# Patient Record
Sex: MALE | Race: WHITE | Employment: UNEMPLOYED | URBAN - NONMETROPOLITAN AREA
[De-identification: names, ages, dates, MRNs, and addresses within clinical notes are randomized per-mention and may not be internally consistent; named-entity substitution may affect disease eponyms.]

---

## 2017-08-27 ENCOUNTER — HOSPITAL ENCOUNTER (EMERGENCY)
Age: 27
Discharge: HOME | End: 2017-08-27

## 2017-08-27 VITALS
WEIGHT: 165 LBS | RESPIRATION RATE: 16 BRPM | TEMPERATURE: 99 F | DIASTOLIC BLOOD PRESSURE: 68 MMHG | OXYGEN SATURATION: 97 % | SYSTOLIC BLOOD PRESSURE: 136 MMHG | HEART RATE: 68 BPM | BODY MASS INDEX: 23.1 KG/M2 | HEIGHT: 71 IN

## 2017-08-27 DIAGNOSIS — K52.9 ACUTE GASTROENTERITIS: Primary | ICD-10-CM

## 2017-08-27 PROCEDURE — 99212 OFFICE O/P EST SF 10 MIN: CPT | Performed by: NURSE PRACTITIONER

## 2017-08-27 PROCEDURE — 99212 OFFICE O/P EST SF 10 MIN: CPT

## 2017-08-27 ASSESSMENT — PAIN SCALES - GENERAL: PAINLEVEL_OUTOF10: 2

## 2017-08-27 ASSESSMENT — ENCOUNTER SYMPTOMS
NAUSEA: 1
COUGH: 0
RHINORRHEA: 0
WHEEZING: 0
EYE DISCHARGE: 0
CONSTIPATION: 0
ABDOMINAL PAIN: 0
BACK PAIN: 0
COLOR CHANGE: 0
EYE PAIN: 0
DIARRHEA: 1
VOMITING: 1
SHORTNESS OF BREATH: 0
STRIDOR: 0
SORE THROAT: 0

## 2017-08-27 ASSESSMENT — PAIN DESCRIPTION - LOCATION: LOCATION: ABDOMEN

## 2017-12-11 ENCOUNTER — HOSPITAL ENCOUNTER (EMERGENCY)
Age: 27
Discharge: HOME | End: 2017-12-11

## 2017-12-11 VITALS
HEIGHT: 71 IN | WEIGHT: 180 LBS | HEART RATE: 76 BPM | SYSTOLIC BLOOD PRESSURE: 122 MMHG | BODY MASS INDEX: 25.2 KG/M2 | OXYGEN SATURATION: 98 % | RESPIRATION RATE: 16 BRPM | DIASTOLIC BLOOD PRESSURE: 72 MMHG | TEMPERATURE: 98.1 F

## 2017-12-11 DIAGNOSIS — K52.9 GASTROENTERITIS: Primary | ICD-10-CM

## 2017-12-11 DIAGNOSIS — H65.01 RIGHT ACUTE SEROUS OTITIS MEDIA, RECURRENCE NOT SPECIFIED: ICD-10-CM

## 2017-12-11 PROCEDURE — 99213 OFFICE O/P EST LOW 20 MIN: CPT | Performed by: NURSE PRACTITIONER

## 2017-12-11 PROCEDURE — 99213 OFFICE O/P EST LOW 20 MIN: CPT

## 2017-12-11 PROCEDURE — 6360000002 HC RX W HCPCS: Performed by: NURSE PRACTITIONER

## 2017-12-11 RX ORDER — ONDANSETRON 4 MG/1
4 TABLET, ORALLY DISINTEGRATING ORAL EVERY 8 HOURS PRN
Qty: 15 TABLET | Refills: 0 | Status: SHIPPED | OUTPATIENT
Start: 2017-12-11 | End: 2017-12-16

## 2017-12-11 RX ORDER — ONDANSETRON 4 MG/1
4 TABLET, ORALLY DISINTEGRATING ORAL ONCE
Status: COMPLETED | OUTPATIENT
Start: 2017-12-11 | End: 2017-12-11

## 2017-12-11 RX ORDER — ZINC OXIDE 13 %
1 CREAM (GRAM) TOPICAL 2 TIMES DAILY
Qty: 30 CAPSULE | Refills: 0 | Status: SHIPPED | OUTPATIENT
Start: 2017-12-11 | End: 2017-12-26

## 2017-12-11 RX ORDER — AMOXICILLIN 500 MG/1
500 CAPSULE ORAL 3 TIMES DAILY
Qty: 30 CAPSULE | Refills: 0 | Status: SHIPPED | OUTPATIENT
Start: 2017-12-14 | End: 2017-12-24

## 2017-12-11 RX ADMIN — ONDANSETRON 4 MG: 4 TABLET, ORALLY DISINTEGRATING ORAL at 10:05

## 2017-12-11 ASSESSMENT — ENCOUNTER SYMPTOMS
COUGH: 1
SHORTNESS OF BREATH: 0
RHINORRHEA: 1
APNEA: 0
DIARRHEA: 0
STRIDOR: 0
WHEEZING: 0
CHOKING: 0
NAUSEA: 1
CHEST TIGHTNESS: 0
ABDOMINAL PAIN: 0
SORE THROAT: 0

## 2017-12-11 ASSESSMENT — PAIN DESCRIPTION - LOCATION: LOCATION: EAR

## 2017-12-11 ASSESSMENT — PAIN DESCRIPTION - ORIENTATION: ORIENTATION: RIGHT

## 2017-12-11 ASSESSMENT — PAIN SCALES - GENERAL: PAINLEVEL_OUTOF10: 5

## 2017-12-11 ASSESSMENT — PAIN DESCRIPTION - PAIN TYPE: TYPE: ACUTE PAIN

## 2017-12-11 NOTE — ED PROVIDER NOTES
ALBUTEROL SULFATE  (90 BASE) MCG/ACT INHALER    Inhale 2 puffs into the lungs every 6 hours as needed for Wheezing       ALLERGIES     Patient is has No Known Allergies. FAMILY HISTORY     Patient's family history includes Heart Disease in his father. SOCIAL HISTORY     Patient  reports that he has been smoking Cigarettes. He has a 5.00 pack-year smoking history. His smokeless tobacco use includes Chew. He reports that he does not drink alcohol or use drugs. PHYSICAL EXAM     ED TRIAGE VITALS  BP: 122/72, Temp: 98.1 °F (36.7 °C), Pulse: 76, Resp: 16, SpO2: 98 %  Physical Exam   Constitutional: He is oriented to person, place, and time. He appears well-developed and well-nourished. No distress. HENT:   Head: Normocephalic and atraumatic. Right Ear: Hearing, external ear and ear canal normal. There is hemotympanum. Left Ear: Hearing, external ear and ear canal normal. Tympanic membrane is retracted. Nose: Mucosal edema and rhinorrhea present. Mouth/Throat: Uvula is midline and mucous membranes are normal. Posterior oropharyngeal edema and posterior oropharyngeal erythema present. Eyes: Conjunctivae and EOM are normal.   Neck: Normal range of motion. Cardiovascular: Normal rate, regular rhythm and normal heart sounds. Exam reveals no gallop and no friction rub. No murmur heard. Pulmonary/Chest: Effort normal and breath sounds normal. No respiratory distress. He has no wheezes. He has no rales. He exhibits no tenderness. Abdominal: Soft. Normal appearance and bowel sounds are normal. He exhibits no distension and no mass. There is tenderness in the left lower quadrant. There is no rigidity, no rebound and no guarding. Musculoskeletal: Normal range of motion. Neurological: He is alert and oriented to person, place, and time. Skin: Skin is warm and dry. He is not diaphoretic. Psychiatric: He has a normal mood and affect.  His behavior is normal. Judgment and thought content normal.   Nursing note and vitals reviewed. DIAGNOSTIC RESULTS   Labs:No results found for this visit on 12/11/17. IMAGING:  No orders to display     URGENT CARE COURSE:     Vitals:    12/11/17 0951   BP: 122/72   Pulse: 76   Resp: 16   Temp: 98.1 °F (36.7 °C)   TempSrc: Oral   SpO2: 98%   Weight: 180 lb (81.6 kg)   Height: 5' 11\" (1.803 m)       Medications   ondansetron (ZOFRAN-ODT) disintegrating tablet 4 mg (4 mg Oral Given 12/11/17 1005)     PROCEDURES:  None  FINAL IMPRESSION      1. Gastroenteritis    2. Right acute serous otitis media, recurrence not specified        DISPOSITION/PLAN      These symptoms are consistent with viral gastroenteritis. I recommend Clear Liquids only next 12-24 hours. If tolerated then BRAT Diet . Advise the patient that if worsening symptoms such as more than 6 stools per day, not voiding regularly, persistent vomiting not relieved with medication,unable to take oral fluids, high fever, severe weakness, lightheadedness or fainting, dry mucous membranes or other signs of dehydration, persisting or increasing abdominal pain, blood in stool or vomit, or failure to improve in 1-2 days. The patient needs to be reevaluated at that time by their primary care provider for a recheck, OR go the Emergency Department for reevaluation. The patient or patient's representative are agreeable to the treatment plan and left ambulatory without any complaints at this time. The parent or patient representative was advised that at this point the patient can be treated safely at home, the parent or Patient representative should be aware of following interventions and  advised to the watch for the following:  #1. Any increasing pain not controlled with Motrin or Tylenol. #2. Any development of drainage from the ears redness of the auricle or posterior ear.   #3.  Her development of the any fever chills, headache or stiffness of the neck the patient needs to be reevaluated by the primary care provider, return here or go to the emergency department for reevaluation. The patient or patient's representative are agreeable to the outpatient management at this time. They are advised to follow-up with her primary care provider in 2-3 days for reevaluation. The patient left ambulatory without any problems. PATIENT REFERRED TO:  Kristi Martínez CNP  [address] [facility]  [address]  [place] [address]  [phone]    Schedule an appointment as soon as possible for a visit   If symptoms worsen    DISCHARGE MEDICATIONS:  New Prescriptions    AMOXICILLIN (AMOXIL) 500 MG CAPSULE    Take 1 capsule by mouth 3 times daily for 10 days    ONDANSETRON (ZOFRAN ODT) 4 MG DISINTEGRATING TABLET    Take 1 tablet by mouth every 8 hours as needed for Nausea    PROBIOTIC PRODUCT (PROBIOTIC DAILY) CAPS    Take 1 capsule by mouth 2 times daily for 15 days    SODIUM CHLORIDE (AYR SALINE NASAL DROPS) 0.65 % (SOLN) SOLN NASAL DROPS    2 drops by Each Nare route as needed (congestion)     Current Discharge Medication List          MEENAKSHI Massey NP  12/11/17 Donald Deputado [facility] 136 Kristi Martínez NP  12/11/17 1012

## 2018-01-11 ENCOUNTER — TELEPHONE (OUTPATIENT)
Dept: INTERNAL MEDICINE CLINIC | Age: 28
End: 2018-01-11

## 2018-03-03 ENCOUNTER — HOSPITAL ENCOUNTER (EMERGENCY)
Age: 28
Discharge: HOME | End: 2018-03-03

## 2018-03-03 VITALS
BODY MASS INDEX: 26.05 KG/M2 | SYSTOLIC BLOOD PRESSURE: 132 MMHG | HEART RATE: 61 BPM | RESPIRATION RATE: 16 BRPM | OXYGEN SATURATION: 98 % | TEMPERATURE: 98.9 F | HEIGHT: 70 IN | DIASTOLIC BLOOD PRESSURE: 84 MMHG | WEIGHT: 182 LBS

## 2018-03-03 DIAGNOSIS — H66.001 ACUTE SUPPURATIVE OTITIS MEDIA OF RIGHT EAR WITHOUT SPONTANEOUS RUPTURE OF TYMPANIC MEMBRANE, RECURRENCE NOT SPECIFIED: Primary | ICD-10-CM

## 2018-03-03 PROCEDURE — 99214 OFFICE O/P EST MOD 30 MIN: CPT | Performed by: NURSE PRACTITIONER

## 2018-03-03 PROCEDURE — 99212 OFFICE O/P EST SF 10 MIN: CPT

## 2018-03-03 RX ORDER — AMOXICILLIN 500 MG/1
500 CAPSULE ORAL 2 TIMES DAILY
Qty: 20 CAPSULE | Refills: 0 | Status: SHIPPED | OUTPATIENT
Start: 2018-03-03 | End: 2018-03-13

## 2018-03-03 ASSESSMENT — PAIN DESCRIPTION - LOCATION: LOCATION: EAR

## 2018-03-03 ASSESSMENT — PAIN SCALES - GENERAL: PAINLEVEL_OUTOF10: 7

## 2018-03-03 ASSESSMENT — PAIN DESCRIPTION - ORIENTATION: ORIENTATION: RIGHT

## 2018-03-06 ASSESSMENT — ENCOUNTER SYMPTOMS
SHORTNESS OF BREATH: 0
VOMITING: 0
SORE THROAT: 0
RHINORRHEA: 0
VOICE CHANGE: 0
WHEEZING: 0
DIARRHEA: 0
COUGH: 1
SINUS PRESSURE: 0
TROUBLE SWALLOWING: 0
CHEST TIGHTNESS: 0
STRIDOR: 0

## 2018-03-06 NOTE — ED PROVIDER NOTES
(SUDAFED 24 HOUR NON-DROWSY) 240 MG TB24 extended release tablet Take 1 tablet by mouth daily as needed (congestion) Daily for nasal congestion, Disp-20 tablet, R-0Normal           Discharge Medication List as of 3/3/2018  1:55 PM          Patient given educational materials - see patient instructions. Discussed use, benefit, and side effects of prescribed medications. All patient questions answered. Pt voiced understanding. Reviewed health maintenance. Patient agreed with treatment plan. Follow up as directed.      Radha Akins, [address] [place] [address], [facility]  03/06/18 0177

## 2018-05-31 ENCOUNTER — HOSPITAL ENCOUNTER (EMERGENCY)
Age: 28
Discharge: HOME | End: 2018-05-31

## 2018-05-31 VITALS
HEART RATE: 84 BPM | OXYGEN SATURATION: 96 % | DIASTOLIC BLOOD PRESSURE: 75 MMHG | WEIGHT: 179 LBS | HEIGHT: 70 IN | RESPIRATION RATE: 16 BRPM | SYSTOLIC BLOOD PRESSURE: 137 MMHG | TEMPERATURE: 98.4 F | BODY MASS INDEX: 25.62 KG/M2

## 2018-05-31 DIAGNOSIS — S46.912A STRAIN OF LEFT SHOULDER, INITIAL ENCOUNTER: Primary | ICD-10-CM

## 2018-05-31 PROCEDURE — 99212 OFFICE O/P EST SF 10 MIN: CPT

## 2018-05-31 PROCEDURE — 96372 THER/PROPH/DIAG INJ SC/IM: CPT

## 2018-05-31 PROCEDURE — A4565 SLINGS: HCPCS

## 2018-05-31 PROCEDURE — 99212 OFFICE O/P EST SF 10 MIN: CPT | Performed by: NURSE PRACTITIONER

## 2018-05-31 RX ORDER — NAPROXEN 500 MG/1
500 TABLET ORAL 2 TIMES DAILY WITH MEALS
Qty: 30 TABLET | Refills: 0 | Status: SHIPPED | OUTPATIENT
Start: 2018-05-31 | End: 2018-11-05 | Stop reason: ALTCHOICE

## 2018-05-31 RX ORDER — CYCLOBENZAPRINE HCL 10 MG
10 TABLET ORAL 3 TIMES DAILY PRN
Qty: 15 TABLET | Refills: 0 | Status: SHIPPED | OUTPATIENT
Start: 2018-05-31 | End: 2018-08-27 | Stop reason: ALTCHOICE

## 2018-05-31 ASSESSMENT — PAIN DESCRIPTION - LOCATION: LOCATION: SHOULDER

## 2018-05-31 ASSESSMENT — PAIN SCALES - GENERAL: PAINLEVEL_OUTOF10: 2

## 2018-05-31 ASSESSMENT — ENCOUNTER SYMPTOMS
SHORTNESS OF BREATH: 0
CHEST TIGHTNESS: 0
COLOR CHANGE: 0

## 2018-08-27 ENCOUNTER — HOSPITAL ENCOUNTER (EMERGENCY)
Dept: GENERAL RADIOLOGY | Age: 28
Discharge: HOME | End: 2018-08-27

## 2018-08-27 ENCOUNTER — HOSPITAL ENCOUNTER (EMERGENCY)
Age: 28
Discharge: HOME | End: 2018-08-27

## 2018-08-27 VITALS
HEIGHT: 70 IN | BODY MASS INDEX: 25.05 KG/M2 | HEART RATE: 72 BPM | TEMPERATURE: 98.6 F | RESPIRATION RATE: 16 BRPM | WEIGHT: 175 LBS | OXYGEN SATURATION: 97 % | SYSTOLIC BLOOD PRESSURE: 143 MMHG | DIASTOLIC BLOOD PRESSURE: 85 MMHG

## 2018-08-27 DIAGNOSIS — S61.215A LACERATION OF LEFT RING FINGER WITHOUT FOREIGN BODY WITHOUT DAMAGE TO NAIL, INITIAL ENCOUNTER: Primary | ICD-10-CM

## 2018-08-27 PROCEDURE — 12001 RPR S/N/AX/GEN/TRNK 2.5CM/<: CPT

## 2018-08-27 PROCEDURE — 2500000003 HC RX 250 WO HCPCS

## 2018-08-27 PROCEDURE — 2709999900 HC NON-CHARGEABLE SUPPLY

## 2018-08-27 PROCEDURE — L3933 FO W/O JOINTS CF: HCPCS

## 2018-08-27 PROCEDURE — 99214 OFFICE O/P EST MOD 30 MIN: CPT | Performed by: NURSE PRACTITIONER

## 2018-08-27 PROCEDURE — 99213 OFFICE O/P EST LOW 20 MIN: CPT

## 2018-08-27 PROCEDURE — 12001 RPR S/N/AX/GEN/TRNK 2.5CM/<: CPT | Performed by: NURSE PRACTITIONER

## 2018-08-27 PROCEDURE — 64450 NJX AA&/STRD OTHER PN/BRANCH: CPT

## 2018-08-27 PROCEDURE — 73140 X-RAY EXAM OF FINGER(S): CPT

## 2018-08-27 RX ORDER — LIDOCAINE HYDROCHLORIDE 20 MG/ML
INJECTION, SOLUTION INFILTRATION; PERINEURAL
Status: COMPLETED
Start: 2018-08-27 | End: 2018-08-27

## 2018-08-27 RX ORDER — GINSENG 100 MG
CAPSULE ORAL
Qty: 1 TUBE | Refills: 0 | Status: SHIPPED | OUTPATIENT
Start: 2018-08-27 | End: 2018-11-05 | Stop reason: ALTCHOICE

## 2018-08-27 RX ORDER — DIAPER,BRIEF,INFANT-TODD,DISP
EACH MISCELLANEOUS ONCE
Status: COMPLETED | OUTPATIENT
Start: 2018-08-27 | End: 2018-08-27

## 2018-08-27 RX ORDER — DIAPER,BRIEF,INFANT-TODD,DISP
EACH MISCELLANEOUS
Status: DISCONTINUED
Start: 2018-08-27 | End: 2018-08-27 | Stop reason: HOSPADM

## 2018-08-27 RX ADMIN — Medication: at 11:54

## 2018-08-27 RX ADMIN — LIDOCAINE HYDROCHLORIDE: 20 INJECTION, SOLUTION INFILTRATION; PERINEURAL at 11:57

## 2018-08-27 ASSESSMENT — ENCOUNTER SYMPTOMS
DIARRHEA: 0
VOMITING: 0
COLOR CHANGE: 0
NAUSEA: 0
SHORTNESS OF BREATH: 0

## 2018-08-27 ASSESSMENT — PAIN SCALES - GENERAL: PAINLEVEL_OUTOF10: 3

## 2018-08-27 ASSESSMENT — PAIN DESCRIPTION - PAIN TYPE: TYPE: ACUTE PAIN

## 2018-08-27 ASSESSMENT — PAIN DESCRIPTION - ORIENTATION: ORIENTATION: LEFT

## 2018-08-27 ASSESSMENT — PAIN DESCRIPTION - DESCRIPTORS: DESCRIPTORS: ACHING

## 2018-08-27 ASSESSMENT — PAIN DESCRIPTION - LOCATION: LOCATION: FINGER (COMMENT WHICH ONE)

## 2018-08-27 NOTE — ED PROVIDER NOTES
Ganes[facility] L[facility]  Urgent Care Encounter       CHIEF COMPLAINT       Chief Complaint   Patient presents with    Finger Injury     Left, 4th digit       Nurses Notes reviewed and I agree except as noted in the HPI. HISTORY OF PRESENT ILLNESS   Lilly Zamudio is a 33 y.o. male who presents For complaints of a left ring finger laceration. The patient dropped an item on it at work. He denies decrease in range of motion. He states that this is not a contaminated wound. He denies numbness or tingling, decreased sensation, decreased range of motion. His last tetanus shot was 4 months ago. HPI    REVIEW OF SYSTEMS     Review of Systems   Constitutional: Negative for activity change, appetite change, chills and fever. Respiratory: Negative for shortness of breath. Cardiovascular: Negative for chest pain. Gastrointestinal: Negative for diarrhea, nausea and vomiting. Skin: Positive for wound (Left ring finger). Negative for color change, pallor and rash. PAST MEDICAL HISTORY         Diagnosis Date    Asthma     Depression     Heart murmur     Multiple allergies        SURGICAL HISTORY     Patient  has a past surgical history that includes joint replacement (Left, 2011) and joint replacement. CURRENT MEDICATIONS       Discharge Medication List as of 8/27/2018 11:43 AM      CONTINUE these medications which have NOT CHANGED    Details   naproxen (NAPROSYN) 500 MG tablet Take 1 tablet by mouth 2 times daily (with meals), Disp-30 tablet, R-0Normal             ALLERGIES     Patient is has No Known Allergies. Patients   There is no immunization history on file for this patient. FAMILY HISTORY     Patient's family history includes Heart Disease in his father. SOCIAL HISTORY     Patient  reports that he has been smoking Cigarettes. He has a 5.00 pack-year smoking history. His smokeless tobacco use includes Chew.  He reports that he does not drink alcohol or use Other 8/27/18 1157)   bacitracin zinc ointment ( Topical Given 8/27/18 1150)            PROCEDURES:  Lac Repair  Date/Time: 8/27/2018 12:02 PM  Performed by: Ethel Koehler  Authorized by: Ethel Koehler     Consent:     Consent obtained:  Verbal    Consent given by:  Patient    Risks discussed:  Infection, need for additional repair and poor cosmetic result  Universal protocol:     Procedure explained and questions answered to patient or proxy's satisfaction: yes      Relevant documents present and verified: yes      Test results available and properly labeled: yes      Imaging studies available: yes      Required blood products, implants, devices, and special equipment available: yes      Site/side marked: yes      Immediately prior to procedure, a time out was called: yes      Patient identity confirmed:  Verbally with patient and arm band  Anesthesia (see MAR for exact dosages):      Anesthesia method:  Nerve block    Block location:  Left ring finger    Block needle gauge:  25 G    Block anesthetic:  Lidocaine 2% w/o epi    Block technique:  Transthecal finger block    Block injection procedure:  Anatomic landmarks identified, introduced needle, incremental injection, negative aspiration for blood and anatomic landmarks palpated    Block outcome:  Anesthesia achieved  Laceration details:     Location:  Finger    Finger location:  L ring finger    Length (cm):  2.5    Depth (mm):  3  Repair type:     Repair type:  Simple  Pre-procedure details:     Preparation:  Patient was prepped and draped in usual sterile fashion  Exploration:     Hemostasis achieved with:  Direct pressure    Wound exploration: wound explored through full range of motion and entire depth of wound probed and visualized      Wound extent: no areolar tissue violation noted, no foreign bodies/material noted, no muscle damage noted, no nerve damage noted and no tendon damage noted      Contaminated: no    Treatment:     Area cleansed with:  Betadine 8/27/2018 11:43 AM          Discharge Medication List as of 8/27/2018 11:43 AM          LEYDI Gay CNP    (Please note that portions of this note were completed with a voice recognition program.  Efforts were made to edit the dictations but occasionally words are mis-transcribed.)           LEYDI Gay CNP  08/27/18 8035

## 2018-11-05 ENCOUNTER — HOSPITAL ENCOUNTER (EMERGENCY)
Age: 28
Discharge: HOME | End: 2018-11-05

## 2018-11-05 VITALS
BODY MASS INDEX: 25.68 KG/M2 | RESPIRATION RATE: 16 BRPM | WEIGHT: 179 LBS | OXYGEN SATURATION: 98 % | TEMPERATURE: 98.4 F | SYSTOLIC BLOOD PRESSURE: 132 MMHG | HEART RATE: 72 BPM | DIASTOLIC BLOOD PRESSURE: 79 MMHG

## 2018-11-05 DIAGNOSIS — Z20.2 EXPOSURE TO CHLAMYDIA: Primary | ICD-10-CM

## 2018-11-05 DIAGNOSIS — Z72.0 TOBACCO USE: ICD-10-CM

## 2018-11-05 DIAGNOSIS — Z11.3 SCREENING EXAMINATION FOR STD (SEXUALLY TRANSMITTED DISEASE): ICD-10-CM

## 2018-11-05 LAB
BILIRUBIN URINE: NEGATIVE
BLOOD, URINE: NEGATIVE
CHARACTER, URINE: CLEAR
CHLAMYDIA TRACHOMATIS BY RT-PCR: NOT DETECTED
COLOR: YELLOW
CT/NG SOURCE: NORMAL
GLUCOSE, URINE: NEGATIVE MG/DL
KETONES, URINE: NEGATIVE
LEUKOCYTES, UA: NEGATIVE
NEISSERIA GONORRHOEAE BY RT-PCR: NOT DETECTED
NITRATE, UA: NEGATIVE
PH UA: 5.5 (ref 5–9)
PROTEIN UA: NEGATIVE MG/DL
REFLEX TO URINE C & S: NORMAL
SPECIFIC GRAVITY UA: >= 1.03 (ref 1–1.03)
UROBILINOGEN, URINE: 0.2 EU/DL (ref 0–1)

## 2018-11-05 PROCEDURE — 2500000003 HC RX 250 WO HCPCS: Performed by: NURSE PRACTITIONER

## 2018-11-05 PROCEDURE — 99213 OFFICE O/P EST LOW 20 MIN: CPT | Performed by: NURSE PRACTITIONER

## 2018-11-05 PROCEDURE — 6360000002 HC RX W HCPCS: Performed by: NURSE PRACTITIONER

## 2018-11-05 PROCEDURE — 81003 URINALYSIS AUTO W/O SCOPE: CPT

## 2018-11-05 PROCEDURE — 96372 THER/PROPH/DIAG INJ SC/IM: CPT

## 2018-11-05 PROCEDURE — 87591 N.GONORRHOEAE DNA AMP PROB: CPT

## 2018-11-05 PROCEDURE — 6370000000 HC RX 637 (ALT 250 FOR IP): Performed by: NURSE PRACTITIONER

## 2018-11-05 PROCEDURE — 99213 OFFICE O/P EST LOW 20 MIN: CPT

## 2018-11-05 PROCEDURE — 87491 CHLMYD TRACH DNA AMP PROBE: CPT

## 2018-11-05 RX ORDER — AZITHROMYCIN 250 MG/1
1000 TABLET, FILM COATED ORAL ONCE
Status: COMPLETED | OUTPATIENT
Start: 2018-11-05 | End: 2018-11-05

## 2018-11-05 RX ORDER — METRONIDAZOLE 500 MG/1
2000 TABLET ORAL ONCE
Status: COMPLETED | OUTPATIENT
Start: 2018-11-05 | End: 2018-11-05

## 2018-11-05 RX ADMIN — AZITHROMYCIN 1000 MG: 250 TABLET, FILM COATED ORAL at 10:06

## 2018-11-05 RX ADMIN — LIDOCAINE HYDROCHLORIDE 250 MG: 10 INJECTION, SOLUTION EPIDURAL; INFILTRATION; INTRACAUDAL; PERINEURAL at 10:06

## 2018-11-05 RX ADMIN — METRONIDAZOLE 2000 MG: 500 TABLET ORAL at 10:06

## 2018-11-05 ASSESSMENT — ENCOUNTER SYMPTOMS
NAUSEA: 0
SHORTNESS OF BREATH: 0
ABDOMINAL PAIN: 0
DIARRHEA: 0
VOMITING: 0
CHEST TIGHTNESS: 0

## 2018-11-05 NOTE — ED PROVIDER NOTES
Screening examination for STD (sexually transmitted disease)    3. Tobacco use        DISPOSITION/PLAN   DISPOSITION Decision To Discharge 11/05/2018 09:55:16 AM    Chlamydia and gonorrhea cultures pending. Treated as above here in the urgent care. Refrain from all sexual activity for the next 7 days. Notify your sexual partner(s) regarding positive results so that they can be tested and treated as well. Patient has been encouraged to use condoms with each sexual encounter. [facility] Department  Sexually Transmitted Disease Clinic                Address: [address], [facility], [address]   [phone]  Regarding syphilis and HIV testing as per [facility] [place] guidelines  [address] [place] [address]                                                               [phone]    Smoking cessation     PATIENT REFERRED TO:  [facility] Department  [address] [facility] [email or web address] [id number] [phone]    Schedule an appointment as soon as possible for a visit in 3 days  for further evalation, If symptoms worsen, GO DIRECTLY TO THE EMERGENCY DEPARTMENT    DISCHARGE MEDICATIONS:  New Prescriptions    No medications on file     There are no discharge medications for this patient.       LEYDI Holden CNP, APRN - CNP  11/05/18 1010

## 2018-11-26 ENCOUNTER — HOSPITAL ENCOUNTER (OUTPATIENT)
Dept: GENERAL RADIOLOGY | Age: 28
Discharge: HOME | End: 2018-11-26

## 2018-11-26 ENCOUNTER — HOSPITAL ENCOUNTER (OUTPATIENT)
Age: 28
Discharge: HOME | End: 2018-11-26

## 2018-11-26 DIAGNOSIS — R52 PAIN: ICD-10-CM

## 2018-12-03 ENCOUNTER — HOSPITAL ENCOUNTER (EMERGENCY)
Age: 28
Discharge: HOME | End: 2018-12-03

## 2018-12-03 VITALS
HEART RATE: 66 BPM | OXYGEN SATURATION: 98 % | BODY MASS INDEX: 25.83 KG/M2 | RESPIRATION RATE: 12 BRPM | WEIGHT: 180 LBS | SYSTOLIC BLOOD PRESSURE: 124 MMHG | TEMPERATURE: 98.2 F | DIASTOLIC BLOOD PRESSURE: 59 MMHG

## 2018-12-03 DIAGNOSIS — K52.9 GASTROENTERITIS: Primary | ICD-10-CM

## 2018-12-03 PROCEDURE — 99213 OFFICE O/P EST LOW 20 MIN: CPT

## 2018-12-03 PROCEDURE — 99212 OFFICE O/P EST SF 10 MIN: CPT | Performed by: NURSE PRACTITIONER

## 2018-12-03 PROCEDURE — 6360000002 HC RX W HCPCS: Performed by: NURSE PRACTITIONER

## 2018-12-03 RX ORDER — ONDANSETRON 4 MG/1
4 TABLET, ORALLY DISINTEGRATING ORAL ONCE
Status: COMPLETED | OUTPATIENT
Start: 2018-12-03 | End: 2018-12-03

## 2018-12-03 RX ORDER — ONDANSETRON 4 MG/1
4 TABLET, ORALLY DISINTEGRATING ORAL EVERY 8 HOURS PRN
Qty: 6 TABLET | Refills: 0 | Status: SHIPPED | OUTPATIENT
Start: 2018-12-03 | End: 2019-01-09 | Stop reason: ALTCHOICE

## 2018-12-03 RX ADMIN — ONDANSETRON 4 MG: 4 TABLET, ORALLY DISINTEGRATING ORAL at 10:29

## 2018-12-03 ASSESSMENT — ENCOUNTER SYMPTOMS
ABDOMINAL DISTENTION: 0
ANAL BLEEDING: 0
NAUSEA: 1
VOMITING: 0
SHORTNESS OF BREATH: 0
ABDOMINAL PAIN: 0
CONSTIPATION: 0
RECTAL PAIN: 0
DIARRHEA: 1
BLOOD IN STOOL: 0

## 2018-12-03 NOTE — ED PROVIDER NOTES
concerns at time of discharge. I did discuss the patient that the symptoms typically last 3-5 days. The patient was ambulatory out of the department in stable condition. PATIENTREFERRED TO:  No primary care provider on file. No primary physician on file.       DISCHARGEMEDICATIONS:  Discharge Medication List as of 12/3/2018 10:45 AM      START taking these medications    Details   ondansetron (ZOFRAN ODT) 4 MG disintegrating tablet Take 1 tablet by mouth every 8 hours as needed for Nausea or Vomiting, Disp-6 tablet, R-0Normal             Discharge Medication List as of 12/3/2018 10:45 AM          Discharge Medication List as of 12/3/2018 10:45 AM          LEYDI Niño CNP    (Please note that portions of this note were completed with a voice recognition program. Efforts were made to edit the dictations but occasionally words are mis-transcribed.)           LEYDI Niño CNP  12/03/18 9748

## 2019-01-09 ENCOUNTER — HOSPITAL ENCOUNTER (EMERGENCY)
Age: 29
Discharge: HOME | End: 2019-01-09

## 2019-01-09 VITALS
DIASTOLIC BLOOD PRESSURE: 69 MMHG | TEMPERATURE: 98.2 F | SYSTOLIC BLOOD PRESSURE: 149 MMHG | OXYGEN SATURATION: 99 % | HEART RATE: 68 BPM | WEIGHT: 172 LBS | BODY MASS INDEX: 24.68 KG/M2 | RESPIRATION RATE: 18 BRPM

## 2019-01-09 DIAGNOSIS — A08.4 VIRAL GASTROENTERITIS: Primary | ICD-10-CM

## 2019-01-09 PROCEDURE — 99213 OFFICE O/P EST LOW 20 MIN: CPT

## 2019-01-09 PROCEDURE — 99214 OFFICE O/P EST MOD 30 MIN: CPT | Performed by: NURSE PRACTITIONER

## 2019-01-09 RX ORDER — ONDANSETRON 4 MG/1
4 TABLET, FILM COATED ORAL EVERY 8 HOURS PRN
Qty: 12 TABLET | Refills: 0 | Status: SHIPPED | OUTPATIENT
Start: 2019-01-09 | End: 2019-02-25

## 2019-01-09 RX ORDER — LOPERAMIDE HYDROCHLORIDE 2 MG/1
2 CAPSULE ORAL 4 TIMES DAILY PRN
Refills: 0 | COMMUNITY
Start: 2019-01-09 | End: 2019-02-25

## 2019-01-09 ASSESSMENT — ENCOUNTER SYMPTOMS
VOMITING: 0
ABDOMINAL PAIN: 1
BACK PAIN: 0
NAUSEA: 1
VOICE CHANGE: 0
RHINORRHEA: 1
COUGH: 0
CONSTIPATION: 0
TROUBLE SWALLOWING: 0
BLOATING: 0
DIARRHEA: 1
SORE THROAT: 0

## 2019-01-09 ASSESSMENT — PAIN DESCRIPTION - PAIN TYPE: TYPE: ACUTE PAIN

## 2019-01-09 ASSESSMENT — PAIN DESCRIPTION - ORIENTATION: ORIENTATION: MID

## 2019-01-09 ASSESSMENT — PAIN SCALES - GENERAL: PAINLEVEL_OUTOF10: 3

## 2019-01-09 ASSESSMENT — PAIN - FUNCTIONAL ASSESSMENT: PAIN_FUNCTIONAL_ASSESSMENT: PREVENTS OR INTERFERES SOME ACTIVE ACTIVITIES AND ADLS

## 2019-01-09 ASSESSMENT — PAIN DESCRIPTION - FREQUENCY: FREQUENCY: CONTINUOUS

## 2019-01-09 ASSESSMENT — PAIN DESCRIPTION - ONSET: ONSET: PROGRESSIVE

## 2019-01-09 ASSESSMENT — PAIN DESCRIPTION - PROGRESSION: CLINICAL_PROGRESSION: GRADUALLY WORSENING

## 2019-01-09 ASSESSMENT — PAIN DESCRIPTION - LOCATION: LOCATION: ABDOMEN

## 2019-01-09 ASSESSMENT — PAIN DESCRIPTION - DESCRIPTORS: DESCRIPTORS: CRAMPING

## 2019-02-25 ENCOUNTER — HOSPITAL ENCOUNTER (EMERGENCY)
Age: 29
Discharge: HOME | End: 2019-02-25

## 2019-02-25 VITALS
BODY MASS INDEX: 24.39 KG/M2 | TEMPERATURE: 99.2 F | HEART RATE: 79 BPM | RESPIRATION RATE: 16 BRPM | WEIGHT: 170 LBS | DIASTOLIC BLOOD PRESSURE: 80 MMHG | SYSTOLIC BLOOD PRESSURE: 133 MMHG | OXYGEN SATURATION: 96 %

## 2019-02-25 DIAGNOSIS — R51.9 NONINTRACTABLE HEADACHE, UNSPECIFIED CHRONICITY PATTERN, UNSPECIFIED HEADACHE TYPE: Primary | ICD-10-CM

## 2019-02-25 DIAGNOSIS — J01.00 ACUTE MAXILLARY SINUSITIS, RECURRENCE NOT SPECIFIED: ICD-10-CM

## 2019-02-25 PROCEDURE — 99213 OFFICE O/P EST LOW 20 MIN: CPT | Performed by: NURSE PRACTITIONER

## 2019-02-25 PROCEDURE — 6360000002 HC RX W HCPCS: Performed by: NURSE PRACTITIONER

## 2019-02-25 PROCEDURE — 99214 OFFICE O/P EST MOD 30 MIN: CPT

## 2019-02-25 PROCEDURE — 96372 THER/PROPH/DIAG INJ SC/IM: CPT

## 2019-02-25 RX ORDER — GUAIFENESIN 600 MG/1
1200 TABLET, EXTENDED RELEASE ORAL 2 TIMES DAILY
Qty: 40 TABLET | Refills: 0 | Status: SHIPPED | OUTPATIENT
Start: 2019-02-25 | End: 2019-03-07

## 2019-02-25 RX ORDER — KETOROLAC TROMETHAMINE 30 MG/ML
30 INJECTION, SOLUTION INTRAMUSCULAR; INTRAVENOUS ONCE
Status: COMPLETED | OUTPATIENT
Start: 2019-02-25 | End: 2019-02-25

## 2019-02-25 RX ORDER — FLUTICASONE PROPIONATE 50 MCG
1 SPRAY, SUSPENSION (ML) NASAL DAILY
Qty: 1 BOTTLE | Refills: 0 | Status: SHIPPED | OUTPATIENT
Start: 2019-02-25 | End: 2020-06-01 | Stop reason: SDUPTHER

## 2019-02-25 RX ADMIN — KETOROLAC TROMETHAMINE 30 MG: 30 INJECTION, SOLUTION INTRAMUSCULAR at 19:48

## 2019-02-25 ASSESSMENT — PAIN - FUNCTIONAL ASSESSMENT: PAIN_FUNCTIONAL_ASSESSMENT: PREVENTS OR INTERFERES SOME ACTIVE ACTIVITIES AND ADLS

## 2019-02-25 ASSESSMENT — PAIN DESCRIPTION - DESCRIPTORS: DESCRIPTORS: ACHING;DULL

## 2019-02-25 ASSESSMENT — PAIN DESCRIPTION - ONSET: ONSET: GRADUAL

## 2019-02-25 ASSESSMENT — PAIN DESCRIPTION - PAIN TYPE: TYPE: ACUTE PAIN

## 2019-02-25 ASSESSMENT — ENCOUNTER SYMPTOMS
EYE REDNESS: 1
EYE DISCHARGE: 0
EYE ITCHING: 0
RESPIRATORY NEGATIVE: 1
PHOTOPHOBIA: 1
EYE PAIN: 0
GASTROINTESTINAL NEGATIVE: 1

## 2019-02-25 ASSESSMENT — PAIN SCALES - GENERAL
PAINLEVEL_OUTOF10: 5
PAINLEVEL_OUTOF10: 8

## 2019-02-25 ASSESSMENT — PAIN DESCRIPTION - LOCATION: LOCATION: HEAD

## 2019-09-23 ENCOUNTER — HOSPITAL ENCOUNTER (EMERGENCY)
Age: 29
Discharge: HOME | End: 2019-09-23

## 2019-09-23 VITALS
BODY MASS INDEX: 24.62 KG/M2 | SYSTOLIC BLOOD PRESSURE: 115 MMHG | DIASTOLIC BLOOD PRESSURE: 77 MMHG | WEIGHT: 172 LBS | TEMPERATURE: 97.9 F | HEIGHT: 70 IN | RESPIRATION RATE: 16 BRPM | OXYGEN SATURATION: 99 % | HEART RATE: 74 BPM

## 2019-09-23 DIAGNOSIS — H65.91 MIDDLE EAR EFFUSION, RIGHT: Primary | ICD-10-CM

## 2019-09-23 PROCEDURE — 99214 OFFICE O/P EST MOD 30 MIN: CPT | Performed by: NURSE PRACTITIONER

## 2019-09-23 PROCEDURE — 99212 OFFICE O/P EST SF 10 MIN: CPT

## 2019-09-23 RX ORDER — AMOXICILLIN 500 MG/1
500 CAPSULE ORAL 3 TIMES DAILY
COMMUNITY
End: 2020-06-01 | Stop reason: ALTCHOICE

## 2019-09-23 RX ORDER — PREDNISONE 20 MG/1
40 TABLET ORAL DAILY
Qty: 14 TABLET | Refills: 0 | Status: SHIPPED | OUTPATIENT
Start: 2019-09-23 | End: 2019-09-30

## 2019-09-23 ASSESSMENT — ENCOUNTER SYMPTOMS
EYE REDNESS: 0
SORE THROAT: 0
NAUSEA: 0
VOMITING: 0
SHORTNESS OF BREATH: 0
COUGH: 0
EYE ITCHING: 0

## 2019-09-23 ASSESSMENT — PAIN DESCRIPTION - ORIENTATION: ORIENTATION: RIGHT

## 2019-09-23 ASSESSMENT — PAIN SCALES - GENERAL: PAINLEVEL_OUTOF10: 3

## 2019-09-23 ASSESSMENT — PAIN - FUNCTIONAL ASSESSMENT: PAIN_FUNCTIONAL_ASSESSMENT: ACTIVITIES ARE NOT PREVENTED

## 2019-09-23 ASSESSMENT — PAIN DESCRIPTION - LOCATION: LOCATION: EAR

## 2019-09-23 NOTE — ED PROVIDER NOTES
1 spray by Nasal route daily for 7 days       ALLERGIES     Patient is has No Known Allergies. Patients   There is no immunization history on file for this patient. FAMILY HISTORY     Patient's family history includes Depression in his mother; Heart Disease in his father. SOCIAL HISTORY     Patient  reports that he has been smoking cigarettes. He has a 5.00 pack-year smoking history. He has quit using smokeless tobacco.  His smokeless tobacco use included chew. He reports that he does not drink alcohol or use drugs. PHYSICAL EXAM     ED TRIAGE VITALS  BP: 115/77, Temp: 97.9 °F (36.6 °C), Pulse: 74, Resp: 16, SpO2: 99 %,Estimated body mass index is 24.68 kg/m² as calculated from the following:    Height as of this encounter: 5' 10\" (1.778 m). Weight as of this encounter: 172 lb (78 kg). ,No LMP for male patient. Physical Exam   Constitutional: He is oriented to person, place, and time. He appears well-developed and well-nourished. No distress. HENT:   Right Ear: A middle ear effusion is present. Left Ear: Tympanic membrane normal.   Mouth/Throat: Oropharynx is clear and moist. No oropharyngeal exudate or posterior oropharyngeal erythema. Tonsils are 0 on the right. Tonsils are 0 on the left. No tonsillar exudate. Eyes: Right conjunctiva is not injected. Left conjunctiva is not injected. Pupils are equal.   Neck: Normal range of motion. Cardiovascular: Normal rate and regular rhythm. No murmur heard. Pulmonary/Chest: Effort normal and breath sounds normal. No respiratory distress. Musculoskeletal:        Right knee: He exhibits normal range of motion. Left knee: He exhibits normal range of motion. Lymphadenopathy:        Head (right side): No tonsillar adenopathy present. Head (left side): No tonsillar adenopathy present. He has no cervical adenopathy. Neurological: He is alert and oriented to person, place, and time. Skin: Skin is warm. No rash noted.  He is not

## 2020-06-01 ENCOUNTER — HOSPITAL ENCOUNTER (EMERGENCY)
Age: 30
Discharge: HOME | End: 2020-06-01
Payer: MEDICAID

## 2020-06-01 VITALS
HEIGHT: 71 IN | TEMPERATURE: 97.9 F | BODY MASS INDEX: 28.56 KG/M2 | DIASTOLIC BLOOD PRESSURE: 86 MMHG | RESPIRATION RATE: 16 BRPM | WEIGHT: 204 LBS | HEART RATE: 73 BPM | OXYGEN SATURATION: 99 % | SYSTOLIC BLOOD PRESSURE: 124 MMHG

## 2020-06-01 PROCEDURE — 99214 OFFICE O/P EST MOD 30 MIN: CPT | Performed by: NURSE PRACTITIONER

## 2020-06-01 PROCEDURE — U0003 INFECTIOUS AGENT DETECTION BY NUCLEIC ACID (DNA OR RNA); SEVERE ACUTE RESPIRATORY SYNDROME CORONAVIRUS 2 (SARS-COV-2) (CORONAVIRUS DISEASE [COVID-19]), AMPLIFIED PROBE TECHNIQUE, MAKING USE OF HIGH THROUGHPUT TECHNOLOGIES AS DESCRIBED BY CMS-2020-01-R: HCPCS

## 2020-06-01 PROCEDURE — 99213 OFFICE O/P EST LOW 20 MIN: CPT

## 2020-06-01 PROCEDURE — U0002 COVID-19 LAB TEST NON-CDC: HCPCS

## 2020-06-01 RX ORDER — DEXTROMETHORPHAN POLISTIREX 30 MG/5ML
60 SUSPENSION ORAL 2 TIMES DAILY PRN
Refills: 0 | COMMUNITY
Start: 2020-06-01 | End: 2020-06-11

## 2020-06-01 RX ORDER — IBUPROFEN 400 MG/1
400 TABLET ORAL EVERY 6 HOURS PRN
COMMUNITY
End: 2020-06-01 | Stop reason: HOSPADM

## 2020-06-01 RX ORDER — FLUTICASONE PROPIONATE 50 MCG
1 SPRAY, SUSPENSION (ML) NASAL DAILY
Qty: 1 BOTTLE | Refills: 0 | Status: SHIPPED | OUTPATIENT
Start: 2020-06-01 | End: 2020-08-13 | Stop reason: CLARIF

## 2020-06-01 RX ORDER — CETIRIZINE HYDROCHLORIDE 10 MG/1
10 TABLET ORAL DAILY
Qty: 30 TABLET | Refills: 0 | COMMUNITY
Start: 2020-06-01 | End: 2020-08-13 | Stop reason: CLARIF

## 2020-06-01 ASSESSMENT — ENCOUNTER SYMPTOMS
FACIAL SWELLING: 0
TROUBLE SWALLOWING: 0
CHEST TIGHTNESS: 0
SINUS PRESSURE: 0
SINUS PAIN: 0
STRIDOR: 0
EYE REDNESS: 0
VOMITING: 1
NAUSEA: 0
VOICE CHANGE: 0
WHEEZING: 0
SHORTNESS OF BREATH: 0
COUGH: 1
EYE ITCHING: 0
ABDOMINAL PAIN: 0
BACK PAIN: 0
SORE THROAT: 0
DIARRHEA: 0
RHINORRHEA: 1

## 2020-06-01 NOTE — ED PROVIDER NOTES
nausea. Genitourinary: Negative for decreased urine volume and difficulty urinating. Musculoskeletal: Negative for back pain and myalgias. Skin: Negative for pallor and rash. Allergic/Immunologic: Positive for environmental allergies. Negative for food allergies and immunocompromised state. Neurological: Negative for dizziness and headaches. Hematological: Negative for adenopathy. Psychiatric/Behavioral: The patient is not nervous/anxious. PAST MEDICAL HISTORY         Diagnosis Date    Asthma     Depression     Heart murmur     Hxyf-Rvxot-Xmoihdx disease, left     Multiple allergies        SURGICAL HISTORY     Patient  has a past surgical history that includes joint replacement (Left, 2011) and Tympanostomy tube placement. CURRENT MEDICATIONS       Discharge Medication List as of 6/1/2020  8:44 AM          ALLERGIES     Patient is has No Known Allergies. FAMILY HISTORY     Patient'sfamily history includes Depression in his mother; Heart Disease in his father. SOCIAL HISTORY     Patient  reports that he has been smoking cigarettes. He has a 7.50 pack-year smoking history. He has quit using smokeless tobacco.  His smokeless tobacco use included chew. He reports that he does not drink alcohol or use drugs. PHYSICAL EXAM     ED TRIAGE VITALS  BP: 124/86, Temp: 97.9 °F (36.6 °C), Pulse: 73, Resp: 16, SpO2: 99 %  Physical Exam  Vitals signs and nursing note reviewed. Constitutional:       General: He is not in acute distress. Appearance: Normal appearance. He is well-developed and well-groomed. He is not ill-appearing, toxic-appearing or diaphoretic. HENT:      Head: Normocephalic and atraumatic. Right Ear: Hearing, tympanic membrane, ear canal and external ear normal.      Left Ear: Hearing, tympanic membrane, ear canal and external ear normal.      Nose: Nose normal. No congestion or rhinorrhea. Right Turbinates: Swollen. Left Turbinates: Swollen.       Right

## 2020-06-01 NOTE — ED NOTES
Discharge instructions reviewed with patient. Patient informed to go to ER for worsening symptoms, SOB, chest pain or abdominal pain. Patient ambulatory out in stable condition.       Mariel Lance RN  06/01/20 7684

## 2020-06-01 NOTE — ED TRIAGE NOTES
Pt ambulatory into esuc with c/o productive cough for the past four days. pt states he called off work the weekend and work states he needed checked. Pt states he had a fever last night but none today. Pt denies pain.

## 2020-06-02 ENCOUNTER — CARE COORDINATION (OUTPATIENT)
Dept: CARE COORDINATION | Age: 30
End: 2020-06-02

## 2020-06-03 NOTE — CARE COORDINATION
contacted by nurse care manager for worsening symptoms. Pt will be further monitored by COVID Loop Team based on severity of symptoms and risk factors.

## 2020-06-04 ENCOUNTER — TELEPHONE (OUTPATIENT)
Dept: FAMILY MEDICINE CLINIC | Age: 30
End: 2020-06-04

## 2020-06-04 LAB — SARS-COV-2: NOT DETECTED

## 2020-06-05 ENCOUNTER — TELEPHONE (OUTPATIENT)
Dept: FAMILY MEDICINE CLINIC | Age: 30
End: 2020-06-05

## 2020-06-11 ENCOUNTER — TELEMEDICINE (OUTPATIENT)
Dept: FAMILY MEDICINE CLINIC | Age: 30
End: 2020-06-11
Payer: MEDICAID

## 2020-06-11 PROBLEM — G25.81 RESTLESS LEG: Status: ACTIVE | Noted: 2020-06-11

## 2020-06-11 PROBLEM — K21.9 GASTROESOPHAGEAL REFLUX DISEASE: Status: ACTIVE | Noted: 2020-06-11

## 2020-06-11 PROCEDURE — 99203 OFFICE O/P NEW LOW 30 MIN: CPT | Performed by: NURSE PRACTITIONER

## 2020-06-11 RX ORDER — FERROUS SULFATE 325(65) MG
325 TABLET ORAL NIGHTLY
Qty: 90 TABLET | Refills: 3 | Status: SHIPPED | OUTPATIENT
Start: 2020-06-11 | End: 2020-08-13 | Stop reason: CLARIF

## 2020-06-11 RX ORDER — CHOLECALCIFEROL (VITAMIN D3) 50 MCG
2000 TABLET ORAL DAILY
Qty: 90 TABLET | Refills: 0 | Status: SHIPPED | OUTPATIENT
Start: 2020-06-11 | End: 2020-08-13 | Stop reason: CLARIF

## 2020-06-11 RX ORDER — MULTIVITAMIN WITH IRON
250 TABLET ORAL
Qty: 90 TABLET | Refills: 5 | Status: SHIPPED | OUTPATIENT
Start: 2020-06-11 | End: 2020-08-13 | Stop reason: CLARIF

## 2020-06-11 RX ORDER — PANTOPRAZOLE SODIUM 20 MG/1
20 TABLET, DELAYED RELEASE ORAL DAILY
Qty: 30 TABLET | Refills: 3 | Status: SHIPPED | OUTPATIENT
Start: 2020-06-11 | End: 2020-10-28 | Stop reason: SDUPTHER

## 2020-06-11 ASSESSMENT — ENCOUNTER SYMPTOMS
EYE PAIN: 0
VOMITING: 0
NAUSEA: 0
ABDOMINAL PAIN: 0
EYE DISCHARGE: 0
COUGH: 0
ABDOMINAL DISTENTION: 0
TROUBLE SWALLOWING: 0
SHORTNESS OF BREATH: 0
PHOTOPHOBIA: 0
SORE THROAT: 0
DIARRHEA: 0
CONSTIPATION: 0
BLOOD IN STOOL: 0

## 2020-06-11 NOTE — PROGRESS NOTES
Cholecalciferol (VITAMIN D) 50 MCG (2000 UT) TABS tablet Take 1 tablet by mouth daily Yes LEYDI Woodson CNP   magnesium (MAGNESIUM-OXIDE) 250 MG TABS tablet Take 1 tablet by mouth 3 times daily (before meals) Yes LEYDI Woodson CNP   pantoprazole (PROTONIX) 20 MG tablet Take 1 tablet by mouth daily Yes LEYDI Woodson CNP   fluticasone (FLONASE) 50 MCG/ACT nasal spray 1 spray by Nasal route daily  LEYDI Foster CNP   dextromethorphan (DELSYM) 30 MG/5ML extended release liquid Take 10 mLs by mouth 2 times daily as needed for Cough  LEYDI Foster CNP   cetirizine (ZYRTEC ALLERGY) 10 MG tablet Take 1 tablet by mouth daily  LEYDI Foster CNP       Social History     Tobacco Use    Smoking status: Current Every Day Smoker     Packs/day: 0.75     Years: 10.00     Pack years: 7.50     Types: Cigarettes    Smokeless tobacco: Former User     Types: Chew    Tobacco comment: Vapor   Substance Use Topics    Alcohol use: No    Drug use: No        PHYSICAL EXAMINATION:  [ INSTRUCTIONS:  \"[x]\" Indicates a positive item  \"[]\" Indicates a negative item  -- DELETE ALL ITEMS NOT EXAMINED]  Vital Signs: (As obtained by patient/caregiver or practitioner observation)  -     Constitutional: [x] Appears well-developed and well-nourished [x] No apparent distress      [] Abnormal-   Mental status  [x] Alert and awake  [x] Oriented to person/place/time [x]Able to follow commands      Eyes:  EOM    [x]  Normal  [] Abnormal-  Sclera  [x]  Normal  [] Abnormal -         Discharge [x]  None visible  [] Abnormal -    HENT:   [x] Normocephalic, atraumatic.   [] Abnormal   [x] Mouth/Throat: Mucous membranes are moist.     External Ears [x] Normal  [] Abnormal-     Neck: [x] No visualized mass     Pulmonary/Chest: [x] Respiratory effort normal.  [x] No visualized signs of difficulty breathing or respiratory distress        [] Abnormal-      Musculoskeletal:   [] Normal gait with no signs of ataxia

## 2020-06-30 ENCOUNTER — TELEMEDICINE (OUTPATIENT)
Dept: FAMILY MEDICINE CLINIC | Age: 30
End: 2020-06-30
Payer: MEDICAID

## 2020-06-30 ENCOUNTER — PATIENT MESSAGE (OUTPATIENT)
Dept: FAMILY MEDICINE CLINIC | Age: 30
End: 2020-06-30

## 2020-06-30 PROCEDURE — 99214 OFFICE O/P EST MOD 30 MIN: CPT | Performed by: NURSE PRACTITIONER

## 2020-06-30 RX ORDER — GABAPENTIN 600 MG/1
600 TABLET ORAL NIGHTLY
Qty: 90 TABLET | Refills: 1 | Status: SHIPPED | OUTPATIENT
Start: 2020-06-30 | End: 2020-11-16 | Stop reason: SDUPTHER

## 2020-06-30 RX ORDER — TRAZODONE HYDROCHLORIDE 50 MG/1
50 TABLET ORAL NIGHTLY
Qty: 90 TABLET | Refills: 1 | Status: SHIPPED | OUTPATIENT
Start: 2020-06-30 | End: 2020-08-11

## 2020-06-30 ASSESSMENT — ENCOUNTER SYMPTOMS
VOMITING: 0
ABDOMINAL PAIN: 0
SORE THROAT: 0
ABDOMINAL DISTENTION: 0
EYE PAIN: 0
PHOTOPHOBIA: 0
DIARRHEA: 0
TROUBLE SWALLOWING: 0
BLOOD IN STOOL: 0
COUGH: 0
CONSTIPATION: 0
EYE DISCHARGE: 0
NAUSEA: 0
SHORTNESS OF BREATH: 0

## 2020-06-30 NOTE — PROGRESS NOTES
Services were provided through a video synchronous discussion virtually to substitute for in-person clinic visit. Patient and provider were located at their individual homes. --Edilberto Amador, LEYDI Staley CNP on 6/30/2020 at 9:07 AM    An electronic signature was used to authenticate this note.

## 2020-07-03 ENCOUNTER — PATIENT MESSAGE (OUTPATIENT)
Dept: FAMILY MEDICINE CLINIC | Age: 30
End: 2020-07-03

## 2020-07-03 NOTE — LETTER
[address] [place] [address],[address] [facility] [address]  Phone: [phone]  Fax: [phone]    LEYDI Conde CNP        July 7, 2020     Patient: Manuela Arrieta   YOB: 1990   Date of Visit: 7/3/2020       To Whom it May Concern:    Mecca Morris is a patient of mine that has borderline personality disorder with recent loss of family member. It is my medical opinion that he will benefit from an emotional support dog. Please allow him to have an emotional support dog. If you have any questions or concerns, please don't hesitate to call.     Sincerely,             LEYDI Conde CNP

## 2020-07-06 NOTE — TELEPHONE ENCOUNTER
From: Chapis Rachel  To: LEYDI Hartman - CNP  Sent: 7/3/2020 6:50 PM EDT  Subject: Non-Urgent Medical Question    Hey, sorry to bug you again I was inquiring about getting a letter to have a emotional support dog, with my borderline personality disorder I feel a canine  would help me in a lot of ways my wife's dog helped me through a lot of tough spots but she recently passed so I was wanting to get another one.

## 2020-07-21 ENCOUNTER — PATIENT MESSAGE (OUTPATIENT)
Dept: FAMILY MEDICINE CLINIC | Age: 30
End: 2020-07-21

## 2020-07-21 ENCOUNTER — TELEPHONE (OUTPATIENT)
Dept: FAMILY MEDICINE CLINIC | Age: 30
End: 2020-07-21

## 2020-07-21 NOTE — TELEPHONE ENCOUNTER
Person called, lm that his son was exposed to covid and work won;t let him back without a test?  Looked into chart, not sure if he is a pt here. Called back and asked for a call back.

## 2020-07-21 NOTE — TELEPHONE ENCOUNTER
From: Palma Matos  Sent: 7/21/2020 4:47 PM EDT  To: [facility]  Subject: RE: Non-Urgent Medical Question    My son was exposed to someone with symptoms that got tested and the same day he was tested he was sent to my house without my knowledge of him being tested so my work is wanting me to get tested before I am able to return.    ----- Message -----  [facility] [facility]: 7/21/20, 4:46 PM  To: Palma Matos  Subject: RE: Non-Urgent [facility],     Are you having symptoms? Were you exposed to someone with COVID-19? Thank you,  Shanelle Connor      ----- Message -----   From:Barrie Sage   Sent:7/21/2020 3:38 PM EDT   To:Liana Wilcox APRN - CNP   Subject:Non-Urgent Medical Question    I need to get an appointment set up to come in and get a covid test I've tried calling to set an appointment up but can't seem to get ahold of anyone.

## 2020-07-24 ENCOUNTER — PATIENT MESSAGE (OUTPATIENT)
Dept: FAMILY MEDICINE CLINIC | Age: 30
End: 2020-07-24

## 2020-07-24 ENCOUNTER — HOSPITAL ENCOUNTER (OUTPATIENT)
Age: 30
Discharge: HOME | End: 2020-07-24
Payer: MEDICAID

## 2020-07-24 DIAGNOSIS — Z20.822 CLOSE EXPOSURE TO COVID-19 VIRUS: ICD-10-CM

## 2020-07-24 PROCEDURE — U0003 INFECTIOUS AGENT DETECTION BY NUCLEIC ACID (DNA OR RNA); SEVERE ACUTE RESPIRATORY SYNDROME CORONAVIRUS 2 (SARS-COV-2) (CORONAVIRUS DISEASE [COVID-19]), AMPLIFIED PROBE TECHNIQUE, MAKING USE OF HIGH THROUGHPUT TECHNOLOGIES AS DESCRIBED BY CMS-2020-01-R: HCPCS

## 2020-07-24 PROCEDURE — 99211 OFF/OP EST MAY X REQ PHY/QHP: CPT

## 2020-07-24 NOTE — PROGRESS NOTES
oropharyngeal Specimen collected using droplet plus precautions. Pt tolerated well. Specimen to lab and pt ambulatory  Out in stable condition.

## 2020-07-27 LAB — SARS-COV-2: NOT DETECTED

## 2020-07-28 ENCOUNTER — PATIENT MESSAGE (OUTPATIENT)
Dept: FAMILY MEDICINE CLINIC | Age: 30
End: 2020-07-28

## 2020-07-28 NOTE — TELEPHONE ENCOUNTER
From: Franchesca Ceballos  To: LEYDI Alicea - CNP  Sent: 7/28/2020 9:59 AM EDT  Subject: Non-Urgent Medical Question    I was wanting to see if now that I have medical card if it would be possible for you to treat me for my borderline personality disorder or if you would be able to get me a referral to someone who would be able to treat me for it.

## 2020-08-04 ENCOUNTER — HOSPITAL ENCOUNTER (OUTPATIENT)
Age: 30
Discharge: HOME | End: 2020-08-04
Payer: MEDICAID

## 2020-08-04 DIAGNOSIS — K21.9 GASTROESOPHAGEAL REFLUX DISEASE, ESOPHAGITIS PRESENCE NOT SPECIFIED: ICD-10-CM

## 2020-08-04 DIAGNOSIS — J45.909 ASTHMA, UNSPECIFIED ASTHMA SEVERITY, UNSPECIFIED WHETHER COMPLICATED, UNSPECIFIED WHETHER PERSISTENT: ICD-10-CM

## 2020-08-04 DIAGNOSIS — F60.3 BORDERLINE PERSONALITY DISORDER (HCC): ICD-10-CM

## 2020-08-04 DIAGNOSIS — G47.00 INSOMNIA, UNSPECIFIED TYPE: ICD-10-CM

## 2020-08-04 DIAGNOSIS — G25.81 RESTLESS LEG: ICD-10-CM

## 2020-08-04 LAB
ALBUMIN SERPL-MCNC: 4.3 G/DL (ref 3.5–5.1)
ALP BLD-CCNC: 45 U/L (ref 38–126)
ALT SERPL-CCNC: 17 U/L (ref 11–66)
ANION GAP SERPL CALCULATED.3IONS-SCNC: 10 MEQ/L (ref 8–16)
AST SERPL-CCNC: 18 U/L (ref 5–40)
BASOPHILS # BLD: 0.3 %
BASOPHILS ABSOLUTE: 0 THOU/MM3 (ref 0–0.1)
BILIRUB SERPL-MCNC: 0.4 MG/DL (ref 0.3–1.2)
BILIRUBIN DIRECT: < 0.2 MG/DL (ref 0–0.3)
BUN BLDV-MCNC: 10 MG/DL (ref 7–22)
CALCIUM SERPL-MCNC: 9.2 MG/DL (ref 8.5–10.5)
CHLORIDE BLD-SCNC: 105 MEQ/L (ref 98–111)
CHOLESTEROL, TOTAL: 211 MG/DL (ref 100–199)
CO2: 24 MEQ/L (ref 23–33)
CREAT SERPL-MCNC: 0.7 MG/DL (ref 0.4–1.2)
EOSINOPHIL # BLD: 2.1 %
EOSINOPHILS ABSOLUTE: 0.2 THOU/MM3 (ref 0–0.4)
ERYTHROCYTE [DISTWIDTH] IN BLOOD BY AUTOMATED COUNT: 13 % (ref 11.5–14.5)
ERYTHROCYTE [DISTWIDTH] IN BLOOD BY AUTOMATED COUNT: 43.4 FL (ref 35–45)
FERRITIN: 144 NG/ML (ref 22–322)
GFR SERPL CREATININE-BSD FRML MDRD: > 90 ML/MIN/1.73M2
GLUCOSE BLD-MCNC: 98 MG/DL (ref 70–108)
HCT VFR BLD CALC: 50 % (ref 42–52)
HDLC SERPL-MCNC: 29 MG/DL
HEMOGLOBIN: 16.9 GM/DL (ref 14–18)
IMMATURE GRANS (ABS): 0.05 THOU/MM3 (ref 0–0.07)
IMMATURE GRANULOCYTES: 0.4 %
IRON: 92 UG/DL (ref 65–195)
LDL CHOLESTEROL CALCULATED: 125 MG/DL
LYMPHOCYTES # BLD: 29 %
LYMPHOCYTES ABSOLUTE: 3.3 THOU/MM3 (ref 1–4.8)
MAGNESIUM: 2.1 MG/DL (ref 1.6–2.4)
MCH RBC QN AUTO: 31 PG (ref 26–33)
MCHC RBC AUTO-ENTMCNC: 33.8 GM/DL (ref 32.2–35.5)
MCV RBC AUTO: 91.7 FL (ref 80–94)
MONOCYTES # BLD: 8 %
MONOCYTES ABSOLUTE: 0.9 THOU/MM3 (ref 0.4–1.3)
NUCLEATED RED BLOOD CELLS: 0 /100 WBC
PLATELET # BLD: 221 THOU/MM3 (ref 130–400)
PMV BLD AUTO: 11.3 FL (ref 9.4–12.4)
POTASSIUM SERPL-SCNC: 4.4 MEQ/L (ref 3.5–5.2)
RBC # BLD: 5.45 MILL/MM3 (ref 4.7–6.1)
SEG NEUTROPHILS: 60.2 %
SEGMENTED NEUTROPHILS ABSOLUTE COUNT: 6.9 THOU/MM3 (ref 1.8–7.7)
SODIUM BLD-SCNC: 139 MEQ/L (ref 135–145)
TOTAL IRON BINDING CAPACITY: 269 UG/DL (ref 171–450)
TOTAL PROTEIN: 6.6 G/DL (ref 6.1–8)
TRIGL SERPL-MCNC: 283 MG/DL (ref 0–199)
TSH SERPL DL<=0.05 MIU/L-ACNC: 1.88 UIU/ML (ref 0.4–4.2)
VITAMIN D 25-HYDROXY: 19 NG/ML (ref 30–100)
WBC # BLD: 11.5 THOU/MM3 (ref 4.8–10.8)

## 2020-08-04 PROCEDURE — 84443 ASSAY THYROID STIM HORMONE: CPT

## 2020-08-04 PROCEDURE — 80053 COMPREHEN METABOLIC PANEL: CPT

## 2020-08-04 PROCEDURE — 83735 ASSAY OF MAGNESIUM: CPT

## 2020-08-04 PROCEDURE — 84436 ASSAY OF TOTAL THYROXINE: CPT

## 2020-08-04 PROCEDURE — 82306 VITAMIN D 25 HYDROXY: CPT

## 2020-08-04 PROCEDURE — 80061 LIPID PANEL: CPT

## 2020-08-04 PROCEDURE — 36415 COLL VENOUS BLD VENIPUNCTURE: CPT

## 2020-08-04 PROCEDURE — 83090 ASSAY OF HOMOCYSTEINE: CPT

## 2020-08-04 PROCEDURE — 82728 ASSAY OF FERRITIN: CPT

## 2020-08-04 PROCEDURE — 85025 COMPLETE CBC W/AUTO DIFF WBC: CPT

## 2020-08-04 PROCEDURE — 83550 IRON BINDING TEST: CPT

## 2020-08-04 PROCEDURE — 83540 ASSAY OF IRON: CPT

## 2020-08-04 PROCEDURE — 82248 BILIRUBIN DIRECT: CPT

## 2020-08-05 LAB
HOMOCYSTEINE: 8.1 UMOL/L
THYROXINE (T4): 7 UG/DL (ref 4.5–10.9)

## 2020-08-08 ENCOUNTER — PATIENT MESSAGE (OUTPATIENT)
Dept: FAMILY MEDICINE CLINIC | Age: 30
End: 2020-08-08

## 2020-08-10 ENCOUNTER — TELEPHONE (OUTPATIENT)
Dept: FAMILY MEDICINE CLINIC | Age: 30
End: 2020-08-10

## 2020-08-10 NOTE — TELEPHONE ENCOUNTER
From: Rukhsana Hawthorne  To: Nehal Reddy APRN - CNP  Sent: 8/8/2020 11:54 AM EDT  Subject: Prescription Question    I was wondering if you could prescribe me the 100 mg of trazadone. With the 50 that you prescribed me I have to take 2 which only lasts half as long as they should I now use the [facility] on [address]. Also I never recieved the letter for my emotional support animal so was going to see if you could send another copy for me.

## 2020-08-10 NOTE — TELEPHONE ENCOUNTER
Patient calling in regards to his upcoming appointment 8/13/2020 with orthopedic Dr. Macie Perez in [facility]. Patient is needing to be seen in regards to continued issues with his left hip after having a hip replacement. Patient needs referral prior to being seen Thursday. Please place referral they are a [facility] provider.

## 2020-08-11 ENCOUNTER — TELEMEDICINE (OUTPATIENT)
Dept: FAMILY MEDICINE CLINIC | Age: 30
End: 2020-08-11
Payer: MEDICAID

## 2020-08-11 PROBLEM — E55.9 VITAMIN D DEFICIENCY: Status: ACTIVE | Noted: 2020-08-11

## 2020-08-11 PROBLEM — G47.09 OTHER INSOMNIA: Status: ACTIVE | Noted: 2020-08-11

## 2020-08-11 PROCEDURE — G8428 CUR MEDS NOT DOCUMENT: HCPCS | Performed by: STUDENT IN AN ORGANIZED HEALTH CARE EDUCATION/TRAINING PROGRAM

## 2020-08-11 PROCEDURE — 99214 OFFICE O/P EST MOD 30 MIN: CPT | Performed by: STUDENT IN AN ORGANIZED HEALTH CARE EDUCATION/TRAINING PROGRAM

## 2020-08-11 RX ORDER — TRAZODONE HYDROCHLORIDE 100 MG/1
100 TABLET ORAL NIGHTLY
Qty: 90 TABLET | Refills: 1 | Status: SHIPPED | OUTPATIENT
Start: 2020-08-11 | End: 2020-12-31 | Stop reason: SDUPTHER

## 2020-08-11 RX ORDER — TRAZODONE HYDROCHLORIDE 100 MG/1
100 TABLET ORAL NIGHTLY
Qty: 30 TABLET | Refills: 5 | Status: SHIPPED | OUTPATIENT
Start: 2020-08-11 | End: 2020-08-11

## 2020-08-11 RX ORDER — VARENICLINE TARTRATE
KIT
Qty: 1 BOX | Refills: 0 | Status: SHIPPED | OUTPATIENT
Start: 2020-08-11 | End: 2020-11-10 | Stop reason: ALTCHOICE

## 2020-08-11 RX ORDER — MONTELUKAST SODIUM 10 MG/1
10 TABLET ORAL DAILY
Qty: 30 TABLET | Refills: 3 | Status: SHIPPED | OUTPATIENT
Start: 2020-08-11 | End: 2021-01-20 | Stop reason: SDUPTHER

## 2020-08-11 ASSESSMENT — ENCOUNTER SYMPTOMS
COUGH: 0
DIARRHEA: 1
BACK PAIN: 0
EYE REDNESS: 0
ABDOMINAL PAIN: 1
SHORTNESS OF BREATH: 0
RHINORRHEA: 0
EYE ITCHING: 1
CONSTIPATION: 0

## 2020-08-11 NOTE — TELEPHONE ENCOUNTER
Is there any way we can work him into a video visit in the next day or so? We need to document why we are making the referral and the last time I see we saw him for the hip pain was an er visit from 2018. That's why the insurance company wants us to see him first to refer, in case we can do any xrays, etc so he can see the specialist fewer times and get it all taken care of hopefully in the first visit. We did see him for restless leg - but not hip pain.   WE just need to document need for the specialist and or order some xrays if that would be indicated for him to get also

## 2020-08-11 NOTE — TELEPHONE ENCOUNTER
Trazodone 100 mg already prescribed  to [employer]. Also we will need to discuss emotional support animal next visit since we did not discuss it in the 2 previous appointments.

## 2020-08-11 NOTE — PROGRESS NOTES
TELEHEALTH EVALUATION -- Audio/Visual (During ILSVW-96 public health emergency)    HPI:    Franchesca Ceballos (:  1990) has requested an audio/video evaluation for the following concern(s):    Patient has total hip replacement left in . Patient's pain is getting worse. Patient has been trying stretches and ibuprofen which helps but still gives a lot of issues. Has been going to chiropractor 3 times a week. Appointment with Dr. Priyank Romero in [facility]. Osgood schlatter on knee with pain. Patient needs recommendation to get certified as service animal. . Review of Systems   Constitutional: Negative for chills and fever. HENT: Positive for congestion. Negative for rhinorrhea. Eyes: Positive for itching. Negative for redness and visual disturbance. Respiratory: Negative for cough and shortness of breath. Cardiovascular: Negative for chest pain and leg swelling. Gastrointestinal: Positive for abdominal pain and diarrhea. Negative for constipation. Genitourinary: Negative for dysuria and hematuria. Musculoskeletal: Negative for back pain and myalgias. Hip pain, knee pain   Skin: Negative for rash and wound. Neurological: Negative for dizziness and headaches. Hematological: Does not bruise/bleed easily. Psychiatric/Behavioral: Positive for sleep disturbance. The patient is not nervous/anxious. Prior to Visit Medications    Medication Sig Taking? Authorizing Provider   montelukast (SINGULAIR) 10 MG tablet Take 1 tablet by mouth daily Yes Pepito Novoa, DO   varenicline (CHANTIX STARTING MONTH ) 0.5 MG X 11 & 1 MG X 42 tablet Take by mouth. Yes Pepito Novoa, DO   traZODone (DESYREL) 100 MG tablet Take 1 tablet by mouth nightly Yes Tomás Pinedo, DO   gabapentin (NEURONTIN) 600 MG tablet Take 1 tablet by mouth nightly for 90 days.   Julieth Sidhu APRN - CNP   ferrous sulfate (IRON 325) 325 (65 Fe) MG tablet Take 1 tablet by mouth nightly  Liana LEYDI Corona CNP   Cholecalciferol (VITAMIN D) 50 MCG (2000 UT) TABS tablet Take 1 tablet by mouth daily  LEYDI Lozano CNP   magnesium (MAGNESIUM-OXIDE) 250 MG TABS tablet Take 1 tablet by mouth 3 times daily (before meals)  LEYDI Lozano CNP   pantoprazole (PROTONIX) 20 MG tablet Take 1 tablet by mouth daily  LEYDI Lozano CNP   fluticasone (FLONASE) 50 MCG/ACT nasal spray 1 spray by Nasal route daily  Roark Lesches, APRN - CNP   cetirizine (ZYRTEC ALLERGY) 10 MG tablet Take 1 tablet by mouth daily  Roark Lesches, APRN - CNP       Social History     Tobacco Use    Smoking status: Current Every Day Smoker     Packs/day: 0.75     Years: 10.00     Pack years: 7.50     Types: Cigarettes    Smokeless tobacco: Former User     Types: Chew    Tobacco comment: Vapor   Substance Use Topics    Alcohol use: No    Drug use: No            PHYSICAL EXAMINATION:    Constitutional: [x] Appears well-developed and well-nourished [] No apparent distress      [] Abnormal-   Mental status  [] Alert and awake  [x] Oriented to person/place/time [x]Able to follow commands      Eyes:  EOM    [x]  Normal  [] Abnormal-  Sclera  [x]  Normal  [] Abnormal -         Discharge [x]  None visible  [] Abnormal -    HENT:   [x] Normocephalic, atraumatic.   [] Abnormal   [x] Mouth/Throat: Mucous membranes are moist.     External Ears [x] Normal  [] Abnormal-     Neck: [x] No visualized mass     Pulmonary/Chest: [x] Respiratory effort normal.  [] No visualized signs of difficulty breathing or respiratory distress        [] Abnormal-      Musculoskeletal:   [x] Normal gait with no signs of ataxia         [x] Normal range of motion of neck        [] Abnormal-       Neurological:        [x] No Facial Asymmetry (Cranial nerve 7 motor function) (limited exam to video visit)          [x] No gaze palsy        [] Abnormal-         Skin:        [x] No significant exanthematous lesions or discoloration noted on facial skin         [] Abnormal-            Psychiatric:       [x] Normal Affect [] No Hallucinations        [] Abnormal-     ASSESSMENT/PLAN:  1. Left hip pain  External referral to ortho    2. Vitamin D deficiency  2000 international units (20 to 25 micrograms) daily  6 months recheck    3. Lactose intolerance  Avoiding dairy products    4. Seasonal allergies  Flonase is not helping   Tried OTC zyrtec in the past  Starting Singulair     5. Encounter for Smoking cessation  Starting Chantix    6. Insomnia, other  Was told to start on trazadone 50 but increase to 100 mg if not working. Has been taking 100 mg  Filled prescription for 100 mg dose per patient request so that he can take one pill      Return in about 6 months (around 2/11/2021), or if symptoms worsen or fail to improve. An  electronic signature was used to authenticate this note. --Pepito Novoa DO on 8/11/2020 at 5:01 PM        Pursuant to the emergency declaration under the [facility], 1135 waiver authority and the [employer] and [employer] Act, this Virtual  Visit was conducted, with patient's consent, to reduce the patient's risk of exposure to COVID-19 and provide continuity of care for an established patient. Services were provided through a video synchronous discussion virtually to substitute for in-person clinic visit.

## 2020-08-12 ENCOUNTER — TELEPHONE (OUTPATIENT)
Dept: FAMILY MEDICINE CLINIC | Age: 30
End: 2020-08-12

## 2020-08-12 ENCOUNTER — TELEMEDICINE (OUTPATIENT)
Dept: FAMILY MEDICINE CLINIC | Age: 30
End: 2020-08-12
Payer: MEDICAID

## 2020-08-12 PROBLEM — F33.40 RECURRENT MAJOR DEPRESSIVE DISORDER, IN REMISSION (HCC): Status: ACTIVE | Noted: 2020-08-12

## 2020-08-12 PROBLEM — F41.9 ANXIETY: Status: ACTIVE | Noted: 2020-08-12

## 2020-08-12 PROBLEM — F60.3 BORDERLINE PERSONALITY DISORDER (HCC): Status: ACTIVE | Noted: 2020-08-12

## 2020-08-12 PROCEDURE — 4004F PT TOBACCO SCREEN RCVD TLK: CPT | Performed by: STUDENT IN AN ORGANIZED HEALTH CARE EDUCATION/TRAINING PROGRAM

## 2020-08-12 PROCEDURE — G8419 CALC BMI OUT NRM PARAM NOF/U: HCPCS | Performed by: STUDENT IN AN ORGANIZED HEALTH CARE EDUCATION/TRAINING PROGRAM

## 2020-08-12 PROCEDURE — G8428 CUR MEDS NOT DOCUMENT: HCPCS | Performed by: STUDENT IN AN ORGANIZED HEALTH CARE EDUCATION/TRAINING PROGRAM

## 2020-08-12 PROCEDURE — 99212 OFFICE O/P EST SF 10 MIN: CPT | Performed by: STUDENT IN AN ORGANIZED HEALTH CARE EDUCATION/TRAINING PROGRAM

## 2020-08-12 NOTE — TELEPHONE ENCOUNTER
When I staffed with Dr. Gerard Bernardo he said to put in an external referral because we did not find Dr. Yazan Leiva when we were placing the order

## 2020-08-12 NOTE — Clinical Note
Patient needs access to this letter for him to be able to train his current dog as a Psychiatric Service Dog.

## 2020-08-12 NOTE — LETTER
[address] [place] [address],[address] [facility] [address]  [facility] [address]  Phone: [phone]  Fax: [address], DO        August 12, 2020     Patient: Shabana Darby   YOB: 1990   Date of Visit: 8/12/2020       To Whom It May Concern: It is my medical opinion that Madelaine Woodruff would benefit from a Psychiatric Service Dog. Patient has a history of Borderline Personality Disorder, Depression and Anxiety. Please allow him to have a Psychiatric Service Dog or for his current dog to be trained as a Psychiatric Service Dog. If you have any questions or concerns, please don't hesitate to call.     Sincerely,        Madelyn Elise, DO

## 2020-08-12 NOTE — PROGRESS NOTES
TELEHEALTH EVALUATION -- Audio/Visual (During CDHIR-39 public health emergency)    HPI:    Roxy Chavarria (:  1990) has requested an audio/video evaluation for the following concern(s): Wants letter for Service Animal Recommendation    Robert Montejo sent patient a paper for her to be an \"QUINTON\". Patient wants to get her trained to be a \"psychiatric service animal\"--needs recommendation as a service animal. He will take this to a  and they will train. Patient is unable to take her to places like [employer] as an QUINTON. Patient wants this for \"borderline personality disorder, chronic depression and acute anxiety. \"    Patient is able to take her into stores. Review of Systems  Constitutional: Negative for chills and fever. HENT: Positive for congestion. Negative for rhinorrhea. Eyes: Positive for itching. Negative for redness and visual disturbance. Respiratory: Negative for cough and shortness of breath. Cardiovascular: Negative for chest pain and leg swelling. Gastrointestinal: Positive for abdominal pain and diarrhea. Negative for constipation. Genitourinary: Negative for dysuria and hematuria. Musculoskeletal: Negative for back pain and myalgias. Hip pain, knee pain   Skin: Negative for rash and wound. Neurological: Negative for dizziness and headaches. Hematological: Does not bruise/bleed easily. Psychiatric/Behavioral: Positive for sleep disturbance. The patient is not nervous/anxious. Prior to Visit Medications    Medication Sig Taking? Authorizing Provider   montelukast (SINGULAIR) 10 MG tablet Take 1 tablet by mouth daily  Jacquie Ragsdale,    varenicline (CHANTIX STARTING MONTH ) 0.5 MG X 11 & 1 MG X 42 tablet Take by mouth. Jacquie Ragsdale,    traZODone (DESYREL) 100 MG tablet Take 1 tablet by mouth nightly  Ofelia Pinedo, DO   gabapentin (NEURONTIN) 600 MG tablet Take 1 tablet by mouth nightly for 90 days.   LEYDI Perez - CNP ferrous sulfate (IRON 325) 325 (65 Fe) MG tablet Take 1 tablet by mouth nightly  LEYDI Frias CNP   Cholecalciferol (VITAMIN D) 50 MCG (2000 UT) TABS tablet Take 1 tablet by mouth daily  LEYDI Frias CNP   magnesium (MAGNESIUM-OXIDE) 250 MG TABS tablet Take 1 tablet by mouth 3 times daily (before meals)  LEYDI Frias CNP   pantoprazole (PROTONIX) 20 MG tablet Take 1 tablet by mouth daily  LEYDI Frias CNP   fluticasone (FLONASE) 50 MCG/ACT nasal spray 1 spray by Nasal route daily  LEYDI Larios CNP   cetirizine (ZYRTEC ALLERGY) 10 MG tablet Take 1 tablet by mouth daily  LEYDI Larios CNP       Social History     Tobacco Use    Smoking status: Current Every Day Smoker     Packs/day: 0.75     Years: 10.00     Pack years: 7.50     Types: Cigarettes    Smokeless tobacco: Former User     Types: Chew    Tobacco comment: Vapor   Substance Use Topics    Alcohol use: No    Drug use: No      PHYSICAL EXAMINATION:  Constitutional: [x] Appears well-developed and well-nourished [x] No apparent distress      [] Abnormal-   Mental status  [x] Alert and awake  [x] Oriented to person/place/time []Able to follow commands      Eyes:  EOM    [x]  Normal  [] Abnormal-  Sclera  [x]  Normal  [] Abnormal -         Discharge [x]  None visible  [] Abnormal -    HENT:   [x] Normocephalic, atraumatic.   [] Abnormal   [x] Mouth/Throat: Mucous membranes are moist.     External Ears [x] Normal  [] Abnormal-     Neck: [x] No visualized mass     Pulmonary/Chest: [] Respiratory effort normal.  [x] No visualized signs of difficulty breathing or respiratory distress        [] Abnormal-      Musculoskeletal:   [x] Normal gait with no signs of ataxia         [x] Normal range of motion of neck        [] Abnormal-       Neurological:        [x] No Facial Asymmetry (Cranial nerve 7 motor function) (limited exam to video visit)          [x] No gaze palsy        [] Abnormal-         Skin:        [x] No significant exanthematous lesions or discoloration noted on facial skin         [] Abnormal-            Psychiatric:       [x] Normal Affect [x] No Hallucinations        [] Abnormal-     Other pertinent observable physical exam findings-     Due to this being a TeleHealth encounter, evaluation of the following organ systems is limited: Vitals/Constitutional/EENT/Resp/CV/GI//MS/Neuro/Skin/Heme-Lymph-Imm. ASSESSMENT/PLAN:  1. Anxiety  Patient is requesting letter be written for \"Psychiatric Service Dog\"- letter has been written. I do believe that it would be beneficial for the patient to have his dog trained further as a Psychiatric Service Dog. He has benefited from his dog being an Emotional Support Animal, but there are still settings that he is not able to bring her along. 2. Recurrent major depressive disorder, in remission ([facility] [address]  See above    3. Borderline personality disorder ([facility] [address]  See above      Return if symptoms worsen or fail to improve. An  electronic signature was used to authenticate this note. --Pepito Novoa DO on 8/12/2020 at 4:35 PM        Pursuant to the emergency declaration under the [facility], 1135 waiver authority and the [employer] and [employer] Act, this Virtual  Visit was conducted, with patient's consent, to reduce the patient's risk of exposure to COVID-19 and provide continuity of care for an established patient. Services were provided through a video synchronous discussion virtually to substitute for in-person clinic visit.

## 2020-08-12 NOTE — TELEPHONE ENCOUNTER
Thanks for catching that [facility].  There is an order now for External  Referral to orthopedic surgery

## 2020-08-12 NOTE — TELEPHONE ENCOUNTER
Patient is scheduled for a VV at 4:00pm 08/12/20   I saw the patient yesterday via VV. If he only needs a letter from us than we can just send this to him and no need for second appointment today. If he needs to discuss more and needs more than the letter than we can continue with the VV today.

## 2020-08-12 NOTE — TELEPHONE ENCOUNTER
Patient was needing an internal referral to orthopedics with Dr. Nichole Hooper. Looks like you ordered a referral for orthotics. Was this what you were wanting to order? Pended referral for orthopedics if it needs to be changed.

## 2020-08-12 NOTE — PROGRESS NOTES
S: 31 y.o. male with No chief complaint on file. HPI: Patient wants to make his therapy dog a service dog. He is a spur chronic depression anxiety and bipolar disorder    BP Readings from Last 3 Encounters:   06/01/20 124/86   09/23/19 115/77   02/25/19 133/80     Wt Readings from Last 3 Encounters:   06/01/20 204 lb (92.5 kg)   09/23/19 172 lb (78 kg)   02/25/19 170 lb (77.1 kg)           O: VS: AAO/NAD, appropriate affect for mood      Diagnosis depression anxiety and bipolar disorder. Plan:  Letter provided for recommendation that the animal be classified as a service animal.      Health Maintenance Due   Topic Date Due    Varicella vaccine (1 of 2 - 2-dose childhood series) 05/05/1990    Pneumococcal 0-64 years Vaccine (1 of 1 - PPSV23) 05/05/1995    HIV screen  05/05/2004    DTaP/Tdap/Td vaccine (1 - Tdap) 05/05/2008         Attending Physician Statement  I have discussed the case, including pertinent history and exam findings with the resident. I also have seen the patient and performed key portions of the examination. I agree with the documented assessment and plan as documented by the resident.   GC modifier added to this encounter      Fabi Guajardo DO 8/12/2020 4:11 PM

## 2020-08-13 ENCOUNTER — OFFICE VISIT (OUTPATIENT)
Dept: ORTHOPEDIC SURGERY | Age: 30
End: 2020-08-13
Payer: MEDICAID

## 2020-08-13 VITALS — BODY MASS INDEX: 29.2 KG/M2 | WEIGHT: 204 LBS | HEIGHT: 70 IN

## 2020-08-13 PROCEDURE — 99203 OFFICE O/P NEW LOW 30 MIN: CPT | Performed by: PHYSICIAN ASSISTANT

## 2020-08-13 PROCEDURE — G8419 CALC BMI OUT NRM PARAM NOF/U: HCPCS | Performed by: PHYSICIAN ASSISTANT

## 2020-08-13 PROCEDURE — G8427 DOCREV CUR MEDS BY ELIG CLIN: HCPCS | Performed by: PHYSICIAN ASSISTANT

## 2020-08-13 PROCEDURE — 4004F PT TOBACCO SCREEN RCVD TLK: CPT | Performed by: PHYSICIAN ASSISTANT

## 2020-08-13 PROCEDURE — 20610 DRAIN/INJ JOINT/BURSA W/O US: CPT | Performed by: PHYSICIAN ASSISTANT

## 2020-08-13 RX ORDER — LIDOCAINE HYDROCHLORIDE 10 MG/ML
4 INJECTION, SOLUTION INFILTRATION; PERINEURAL ONCE
Status: COMPLETED | OUTPATIENT
Start: 2020-08-13 | End: 2020-08-13

## 2020-08-13 RX ORDER — BETAMETHASONE SODIUM PHOSPHATE AND BETAMETHASONE ACETATE 3; 3 MG/ML; MG/ML
12 INJECTION, SUSPENSION INTRA-ARTICULAR; INTRALESIONAL; INTRAMUSCULAR; SOFT TISSUE ONCE
Status: COMPLETED | OUTPATIENT
Start: 2020-08-13 | End: 2020-08-13

## 2020-08-13 RX ORDER — MELOXICAM 15 MG/1
15 TABLET ORAL DAILY
Qty: 30 TABLET | Refills: 3 | Status: SHIPPED | OUTPATIENT
Start: 2020-08-13 | End: 2020-12-31

## 2020-08-13 RX ADMIN — BETAMETHASONE SODIUM PHOSPHATE AND BETAMETHASONE ACETATE 12 MG: 3; 3 INJECTION, SUSPENSION INTRA-ARTICULAR; INTRALESIONAL; INTRAMUSCULAR; SOFT TISSUE at 14:02

## 2020-08-13 RX ADMIN — LIDOCAINE HYDROCHLORIDE 4 ML: 10 INJECTION, SOLUTION INFILTRATION; PERINEURAL at 14:03

## 2020-08-13 ASSESSMENT — ENCOUNTER SYMPTOMS
VOMITING: 0
NAUSEA: 0
COLOR CHANGE: 0

## 2020-08-13 NOTE — PROGRESS NOTES
Patient ID: Olya Benavidez is a 31 y.o. male. Chief Complaint   Patient presents with    New Patient     Lt hip pain        HPI  Mr. Soumya Jaime is a 40-year-old male with history of Legg calf Perthes disease and left total hip arthroplasty approximately 8 years ago by surgeon in [place], [place]. Patient states he has had intermittent pain and discomfort with this left hip since the surgery however the past 3 to 4 months his pain has been quite a bit worse without any preceding injury or trauma. Pain is most severe to the lateral aspect of the left hip and radiates down to the lateral thigh. Patient states he has had bursitis in the past and underwent injections which usually do provide moderate relief of his pain. Patient denies any injury, trauma or fall prior to the onset of his worsening left hip pain. He also notes chronic left-sided low back pain which he is currently involved in chiropractic care for her. Patient denies any left hip swelling, warmth, redness, fever, chills, numbness or tingling in this left side. Right Knee Pain:   Patient also notes chronic right knee pain for many years. He states he has a history of Osgood slaughters disease and states overall it does not bother him unless he bumps this right knee on something otherwise he states it is not limiting to daily or exercise based activities.     Past Medical History:   Diagnosis Date    Asthma     Depression     Heart murmur     Xawr-Fmqob-Uwmcfsu disease, left     Multiple allergies      Past Surgical History:   Procedure Laterality Date    JOINT REPLACEMENT Left 2011    Left hip replacement    TYMPANOSTOMY TUBE PLACEMENT       Family History   Problem Relation Age of Onset    Depression Mother     Heart Disease Father      Social History     Occupational History    Not on file   Tobacco Use    Smoking status: Current Every Day Smoker     Packs/day: 0.75     Years: 10.00     Pack years: 7.50     Types: Cigarettes    Smokeless tobacco: Former User     Types: Chew    Tobacco comment: Vapor   Substance and Sexual Activity    Alcohol use: No    Drug use: No    Sexual activity: Not on file        Review of Systems   Constitutional: Negative for chills and fever. Cardiovascular: Negative for chest pain. Gastrointestinal: Negative for nausea and vomiting. Musculoskeletal: Positive for arthralgias (Left hip, right knee). Negative for gait problem. Skin: Negative for color change and rash. Neurological: Negative for weakness and numbness. Physical Exam  Constitutional:       General: He is not in acute distress. Appearance: Normal appearance. He is well-developed. Neck:      Musculoskeletal: Normal range of motion and neck supple. Musculoskeletal:      Lumbar back: He exhibits tenderness (Patient does have focal tenderness over the left SI joint. No paralumbar or parathoracic tenderness. ). He exhibits normal range of motion, no bony tenderness, no swelling, no edema and no deformity. Comments: left Hip   Tenderness: Greater trochanter     Range of Motion:     Extension: 10    Flexion: 110    Internal Rotation: 20    External Rotation: 30    Abduction: 30    Adduction:  30     Muscle Strength     Abduction: 4/5    Adduction: 5/5    Flexion: 5/5       Gait: Antalgic  Anny Test: Negative  Irina Test: Positive    right Knee   Swelling: Mild focal swelling over the tibial tubercle with prominent tibial tubercle present. Effusion: Negative  Tenderness: None     Range of Motion:     Extension: 0 mild pain with resisted extension. No pain with passive    Flexion: 130     McMurrays: Negative  Anterior Lachmann: Negative  Posterior Lachmann: Negative  Anterior Drawer: Negative  Posterior Drawer: Negative  Varus Stress Test: Negative  Valgus Stress Test: Negative  Pivot Shift: Negative  Patellar Apprehension: No       Skin:     General: Skin is warm and dry.    Neurological:      Mental Status: He is alert and oriented to person, place, and time. Assessment:     Encounter Diagnoses   Name Primary?  Greater trochanteric bursitis of left hip Yes    Osgood-Schlatter's disease of right lower extremity          X-Rays taken in clinic today and preliminarily reviewed by me  AP pelvis demonstrates patient is status post left total hip arthroplasty which appears in appropriate position without evidence of component or periprosthetic complications. Right hip is within normal limits. Plan:     Sofia Bowman is a 31 y.o. old male with left hip pain that is consistent with trochanteric bursitis. Patient is educated he does have a component of some SI joint pain however I believe the majority of his symptoms are coming from trochanteric bursitis. We discussed treatment options available to him, including nonoperative and operative intervention. At this time I believe he will benefit from conservative management. Consequently, an extensive home exercise program was provided. We also discussed use of a prescription NSAID versus a cortisone injection and at this time he has elected to proceed with     a cortisone injection which was administered as outlined below. I'll see him back my clinic in 2 months but he was encouraged to return or call earlier with questions and/or concerns. Procedure: left trochanteric bursal injection  An informed verbal consent for the procedure was obtained and risks including, but not limited to: allergy to medications, injection, bleeding, stiffness of joint, recurrence of symptoms, loss of function, swelling, drainage, irrigation, need for surgery and pseudo-septic inflammation, were explained to the patient. Also, discussed was the potential for further injections, irrigation and debridement and surgery. Alternate means of treatment have also been discussed with the patient.     Administrations This Visit     betamethasone acetate-betamethasone sodium phosphate (CELESTONE) injection 12 mg     Admin Date  08/13/2020  14:02 Action  Given Dose  12 mg Route  Intra-articular Site  Hip Left Administered By  Wilbur Johnston MA    Ordering Provider:  Nichole Hooper, [facility]:  [id number]    Lot#:  [id number]    :  [address]    Patient Supplied?:  No          lidocaine 1 % injection 4 mL     Admin Date  08/13/2020  14:03 Action  Given Dose  4 mL Route  Intra-articular Site  Hip Left Administered By  Wilbur Johnston MA    Ordering Provider:  TRACY Fields    NDC:  6467-6271-15    Lot#:  [id number]    :  [address]    Patient Supplied?:  No                  Under sterile conditions the left greater trochanter is prepped with Betadine and alcohol. Using a 25-gauge needle 4 cc of 1% lidocaine without epinephrine is injected superficially. Subsequently using a 21-gauge needle 2 cc of 6mg/ml Celestone is injected into the left trochanteric bursa. A bandage is applied to the injection site. Patient tolerated procedure well. Orders Placed This Encounter   Procedures    External Referral To Physical Therapy     Referral Priority:   Routine     Referral Type:   Eval and Treat     Referral Reason:   Specialty Services Required     Requested Specialty:   Physical Therapy     Number of Visits Requested:   1    AK ARTHROCENTESIS ASPIR&/INJ MAJOR JT/BURSA W/O         TRACY Fields      Please note that this chart was generated using voicerecognition Dragon dictation software. Although every effort was made to ensurethe accuracy of this automated transcription, some errors in transcription may haveoccurred.

## 2020-08-20 ENCOUNTER — HOSPITAL ENCOUNTER (OUTPATIENT)
Dept: PHYSICAL THERAPY | Age: 30
Setting detail: THERAPIES SERIES
Discharge: HOME | End: 2020-08-20
Payer: MEDICAID

## 2020-08-20 PROCEDURE — 97110 THERAPEUTIC EXERCISES: CPT

## 2020-08-20 PROCEDURE — 97161 PT EVAL LOW COMPLEX 20 MIN: CPT

## 2020-08-20 NOTE — FLOWSHEET NOTE
** PLEASE SIGN, DATE AND TIME CERTIFICATION BELOW AND RETURN TO [facility] OUTPATIENT REHABILITATION (FAX #: [phone]). ATTEST/CO-SIGN IF ACCESSING VIA [employer]. THANK YOU.**    I certify that I have examined the patient below and determined that Physical Medicine and Rehabilitation service is necessary and that I approve the established plan of care for up to 90 days or as specifically noted. Attestation, signature or co-signature of physician indicates approval of certification requirements.    ________________________ ____________ __________  Physician Signature   Date   Time  [address] [facility]  PHYSICAL THERAPY  [x] EVALUATION  [] DAILY NOTE (LAND) [] DAILY NOTE (AQUATIC ) [] PROGRESS NOTE [] DISCHARGE NOTE    [x] OUTPATIENT REHABILITATION [facility]   [] [place] [address]    [] [facility]   [] [facility]    Date: 2020  Patient Name:  Celestino Almendarez  : 1990  MRN: [id number]    Referring Practitioner TRACY Young   Diagnosis Trochanteric bursitis, left hip [M70.62]    Treatment Diagnosis Left hip pain    Date of Evaluation 20    Additional Pertinent History Hip replacement on the left at age 20      Functional Outcome Measure Used LEFS   Functional Outcome Score LEFS Total Score: 18  LEFS Disability Index: 60-79%   (20)       Insurance: Primary: Payor: [employer] /  /  / ,   Secondary:    Authorization Information: precert after 31OZ visit  No ionto , heat or ice    Visit # 1, 1/10 for progress note   Visits Allowed: 30   Recertification Date: 8 weeks, Oct 15th    Physician Follow-Up: Unknown    Physician Orders: Left hip ROM, strengthening and IT band stretching   History of Present Illness: Hip replacement at 20years old on the left, due to leg pertheses disease. Pain started at 8years old. (Took 2 years to diagnosis due to left knee pain, not hip pain when he was a child).        SUBJECTIVE: Wife with pt today. She is pregnant and due on Aug 30th. He reports his hip  pain is getting worse over time   Pain increases with standing more than 20 min,    More hip and low back pain B,  Worse on the left, but also to the right low back with pressure with lifting. Social/Functional History and Current Status:  Medications and Allergies have been reviewed and are listed on Medical History Questionnaire. Minerva Tabares lives with spouse in a multiple floor home with stairs and no handrail to enter. rebuilding porch,, will get a new handrail installed. Task Previous Current   ADLs  Independent needs help getting out of tub. Ambulation Independent not walking very far,  can not walk too fast or too slow   [employer] Not Applicable   Community Integration Not Applicable Not Applicable   Driving Active  Active    Work [employer]. 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 OBJECTIVE:    Pain: Back pain 5/10, left hip 3/10    Palpation Tenderness in the left greater trochanter, and lumbar region,    Observation Left PSIS greater troch higher by 1 inch in stance, no weight shift to the left,    Posture Decreased lordosis in sitting, rounded shoulders, slouches frequently. Range of Motion Flexion wfl, and ext 90% of normal -- end range pain in left hip   SKC R 90, L95, SLR R 25, L 25, hip abd R 42, L 23 IR R 30, L 20 with increased pain at End range, ER    Strength Left hip ER, flex, abd  3- pain with resist ,left IR , ext 3+,  right hip flex abd, ext  4 with LBP with resist ext and abd,    Ambulation Decreased weight shift to the left leg, short stance on the left. Stairs Pain with walking up steps with left leg. Special Tests Hip compression, SI joint compression increased pain into both hips and the back.            TREATMENT   Precautions:    Pain: No new Education completed  []  Reviewed Prior HEP      []  Patient verbalized and/or demonstrated understanding of education provided. []  Patient unable to verbalize and/or demonstrate understanding of education provided. Will continue education. []  Barriers to learning: none    PLAN:  Treatment Recommendations: Strengthening, Range of Motion, Gait Training, Pain Management, Home Exercise Program, Patient Education, Positioning and Modalities    [x]  Plan of care initiated. Plan to see patient 2 times per week for 8 weeks to address the treatment planned outlined above.   []  Continue with current plan of care  []  Modify plan of care as follows:    []  Hold pending physician visit  []  Discharge    Time In 0932   Time Out 1030       Timed Code Minutes: Min Units   ADL (74750)     Aquatics (33470)     Gait (92451)     Manual Therapy (99938)     Massage (10081)     Neuro (28751)     Th. Activities (30249)     Th. Exercise (95660) 9 1   Ultrasound (16178)     Ionto (03055)     Manual E-Stim (00555)          TOTAL SESSION TIME: 58 min       Electronically Signed by: Sridevi Vail

## 2020-08-25 ENCOUNTER — HOSPITAL ENCOUNTER (OUTPATIENT)
Dept: PHYSICAL THERAPY | Age: 30
Setting detail: THERAPIES SERIES
Discharge: HOME | End: 2020-08-25
Payer: MEDICAID

## 2020-08-25 PROCEDURE — 97110 THERAPEUTIC EXERCISES: CPT

## 2020-08-25 NOTE — PROGRESS NOTES
[address] [facility]  PHYSICAL THERAPY  [] EVALUATION  [x] DAILY NOTE (LAND) [] DAILY NOTE (AQUATIC ) [] PROGRESS NOTE [] DISCHARGE NOTE    [x] OUTPATIENT REHABILITATION [facility]   [] [place] [address]    [] [facility]   [] Lili Cruz    Date: 2020  Patient Name:  Unknown Yovani  : 1990  MRN: [id number]    Referring Practitioner TRACY Barboza   Diagnosis Trochanteric bursitis, left hip [M70.62]    Treatment Diagnosis Left hip pain    Date of Evaluation 20    Additional Pertinent History Hip replacement on the left at age 20      Functional Outcome Measure Used LEFS   Functional Outcome Score     (20)       Insurance: Primary: Payor: Mary Aj /  /  / ,   Secondary:    Authorization Information: precert after 69ZG visit  No ionto , heat or ice    Visit # 2, 2/10 for progress note   Visits Allowed: 30   Recertification Date: 8 weeks, Oct 15th    Physician Follow-Up: Unknown    Physician Orders: Left hip ROM, strengthening and IT band stretching   History of Present Illness: Hip replacement at 20years old on the left, due to leg pertheses disease. Pain started at 8years old. (Took 2 years to diagnosis due to left knee pain, not hip pain when he was a child). SUBJECTIVE: Patient reporting hip and low back pain 5-6/10 today. Wife with patient and they brought a service dog in training with them.     TREATMENT   Precautions:    Pain: 5-6/10 in hip/low back    X in shaded column indicates activity completed today   Modalities Parameters/  Location  Notes                     Manual Therapy Time/Technique  Notes                     Exercise/Intervention Sets/Sec  Notes   Abd sets, glut sets, add sets with ball 10 5 sec x    Hip abduction (orange) bilat and unilat 10 5 sec     SAQ 10 5 sec     Quad set and hamstring set 10 5 sec x    SLR  10 5 sec x  had to break left side up into 2 sets of 5    Attempted side lying clam and had to hold due to pain       Passive HS stretch  3 15 seconds x Unable to perform SKTC/piriformis  On L due to pain   LTR 3 10-15 sec x    Right side lying for Left IT band stretch  3 20-30 seconds x    LAQ 10 5 seconds x           Specific Interventions Next Treatment: strengthening and stabs for back, hip strength and stretch, gait , modalities at needed, instructed to ice at home next visit. Activity/Treatment Tolerance:  [x]  Patient tolerated treatment well  []  Patient limited by fatigue  [x]  Patient limited by pain   []  Patient limited by medical complications  []  Other:     Assessment: Progressed with strengthening exercises as noted with patient having good to fair tolerance. Did have to hold a few exercises due to patient having pain but patient denies having any lasting increase in pain. GOALS:  Patient Goal: less pain and strength so he can take care of his future child. Short Term Goals:  Time Frame: 4 weeks   Increase strength of the core to 4/5 and the left hip to 4   Increase B hip flex to 110 , SLR to 45 to increase ease of dressing  Decrease left hip pain to 2, back pain to 4/10   Long Tirm Goals:  Time Frame: 8 weeks  Increase core and B LE strength to 5/5 so patient is able to walk on all surface including up and down steps without UE assist to carry objects into his home  Increase hip flex to 130, SLR to 50 , hip abd to 40 to allow for dressing without increased LBP   I HEP   Increased LEFS score from a 18 to 40/80       Patient Education:   [x]  HEP/Education Completed: hand out given for bracing, SAQ, quad set and hip adduction   Medbridge Access Code:  []  No new Education completed  []  Reviewed Prior HEP      [x]  Patient verbalized and/or demonstrated understanding of education provided. []  Patient unable to verbalize and/or demonstrate understanding of education provided. Will continue education.   []  Barriers to learning: none    PLAN:  Treatment Recommendations: Strengthening, Range of Motion, Gait Training, Pain Management, Home Exercise Program, Patient Education, Positioning and Modalities    []  Plan of care initiated. Plan to see patient 2 times per week for 8 weeks to address the treatment planned outlined above.   [x]  Continue with current plan of care  []  Modify plan of care as follows:    []  Hold pending physician visit  []  Discharge    Time In 1437   Time Out 1511       Timed Code Minutes: Min Units   ADL (10687)     Aquatics (70157)     Gait (93177)     Manual Therapy (41081)     Massage (21082)     Neuro (36303)     Th. Activities (25563)     Th. Exercise (89812) 34 2   Ultrasound (20590)     Ionto (10095)     Manual E-Stim (26807)          TOTAL SESSION TIME: 34 min       Electronically Signed by: Karan Alcantar

## 2020-08-27 ENCOUNTER — HOSPITAL ENCOUNTER (OUTPATIENT)
Dept: PHYSICAL THERAPY | Age: 30
Setting detail: THERAPIES SERIES
Discharge: HOME | End: 2020-08-27
Payer: MEDICAID

## 2020-08-31 ENCOUNTER — APPOINTMENT (OUTPATIENT)
Dept: PHYSICAL THERAPY | Age: 30
End: 2020-08-31
Payer: MEDICAID

## 2020-09-08 ENCOUNTER — HOSPITAL ENCOUNTER (OUTPATIENT)
Dept: PHYSICAL THERAPY | Age: 30
Setting detail: THERAPIES SERIES
Discharge: HOME | End: 2020-09-08
Payer: MEDICAID

## 2020-09-08 PROCEDURE — 97110 THERAPEUTIC EXERCISES: CPT

## 2020-09-08 NOTE — PROGRESS NOTES
[address] [facility]  PHYSICAL THERAPY  [] EVALUATION  [x] DAILY NOTE (LAND) [] DAILY NOTE (AQUATIC ) [] PROGRESS NOTE [] DISCHARGE NOTE    [x] OUTPATIENT REHABILITATION CENTER [facility]   [] [place] [address]    [] [facility]   [] Chris Sanches    Date: 2020  Patient Name:  Manuela Arrieta  : 1990  MRN: [id number]    Referring Practitioner TRACY Kwon   Diagnosis Trochanteric bursitis, left hip [M70.62]    Treatment Diagnosis Left hip pain    Date of Evaluation 20    Additional Pertinent History Hip replacement on the left at age 20      Functional Outcome Measure Used LEFS   Functional Outcome Score    18(20)       Insurance: Primary: Payor: Sukhdeep Felipe /  /  / ,   Secondary:    Authorization Information: precert after [facility] visit  No ionto , heat or ice    Visit # 3, 3/10 for progress note   Visits Allowed: 30   Recertification Date: 8 weeks, Oct 15th    Physician Follow-Up: Unknown    Physician Orders: Left hip ROM, strengthening and IT band stretching   History of Present Illness: Hip replacement at 20years old on the left, due to leg pertheses disease. Pain started at 8years old. (Took 2 years to diagnosis due to left knee pain, not hip pain when he was a child). SUBJECTIVE: missed several appt due to new baby -- 10days old.       TREATMENT   Precautions:    Pain: 5-6/10 left hip -- low back region     X in shaded column indicates activity completed today   Modalities Parameters/  Location  Notes                     Manual Therapy Time/Technique  Notes                     Exercise/Intervention Sets/Sec  Notes   Abd sets, glut sets, add sets with ball, bridge -- supine 10 5 sec x    Hip abduction (orange) bilat and unilat 10 5 sec     SAQ 10 5 sec     Quad set and hamstring set 10 5 sec x    SLR  10 5 sec x  had to break left side up into 2 sets of 5    Attempted side lying clam , hip abd  10  x    Passive HS stretch 3 15 seconds x Unable to perform SKTC/piriformis  On L due to pain   LTR 3 10-15 sec x    Right side lying for Left IT band stretch  3 20-30 seconds x    NK table  7.5#  2 x 10   x                  Sittingnuetral spine hipabd sets, and hip flex 10   x    Hamstring stretch on step, hip flex stretch on step   3 15 sec. x    Nu step  Legs only  Level 4   Done first to warm up. 5 min  x    Specific Interventions Next Treatment: strengthening and stabs for back, hip strength and stretch, gait , modalities at needed, instructed to ice when he gets home. Activity/Treatment Tolerance:  [x]  Patient tolerated treatment well  []  Patient limited by fatigue  [x]  Patient limited by pain   []  Patient limited by medical complications  []  Other:     Assessment: Progressed with strengthening exercises as noted with patient having good to fair tolerance. Did have to hold a few exercises due to patient having pain but patient denies having any lasting increase in pain. GOALS:  Patient Goal: less pain and strength so he can take care of his future child. Short Term Goals:  Time Frame: 4 weeks   Increase strength of the core to 4/5 and the left hip to 4   Increase B hip flex to 110 , SLR to 45 to increase ease of dressing  Decrease left hip pain to 2, back pain to 4/10   Long Tirm Goals:  Time Frame: 8 weeks  Increase core and B LE strength to 5/5 so patient is able to walk on all surface including up and down steps without UE assist to carry objects into his home  Increase hip flex to 130, SLR to 50 , hip abd to 40 to allow for dressing without increased LBP   I HEP   Increased LEFS score from a 18 to 40/80       Patient Education:   [x]  HEP/Education Completed: hand out given for bracing, SAQ, quad set and hip adduction   Medbridge Access Code:  []  No new Education completed  []  Reviewed Prior HEP      [x]  Patient verbalized and/or demonstrated understanding of education provided.   []  Patient unable to verbalize and/or demonstrate understanding of education provided. Will continue education. []  Barriers to learning: none    PLAN:  Treatment Recommendations: Strengthening, Range of Motion, Gait Training, Pain Management, Home Exercise Program, Patient Education, Positioning and Modalities    []  Plan of care initiated. Plan to see patient 2 times per week for 8 weeks to address the treatment planned outlined above.   [x]  Continue with current plan of care  []  Modify plan of care as follows:    []  Hold pending physician visit  []  Discharge    Time In [id number]   Time out 0915       Timed Code Minutes: Min Units   ADL (86404)     Aquatics (33513)     Gait (46763)     Manual Therapy (47375)     Massage (73510)     Neuro (99793)     Th. Activities (92616)     Th. Exercise (21479) 41 3   Ultrasound (56441)     Ionto (81276)     Manual E-Stim (80633)          TOTAL SESSION TIME: 41 min       Electronically Signed by: Hany Wei

## 2020-09-10 ENCOUNTER — HOSPITAL ENCOUNTER (OUTPATIENT)
Dept: PHYSICAL THERAPY | Age: 30
Setting detail: THERAPIES SERIES
Discharge: HOME | End: 2020-09-10
Payer: MEDICAID

## 2020-09-10 ENCOUNTER — PATIENT MESSAGE (OUTPATIENT)
Dept: ORTHOPEDIC SURGERY | Age: 30
End: 2020-09-10

## 2020-09-10 PROCEDURE — 97110 THERAPEUTIC EXERCISES: CPT

## 2020-09-10 NOTE — PROGRESS NOTES
[address] [facility]  PHYSICAL THERAPY  [] EVALUATION  [x] DAILY NOTE (LAND) [] DAILY NOTE (AQUATIC ) [] PROGRESS NOTE [] DISCHARGE NOTE    [x] OUTPATIENT REHABILITATION CENTER [facility]   [] [place] [address]    [] [facility]   [] Reyes Lemme    Date: 9/10/2020  Patient Name:  Efra Rodriguez  : 1990  MRN: [id number]    Referring Practitioner TRACY Arenas   Diagnosis Trochanteric bursitis, left hip [M70.62]    Treatment Diagnosis Left hip pain    Date of Evaluation 20    Additional Pertinent History Hip replacement on the left at age 20      Functional Outcome Measure Used LEFS   Functional Outcome Score    18(20)       Insurance: Primary: Payor: Maria G Pressley /  /  / ,   Secondary:    Authorization Information: precert after 97MI visit  No ionto , heat or ice    Visit # 4, 4/10 for progress note   Visits Allowed: 30   Recertification Date: 8 weeks, Oct 15th    Physician Follow-Up: Unknown    Physician Orders: Left hip ROM, strengthening and IT band stretching   History of Present Illness: Hip replacement at 20years old on the left, due to leg pertheses disease. Pain started at 8years old. (Took 2 years to diagnosis due to left knee pain, not hip pain when he was a child). SUBJECTIVE: low back pain today. Not sleeping well.         TREATMENT   Precautions:    Pain: 6/10back, 4/10  left hip -    X in shaded column indicates activity completed today   Modalities Parameters/  Location  Notes                     Manual Therapy Time/Technique  Notes                     Exercise/Intervention Sets/Sec  Notes   Abd sets, glut sets,sitting,  add sets with ball, bridge -- supine 10 5 sec x    Hip abduction (orange) bilat and unilat 10 5 sec     SAQ 10 5 sec     Quad set and hamstring set 10 5 sec x    SLR  10 5 sec x     side lying clam , hip abd  10  x    Passive HS stretch  3 15 seconds x Unable to perform SKTC/piriformis  On L due to pain   LTR 3 10-15 sec x    Right side lying for Left IT band stretch,   hip flexor stretch with left leg hanging off side of bed    3 15 seconds x    NK table  7.5#  2 x 10   x    Standing hip abd, SLR, Marching with UE assist  10  x           Sitting nuetral spine hip abd sets, and hip flex 10   x    Hamstring stretch on step, hip flex stretch on step   3 15 sec. x    Nu step  Legs only  Level 4   Done first to warm up. 5 min  x    Specific Interventions Next Treatment: strengthening and stabs for back, hip strength and stretch, gait , modalities at needed, instructed to ice when he gets home. Activity/Treatment Tolerance:  [x]  Patient tolerated treatment well  []  Patient limited by fatigue  [x]  Patient limited by pain   []  Patient limited by medical complications  []  Other:     Assessment: more sore this am due to not sleeping well. Especially low back. GOALS:  Patient Goal: less pain and strength so he can take care of his child. Short Term Goals:  Time Frame: 4 weeks   Increase strength of the core to 4/5 and the left hip to 4   Increase B hip flex to 110 , SLR to 45 to increase ease of dressing  Decrease left hip pain to 2, back pain to 4/10   Long Tirm Goals:  Time Frame: 8 weeks  Increase core and B LE strength to 5/5 so patient is able to walk on all surface including up and down steps without UE assist to carry objects into his home  Increase hip flex to 130, SLR to 50 , hip abd to 40 to allow for dressing without increased LBP   I HEP   Increased LEFS score from a 18 to 40/80       Patient Education:   [x]  HEP/Education Completed: hand out given for bracing, SAQ, quad set and hip adduction   Medbridge Access Code:  []  No new Education completed  []  Reviewed Prior HEP      [x]  Patient verbalized and/or demonstrated understanding of education provided. []  Patient unable to verbalize and/or demonstrate understanding of education provided. Will continue education.   []  Barriers to learning: none    PLAN:  Treatment Recommendations: Strengthening, Range of Motion, Gait Training, Pain Management, Home Exercise Program, Patient Education, Positioning and Modalities    []  Plan of care initiated. Plan to see patient 2 times per week for 8 weeks to address the treatment planned outlined above.   [x]  Continue with current plan of care  []  Modify plan of care as follows:    []  Hold pending physician visit  []  Discharge    Time In 0830   Time out 0912       Timed Code Minutes: Min Units   ADL (00597)     Aquatics (28052)     Gait (35874)     Manual Therapy (54050)     Massage (97628)     Neuro (10133)     Th. Activities (73390)     Th. Exercise (45222) 42 3   Ultrasound (13335)     Ionto (87447)     Manual E-Stim (66848)          TOTAL SESSION TIME: 42min       Electronically Signed by: Guadalupe Alva

## 2020-09-10 NOTE — TELEPHONE ENCOUNTER
Sent patient my chart message informing him that Karime Nash PA-C would like him to continue physical therapy and a referral to pain management.

## 2020-09-10 NOTE — TELEPHONE ENCOUNTER
From: Slade Clinton  To: TRACY Hernandez  Sent: 9/10/2020 11:23 AM EDT  Subject: Visit Follow-Up Question    My physical therapist said that I should get in contact with you to let you know I'm still having lower back and hip pain. Usually a constant 6 on a pain scale and it is affected whether I'm sitting or standing. Only relief I get is when laying down and the cortisone shot and mobic is not helping.

## 2020-09-14 ENCOUNTER — HOSPITAL ENCOUNTER (OUTPATIENT)
Dept: PHYSICAL THERAPY | Age: 30
Setting detail: THERAPIES SERIES
Discharge: HOME | End: 2020-09-14
Payer: MEDICAID

## 2020-09-14 PROCEDURE — 97110 THERAPEUTIC EXERCISES: CPT

## 2020-09-14 NOTE — PROGRESS NOTES
[facility]  PHYSICAL THERAPY  [x] DAILY NOTE (LAND) [] DAILY NOTE (AQUATIC ) [] PROGRESS NOTE [] DISCHARGE NOTE    [x] OUTPATIENT REHABILITATION CENTER [facility]   [] [place] [address]    [] [facility]   [] [facility] [place]    Date: 2020  Patient Name:  Celestino Almendarez  : 1990  MRN: [id number]    Referring Practitioner TRACY Young   Diagnosis Trochanteric bursitis, left hip [M70.62]    Treatment Diagnosis Left hip pain    Date of Evaluation 20    Additional Pertinent History Hip replacement on the left at age 20      Functional Outcome Measure Used LEFS   Functional Outcome Score    18(20)       Insurance: Primary: Payor: [employer] /  /  / ,   Secondary:    Authorization Information: precert after 74JZ visit  No ionto , heat or ice    Visit # 5, 5/10 for progress note   Visits Allowed: 30   Recertification Date: 8 weeks, Oct 15th    Physician Follow-Up: Unknown    Physician Orders: Left hip ROM, strengthening and IT band stretching   History of Present Illness: Hip replacement at 20years old on the left, due to leg pertheses disease. Pain started at 8years old. (Took 2 years to diagnosis due to left knee pain, not hip pain when he was a child). SUBJECTIVE: Pt reporting pain level 7/10 today and that his hip gave out on him getting out of the car. Patient denies falling but reporting that he almost fell. Patient also reporting that he is going to be going to pain management.      TREATMENT   Precautions:    Pain: 7/10  left hip     X in shaded column indicates activity completed today   Modalities Parameters/  Location  Notes               Manual Therapy Time/Technique  Notes               Exercise/Intervention Sets/Sec  Notes   Abd sets, add sets with ball, bridge -- supine 15 5 sec x    Hip abduction (orange) bilat and unilat 10 5 sec     SAQ 10 5 sec     Quad set and hamstring set 10 5 sec     SLR  10 5 sec x side lying clam , hip abd  10  x    Passive HS stretch  3 15 seconds  Unable to perform SKTC/piriformis  On L due to pain   LTR 3 10-15 sec     Right side lying for Left IT band stretch,   hip flexor stretch with left leg hanging off side of bed    3 15 seconds     NK table  7.5#  2 x 10   x    Heel/toe raises, 3 way hip, HS curls, marching and mini squats 10  x    Hydro stick with bracing 4 directions 10  x    Sitting neutral spine hip abd sets, and hip flex 10   x    Hamstring stretch on step, hip flex stretch on step , adductor stretch  3 15 sec. x    NuStep  Legs only  Level 4   5 min  x    Specific Interventions Next Treatment: strengthening and stabs for back, hip strength and stretch, gait , modalities at needed, instructed to ice when he gets home. Activity/Treatment Tolerance:  [x]  Patient tolerated treatment well  []  Patient limited by fatigue  [x]  Patient limited by pain   []  Patient limited by medical complications  []  Other:     Assessment:Progressed with reps and added Hydro stick with good tolerance. Less range of motion noted on left hip abduction/clam shells. Patient reporting pain a little worse at end of session but having no other complains. GOALS:  Patient Goal: less pain and strength so he can take care of his child.     Short Term Goals:  Time Frame: 4 weeks   Increase strength of the core to 4/5 and the left hip to 4   Increase B hip flex to 110 , SLR to 45 to increase ease of dressing  Decrease left hip pain to 2, back pain to 4/10   Long Tirm Goals:  Time Frame: 8 weeks  Increase core and B LE strength to 5/5 so patient is able to walk on all surface including up and down steps without UE assist to carry objects into his home  Increase hip flex to 130, SLR to 50 , hip abd to 40 to allow for dressing without increased LBP   I HEP   Increased LEFS score from a 18 to 40/80       Patient Education:   [x]  HEP/Education Completed:   [address]   []  No new Education completed  []  Reviewed Prior HEP      [x]  Patient verbalized and/or demonstrated understanding of education provided. []  Patient unable to verbalize and/or demonstrate understanding of education provided. Will continue education. []  Barriers to learning: none    PLAN: 8 weeks to address the treatment planned outlined above.   [x]  Continue with current plan of care  []  Modify plan of care as follows:    []  Hold pending physician visit  []  Discharge    Time In 0831   Time out 0908       Timed Code Minutes: [address] Units   ADL (54800)     Aquatics (37211)     Gait (46789)     Manual Therapy (27041)     Massage (47279)     Neuro (84129)     Th. Activities (49212)     Th. Exercise (63498) 37 2   Ultrasound (50197)     Ionto (88870)     Manual E-Stim (66366)          TOTAL SESSION TIME: 37min       Electronically Signed by: Renato Ruiz

## 2020-09-17 ENCOUNTER — APPOINTMENT (OUTPATIENT)
Dept: PHYSICAL THERAPY | Age: 30
End: 2020-09-17
Payer: MEDICAID

## 2020-09-21 ENCOUNTER — HOSPITAL ENCOUNTER (OUTPATIENT)
Dept: PHYSICAL THERAPY | Age: 30
Setting detail: THERAPIES SERIES
Discharge: HOME | End: 2020-09-21
Payer: MEDICAID

## 2020-09-21 ENCOUNTER — PATIENT MESSAGE (OUTPATIENT)
Dept: FAMILY MEDICINE CLINIC | Age: 30
End: 2020-09-21

## 2020-09-21 PROCEDURE — 97110 THERAPEUTIC EXERCISES: CPT

## 2020-09-21 NOTE — TELEPHONE ENCOUNTER
From: Pedro Weller  To: Mukul Manrique DO  Sent: 9/21/2020 8:16 AM EDT  Subject: Prescription Question    I've recently started having issues with my asthma and couldn't find my advair discus or my emergency inhaler I was wondering if there was anyway to get a prescription for them again.

## 2020-09-21 NOTE — PROGRESS NOTES
[address] [facility]  PHYSICAL THERAPY  [] DAILY NOTE (LAND) [] DAILY NOTE (AQUATIC ) [x] PROGRESS NOTE [] DISCHARGE NOTE    [x] OUTPATIENT REHABILITATION CENTER - [place]   [] [place] [address]    [] [facility]   [] Stacey Syed    Date: 2020  Patient Name:  Albino Herrera  : 1990  MRN: [id number]    Referring Practitioner TRACY Gonzalez   Diagnosis Trochanteric bursitis, left hip [M70.62]    Treatment Diagnosis Left hip pain    Date of Evaluation 20    Additional Pertinent History Hip replacement on the left at age 20      Functional Outcome Measure Used LEFS   Functional Outcome Score    18(20)       Insurance: Primary: Payor: [employer] /  /  / ,   Secondary:    Authorization Information: precert after 39CO visit  No ionto , heat or ice    Visit # 6,0/10 for progress note   Visits Allowed: 30   Recertification Date: 8 weeks, Oct 15th    Physician Follow-Up: Unknown    Physician Orders: Left hip ROM, strengthening and IT band stretching   History of Present Illness: Hip replacement at 20years old on the left, due to leg pertheses disease. Pain started at 8years old. (Took 2 years to diagnosis due to left knee pain, not hip pain when he was a child). SUBJECTIVE: Pt reporting pain level 4/10 prior to exercise, increases to 7/10 after. Has appt for pain management on Oct 1st.  He feels confortable cont with HEP until he see pain management physician. O: Resistive ER, Flex and abd painful and 3+ strength,  IR 4 with less pain with resistance   Hip compression  Increased pain , distraction -- no change  ROM  Flex 105, abd 20 , with end range pain   TREATMENT   Precautions:    Pain: 4-7 /10  left hip and lateral sacrum  Increases after exercise.      X in shaded column indicates activity completed today   Modalities Parameters/  Location  Notes               Manual Therapy Time/Technique  Notes hip flex to 110 , SLR to 45 to increase ease of dressing    NOT MET   hip flex   Decrease left hip pain to 2, back pain to 4/10  NOT MET  Hip Least pain is 2/10  Worst pain is 7/10  Back pain usually 5/10   Long Tirm Goals:   Time Frame: 8 weeks-- none met    Increase core and B LE strength to 5/5 so patient is able to walk on all surface including up and down steps without UE assist to carry objects into his home  Increase hip flex to 130, SLR to 50 , hip abd to 40 to allow for dressing without increased LBP   I HEP   Increased LEFS score from a 18 to 40/80       Patient Education:   [x]  HEP/Education Completed: reviewed [employer]. [employer] Code:[id number]     []  No new Education completed  []  Reviewed Prior HEP      [x]  Patient verbalized and/or demonstrated understanding of education provided. []  Patient unable to verbalize and/or demonstrate understanding of education provided. Will continue education. []  Barriers to learning: none    PLAN: 8 weeks to address the treatment planned outlined above.   []  Continue with current plan of care  []  Modify plan of care as follows:    [x]  Hold pending physician visit  Will see on Oct 5th after pain management consult  Then cont 2 times per week if pain gets under control   []  Discharge    Time In 0831   Time out 0908       Timed Code Minutes: [facility] Units   ADL ([phone])     Aquatics (39616)     Gait (51415)     Manual Therapy (94310)     Massage (05223)     Neuro (19729)     Th. Activities (28637)     Th. Exercise (57318) 37 2   Ultrasound (40929)     Ionto (35508)     Manual E-Stim (10797)          TOTAL SESSION TIME: 37min       Electronically Signed by: Hany Wei

## 2020-09-21 NOTE — TELEPHONE ENCOUNTER
Dr. Moura Sorrel-    Please see message below. Please advise. LV-08/12/20  Next visit - none    Thanks!   Pratik

## 2020-09-22 NOTE — TELEPHONE ENCOUNTER
Please tell patient it would be best if we could see him in the clinic to discuss his \"issues with asthma\" and we can fill the inhalers at that time. Please inform patient if experiences shortness of breath that does not resolve after sitting and resting to go to the Emergency Department. If patient has new or worsening cough/cold symptoms I will put in order COVID testing ambulatory.

## 2020-09-28 ENCOUNTER — TELEMEDICINE (OUTPATIENT)
Dept: PSYCHOLOGY | Age: 30
End: 2020-09-28
Payer: MEDICAID

## 2020-09-28 PROCEDURE — 90791 PSYCH DIAGNOSTIC EVALUATION: CPT | Performed by: PSYCHOLOGIST

## 2020-09-28 ASSESSMENT — PATIENT HEALTH QUESTIONNAIRE - PHQ9
7. TROUBLE CONCENTRATING ON THINGS, SUCH AS READING THE NEWSPAPER OR WATCHING TELEVISION: 2
9. THOUGHTS THAT YOU WOULD BE BETTER OFF DEAD, OR OF HURTING YOURSELF: 0
4. FEELING TIRED OR HAVING LITTLE ENERGY: 3
8. MOVING OR SPEAKING SO SLOWLY THAT OTHER PEOPLE COULD HAVE NOTICED. OR THE OPPOSITE, BEING SO FIGETY OR RESTLESS THAT YOU HAVE BEEN MOVING AROUND A LOT MORE THAN USUAL: 3
SUM OF ALL RESPONSES TO PHQ QUESTIONS 1-9: 17
2. FEELING DOWN, DEPRESSED OR HOPELESS: 1
1. LITTLE INTEREST OR PLEASURE IN DOING THINGS: 3
10. IF YOU CHECKED OFF ANY PROBLEMS, HOW DIFFICULT HAVE THESE PROBLEMS MADE IT FOR YOU TO DO YOUR WORK, TAKE CARE OF THINGS AT HOME, OR GET ALONG WITH OTHER PEOPLE: 2
SUM OF ALL RESPONSES TO PHQ QUESTIONS 1-9: 17
3. TROUBLE FALLING OR STAYING ASLEEP: 3
5. POOR APPETITE OR OVEREATING: 1
6. FEELING BAD ABOUT YOURSELF - OR THAT YOU ARE A FAILURE OR HAVE LET YOURSELF OR YOUR FAMILY DOWN: 1
SUM OF ALL RESPONSES TO PHQ9 QUESTIONS 1 & 2: 4
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
## 2020-09-28 NOTE — LETTER
[address] [facility]    Deedee Powell PSYD          September 28, 2020       Patient: Susy Hummel   MR Number: [id number]   YOB: 1990   Date of Visit: 9/28/2020       [facility]:    I am referring my patient, Cathy Pena, to you for evaluation of Bipolar Disorder, and medication management. He has a history of Bipolar D/O, Borderline Personality Traits, and PTSD. He  has a past medical history of Asthma, Depression, Heart murmur, Kuwd-Dpkzf-Kzyjhka disease, left, and Multiple allergies. His  has a past surgical history that includes joint replacement (Left, 2011) and Tympanostomy tube placement. He  reports that he has been smoking cigarettes. He has a 7.50 pack-year smoking history. He has quit using smokeless tobacco.  His smokeless tobacco use included chew. He reports that he does not drink alcohol or use drugs. He has a current medication list which includes the following prescription(s): chantix continuing month wilbur, meloxicam, montelukast, chantix starting month wilbur, trazodone, gabapentin, and pantoprazole. He has No Known Allergies. I appreciate your assistance in his care and look forward to your findings and recommendations.     Sincerely,                             Deedee Powell PSYD

## 2020-09-30 ASSESSMENT — ENCOUNTER SYMPTOMS
BLURRED VISION: 0
SHORTNESS OF BREATH: 0
ABDOMINAL PAIN: 0
BACK PAIN: 1
BOWEL INCONTINENCE: 0
PERSISTENT INFECTIONS: 0

## 2020-09-30 NOTE — PROGRESS NOTES
HPI:     Hip Pain    The incident occurred more than 1 week ago. The pain is present in the left hip, left thigh, left leg, left knee, left ankle and left heel. The quality of the pain is described as shooting. The pain is at a severity of 7/10. The pain is moderate. The pain has been constant since onset. Associated symptoms include an inability to bear weight, muscle weakness, numbness and tingling. The symptoms are aggravated by weight bearing, movement and palpation. He has tried non-weight bearing, NSAIDs and rest for the symptoms. The treatment provided mild relief. Back Pain   This is a chronic problem. The current episode started more than 1 year ago. The problem occurs constantly. The problem has been gradually worsening since onset. The pain is present in the lumbar spine and sacro-iliac. The quality of the pain is described as shooting. The pain is at a severity of 7/10. The pain is moderate. The pain is the same all the time. The symptoms are aggravated by bending, standing and twisting. Stiffness is present all day. Associated symptoms include headaches, leg pain, numbness, pelvic pain, tingling and weakness. Pertinent negatives include no abdominal pain, bladder incontinence, bowel incontinence, chest pain, dysuria, fever, paresis, paresthesias, perianal numbness or weight loss. He has tried home exercises and NSAIDs for the symptoms. The treatment provided mild relief. Chronic left hip and low back pain. Had hip replacement with redo Txmz-Ptmmé-Jfoijzu disease. He has had a recent bursa injection with minimal benefit. He has been doing physical therapy and continues to have some lingering pain. Pain in the low back seemingly over the left SI joint. Does occasionally go down the anterior thigh. Takes gabapentin for restless leg with some benefit. X-ray of the hip and pelvis show stable hip prosthesis. Patient denies any new neurological symptoms.  No bowel or bladder incontinence, no weakness, and no falling. Review of OARRS does not show any aberrant prescription behavior. Past Medical History:   Diagnosis Date    Asthma     Depression     Heart murmur     Qlyh-Huawu-Yfcwcjo disease, left     Multiple allergies        Past Surgical History:   Procedure Laterality Date    JOINT REPLACEMENT Left 2011    Left hip replacement    TYMPANOSTOMY TUBE PLACEMENT         No Known Allergies      Current Outpatient Medications:     varenicline (CHANTIX CONTINUING MONTH PAK) 1 MG tablet, Take 1 tablet by mouth 2 times daily, Disp: 60 tablet, Rfl: 3    meloxicam (MOBIC) 15 MG tablet, Take 1 tablet by mouth daily, Disp: 30 tablet, Rfl: 3    montelukast (SINGULAIR) 10 MG tablet, Take 1 tablet by mouth daily, Disp: 30 tablet, Rfl: 3    traZODone (DESYREL) 100 MG tablet, Take 1 tablet by mouth nightly, Disp: 90 tablet, Rfl: 1    pantoprazole (PROTONIX) 20 MG tablet, Take 1 tablet by mouth daily, Disp: 30 tablet, Rfl: 3    varenicline (CHANTIX STARTING MONTH PAK) 0.5 MG X 11 & 1 MG X 42 tablet, Take by mouth., Disp: 1 box, Rfl: 0    gabapentin (NEURONTIN) 600 MG tablet, Take 1 tablet by mouth nightly for 90 days. , Disp: 90 tablet, Rfl: 1    Family History   Problem Relation Age of Onset    Depression Mother     Heart Disease Father        Social History     Socioeconomic History    Marital status:      Spouse name: Not on file    Number of children: Not on file    Years of education: Not on file    Highest education level: Not on file   Occupational History    Not on file   Social Needs    Financial resource strain: Not on file    Food insecurity     Worry: Not on file     Inability: Not on file    Transportation needs     Medical: Not on file     Non-medical: Not on file   Tobacco Use    Smoking status: Current Every Day Smoker     Packs/day: 0.75     Years: 10.00     Pack years: 7.50     Types: Cigarettes    Smokeless tobacco: Former User     Types: R [address]i [address] comment: Vapor   Substance and Sexual Activity    Alcohol use: No    Drug use: No    Sexual activity: Not on file   Lifestyle    Physical activity     Days per week: Not on file     Minutes per session: Not on file    Stress: Not on file   Relationships    Social connections     Talks on phone: Not on file     Gets together: Not on file     Attends [employer] service: Not on file     Active member of club or organization: Not on file     Attends meetings of clubs or organizations: Not on file     Relationship status: Not on file    Intimate partner violence     Fear of current or ex partner: Not on file     Emotionally abused: Not on file     Physically abused: Not on file     Forced sexual activity: Not on file   Other Topics Concern    Not on file   Social History Narrative    Not on file       Review of Systems:  Review of Systems   Constitution: Negative for fever and weight loss. HENT: Negative for congestion and tinnitus. Eyes: Negative for blurred vision. Cardiovascular: Negative for chest pain. Respiratory: Negative for shortness of breath. Endocrine: Negative for heat intolerance. Hematologic/Lymphatic: Negative for bleeding problem. Skin: Negative for rash. Musculoskeletal: Positive for back pain, joint pain and joint swelling. Gastrointestinal: Negative for abdominal pain and bowel incontinence. Genitourinary: Positive for pelvic pain. Negative for bladder incontinence and dysuria. Neurological: Positive for headaches, numbness, tingling and weakness. Negative for paresthesias. Psychiatric/Behavioral: Positive for depression. Allergic/Immunologic: Negative for persistent infections. Physical Exam:  BP (!) 140/89   Pulse 73   Temp 98 °F (36.7 °C) (Infrared)   Ht 5' 10\" (1.778 m)   Wt 195 lb (88.5 kg)   BMI 27.98 kg/m²     Physical Exam  Vitals signs reviewed. Constitutional:       General: He is awake. Appearance: He is not ill-appearing or diaphoretic. HENT:      Right Ear: External ear normal.      Left Ear: External ear normal.   Eyes:      General:         Right eye: No discharge. Left eye: No discharge. Conjunctiva/sclera: Conjunctivae normal.   Neck:      Thyroid: No thyromegaly. Trachea: No tracheal deviation. Pulmonary:      Effort: Pulmonary effort is normal. No respiratory distress. Breath sounds: No stridor. Musculoskeletal:      Lumbar back: He exhibits tenderness. He exhibits no edema and no deformity. Skin:     General: Skin is warm and dry. Neurological:      Mental Status: He is alert and oriented to person, place, and time. Gait: Gait normal.      Deep Tendon Reflexes: Reflexes are normal and symmetric. Psychiatric:         Attention and Perception: Attention and perception normal.         Behavior: Behavior normal.         Record/Diagnostics Review:    As above, I did review the imaging    Orders:  Orders Placed This Encounter   Procedures    XR LUMBAR SPINE (2-3 VIEWS)    DE INJECT SI JOINT ARTHRGRPHY&/ANES/STEROID W/IMAGE     Assessment:  1. Sacroiliitis ([address]    2. Chronic bilateral low back pain, unspecified whether sciatica present    3. Left hip pain    4. Lumbar radiculopathy    5. Greater trochanteric bursitis of left hip        Treatment Plan:  DISCUSSION: Treatment options discussed with patient and all questions answered to patient's satisfaction. OARRS Review: Reviewed and acceptable for medications prescribed. TREATMENT OPTIONS:     Discussed different treatment options including continued conservative care such as physical therapy, chiropractic care, acupuncture. Discussed different interventional options such as epidural steroids or medial branch blocks. Also discussed neuromodulation in the form of spinal cord stimulation. Also discussed surgical evaluation. X-ray lumbar spine.   Axial low back pain the worst of complaints, has failed conservative measures and the pain continues,

## 2020-10-01 ENCOUNTER — OFFICE VISIT (OUTPATIENT)
Dept: PAIN MANAGEMENT | Age: 30
End: 2020-10-01
Payer: MEDICAID

## 2020-10-01 VITALS
HEIGHT: 70 IN | DIASTOLIC BLOOD PRESSURE: 89 MMHG | BODY MASS INDEX: 27.92 KG/M2 | SYSTOLIC BLOOD PRESSURE: 140 MMHG | HEART RATE: 73 BPM | WEIGHT: 195 LBS | TEMPERATURE: 98 F

## 2020-10-01 PROCEDURE — 99244 OFF/OP CNSLTJ NEW/EST MOD 40: CPT | Performed by: PAIN MEDICINE

## 2020-10-05 ENCOUNTER — HOSPITAL ENCOUNTER (EMERGENCY)
Dept: GENERAL RADIOLOGY | Age: 30
Discharge: HOME | End: 2020-10-05
Payer: MEDICAID

## 2020-10-05 ENCOUNTER — HOSPITAL ENCOUNTER (EMERGENCY)
Age: 30
Discharge: HOME | End: 2020-10-05
Payer: MEDICAID

## 2020-10-05 ENCOUNTER — PATIENT MESSAGE (OUTPATIENT)
Dept: FAMILY MEDICINE CLINIC | Age: 30
End: 2020-10-05

## 2020-10-05 ENCOUNTER — HOSPITAL ENCOUNTER (OUTPATIENT)
Age: 30
Discharge: HOME | End: 2020-10-05
Payer: MEDICAID

## 2020-10-05 VITALS
OXYGEN SATURATION: 98 % | HEIGHT: 70 IN | HEART RATE: 77 BPM | DIASTOLIC BLOOD PRESSURE: 93 MMHG | WEIGHT: 211 LBS | BODY MASS INDEX: 30.21 KG/M2 | TEMPERATURE: 98.4 F | RESPIRATION RATE: 16 BRPM | SYSTOLIC BLOOD PRESSURE: 137 MMHG

## 2020-10-05 LAB
GROUP A STREP CULTURE, REFLEX: NEGATIVE
REFLEX THROAT C + S: NORMAL

## 2020-10-05 PROCEDURE — 87880 STREP A ASSAY W/OPTIC: CPT

## 2020-10-05 PROCEDURE — 99213 OFFICE O/P EST LOW 20 MIN: CPT | Performed by: NURSE PRACTITIONER

## 2020-10-05 PROCEDURE — 99214 OFFICE O/P EST MOD 30 MIN: CPT

## 2020-10-05 PROCEDURE — 87070 CULTURE OTHR SPECIMN AEROBIC: CPT

## 2020-10-05 PROCEDURE — 72100 X-RAY EXAM L-S SPINE 2/3 VWS: CPT

## 2020-10-05 RX ORDER — AMOXICILLIN 500 MG/1
500 CAPSULE ORAL 3 TIMES DAILY
Qty: 15 CAPSULE | Refills: 0 | Status: SHIPPED | OUTPATIENT
Start: 2020-10-05 | End: 2020-10-10

## 2020-10-05 ASSESSMENT — ENCOUNTER SYMPTOMS
NAUSEA: 0
SORE THROAT: 1
VOMITING: 0
ABDOMINAL PAIN: 0
SHORTNESS OF BREATH: 0
RHINORRHEA: 0
COUGH: 0
DIARRHEA: 0

## 2020-10-05 ASSESSMENT — PAIN DESCRIPTION - FREQUENCY: FREQUENCY: CONTINUOUS

## 2020-10-05 ASSESSMENT — PAIN SCALES - GENERAL: PAINLEVEL_OUTOF10: 4

## 2020-10-05 ASSESSMENT — PAIN DESCRIPTION - ORIENTATION: ORIENTATION: RIGHT

## 2020-10-05 ASSESSMENT — PAIN DESCRIPTION - PAIN TYPE: TYPE: ACUTE PAIN

## 2020-10-05 ASSESSMENT — PAIN DESCRIPTION - LOCATION: LOCATION: EAR

## 2020-10-05 ASSESSMENT — PAIN DESCRIPTION - DESCRIPTORS: DESCRIPTORS: ACHING

## 2020-10-05 NOTE — ED PROVIDER NOTES
[facility]  Urgent Care Encounter       CHIEF COMPLAINT       Chief Complaint   Patient presents with   Alexandru Pink     right ear       Nurses Notes reviewed and I agree except as noted in the HPI. HISTORY OF PRESENT ILLNESS   Jose Stewart is a 31 y.o. male who presents with complaints of right ear pain that worsens when he swallows and radiates into his lower jaw/throat. The history is provided by the patient. Ear Problem   Location:  Right  Behind ear:  No abnormality  Quality:  Aching, shooting and sharp  Severity:  Moderate  Onset quality:  Sudden  Duration:  12 hours  Timing:  Constant  Progression:  Worsening  Chronicity:  New  Context: recent URI    Context: not direct blow and not water in ear    Relieved by:  Nothing  Worsened by:  Swallowing  Ineffective treatments:  Ice and OTC medications  Associated symptoms: sore throat    Associated symptoms: no abdominal pain, no congestion, no cough, no diarrhea, no ear discharge, no fever, no headaches, no rash, no rhinorrhea and no vomiting        REVIEW OF SYSTEMS     Review of Systems   Constitutional: Negative for chills and fever. HENT: Positive for ear pain (right) and sore throat. Negative for congestion, ear discharge and rhinorrhea. Respiratory: Negative for cough and shortness of breath. Cardiovascular: Negative for chest pain. Gastrointestinal: Negative for abdominal pain, diarrhea, nausea and vomiting. Musculoskeletal: Negative for myalgias. Skin: Negative for rash. Allergic/Immunologic: Negative for environmental allergies. Neurological: Negative for weakness and headaches.        PAST MEDICAL HISTORY         Diagnosis Date    Asthma     Bipolar 1 disorder ([facility] [address]     Depression     Heart murmur     Iuir-Vrrpo-Omcqzzq disease, left     Multiple allergies     PTSD (post-traumatic stress disorder)        SURGICALHISTORY     Patient  has a past surgical history that includes joint replacement (Left, 2011) and Tympanostomy tube placement. CURRENT MEDICATIONS       Previous Medications    GABAPENTIN (NEURONTIN) 600 MG TABLET    Take 1 tablet by mouth nightly for 90 days. MELOXICAM (MOBIC) 15 MG TABLET    Take 1 tablet by mouth daily    MONTELUKAST (SINGULAIR) 10 MG TABLET    Take 1 tablet by mouth daily    PANTOPRAZOLE (PROTONIX) 20 MG TABLET    Take 1 tablet by mouth daily    TRAZODONE (DESYREL) 100 MG TABLET    Take 1 tablet by mouth nightly    VARENICLINE (CHANTIX CONTINUING MONTH AASHISH) 1 MG TABLET    Take 1 tablet by mouth 2 times daily    VARENICLINE (CHANTIX STARTING MONTH PAK) 0.5 MG X 11 & 1 MG X 42 TABLET    Take by mouth. ALLERGIES     Patient is has No Known Allergies. Patients   There is no immunization history on file for this patient. FAMILY HISTORY     Patient's family history includes Depression in his mother; Heart Disease in his father. SOCIAL HISTORY     Patient  reports that he has been smoking cigarettes. He has a 2.50 pack-year smoking history. He has quit using smokeless tobacco.  His smokeless tobacco use included chew. He reports current drug use. Drug: Marijuana. He reports that he does not drink alcohol. PHYSICAL EXAM     ED TRIAGE VITALS  BP: (!) 137/93, Temp: 98.4 °F (36.9 °C), Pulse: 77, Resp: 16, SpO2: 98 %,Estimated body mass index is 30.28 kg/m² as calculated from the following:    Height as of this encounter: 5' 10\" (1.778 m). Weight as of this encounter: 211 lb (95.7 kg). ,No LMP for male patient. Physical Exam  Vitals signs and nursing note reviewed. Constitutional:       General: He is not in acute distress. Appearance: Normal appearance. HENT:      Head: Normocephalic. Right Ear: Ear canal and external ear normal. Tenderness present. Tympanic membrane is erythematous.       Left Ear: Tympanic membrane, ear canal and external ear normal.      Nose: Nose normal.      Mouth/Throat:      Mouth: Mucous membranes are moist. Pharynx: Pharyngeal swelling, oropharyngeal exudate and posterior oropharyngeal erythema present. Neck:      Musculoskeletal: Normal range of motion. Cardiovascular:      Rate and Rhythm: Normal rate and regular rhythm. Heart sounds: Normal heart sounds. Pulmonary:      Effort: Pulmonary effort is normal.      Breath sounds: Normal breath sounds. Skin:     General: Skin is warm and dry. Neurological:      Mental Status: He is alert and oriented to person, place, and time. Psychiatric:         Behavior: Behavior normal.       DIAGNOSTIC RESULTS     Labs:  Results for orders placed or performed during the hospital encounter of 10/05/20   Strep A culture, throat    Specimen: Throat   Result Value Ref Range    REFLEX THROAT C + S INDICATED    STREP A ANTIGEN   Result Value Ref Range    GROUP A STREP CULTURE, REFLEX Negative        IMAGING:  none    EKG:  none    URGENT CARE COURSE:     Vitals:    10/05/20 1728   BP: (!) 137/93   Pulse: 77   Resp: 16   Temp: 98.4 °F (36.9 °C)   TempSrc: Temporal   SpO2: 98%   Weight: 211 lb (95.7 kg)   Height: 5' 10\" (1.778 m)       Medications - No data to display         PROCEDURES:  None    FINAL IMPRESSION      1. Otalgia of right ear    2. Recurrent acute serous otitis media of right ear      DISPOSITION/ PLAN   DISPOSITION Decision To Discharge 10/05/2020 05:41:29 PM     Exam consistent with right ear otitis media. Concerns for strep to coccal pharyngitis. Rapid strep a swab negative and sent to lab for culture. Will start patient on amoxicillin for 7 days. He may continue to use over-the-counter acetaminophen as needed for pain control. He was instructed to follow-up with PCP if symptoms worsen or fail to improve. Patient voiced understanding and was discharged in stable condition.     PATIENT REFERRED TO:  Eunice Vidales, APRN - CNP  None      DISCHARGE MEDICATIONS:  New Prescriptions    AMOXICILLIN (AMOXIL) 500 MG CAPSULE    Take 1 capsule by mouth 3 times daily for 5 days       Discontinued Medications    No medications on file       Current Discharge Medication List          LEYDI Arana CNP    (Please note that portions of this note were completed with a voice recognition program. Efforts were made to edit the dictations but occasionally words are mis-transcribed.)            LEYDI Arana CNP  10/05/20 7849

## 2020-10-05 NOTE — TELEPHONE ENCOUNTER
From: Micheline Pac  Sent: 10/5/2020 5:00 PM EDT  To: [facility]  Subject: RE: Non-Urgent Medical Question    Unfortunately no because I work from 7a.m to 5p.m and just started my job this past Monday. But thank you I'll try to get to urgent care tomorrow after work  ----- Message -----  [facility] [address] [facility] [address] [facility] 10/5/20, 4:58 PM  To: Micheline Pac  Subject: RE: Non-Urgent [facility],     We would need to see you for an appointment. Would tomorrow 10/6/2020 at 8am, 11:40am, 1:40pm, or 3:20pm work for you? Thank you,  Gaurang Perry      ----- Message -----   From:Barrie Hung   Sent:10/5/2020 4:30 PM EDT   To:Leslee Koroma,    Subject:Non-Urgent Medical Question    Hello, I have a history of bad ear infections and woke up with a bad one this morning I was hoping you might be able to send something over to the pharmacy to take and make this better.

## 2020-10-05 NOTE — ED TRIAGE NOTES
Pt ambulatory into [facility] with c/o right ear pain that started this am. Pt states he gets frequent ear infections and works outside. Pt states pain 4.

## 2020-10-06 ENCOUNTER — TELEPHONE (OUTPATIENT)
Dept: FAMILY MEDICINE CLINIC | Age: 30
End: 2020-10-06

## 2020-10-06 NOTE — TELEPHONE ENCOUNTER
[facility]  [address], [address]  Phone: [phone]  Fax: [phone]    October 6, 2020    [address] [address]      Dear Eugene Galindo,    This letter is regarding your Emergency Department (ED) visit at [address] [address] on 10/5/20. Dr. Yazmin Carmona wanted to make sure that you understand your discharge instructions and that you were able to fill any prescriptions that may have been ordered for you. Please contact the office at the above phone number if the ED advised you to follow up with Dr. Yazmin Carmona, or if you have any further questions or needs. Also did you know -   *Visiting the ED for a non-emergency could result in higher co-pays than you would normally be subject to paying? *You can call your doctor even after hours so they can direct you to the most appropriate care. [facility]) practices can often offer you an appointment on the same day that you call. *We have some [facility] offices that offer Walk-in appointments; check our website for availability in your community, [email or web address].      *Evisits are now available for patients for $36 through [employer] for certain conditions:  * Sinus, cold and or cough       * Diarrhea            * Headache  * Heartburn                                * Poison Cathryn          * Back pain     * Urinary problems                         If you do not have [employer] and are interested, please contact the office and a staff member may assist you or go to [email or web address].     Sincerely,     LEYDI Faulkner CNP and your [facility]

## 2020-10-08 LAB — THROAT/NOSE CULTURE: NORMAL

## 2020-10-19 ENCOUNTER — HOSPITAL ENCOUNTER (OUTPATIENT)
Dept: PREADMISSION TESTING | Age: 30
Setting detail: SPECIMEN
Discharge: HOME | End: 2020-10-23
Payer: MEDICAID

## 2020-10-19 DIAGNOSIS — Z01.818 PREOP TESTING: Primary | ICD-10-CM

## 2020-10-19 PROCEDURE — U0003 INFECTIOUS AGENT DETECTION BY NUCLEIC ACID (DNA OR RNA); SEVERE ACUTE RESPIRATORY SYNDROME CORONAVIRUS 2 (SARS-COV-2) (CORONAVIRUS DISEASE [COVID-19]), AMPLIFIED PROBE TECHNIQUE, MAKING USE OF HIGH THROUGHPUT TECHNOLOGIES AS DESCRIBED BY CMS-2020-01-R: HCPCS

## 2020-10-19 PROCEDURE — C9803 HOPD COVID-19 SPEC COLLECT: HCPCS

## 2020-10-20 ENCOUNTER — ANESTHESIA EVENT (OUTPATIENT)
Dept: OPERATING ROOM | Age: 30
End: 2020-10-20
Payer: MEDICAID

## 2020-10-20 LAB — SARS-COV-2, NAA: NOT DETECTED

## 2020-10-23 ENCOUNTER — ANESTHESIA (OUTPATIENT)
Dept: OPERATING ROOM | Age: 30
End: 2020-10-23
Payer: MEDICAID

## 2020-10-23 ENCOUNTER — APPOINTMENT (OUTPATIENT)
Dept: GENERAL RADIOLOGY | Age: 30
End: 2020-10-23
Attending: PAIN MEDICINE
Payer: MEDICAID

## 2020-10-23 ENCOUNTER — HOSPITAL ENCOUNTER (OUTPATIENT)
Age: 30
Setting detail: OUTPATIENT SURGERY
Discharge: HOME | End: 2020-10-23
Attending: PAIN MEDICINE | Admitting: PAIN MEDICINE
Payer: MEDICAID

## 2020-10-23 VITALS
SYSTOLIC BLOOD PRESSURE: 118 MMHG | OXYGEN SATURATION: 97 % | BODY MASS INDEX: 28 KG/M2 | HEART RATE: 86 BPM | TEMPERATURE: 98 F | RESPIRATION RATE: 26 BRPM | HEIGHT: 71 IN | DIASTOLIC BLOOD PRESSURE: 98 MMHG | WEIGHT: 200 LBS

## 2020-10-23 VITALS
SYSTOLIC BLOOD PRESSURE: 121 MMHG | OXYGEN SATURATION: 100 % | DIASTOLIC BLOOD PRESSURE: 70 MMHG | RESPIRATION RATE: 17 BRPM

## 2020-10-23 PROCEDURE — 3700000000 HC ANESTHESIA ATTENDED CARE: Performed by: PAIN MEDICINE

## 2020-10-23 PROCEDURE — 3209999900 FLUORO FOR SURGICAL PROCEDURES

## 2020-10-23 PROCEDURE — 6360000002 HC RX W HCPCS: Performed by: NURSE ANESTHETIST, CERTIFIED REGISTERED

## 2020-10-23 PROCEDURE — 2709999900 HC NON-CHARGEABLE SUPPLY: Performed by: PAIN MEDICINE

## 2020-10-23 PROCEDURE — 3600000002 HC SURGERY LEVEL 2 BASE: Performed by: PAIN MEDICINE

## 2020-10-23 PROCEDURE — 27096 INJECT SACROILIAC JOINT: CPT | Performed by: PAIN MEDICINE

## 2020-10-23 PROCEDURE — 7100000011 HC PHASE II RECOVERY - ADDTL 15 MIN: Performed by: PAIN MEDICINE

## 2020-10-23 PROCEDURE — 6360000002 HC RX W HCPCS: Performed by: PAIN MEDICINE

## 2020-10-23 PROCEDURE — 2500000003 HC RX 250 WO HCPCS: Performed by: PAIN MEDICINE

## 2020-10-23 PROCEDURE — 7100000010 HC PHASE II RECOVERY - FIRST 15 MIN: Performed by: PAIN MEDICINE

## 2020-10-23 RX ORDER — PROMETHAZINE HYDROCHLORIDE 25 MG/ML
6.25 INJECTION, SOLUTION INTRAMUSCULAR; INTRAVENOUS
Status: DISCONTINUED | OUTPATIENT
Start: 2020-10-23 | End: 2020-10-23 | Stop reason: HOSPADM

## 2020-10-23 RX ORDER — ONDANSETRON 2 MG/ML
4 INJECTION INTRAMUSCULAR; INTRAVENOUS
Status: DISCONTINUED | OUTPATIENT
Start: 2020-10-23 | End: 2020-10-23 | Stop reason: HOSPADM

## 2020-10-23 RX ORDER — FENTANYL CITRATE 50 UG/ML
25 INJECTION, SOLUTION INTRAMUSCULAR; INTRAVENOUS EVERY 5 MIN PRN
Status: DISCONTINUED | OUTPATIENT
Start: 2020-10-23 | End: 2020-10-23 | Stop reason: HOSPADM

## 2020-10-23 RX ORDER — HYDROCODONE BITARTRATE AND ACETAMINOPHEN 5; 325 MG/1; MG/1
2 TABLET ORAL PRN
Status: DISCONTINUED | OUTPATIENT
Start: 2020-10-23 | End: 2020-10-23 | Stop reason: HOSPADM

## 2020-10-23 RX ORDER — HYDRALAZINE HYDROCHLORIDE 20 MG/ML
5 INJECTION INTRAMUSCULAR; INTRAVENOUS EVERY 10 MIN PRN
Status: DISCONTINUED | OUTPATIENT
Start: 2020-10-23 | End: 2020-10-23 | Stop reason: HOSPADM

## 2020-10-23 RX ORDER — MORPHINE SULFATE 1 MG/ML
1 INJECTION, SOLUTION EPIDURAL; INTRATHECAL; INTRAVENOUS EVERY 5 MIN PRN
Status: DISCONTINUED | OUTPATIENT
Start: 2020-10-23 | End: 2020-10-23 | Stop reason: HOSPADM

## 2020-10-23 RX ORDER — DEXAMETHASONE SODIUM PHOSPHATE 10 MG/ML
INJECTION INTRAMUSCULAR; INTRAVENOUS PRN
Status: DISCONTINUED | OUTPATIENT
Start: 2020-10-23 | End: 2020-10-23 | Stop reason: ALTCHOICE

## 2020-10-23 RX ORDER — HYDROCODONE BITARTRATE AND ACETAMINOPHEN 5; 325 MG/1; MG/1
1 TABLET ORAL PRN
Status: DISCONTINUED | OUTPATIENT
Start: 2020-10-23 | End: 2020-10-23 | Stop reason: HOSPADM

## 2020-10-23 RX ORDER — MIDAZOLAM HYDROCHLORIDE 1 MG/ML
INJECTION INTRAMUSCULAR; INTRAVENOUS PRN
Status: DISCONTINUED | OUTPATIENT
Start: 2020-10-23 | End: 2020-10-23 | Stop reason: SDUPTHER

## 2020-10-23 RX ORDER — FENTANYL CITRATE 50 UG/ML
INJECTION, SOLUTION INTRAMUSCULAR; INTRAVENOUS PRN
Status: DISCONTINUED | OUTPATIENT
Start: 2020-10-23 | End: 2020-10-23 | Stop reason: SDUPTHER

## 2020-10-23 RX ORDER — SODIUM CHLORIDE 0.9 % (FLUSH) 0.9 %
10 SYRINGE (ML) INJECTION EVERY 12 HOURS SCHEDULED
Status: DISCONTINUED | OUTPATIENT
Start: 2020-10-23 | End: 2020-10-23 | Stop reason: HOSPADM

## 2020-10-23 RX ORDER — DIPHENHYDRAMINE HYDROCHLORIDE 50 MG/ML
12.5 INJECTION INTRAMUSCULAR; INTRAVENOUS
Status: DISCONTINUED | OUTPATIENT
Start: 2020-10-23 | End: 2020-10-23 | Stop reason: HOSPADM

## 2020-10-23 RX ORDER — SODIUM CHLORIDE 0.9 % (FLUSH) 0.9 %
10 SYRINGE (ML) INJECTION PRN
Status: DISCONTINUED | OUTPATIENT
Start: 2020-10-23 | End: 2020-10-23 | Stop reason: HOSPADM

## 2020-10-23 RX ORDER — MEPERIDINE HYDROCHLORIDE 50 MG/ML
12.5 INJECTION INTRAMUSCULAR; INTRAVENOUS; SUBCUTANEOUS EVERY 5 MIN PRN
Status: DISCONTINUED | OUTPATIENT
Start: 2020-10-23 | End: 2020-10-23 | Stop reason: HOSPADM

## 2020-10-23 RX ORDER — BUPIVACAINE HYDROCHLORIDE 2.5 MG/ML
INJECTION, SOLUTION INFILTRATION; PERINEURAL PRN
Status: DISCONTINUED | OUTPATIENT
Start: 2020-10-23 | End: 2020-10-23 | Stop reason: ALTCHOICE

## 2020-10-23 RX ADMIN — FENTANYL CITRATE 100 MCG: 50 INJECTION INTRAMUSCULAR; INTRAVENOUS at 13:52

## 2020-10-23 RX ADMIN — MIDAZOLAM HYDROCHLORIDE 2 MG: 1 INJECTION, SOLUTION INTRAMUSCULAR; INTRAVENOUS at 13:52

## 2020-10-23 ASSESSMENT — PULMONARY FUNCTION TESTS
PIF_VALUE: 0

## 2020-10-23 ASSESSMENT — PAIN SCALES - GENERAL: PAINLEVEL_OUTOF10: 0

## 2020-10-23 ASSESSMENT — LIFESTYLE VARIABLES: SMOKING_STATUS: 1

## 2020-10-23 ASSESSMENT — PAIN - FUNCTIONAL ASSESSMENT: PAIN_FUNCTIONAL_ASSESSMENT: 0-10

## 2020-10-23 NOTE — H&P
Pain Pre-Op H&P Note    Bouchra Myers    HPI: Jose Stewart  presents with back pain.     Past Medical History:   Diagnosis Date    Asthma     Bipolar 1 disorder ([address]     Depression     Heart murmur     Rosq-Bgcjh-Goxbbob disease, left     Multiple allergies     PTSD (post-traumatic stress disorder)        Past Surgical History:   Procedure Laterality Date    JOINT REPLACEMENT Left 2011    Left hip replacement    TYMPANOSTOMY TUBE PLACEMENT         Family History   Problem Relation Age of Onset    Depression Mother     Heart Disease Father        No Known Allergies      Current Facility-Administered Medications:     morphine (PF) injection 1 mg, 1 mg, Intravenous, Q5 Min PRN, Marco Antonio Esquivel MD    HYDROmorphone (DILAUDID) injection 0.5 mg, 0.5 mg, Intravenous, Q5 Min PRN, Marco Antonio Esquivel MD    fentaNYL (SUBLIMAZE) injection 25 mcg, 25 mcg, Intravenous, Q5 Min PRN, Marco Antonio Esquivel MD    HYDROcodone-acetaminophen (NORCO) 5-325 MG per tablet 1 tablet, 1 tablet, Oral, PRN **OR** HYDROcodone-acetaminophen (NORCO) 5-325 MG per tablet 2 tablet, 2 tablet, Oral, PRN, Marco Antonio Esquivel MD    diphenhydrAMINE (BENADRYL) injection 12.5 mg, 12.5 mg, Intravenous, Once PRN, Marco Antonio Esquivel MD    ondansetron [facility]) injection 4 mg, 4 mg, Intravenous, Once PRN, Marco Antonio Esquivel MD    promethazine (PHENERGAN) injection 6.25 mg, 6.25 mg, Intramuscular, Once PRN, Marco Antonio Esquivel MD    hydrALAZINE (APRESOLINE) injection 5 mg, 5 mg, Intravenous, Q10 Min PRN, Marco Antonio Esquivel MD    meperidine (DEMEROL) injection 12.5 mg, 12.5 mg, Intravenous, Q5 Min PRN, Marco Antonio Esquivel MD    Social History     Tobacco Use    Smoking status: Current Every Day Smoker     Packs/day: 0.25     Years: 10.00     Pack years: 2.50     Types: Cigarettes    Smokeless tobacco: Former User     Types: Chew    Tobacco comment: Vapor   Substance Use Topics    Alcohol use: No       Review of Systems:   Focused review of systems was performed, and negative as pertinent to diagnosis, except as stated in HPI.    Physical Exam:     /79   Pulse 67   Temp 98.4 °F (36.9 °C) (Temporal)   Resp 16   Ht 5' 11\" (1.803 m)   Wt 200 lb (90.7 kg)   SpO2 96%   BMI 27.89 kg/m²     Physical Exam  Constitutional:       Appearance: Normal appearance. HENT:      Head: Normocephalic and atraumatic. Eyes:      General: Lids are normal.   Neck:      Musculoskeletal: Normal range of motion. Pulmonary:      Effort: Pulmonary effort is normal.   Skin:     Comments: Visualized skin with no rash   Neurological:      Mental Status: He is alert. Psychiatric:         Attention and Perception: Attention and perception normal.         Mood and Affect: Mood and affect normal.            Patient's current physical status, medications, medical history, and HPI have been reviewed and updated as appropriate on this date: 10/23/20    Risk/Benefit(s): The risks, benefits, alternatives, and potential complications have been discussed with the patient/family and informed consent has been obtained for the procedure/sedation.     Diagnosis:   M46.1 SACROILITIS      Plan: SI joint injection          Amy García

## 2020-10-23 NOTE — ANESTHESIA POSTPROCEDURE EVALUATION
POST- ANESTHESIA EVALUATION       Pt Name: Micheline Landeros  MRN: [id number]  [place]: 1990  Date of evaluation: 10/23/2020  Time:  2:20 PM      BP (!) 118/98   Pulse 86   Temp 98 °F (36.7 °C) (Temporal)   Resp 26   Ht 5' 11\" (1.803 m)   Wt 200 lb (90.7 kg)   SpO2 97%   BMI 27.89 kg/m²      Consciousness Level  Awake  Cardiopulmonary Status  Stable  Pain Adequately Treated YES  Nausea / Vomiting  NO  Adequate Hydration  YES  Anesthesia Related Complications NONE      Electronically signed by Kasey Lopez MD on 10/23/2020 at 2:20 PM       Department of Anesthesiology  Postprocedure Note    Patient: Micheline Landeros  MRN: [id number]  [place]: 1990  Date of evaluation: 10/23/2020  Time:  2:20 PM     Procedure Summary     Date:  10/23/20 Room / Location:  [address] [place] [address] [address]    Anesthesia Start:  2740 Anesthesia Stop:  6816    Procedure:  SACROILIAC JOINT INJECTION (Left ) Diagnosis:  (M46.1 SACROILITIS)    Surgeon:  Dillan Archibald MD Responsible Provider:  LEYDI Vazquez CRNA    Anesthesia Type:  MAC ASA Status:  2          Anesthesia Type: MAC    Apoorva Phase I: Apoorva Score: 10    Apoorva Phase II: Apoorva Score: 10    Last vitals: Reviewed and per EMR flowsheets.        Anesthesia Post Evaluation

## 2020-10-23 NOTE — ANESTHESIA PRE PROCEDURE
Department of Anesthesiology  Preprocedure Note       Name:  Gerson Lares   Age:  31 y.o.  :  1990                                          MRN:  [id number]         Date:  10/23/2020      Surgeon: Estela Holm):  Jazzmine Foster MD    Procedure: Procedure(s):  SACROILIAC JOINT INJECTION    Medications prior to admission:   Prior to Admission medications    Medication Sig Start Date End Date Taking? Authorizing Provider   meloxicam (MOBIC) 15 MG tablet Take 1 tablet by mouth daily 20  Yes TRACY Pitts   montelukast (SINGULAIR) 10 MG tablet Take 1 tablet by mouth daily 20  Yes Luis Armando Elsmore, DO   traZODone (DESYREL) 100 MG tablet Take 1 tablet by mouth nightly 20  Yes Qing Pinedo,    gabapentin (NEURONTIN) 600 MG tablet Take 1 tablet by mouth nightly for 90 days. 6/30/20 10/23/20 Yes LEYDI Terrell CNP   pantoprazole (PROTONIX) 20 MG tablet Take 1 tablet by mouth daily 20  Yes LEYDI Terrell - CNP   varenicline (CHANTIX CONTINUING MONTH ) 1 MG tablet Take 1 tablet by mouth 2 times daily 20   Luis Armando Elsmore, DO   varenicline (CHANTIX STARTING MONTH ) 0.5 MG X 11 & 1 MG X 42 tablet Take by mouth. 20   Luis Armando Elsmore, DO       Current medications:    No current facility-administered medications for this encounter.         Allergies:  No Known Allergies    Problem List:    Patient Active Problem List   Diagnosis Code    Left hip pain M25.552    Depression F32.9    Asthma J45.909    Restless leg G25.81    Gastroesophageal reflux disease K21.9    Vitamin D deficiency E55.9    Other insomnia G47.09    Anxiety F41.9    Recurrent major depressive disorder, in remission ([facility] F33.40    Borderline personality disorder ([facility] F60.3       Past Medical History:        Diagnosis Date    Asthma     Bipolar 1 disorder ([facility]     Depression     Heart murmur     Gryp-Uchob-Jgggcej disease, left     Multiple allergies     PTSD (post-traumatic stress disorder) Past Surgical History:        Procedure Laterality Date    JOINT REPLACEMENT Left 2011    Left hip replacement    TYMPANOSTOMY TUBE PLACEMENT         Social History:    Social History     Tobacco Use    Smoking status: Current Every Day Smoker     Packs/day: 0.25     Years: 10.00     Pack years: 2.50     Types: Cigarettes    Smokeless tobacco: Former User     Types: Chew    Tobacco comment: Vapor   Substance Use Topics    Alcohol use: No                                Ready to quit: Not Answered  Counseling given: Not Answered  Comment: Vapor      Vital Signs (Current):   Vitals:    10/23/20 1313   BP: 132/79   Pulse: 67   Resp: 16   Temp: 98.4 °F (36.9 °C)   TempSrc: Temporal   SpO2: 96%   Weight: 200 lb (90.7 kg)   Height: 5' 11\" (1.803 m)                                              BP Readings from Last 3 Encounters:   10/23/20 132/79   10/05/20 (!) 137/93   10/01/20 (!) 140/89       NPO Status: Time of last liquid consumption: 2300                        Time of last solid consumption: 2300                        Date of last liquid consumption: 10/22/20                        Date of last solid food consumption: 10/22/20    BMI:   Wt Readings from Last 3 Encounters:   10/23/20 200 lb (90.7 kg)   10/05/20 211 lb (95.7 kg)   10/01/20 195 lb (88.5 kg)     Body mass index is 27.89 kg/m².     CBC:   Lab Results   Component Value Date    WBC 11.5 08/04/2020    RBC 5.45 08/04/2020    HGB 16.9 08/04/2020    HCT 50.0 08/04/2020    MCV 91.7 08/04/2020    RDW 13.4 06/28/2014     08/04/2020       CMP:   Lab Results   Component Value Date     08/04/2020    K 4.4 08/04/2020     08/04/2020    CO2 24 08/04/2020    BUN 10 08/04/2020    CREATININE 0.7 08/04/2020    LABGLOM >90 08/04/2020    GLUCOSE 98 08/04/2020    PROT 6.6 08/04/2020    CALCIUM 9.2 08/04/2020    BILITOT 0.4 08/04/2020    ALKPHOS 45 08/04/2020    AST 18 08/04/2020    ALT 17 08/04/2020       POC Tests: No results for input(s): POCGLU, POCNA, POCK, POCCL, POCBUN, POCHEMO, POCHCT in the last 72 hours. Coags: No results found for: PROTIME, INR, APTT    HCG (If Applicable): No results found for: PREGTESTUR, PREGSERUM, HCG, HCGQUANT     ABGs: No results found for: PHART, PO2ART, WUP3OBE, IZP6NWV, BEART, W5VQZVHR     Type & Screen (If Applicable):  No results found for: LABABO, LABRH    Drug/Infectious Status (If Applicable):  No results found for: HIV, HEPCAB    COVID-19 Screening (If Applicable):   Lab Results   Component Value Date    COVID19 Not Detected 10/19/2020         Anesthesia Evaluation  Patient summary reviewed and Nursing notes reviewed no history of anesthetic complications:   Airway: Mallampati: II  TM distance: >3 FB   Neck ROM: full  Mouth opening: > = 3 FB Dental: normal exam         Pulmonary:normal exam  breath sounds clear to auscultation  (+) asthma: current smoker                           Cardiovascular:  Exercise tolerance: good (>4 METS),           Rhythm: regular  Rate: normal           Beta Blocker:  Not on Beta Blocker         Neuro/Psych:   (+) psychiatric history: stable with treatmentdepression/anxiety             GI/Hepatic/Renal:   (+) GERD: no interval change,           Endo/Other: Negative Endo/Other ROS                    Abdominal:           Vascular: negative vascular ROS. Anesthesia Plan      MAC     ASA 2             Anesthetic plan and risks discussed with patient. Plan discussed with CRNA.                   Manish Corbin MD   10/23/2020

## 2020-10-23 NOTE — OP NOTE
Sacro-Iliac Joint Injection:  SURGEON: Daphne Myers    PRE-OP DIAGNOSIS: Sacroiliitis (M46.1), Low back pain (M54.5)    POST-OP DIAGNOSIS: Same. Procedure performed: Left Sacroiliac Joint Injection. Physician confirmed and marked the surgical site. EBL: minimal    CONSENT: Patient has undergone the educational process with this procedure, is aware and fully understands the risks involved: potential damage to any and all body organs including possible bleeding, infection, and nerve injury, allergic reaction and headache. Patient also understands that the procedure will be undertaken in a safe, controlled and monitored setting. Patient recognizes that the benefits may include relief from pain and reduction in the oral use of medications. Patient agreed to proceed. The patient was counseled at length about the risks of conchis Covid-19 during their perioperative period and any recovery window from their procedure. The patient was made aware that conchis Covid-19  may worsen their prognosis for recovering from their procedure  and lend to a higher morbidity and/or mortality risk. All material risks, benefits, and reasonable alternatives including postponing the procedure were discussed. The patient does wish to proceed with the procedure at this time. PREP: The patient's back was prepped with chloroprep and draped appropriately. 5ml of 0.5% lidocaine was used to anesthetize the skin and subcutaneous tissue. PROCEDURE NOTE: A 22 gauge 3.5 inch spinal needle was advanced to the  Left SI joint under fluoroscopic guidance. Aspiration was negative for blood, CSF and producing pain. 2ml of  0.25% bupivacaine was mixed with 5mg Dexamethasone and slowly injected into the joint(s). The needle was withdrawn by the physician and the nurse applied a sterile dressing. The patient tolerated the procedure well. No complications occurred.  Patient transferred to the recovery room in satisfactory condition. Appropriate written discharge instructions given to the patient.       Sumi Bowles

## 2020-10-28 RX ORDER — PANTOPRAZOLE SODIUM 20 MG/1
20 TABLET, DELAYED RELEASE ORAL DAILY
Qty: 30 TABLET | Refills: 3 | Status: SHIPPED | OUTPATIENT
Start: 2020-10-28 | End: 2021-02-04 | Stop reason: SDUPTHER

## 2020-11-04 ASSESSMENT — ENCOUNTER SYMPTOMS
BACK PAIN: 1
RESPIRATORY NEGATIVE: 1
BOWEL INCONTINENCE: 0

## 2020-11-04 NOTE — PROGRESS NOTES
HPI:     Back Pain   This is a chronic problem. The current episode started more than 1 year ago. The problem occurs daily. The problem has been gradually worsening since onset. The pain is present in the gluteal. The quality of the pain is described as aching and stabbing. The pain radiates to the left knee and left thigh. The pain is at a severity of 3/10. The pain is mild. The pain is worse during the night. The symptoms are aggravated by bending, twisting, standing and position. Associated symptoms include leg pain, numbness, tingling and weakness. Pertinent negatives include no bladder incontinence or bowel incontinence. He has tried ice and heat for the symptoms. The treatment provided no relief. Chronic left hip and low back pain. 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 He had a bursa injection with minimal benefit. He had an SI joint injection with minimal benefit. X-ray of the hip show stable prosthesis. X-ray of the lumbar spine with degenerative changes. Does have left hip prosthesis. Has recently completed physical therapy and continues to have pain. Patient denies any new neurological symptoms. Nobowel or bladder incontinence, no weakness, and no falling. Review of OARRS does not show any aberrant prescription behavior.      Past Medical History:   Diagnosis Date    Asthma     Bipolar 1 disorder ([facility] [address]     Depression     Heart murmur     Nrqb-Opdxf-Qckevpz disease, left     Multiple allergies     PTSD (post-traumatic stress disorder)        Past Surgical History:   Procedure Laterality Date    [facility] INJECTION PROCEDURE FOR SACROILIAC JOINT Left 10/23/2020    SACROILIAC JOINT INJECTION performed by Alaina Kay MD at [address] [facility] Left 2011    Left hip replacement    PAIN MANAGEMENT PROCEDURE  10/23/2020    SACROILIAC JOINT INJECTION    TYMPANOSTOMY TUBE PLACEMENT         No Known Allergies      Current Outpatient Medications:    pantoprazole (PROTONIX) 20 MG tablet, Take 1 tablet by mouth daily, Disp: 30 tablet, Rfl: 3    meloxicam (MOBIC) 15 MG tablet, Take 1 tablet by mouth daily, Disp: 30 tablet, Rfl: 3    montelukast (SINGULAIR) 10 MG tablet, Take 1 tablet by mouth daily, Disp: 30 tablet, Rfl: 3    traZODone (DESYREL) 100 MG tablet, Take 1 tablet by mouth nightly, Disp: 90 tablet, Rfl: 1    varenicline (CHANTIX CONTINUING MONTH AASHISH) 1 MG tablet, Take 1 tablet by mouth 2 times daily, Disp: 60 tablet, Rfl: 3    varenicline (CHANTIX STARTING MONTH AASHISH) 0.5 MG X 11 & 1 MG X 42 tablet, Take by mouth., Disp: 1 box, Rfl: 0    gabapentin (NEURONTIN) 600 MG tablet, Take 1 tablet by mouth nightly for 90 days. , Disp: 90 tablet, Rfl: 1    Family History   Problem Relation Age of Onset    Depression Mother     Heart Disease Father        Social History     Socioeconomic History    Marital status:      Spouse name: Not on file    Number of children: Not on file    Years of education: Not on file    Highest education level: Not on file   Occupational History    Not on file   Social Needs    Financial resource strain: Not on file    Food insecurity     Worry: Not on file     Inability: Not on file    Transportation needs     Medical: Not on file     Non-medical: Not on file   Tobacco Use    Smoking status: Current Every Day Smoker     Packs/day: 0.25     Years: 10.00     Pack years: 2.50     Types: Cigarettes    Smokeless tobacco: Former User     Types: Chew    Tobacco comment: Vapor   Substance and Sexual Activity    Alcohol use: No    Drug use: Yes     Types: Marijuana     Comment: medical gummy used 10/04/2020    Sexual activity: Not on file   Lifestyle    Physical activity     Days per week: Not on file     Minutes per session: Not on file    Stress: Not on file   Relationships    Social connections     Talks on phone: Not on file     Gets together: Not on file     Attends Buddhist service: Not on file     Active member of club or organization: Not on file     Attends meetings of clubs or organizations: Not on file     Relationship status: Not on file    Intimate partner violence     Fear of current or ex partner: Not on file     Emotionally abused: Not on file     Physically abused: Not on file     Forced sexual activity: Not on file   Other Topics Concern    Not on file   Social History Narrative    Not on file       Review of Systems:  Review of Systems   Constitution: Negative. Cardiovascular: Negative. Respiratory: Negative. Musculoskeletal: Positive for back pain. Gastrointestinal: Negative for bowel incontinence. Genitourinary: Negative for bladder incontinence. Neurological: Positive for numbness, tingling and weakness. Physical Exam:  There were no vitals taken for this visit. Physical Exam  Constitutional:       Appearance: Normal appearance. HENT:      Head: Normocephalic and atraumatic. Eyes:      General: Lids are normal.   Neck:      Musculoskeletal: Normal range of motion. Pulmonary:      Effort: Pulmonary effort is normal.   Skin:     Comments: Visualized skin with no rash   Neurological:      Mental Status: He is alert. Psychiatric:         Attention and Perception: Attention and perception normal.         Mood and Affect: Mood and affect normal.         Record/Diagnostics Review:    As above, I did review the imaging    Orders:  Orders Placed This Encounter   Procedures    MRI LUMBAR SPINE WO CONTRAST       Assessment:  1. Lumbosacral neuritis    2. Lumbosacral spondylosis without myelopathy    3. Left hip pain        Treatment Plan:  DISCUSSION: Treatment options discussed with patient and all questions answered to patient's satisfaction. OARRS Review: Reviewed and acceptable for medications prescribed.   TREATMENT OPTIONS:     Has failed conservative measures, including physical therapy and the pain is worsening, I do believe an MRI of the Lumbar spine is indicated to further evaluate pathology and guide treatment plan. Consider injection therapies versus surgical evaluation based on imaging results. Salima Hill M.D. I have reviewed the chief complaint and history of present illness (including ROS and PFSH) and vital documentation by my staff and I agree with their documentation and have added where applicable. Nirali Pizano is a 31 y.o. male being evaluated by a Virtual Visit (video visit) encounter to address concerns as mentioned above. A caregiver was present when appropriate. Due to this being a TeleHealth encounter (During [facility] emergency), evaluation of the following organ systems was limited: Vitals/Constitutional/EENT/Resp/CV/GI//MS/Neuro/Skin/Heme-Lymph-Imm. Pursuant to the emergency declaration under the [address] [place] [address] authority and the [employer] Act, this Virtual Visit was conducted with patient's (and/or legal guardian's) consent, to reduce the patient's risk of exposure to COVID-19 and provide necessary medical care. The patient (and/or legal guardian) has also been advised to contact this office for worsening conditions or problems, and seek emergency medical treatment and/or call 911 if deemed necessary. Total time spent for this encounter: Not billed by time    Services were provided through a video synchronous discussion virtually to substitute for in-person clinic visit. Patient and provider were located at their individual homes. --Everett Crawford MD on 11/5/2020 at 9:27 AM    An electronic signature was used to authenticate this note.

## 2020-11-05 ENCOUNTER — TELEPHONE (OUTPATIENT)
Dept: PAIN MANAGEMENT | Age: 30
End: 2020-11-05

## 2020-11-05 ENCOUNTER — VIRTUAL VISIT (OUTPATIENT)
Dept: PAIN MANAGEMENT | Age: 30
End: 2020-11-05
Payer: MEDICAID

## 2020-11-05 PROCEDURE — 99213 OFFICE O/P EST LOW 20 MIN: CPT | Performed by: PAIN MEDICINE

## 2020-11-05 NOTE — TELEPHONE ENCOUNTER
Pt cld just had VV this morning and is in need of Dr note for work-Please contact him back at [phone]

## 2020-11-10 ENCOUNTER — HOSPITAL ENCOUNTER (EMERGENCY)
Age: 30
Discharge: HOME | End: 2020-11-10
Payer: MEDICAID

## 2020-11-10 VITALS
TEMPERATURE: 98.2 F | DIASTOLIC BLOOD PRESSURE: 77 MMHG | RESPIRATION RATE: 16 BRPM | HEART RATE: 78 BPM | OXYGEN SATURATION: 98 % | SYSTOLIC BLOOD PRESSURE: 122 MMHG

## 2020-11-10 PROCEDURE — 99213 OFFICE O/P EST LOW 20 MIN: CPT | Performed by: NURSE PRACTITIONER

## 2020-11-10 PROCEDURE — 99213 OFFICE O/P EST LOW 20 MIN: CPT

## 2020-11-10 PROCEDURE — U0003 INFECTIOUS AGENT DETECTION BY NUCLEIC ACID (DNA OR RNA); SEVERE ACUTE RESPIRATORY SYNDROME CORONAVIRUS 2 (SARS-COV-2) (CORONAVIRUS DISEASE [COVID-19]), AMPLIFIED PROBE TECHNIQUE, MAKING USE OF HIGH THROUGHPUT TECHNOLOGIES AS DESCRIBED BY CMS-2020-01-R: HCPCS

## 2020-11-10 RX ORDER — ACETAMINOPHEN 500 MG
500 TABLET ORAL EVERY 6 HOURS PRN
Qty: 60 TABLET | Refills: 0 | Status: SHIPPED | OUTPATIENT
Start: 2020-11-10 | End: 2021-02-04

## 2020-11-10 RX ORDER — AZELASTINE 1 MG/ML
1 SPRAY, METERED NASAL 2 TIMES DAILY
Qty: 1 BOTTLE | Refills: 0 | Status: SHIPPED | OUTPATIENT
Start: 2020-11-10 | End: 2021-02-04

## 2020-11-10 RX ORDER — ASCORBIC ACID/MULTIVIT-MIN 1000 MG
1 EFFERVESCENT POWDER IN PACKET ORAL DAILY
Qty: 30 EACH | Refills: 0 | Status: SHIPPED | OUTPATIENT
Start: 2020-11-10 | End: 2021-02-04

## 2020-11-10 RX ORDER — CALCIUM CARBONATE 260MG(650)
TABLET,CHEWABLE ORAL
Refills: 0 | COMMUNITY
Start: 2020-11-10 | End: 2021-02-04

## 2020-11-10 RX ORDER — NICOTINE 21 MG/24HR
1 PATCH, TRANSDERMAL 24 HOURS TRANSDERMAL DAILY
Qty: 42 PATCH | Refills: 0 | Status: SHIPPED | OUTPATIENT
Start: 2020-11-10 | End: 2021-02-04

## 2020-11-10 RX ORDER — PSEUDOEPHEDRINE HYDROCHLORIDE 30 MG/1
30 TABLET ORAL EVERY 6 HOURS PRN
Qty: 56 TABLET | Refills: 0 | COMMUNITY
Start: 2020-11-10 | End: 2020-11-15

## 2020-11-10 RX ORDER — ALBUTEROL SULFATE 90 UG/1
2 AEROSOL, METERED RESPIRATORY (INHALATION) EVERY 6 HOURS PRN
Qty: 1 INHALER | Refills: 0 | Status: SHIPPED | OUTPATIENT
Start: 2020-11-10 | End: 2021-04-21 | Stop reason: SDUPTHER

## 2020-11-10 ASSESSMENT — PAIN DESCRIPTION - PAIN TYPE: TYPE: ACUTE PAIN

## 2020-11-10 ASSESSMENT — ENCOUNTER SYMPTOMS
CHOKING: 0
STRIDOR: 0
WHEEZING: 0
SINUS PRESSURE: 1
VOMITING: 1
CHEST TIGHTNESS: 0
CONSTIPATION: 0
ABDOMINAL PAIN: 1
RECTAL PAIN: 0
APNEA: 0
SORE THROAT: 0
SHORTNESS OF BREATH: 1
COLOR CHANGE: 0
NAUSEA: 1
RHINORRHEA: 1
BLOOD IN STOOL: 0
DIARRHEA: 1
COUGH: 1
ANAL BLEEDING: 0

## 2020-11-10 ASSESSMENT — PAIN DESCRIPTION - LOCATION: LOCATION: HEAD

## 2020-11-10 ASSESSMENT — PAIN SCALES - GENERAL: PAINLEVEL_OUTOF10: 3

## 2020-11-10 NOTE — ED TRIAGE NOTES
Stephen Flaherty arrives to room with complaint of cough nausea loose stool fever at home yesterday symptoms started 2 days ago.

## 2020-11-10 NOTE — ED PROVIDER NOTES
[place] [address]  Urgent Care Encounter      CHIEF COMPLAINT       Chief Complaint   Patient presents with    Headache    Fever    Cough    Nausea    Diarrhea       Nurses Notes reviewed and I agree except as noted in the HPI. HISTORY OFPRESENT ILLNESS   Randi Skaggs is a 31 y.o. The history is provided by the patient. No  was used. Fever   Max temp prior to arrival:  101.7  Temp source:  Oral  Severity:  Moderate  Onset quality:  Sudden  Duration:  2 days  Timing:  Constant  Progression:  Worsening  Relieved by:  Nothing  Worsened by:  Laying down  Ineffective treatments:  Ibuprofen  Associated symptoms: chills, congestion, cough, diarrhea, headaches, myalgias, nausea, rhinorrhea and vomiting    Associated symptoms: no chest pain, no confusion, no dysuria, no ear pain, no rash, no somnolence and no sore throat    Risk factors: no contaminated food, no contaminated water, no hx of cancer, no immunosuppression, no occupational exposure, no recent sickness, no recent travel and no sick contacts        REVIEW OF SYSTEMS     Review of Systems   Constitutional: Positive for activity change, appetite change, chills, diaphoresis, fatigue and fever. HENT: Positive for congestion, postnasal drip, rhinorrhea and sinus pressure. Negative for ear pain and sore throat. Respiratory: Positive for cough and shortness of breath. Negative for apnea, choking, chest tightness, wheezing and stridor. Cardiovascular: Negative for chest pain, palpitations and leg swelling. Gastrointestinal: Positive for abdominal pain, diarrhea, nausea and vomiting. Negative for anal bleeding, blood in stool, constipation and rectal pain. Genitourinary: Negative for dysuria. Musculoskeletal: Positive for myalgias. Skin: Negative for color change, pallor, rash and wound. Neurological: Positive for headaches. Negative for dizziness and light-headedness.    Psychiatric/Behavioral: ill-appearing, toxic-appearing or diaphoretic. HENT:      Head: Normocephalic and atraumatic. Right Ear: Hearing, ear canal and external ear normal. No middle ear effusion. Tympanic membrane is bulging. Tympanic membrane is not erythematous. Left Ear: Hearing, ear canal and external ear normal.  No middle ear effusion. Tympanic membrane is bulging. Tympanic membrane is not erythematous. Nose: Congestion and rhinorrhea present. Mouth/Throat:      Pharynx: Posterior oropharyngeal erythema present. Eyes:      Extraocular Movements: Extraocular movements intact. Conjunctiva/sclera: Conjunctivae normal.   Neck:      Musculoskeletal: Normal range of motion. Cardiovascular:      Rate and Rhythm: Normal rate and regular rhythm. Pulmonary:      Effort: Pulmonary effort is normal. No respiratory distress. Breath sounds: Normal breath sounds. No stridor. No wheezing, rhonchi or rales. Chest:      Chest wall: No tenderness. Musculoskeletal: Normal range of motion. Skin:     General: Skin is warm. Neurological:      General: No focal deficit present. Mental Status: He is alert and oriented to person, place, and time. Psychiatric:         Mood and Affect: Mood normal.         Behavior: Behavior normal. Behavior is cooperative. Thought Content: Thought content normal.         Judgment: Judgment normal.         DIAGNOSTIC RESULTS   Labs:No results found for this visit on 11/10/20. IMAGING:  No orders to display     URGENT CARE COURSE:     Vitals:    11/10/20 0958   BP: 122/77   Pulse: 78   Resp: 16   Temp: 98.2 °F (36.8 °C)   TempSrc: Temporal   SpO2: 98%       Medications - No data to display  PROCEDURES:  None  FINAL IMPRESSION      1. Acute upper respiratory infection        DISPOSITION/PLAN   Decision To Discharge     I did discuss clinical findings with the patient as well as vital signs in assessment findings.   He was advised that the Patient has signs and symptoms of upper respiratory infection. Patient is afebrile and stable. Patient can use Tylenol and/or OTC cough syrup. Avoid tobacco use/exposure,Take medication as directed,Drink Lots of fluids and Use Inhalers as directed if prescribed. Advised to follow up with family doctor in the next 2-3 days for reevaluation. The patient may return to urgent care if does not get better or symptoms worsen. However the patient is advised to go to ER immediately if present symptoms worsen, high fever >102 , vomiting, breathing difficulty, chest pain, lethargy or new symptoms develop. Patient/ parents understands this approach of home management and agrees to the treatment plan.     PATIENT REFERRED TO:  [facility]  [address] [phone]  Go today  If symptoms worsen    [facility]  [address]  [facility] [address]  Call today  [phone]    DISCHARGE MEDICATIONS:  Discharge Medication List as of 11/10/2020 10:18 AM      START taking these medications    Details   azelastine (ASTELIN) 0.1 % nasal spray 1 spray by Nasal route 2 times daily Use in each nostril as directed, Disp-1 Bottle,R-0Normal      pseudoephedrine (SUDAFED) 30 MG tablet Take 1 tablet by mouth every 6 hours as needed for Congestion (headache), Disp-56 tablet,R-0OTC      albuterol sulfate HFA (VENTOLIN HFA) 108 (90 Base) MCG/ACT inhaler Inhale 2 puffs into the lungs every 6 hours as needed for Wheezing, Disp-1 Inhaler,R-0Normal      acetaminophen (APAP EXTRA STRENGTH) 500 MG tablet Take 1 tablet by mouth every 6 hours as needed for Pain, Disp-60 tablet,R-0Normal      Multiple Vitamins-Minerals (EMERGEN-C VITAMIN C) PACK Take 1 Package by mouth daily, Disp-30 each,R-0Normal      Zinc 10 MG LOZG Follow package directions, R-0OTC           Discharge Medication List as of 11/10/2020 10:18 AM          [facility], APRN - CNP          [facility], APRN - CNP  11/10/20 1033

## 2020-11-11 ENCOUNTER — CARE COORDINATION (OUTPATIENT)
Dept: CARE COORDINATION | Age: 30
End: 2020-11-11

## 2020-11-11 NOTE — CARE COORDINATION
questions related to their healthcare. Author provided contact information for future reference. Patient/family/caregiver given information for [facility] and agrees to enroll yes  Patient's preferred e-mail:  [email or web address]   Patient's preferred phone number: [phone]  Based on Loop alert triggers, patient will be contacted by nurse care manager for worsening symptoms. Patient was seen in the  on 11/10/20 for headache and fever. Patient is self quarantined until he gets his test results back. Patient educated on covid precautions and given covid hotline number. Patient agreed to sign up for get well loop.     Swathi López [facility]  [address] [facility]   Medication Assistance  [employer]    C)[phone]

## 2020-11-12 LAB — SARS-COV-2: NOT DETECTED

## 2020-11-15 ENCOUNTER — PATIENT MESSAGE (OUTPATIENT)
Dept: FAMILY MEDICINE CLINIC | Age: 30
End: 2020-11-15

## 2020-11-16 RX ORDER — GABAPENTIN 600 MG/1
600 TABLET ORAL NIGHTLY
Qty: 90 TABLET | Refills: 0 | Status: SHIPPED | OUTPATIENT
Start: 2020-11-16 | End: 2020-11-17 | Stop reason: SDUPTHER

## 2020-11-16 RX ORDER — GABAPENTIN 600 MG/1
600 TABLET ORAL NIGHTLY
Qty: 90 TABLET | Refills: 1 | Status: CANCELLED | OUTPATIENT
Start: 2020-11-16 | End: 2021-02-14

## 2020-11-16 NOTE — TELEPHONE ENCOUNTER
Patient's last appointment was : 8/12/2020  Patient's next appointment is : 2/11/2021  Last refilled:06/30/2020

## 2020-11-17 ENCOUNTER — TELEPHONE (OUTPATIENT)
Dept: PAIN MANAGEMENT | Age: 30
End: 2020-11-17

## 2020-11-17 RX ORDER — GABAPENTIN 600 MG/1
600 TABLET ORAL NIGHTLY
Qty: 90 TABLET | Refills: 0 | Status: ON HOLD | OUTPATIENT
Start: 2020-11-17 | End: 2021-02-12

## 2020-11-17 NOTE — TELEPHONE ENCOUNTER
Sweetie Elizondo is calling regarding a pre-cert for this patients testing for tomorrow. She is asking for a return call by 12:00 noon today to discuss.  - [phone]. Thank you.

## 2020-11-17 NOTE — TELEPHONE ENCOUNTER
Gabapentin was sent to [facility], Needs to be sent to [facility].

## 2020-11-18 ENCOUNTER — HOSPITAL ENCOUNTER (OUTPATIENT)
Dept: MRI IMAGING | Age: 30
Discharge: HOME | End: 2020-11-18
Payer: MEDICAID

## 2020-11-18 PROCEDURE — 72148 MRI LUMBAR SPINE W/O DYE: CPT

## 2020-12-03 ENCOUNTER — TELEMEDICINE (OUTPATIENT)
Dept: PSYCHOLOGY | Age: 30
End: 2020-12-03
Payer: MEDICAID

## 2020-12-03 PROCEDURE — 90834 PSYTX W PT 45 MINUTES: CPT | Performed by: PSYCHOLOGIST

## 2020-12-03 NOTE — PROGRESS NOTES
Behavioral Health Consultation/Psychotherapy Note  Dianelys Nuno Psy.D. Visit Date:  12/3/2020    Patient:  Joanne Carpenter  YOB: 1990  Chief Complaint:  Follow-up; Anxiety; Depression; and Stress    Duration of session:  40 minutes      S:     This session was conducted as a telepsychology visit due to [facility] restrictions placed on in-person visits d/t the COVID-19 outbreak. Patient Location: Home       Provider Location (City/State): [facility] [address] [facility]    Patient gave verbal consent for teleservices and will sign a consent form when feasible. This virtual visit was conducted via interactive/real-time audio/video, using [place]. Ct has been having issues with noises, as an example he can't be in the same room as his wife b/c she chews too loud. He also is afraid to leave the house, and hasn't left in 3 weeks. He said he doesn't like being around people, and would rather be alone. He doesn't even like it when his wife touches him. He's very tense when out and about. He expressed the intense hypervigilance he experiences each day. He said he's afraid anything could happen, someone coming up behind him, etc.      He lost his job when he got tested for COVID-19 and had to be off work to await results. 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 He attended [employer], and hopes to get back into a [employer] again someday. We talked about some CBT techniques that will help him reduce his anxiety through gradual exposure. We also talked about mindfulness meditation and deep breathing to help elicit the anxiety response.      O:    Appearance    Patient presents as alert, oriented, and cooperative  Appetite normal  Sleep disturbance Yes  Loss of pleasure Yes  Speech    normal rate, normal volume, well articulated and clear and understandable  Mood    Depressed  Affect    depressed affect  Thought Process    goal directed and linear and coherent  Insight    Fair  Judgment    Impaired  Memory    recent and remote memory intact  Suicide Assessment    no suicidal ideation    A:    1. PTSD (post-traumatic stress disorder)    2. Generalized anxiety disorder    3. Bipolar affective disorder, currently depressed, moderate ([address]        Multiple potential diagnoses exist, but at this time, DANTE, PTSD stand out. He possesses some traits of Borderline Personality Disorder, and it appears a Bipolar D/O of some type is present as well. Ct needs referred for psychiatric services. P:    [facility] in 2-4 weeks. All questions about treatment plan answered. Patient instructed to go immediately to the emergency room and/or call 911 if any suicidal or homicidal ideations. Patient stated understanding and is agreeable to treatment and crisis plan.           Provider Signature:  Electronically signed by Stephen Mera PSYD on 12/3/2020 at 9:37 AM

## 2020-12-10 ENCOUNTER — VIRTUAL VISIT (OUTPATIENT)
Dept: PAIN MANAGEMENT | Age: 30
End: 2020-12-10
Payer: MEDICAID

## 2020-12-10 PROCEDURE — 99213 OFFICE O/P EST LOW 20 MIN: CPT | Performed by: PAIN MEDICINE

## 2020-12-10 PROCEDURE — G8428 CUR MEDS NOT DOCUMENT: HCPCS | Performed by: PAIN MEDICINE

## 2020-12-10 ASSESSMENT — ENCOUNTER SYMPTOMS
COUGH: 0
BACK PAIN: 1
BOWEL INCONTINENCE: 0

## 2020-12-10 NOTE — PROGRESS NOTES
HPI:     Back Pain   This is a chronic problem. The current episode started more than 1 year ago. The problem occurs constantly. The problem has been gradually worsening since onset. The pain is present in the lumbar spine. The quality of the pain is described as aching. The pain is moderate. The pain is the same all the time. The symptoms are aggravated by position. Stiffness is present all day. Pertinent negatives include no bowel incontinence, chest pain or fever. Pain in the low back into the left hip. MRI with disc bulging and foraminal narrowing was at L3-4 and L4-5. He has had SI joint injection with no benefit. He has hip replacement in the past.  Physical therapy in the past with no benefit. Patient denies any new neurological symptoms. Nobowel or bladder incontinence, no weakness, and no falling. Review of OARRS does not show any aberrant prescription behavior. Past Medical History:   Diagnosis Date    Asthma     B[facility] disorder ([facility] [address] [facility]     Depression     Heart murmur     Lhud-Tdmca-Hzjnvyk disease, left     Multiple allergies     PTSD (post-traumatic stress disorder)        Past Surgical History:   Procedure Laterality Date    [facility] INJECTION PROCEDURE FOR SACROILIAC JOINT Left 10/23/2020    SACROILIAC JOINT INJECTION performed by Rosemary Ryder MD at [facility] Left 2011    Left hip replacement    PAIN MANAGEMENT PROCEDURE  10/23/2020    SACROILIAC JOINT INJECTION    TYMPANOSTOMY TUBE PLACEMENT         No Known Allergies      Current Outpatient Medications:     gabapentin (NEURONTIN) 600 MG tablet, Take 1 tablet by mouth nightly for 90 days. , Disp: 90 tablet, Rfl: 0    azelastine (ASTELIN) 0.1 % nasal spray, 1 spray by Nasal route 2 times daily Use in each nostril as directed, Disp: 1 Bottle, Rfl: 0    albuterol sulfate HFA (VENTOLIN HFA) 108 (90 Base) MCG/ACT inhaler, Inhale 2 puffs into the lungs every 6 hours as needed for Wheezing, Disp: 1 Inhaler, Rfl: 0    acetaminophen (APAP EXTRA STRENGTH) 500 MG tablet, Take 1 tablet by mouth every 6 hours as needed for Pain, Disp: 60 tablet, Rfl: 0    Multiple Vitamins-Minerals (EMERGEN-C VITAMIN C) PACK, Take 1 Package by mouth daily, Disp: 30 each, Rfl: 0    Zinc 10 MG LOZG, Follow package directions, Disp:  , Rfl: 0    nicotine (NICODERM CQ) 21 MG/24HR, Place 1 patch onto the skin daily, Disp: 42 patch, Rfl: 0    pantoprazole (PROTONIX) 20 MG tablet, Take 1 tablet by mouth daily, Disp: 30 tablet, Rfl: 3    meloxicam (MOBIC) 15 MG tablet, Take 1 tablet by mouth daily, Disp: 30 tablet, Rfl: 3    montelukast (SINGULAIR) 10 MG tablet, Take 1 tablet by mouth daily, Disp: 30 tablet, Rfl: 3    traZODone (DESYREL) 100 MG tablet, Take 1 tablet by mouth nightly, Disp: 90 tablet, Rfl: 1    Family History   Problem Relation Age of Onset    Depression Mother     Heart Disease Father        Social History     Socioeconomic History    Marital status:      Spouse name: Not on file    Number of children: Not on file    Years of education: Not on file    Highest education level: Not on file   Occupational History    Not on file   Social Needs    Financial resource strain: Not on file    Food insecurity     Worry: Not on file     Inability: Not on file    Transportation needs     Medical: Not on file     Non-medical: Not on file   Tobacco Use    Smoking status: Current Every Day Smoker     Packs/day: 0.25     Years: 10.00     Pack years: 2.50     Types: Cigarettes    Smokeless tobacco: Former User     Types: Chew    Tobacco comment: Vapor   Substance and Sexual Activity    Alcohol use: No    Drug use: Yes     Types: Marijuana     Comment: medical gummy used 10/04/2020    Sexual activity: Not on file   Lifestyle    Physical activity     Days per week: Not on file     Minutes per session: Not on file    Stress: Not on file   Relationships    Social connections     Talks on phone: Not on file Gets together: Not on file     Attends Bahai service: Not on file     Active member of club or organization: Not on file     Attends meetings of clubs or organizations: Not on file     Relationship status: Not on file    Intimate partner violence     Fear of current or ex partner: Not on file     Emotionally abused: Not on file     Physically abused: Not on file     Forced sexual activity: Not on file   Other Topics Concern    Not on file   Social History Narrative    Not on file       Review of Systems:  Review of Systems   Constitution: Negative for fever. Cardiovascular: Negative for chest pain. Respiratory: Negative for cough. Musculoskeletal: Positive for back pain. Gastrointestinal: Negative for bowel incontinence. Physical Exam:    Physical Exam  Constitutional:       Appearance: Normal appearance. HENT:      Head: Normocephalic and atraumatic. Eyes:      General: Lids are normal.   Neck:      Musculoskeletal: Normal range of motion. Pulmonary:      Effort: Pulmonary effort is normal.   Skin:     Comments: Visualized skin with no rash   Neurological:      Mental Status: He is alert. Psychiatric:         Attention and Perception: Attention and perception normal.         Mood and Affect: Mood and affect normal.       Record/Diagnostics Review:    As above, I did review the imaging    Orders:  Orders Placed This Encounter   Procedures    WA INJECT ANES/STEROID FORAMEN LUMBAR/SACRAL W IMG GUIDE ,1 LEVEL    [place] INJECT ANES/STEROID FORAMEN LUMBAR/SACRAL W IMG GUIDE ,EA ADD LEVEL       Assessment:  1. Lumbar radiculopathy    2. Lumbosacral spondylosis without myelopathy        Treatment Plan:  DISCUSSION: Treatment options discussed with patient and all questions answered to patient's satisfaction. OARRS Review: Reviewed and acceptable for medications prescribed.   TREATMENT OPTIONS:     Discussed different treatment options including continued conservative care such as physical therapy, chiropractic care, acupuncture. Discussed different interventional options such as epidural steroids or medial branch blocks. Also discussed neuromodulation in the form of spinal cord stimulation. Also discussed surgical evaluation. Wishes to try injections. Pain in the low back and legs continues despite conservative measures, may benefit from epidural steroid injections, we'll try the L3 and L4 transforaminal approach x 2. Radha Lopez M.D. I have reviewed the chief complaint and history of present illness (including ROS and PFSH) and vital documentation by my staff and I agree with their documentation and have added where applicable. Papito Keen is a 31 y.o. male being evaluated by a Virtual Visit (video visit) encounter to address concerns as mentioned above. A caregiver was present when appropriate. Due to this being a TeleHealth encounter (During CDNI-89 public health emergency), evaluation of the following organ systems was limited: Vitals/Constitutional/EENT/Resp/CV/GI//MS/Neuro/Skin/Heme-Lymph-Imm. Pursuant to the emergency declaration under the [address] [place] [address] authority and the [employer] Act, this Virtual Visit was conducted with patient's (and/or legal guardian's) consent, to reduce the patient's risk of exposure to COVID-19 and provide necessary medical care. The patient (and/or legal guardian) has also been advised to contact this office for worsening conditions or problems, and seek emergency medical treatment and/or call 911 if deemed necessary. Patient identification was verified at the start of the visit: Yes    Total time spent for this encounter: Not billed by time    Services were provided through a video synchronous discussion virtually to substitute for in-person clinic visit.  Patient and provider were located at their individual homes.    --Addison Blevins MD on 12/10/2020 at 10:40 AM    An electronic signature was used to authenticate this note.

## 2020-12-31 ENCOUNTER — VIRTUAL VISIT (OUTPATIENT)
Dept: PSYCHIATRY | Age: 30
End: 2020-12-31
Payer: MEDICAID

## 2020-12-31 DIAGNOSIS — F41.1 GAD (GENERALIZED ANXIETY DISORDER): ICD-10-CM

## 2020-12-31 DIAGNOSIS — F39 MOOD DISORDER (HCC): ICD-10-CM

## 2020-12-31 DIAGNOSIS — F43.10 PTSD (POST-TRAUMATIC STRESS DISORDER): Primary | ICD-10-CM

## 2020-12-31 PROCEDURE — 90792 PSYCH DIAG EVAL W/MED SRVCS: CPT | Performed by: PSYCHIATRY & NEUROLOGY

## 2020-12-31 RX ORDER — LAMOTRIGINE 25 MG/1
TABLET ORAL
Qty: 60 TABLET | Refills: 1 | Status: SHIPPED | OUTPATIENT
Start: 2020-12-31 | End: 2021-02-19 | Stop reason: SDUPTHER

## 2020-12-31 RX ORDER — TRAZODONE HYDROCHLORIDE 100 MG/1
100 TABLET ORAL NIGHTLY
Qty: 90 TABLET | Refills: 1 | Status: SHIPPED | OUTPATIENT
Start: 2020-12-31 | End: 2021-03-04 | Stop reason: SDUPTHER
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
## 2021-01-19 ENCOUNTER — TELEPHONE (OUTPATIENT)
Dept: PREADMISSION TESTING | Age: 31
End: 2021-01-19

## 2021-01-20 ENCOUNTER — PRE-PROCEDURE TELEPHONE (OUTPATIENT)
Dept: PREADMISSION TESTING | Age: 31
End: 2021-01-20

## 2021-01-20 DIAGNOSIS — J30.2 SEASONAL ALLERGIES: ICD-10-CM

## 2021-01-20 RX ORDER — MONTELUKAST SODIUM 10 MG/1
10 TABLET ORAL DAILY
Qty: 30 TABLET | Refills: 0 | Status: SHIPPED | OUTPATIENT
Start: 2021-01-20 | End: 2021-02-04 | Stop reason: SDUPTHER

## 2021-01-21 ENCOUNTER — ANESTHESIA EVENT (OUTPATIENT)
Dept: OPERATING ROOM | Age: 31
End: 2021-01-21
Payer: MEDICAID

## 2021-01-25 ENCOUNTER — HOSPITAL ENCOUNTER (OUTPATIENT)
Dept: PREADMISSION TESTING | Age: 31
Setting detail: SPECIMEN
Discharge: HOME | End: 2021-01-29
Payer: MEDICAID

## 2021-01-25 DIAGNOSIS — Z01.818 PREOPERATIVE TESTING: ICD-10-CM

## 2021-01-25 DIAGNOSIS — Z01.818 PREOPERATIVE TESTING: Primary | ICD-10-CM

## 2021-01-25 PROCEDURE — U0005 INFEC AGEN DETEC AMPLI PROBE: HCPCS

## 2021-01-25 PROCEDURE — C9803 HOPD COVID-19 SPEC COLLECT: HCPCS

## 2021-01-25 PROCEDURE — U0003 INFECTIOUS AGENT DETECTION BY NUCLEIC ACID (DNA OR RNA); SEVERE ACUTE RESPIRATORY SYNDROME CORONAVIRUS 2 (SARS-COV-2) (CORONAVIRUS DISEASE [COVID-19]), AMPLIFIED PROBE TECHNIQUE, MAKING USE OF HIGH THROUGHPUT TECHNOLOGIES AS DESCRIBED BY CMS-2020-01-R: HCPCS

## 2021-01-26 ENCOUNTER — TELEMEDICINE (OUTPATIENT)
Dept: PSYCHOLOGY | Age: 31
End: 2021-01-26
Payer: MEDICAID

## 2021-01-26 DIAGNOSIS — F43.10 PTSD (POST-TRAUMATIC STRESS DISORDER): Primary | ICD-10-CM

## 2021-01-26 DIAGNOSIS — F31.32 BIPOLAR AFFECTIVE DISORDER, CURRENTLY DEPRESSED, MODERATE (HCC): ICD-10-CM

## 2021-01-26 DIAGNOSIS — F41.1 GENERALIZED ANXIETY DISORDER: ICD-10-CM

## 2021-01-26 LAB
SARS-COV-2, RAPID: NORMAL
SARS-COV-2: NORMAL
SARS-COV-2: NOT DETECTED
SOURCE: NORMAL

## 2021-01-26 PROCEDURE — 90832 PSYTX W PT 30 MINUTES: CPT | Performed by: PSYCHOLOGIST

## 2021-01-26 NOTE — PROGRESS NOTES
Behavioral Health Consultation/Psychotherapy Note  Sue Stein. Magalys Cruz Psy.D. Visit Date:  2021    Patient:  Robin Ahumada  YOB: 1990  Chief Complaint:  Follow-up, Anxiety, and Stress    Duration of session:  35 minutes      S:     This session was conducted as a telepsychology visit due to [facility] restrictions placed on in-person visits d/t the COVID-19 outbreak. Patient Location: Home       Provider Location ([facility]    Patient gave verbal consent for teleservices and will sign a consent form when feasible. This virtual visit was conducted via interactive/real-time audio/video, using Triage. Ct said he's trying to cope with his dad's passing. He feels comfortable being at his house, and spending time with his mom. He said he and his wife will be moving into the house after they get done renovating it. 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 The job is overnight, which he said is better for his sleep schedule, and for appts, etc.      He's been taking the Lamictal now for nearly 4 weeks (2 wks at increased dose of 50mg). His dad  on Dec 21. Over the next week or so they were out of town visiting family. He said he has been having some vivid dreams, which he attributes to new medication (this writer is unsure about this). He still wakes up during the night, but overall sleeps better than before since he gets a few good nights per week. He gets very irritated at the smallest things, and we explored and processed that today. He seems to have sensitivity to sounds and lights. He also said his short-term memory has been worse.          O:    Appearance    Patient presents as alert, oriented, and cooperative  Appetite normal  Sleep disturbance Yes  Loss of pleasure Yes  Speech    normal rate, normal volume, well articulated and clear and understandable  Mood    Depressed  Affect    depressed affect  Thought Process    goal directed and linear and coherent  Insight    Fair  Judgment    Impaired  Memory    recent and remote memory intact  Suicide Assessment    no suicidal ideation    A:    1. PTSD (post-traumatic stress disorder)    2. Generalized anxiety disorder    3. Bipolar affective disorder, currently depressed, moderate ([address]        Multiple potential diagnoses exist, but at this time, DANTE, PTSD stand out. Felipe Guillen possesses some traits of Borderline Personality Disorder, and it appears a Bipolar D/O of some type is present as well.  Ct needs referred for psychiatric services.      P:    Get appt with PCP to discuss referrals for sound and light sensitivity which seem to be a root of irritability. Focus on daily mindfulness meditation practices for 15+ min per day. Control aspects of your environment you can and try not to make too many changes at once. MyChart VV in 2-3 weeks. All questions about treatment plan answered. Patient instructed to go immediately to the emergency room and/or call 911 if any suicidal or homicidal ideations. Patient stated understanding and is agreeable to treatment and crisis plan.           Provider Signature:  Electronically signed by Ritesh Decker PSYD on 1/26/2021 at 4:17 PM

## 2021-01-29 ENCOUNTER — HOSPITAL ENCOUNTER (OUTPATIENT)
Age: 31
Setting detail: OUTPATIENT SURGERY
Discharge: HOME | End: 2021-01-29
Attending: PAIN MEDICINE | Admitting: PAIN MEDICINE
Payer: MEDICAID

## 2021-01-29 ENCOUNTER — APPOINTMENT (OUTPATIENT)
Dept: GENERAL RADIOLOGY | Age: 31
End: 2021-01-29
Attending: PAIN MEDICINE
Payer: MEDICAID

## 2021-01-29 ENCOUNTER — ANESTHESIA (OUTPATIENT)
Dept: OPERATING ROOM | Age: 31
End: 2021-01-29
Payer: MEDICAID

## 2021-01-29 VITALS
TEMPERATURE: 98.3 F | WEIGHT: 195.5 LBS | HEART RATE: 69 BPM | HEIGHT: 70 IN | BODY MASS INDEX: 27.99 KG/M2 | OXYGEN SATURATION: 100 % | RESPIRATION RATE: 20 BRPM | DIASTOLIC BLOOD PRESSURE: 83 MMHG | SYSTOLIC BLOOD PRESSURE: 118 MMHG

## 2021-01-29 VITALS
SYSTOLIC BLOOD PRESSURE: 112 MMHG | RESPIRATION RATE: 10 BRPM | DIASTOLIC BLOOD PRESSURE: 68 MMHG | OXYGEN SATURATION: 99 %

## 2021-01-29 PROCEDURE — 3600000002 HC SURGERY LEVEL 2 BASE: Performed by: PAIN MEDICINE

## 2021-01-29 PROCEDURE — 64483 NJX AA&/STRD TFRM EPI L/S 1: CPT | Performed by: PAIN MEDICINE

## 2021-01-29 PROCEDURE — 3700000000 HC ANESTHESIA ATTENDED CARE: Performed by: PAIN MEDICINE

## 2021-01-29 PROCEDURE — 64484 NJX AA&/STRD TFRM EPI L/S EA: CPT | Performed by: PAIN MEDICINE

## 2021-01-29 PROCEDURE — 7100000011 HC PHASE II RECOVERY - ADDTL 15 MIN: Performed by: PAIN MEDICINE

## 2021-01-29 PROCEDURE — 6360000002 HC RX W HCPCS: Performed by: NURSE ANESTHETIST, CERTIFIED REGISTERED

## 2021-01-29 PROCEDURE — 6360000002 HC RX W HCPCS: Performed by: PAIN MEDICINE

## 2021-01-29 PROCEDURE — 6360000004 HC RX CONTRAST MEDICATION: Performed by: PAIN MEDICINE

## 2021-01-29 PROCEDURE — 7100000010 HC PHASE II RECOVERY - FIRST 15 MIN: Performed by: PAIN MEDICINE

## 2021-01-29 PROCEDURE — 2709999900 HC NON-CHARGEABLE SUPPLY: Performed by: PAIN MEDICINE

## 2021-01-29 PROCEDURE — 3209999900 FLUORO FOR SURGICAL PROCEDURES

## 2021-01-29 RX ORDER — MIDAZOLAM HYDROCHLORIDE 1 MG/ML
INJECTION INTRAMUSCULAR; INTRAVENOUS PRN
Status: DISCONTINUED | OUTPATIENT
Start: 2021-01-29 | End: 2021-01-29 | Stop reason: SDUPTHER

## 2021-01-29 RX ORDER — DEXAMETHASONE SODIUM PHOSPHATE 10 MG/ML
INJECTION INTRAMUSCULAR; INTRAVENOUS PRN
Status: DISCONTINUED | OUTPATIENT
Start: 2021-01-29 | End: 2021-01-29 | Stop reason: ALTCHOICE

## 2021-01-29 RX ORDER — ONDANSETRON 2 MG/ML
4 INJECTION INTRAMUSCULAR; INTRAVENOUS
Status: DISCONTINUED | OUTPATIENT
Start: 2021-01-29 | End: 2021-01-29 | Stop reason: HOSPADM

## 2021-01-29 RX ORDER — PROMETHAZINE HYDROCHLORIDE 25 MG/ML
6.25 INJECTION, SOLUTION INTRAMUSCULAR; INTRAVENOUS
Status: DISCONTINUED | OUTPATIENT
Start: 2021-01-29 | End: 2021-01-29 | Stop reason: HOSPADM

## 2021-01-29 RX ORDER — DIPHENHYDRAMINE HYDROCHLORIDE 50 MG/ML
12.5 INJECTION INTRAMUSCULAR; INTRAVENOUS
Status: DISCONTINUED | OUTPATIENT
Start: 2021-01-29 | End: 2021-01-29 | Stop reason: HOSPADM

## 2021-01-29 RX ORDER — HYDRALAZINE HYDROCHLORIDE 20 MG/ML
5 INJECTION INTRAMUSCULAR; INTRAVENOUS EVERY 10 MIN PRN
Status: DISCONTINUED | OUTPATIENT
Start: 2021-01-29 | End: 2021-01-29 | Stop reason: HOSPADM

## 2021-01-29 RX ORDER — MORPHINE SULFATE 1 MG/ML
1 INJECTION, SOLUTION EPIDURAL; INTRATHECAL; INTRAVENOUS EVERY 5 MIN PRN
Status: DISCONTINUED | OUTPATIENT
Start: 2021-01-29 | End: 2021-01-29 | Stop reason: HOSPADM

## 2021-01-29 RX ORDER — MEPERIDINE HYDROCHLORIDE 50 MG/ML
12.5 INJECTION INTRAMUSCULAR; INTRAVENOUS; SUBCUTANEOUS EVERY 5 MIN PRN
Status: DISCONTINUED | OUTPATIENT
Start: 2021-01-29 | End: 2021-01-29 | Stop reason: HOSPADM

## 2021-01-29 RX ORDER — FENTANYL CITRATE 50 UG/ML
25 INJECTION, SOLUTION INTRAMUSCULAR; INTRAVENOUS EVERY 5 MIN PRN
Status: DISCONTINUED | OUTPATIENT
Start: 2021-01-29 | End: 2021-01-29 | Stop reason: HOSPADM

## 2021-01-29 RX ORDER — HYDROCODONE BITARTRATE AND ACETAMINOPHEN 5; 325 MG/1; MG/1
1 TABLET ORAL PRN
Status: DISCONTINUED | OUTPATIENT
Start: 2021-01-29 | End: 2021-01-29 | Stop reason: HOSPADM

## 2021-01-29 RX ORDER — SODIUM CHLORIDE 0.9 % (FLUSH) 0.9 %
10 SYRINGE (ML) INJECTION EVERY 12 HOURS SCHEDULED
Status: DISCONTINUED | OUTPATIENT
Start: 2021-01-29 | End: 2021-01-29 | Stop reason: HOSPADM

## 2021-01-29 RX ORDER — SODIUM CHLORIDE 0.9 % (FLUSH) 0.9 %
10 SYRINGE (ML) INJECTION PRN
Status: DISCONTINUED | OUTPATIENT
Start: 2021-01-29 | End: 2021-01-29 | Stop reason: HOSPADM

## 2021-01-29 RX ORDER — FENTANYL CITRATE 50 UG/ML
INJECTION, SOLUTION INTRAMUSCULAR; INTRAVENOUS PRN
Status: DISCONTINUED | OUTPATIENT
Start: 2021-01-29 | End: 2021-01-29 | Stop reason: SDUPTHER

## 2021-01-29 RX ORDER — HYDROCODONE BITARTRATE AND ACETAMINOPHEN 5; 325 MG/1; MG/1
2 TABLET ORAL PRN
Status: DISCONTINUED | OUTPATIENT
Start: 2021-01-29 | End: 2021-01-29 | Stop reason: HOSPADM

## 2021-01-29 RX ADMIN — FENTANYL CITRATE 100 MCG: 50 INJECTION, SOLUTION INTRAMUSCULAR; INTRAVENOUS at 10:44

## 2021-01-29 RX ADMIN — MIDAZOLAM HYDROCHLORIDE 2 MG: 1 INJECTION, SOLUTION INTRAMUSCULAR; INTRAVENOUS at 10:46

## 2021-01-29 ASSESSMENT — PAIN SCALES - GENERAL
PAINLEVEL_OUTOF10: 0
PAINLEVEL_OUTOF10: 0

## 2021-01-29 ASSESSMENT — PAIN - FUNCTIONAL ASSESSMENT: PAIN_FUNCTIONAL_ASSESSMENT: 0-10

## 2021-01-29 ASSESSMENT — PULMONARY FUNCTION TESTS
PIF_VALUE: 0

## 2021-01-29 ASSESSMENT — LIFESTYLE VARIABLES: SMOKING_STATUS: 1

## 2021-01-29 NOTE — ANESTHESIA PRE PROCEDURE
Department of Anesthesiology  Preprocedure Note       Name:  Malone Bryce   Age:  31 y.o.  :  1990                                          MRN:  [id number]         Date:  2021      Surgeon: Milton Hammond):  César Wright MD    Procedure: Procedure(s):  LUMBAR TRANSFORAMINAL L3-4    Medications prior to admission:   Prior to Admission medications    Medication Sig Start Date End Date Taking? Authorizing Provider   montelukast (SINGULAIR) 10 MG tablet Take 1 tablet by mouth daily 21  Yes Toni Frederick DO   traZODone (DESYREL) 100 MG tablet Take 1 tablet by mouth nightly 20  Yes Chester Bang MD   lamoTRIgine (LAMICTAL) 25 MG tablet Take one tab once daily x 2 weeks. Then increase to two tabs once daily. 20  Yes Chester Bang MD   gabapentin (NEURONTIN) 600 MG tablet Take 1 tablet by mouth nightly for 90 days. 11/17/20 2/15/21 Yes Nicole Pinedo DO   albuterol sulfate HFA (VENTOLIN HFA) 108 (90 Base) MCG/ACT inhaler Inhale 2 puffs into the lungs every 6 hours as needed for Wheezing 11/10/20  Yes LEYDI Diaz CNP   pantoprazole (PROTONIX) 20 MG tablet Take 1 tablet by mouth daily 10/28/20 2/25/21 Yes Nicole Pinedo DO   azelastine (ASTELIN) 0.1 % nasal spray 1 spray by Nasal route 2 times daily Use in each nostril as directed 11/10/20   LEYDI Diaz CNP   acetaminophen (APAP EXTRA STRENGTH) 500 MG tablet Take 1 tablet by mouth every 6 hours as needed for Pain 11/10/20   LEYDI Diaz CNP   Multiple Vitamins-Minerals (EMERGEN-C VITAMIN C) PACK Take 1 Package by mouth daily 11/10/20   LEYDI Diaz CNP   Zinc 10 MG LOZG Follow package directions 11/10/20   LEYDI Diaz CNP   nicotine (Dee Tobias) 21 MG/24HR Place 1 patch onto the skin daily 11/10/20 12/22/20  LEYDI Diaz CNP       Current medications:    No current facility-administered medications for this encounter.         Allergies:  No Known Allergies    Problem List:    Patient Active Problem List   Diagnosis Code    Left hip pain M25.552    Depression F32.9    Asthma J45.909    Restless leg G25.81    Gastroesophageal reflux disease K21.9    Vitamin D deficiency E55.9    Other insomnia G47.09    Anxiety F41.9    Recurrent major depressive disorder, in remission ([facility] [address] F33.40    Borderline personality disorder ([facility] [address] F60.3       Past Medical History:        Diagnosis Date    Asthma     Bipolar 1 disorder ([facility] [address]     Depression     Heart murmur     Eywe-Kpktt-Xtshnks disease, left     Multiple allergies     PTSD (post-traumatic stress disorder)        Past Surgical History:        Procedure Laterality Date    [facility] INJECTION PROCEDURE FOR SACROILIAC JOINT Left 10/23/2020    SACROILIAC JOINT INJECTION performed by [address], MD at [facility] Left 2011    Left hip replacement    PAIN MANAGEMENT PROCEDURE  10/23/2020    SACROILIAC JOINT INJECTION    TYMPANOSTOMY TUBE PLACEMENT         Social History:    Social History     Tobacco Use    Smoking status: Current Every Day Smoker     Packs/day: 0.25     Years: 10.00     Pack years: 2.50     Types: Cigarettes    Smokeless tobacco: Former User     Types: Chew    Tobacco comment: Vapor   Substance Use Topics    Alcohol use: No                                Ready to quit: Not Answered  Counseling given: Not Answered  Comment: Vapor      Vital Signs (Current): There were no vitals filed for this visit.                                            BP Readings from Last 3 Encounters:   11/10/20 122/77   10/23/20 121/70   10/23/20 (!) 118/98       NPO Status:                                                                                 BMI:   Wt Readings from Last 3 Encounters:   10/23/20 200 lb (90.7 kg)   10/05/20 211 lb (95.7 kg)   10/01/20 195 lb (88.5 kg)     There is no height or weight on file to calculate BMI.    CBC:   Lab Results   Component Value Date WBC 11.5 08/04/2020    RBC 5.45 08/04/2020    HGB 16.9 08/04/2020    HCT 50.0 08/04/2020    MCV 91.7 08/04/2020    RDW 13.4 06/28/2014     08/04/2020       CMP:   Lab Results   Component Value Date     08/04/2020    K 4.4 08/04/2020     08/04/2020    CO2 24 08/04/2020    BUN 10 08/04/2020    CREATININE 0.7 08/04/2020    LABGLOM >90 08/04/2020    GLUCOSE 98 08/04/2020    PROT 6.6 08/04/2020    CALCIUM 9.2 08/04/2020    BILITOT 0.4 08/04/2020    ALKPHOS 45 08/04/2020    AST 18 08/04/2020    ALT 17 08/04/2020       POC Tests: No results for input(s): POCGLU, POCNA, POCK, POCCL, POCBUN, POCHEMO, POCHCT in the last 72 hours. Coags: No results found for: PROTIME, INR, APTT    HCG (If Applicable): No results found for: PREGTESTUR, PREGSERUM, HCG, HCGQUANT     ABGs: No results found for: PHART, PO2ART, KYE1IVX, NYE0DZW, BEART, M0APKXZO     Type & Screen (If Applicable):  No results found for: LABABO, LABRH    Drug/Infectious Status (If Applicable):  No results found for: HIV, HEPCAB    COVID-19 Screening (If Applicable):   Lab Results   Component Value Date    COVID19 Not Detected 01/25/2021    COVID19 Not Detected 10/19/2020         Anesthesia Evaluation  Patient summary reviewed and Nursing notes reviewed no history of anesthetic complications:   Airway: Mallampati: II  TM distance: >3 FB   Neck ROM: full  Mouth opening: > = 3 FB Dental: normal exam         Pulmonary:normal exam  breath sounds clear to auscultation  (+) asthma: current smoker                           Cardiovascular:Negative CV ROS            Rhythm: regular  Rate: normal                    Neuro/Psych:   (+) psychiatric history: stable with treatmentdepression/anxiety             GI/Hepatic/Renal:   (+) GERD:,           Endo/Other: Negative Endo/Other ROS                    Abdominal:           Vascular: negative vascular ROS.                                        Anesthesia Plan      MAC     ASA 2             Anesthetic plan and risks discussed with patient. Plan discussed with CRNA.                   Kajal Lewis MD   1/29/2021

## 2021-01-29 NOTE — ANESTHESIA POSTPROCEDURE EVALUATION
POST- ANESTHESIA EVALUATION       Pt Name: Carin Lezama  MRN: [id number]  [place]: 1990  Date of evaluation: 1/29/2021  Time:  11:26 AM      /83   Pulse 69   Temp 98.3 °F (36.8 °C) (Temporal)   Resp 20   Ht 5' 10\" (1.778 m)   Wt 195 lb 8 oz (88.7 kg)   SpO2 100%   BMI 28.05 kg/m²      Consciousness Level  Awake  Cardiopulmonary Status  Stable  Pain Adequately Treated YES  Nausea / Vomiting  NO  Adequate Hydration  YES  Anesthesia Related Complications NONE      Electronically signed by Yolis Looney MD on 1/29/2021 at 11:26 AM       Department of Anesthesiology  Postprocedure Note    Patient: Carin Lezama  MRN: [id number]  [place]: 1990  Date of evaluation: 1/29/2021  Time:  11:26 AM     Procedure Summary     Date: 01/29/21 Room / Location: [address] / [address] [place] [address]    Anesthesia Start: 8638 Anesthesia Stop: 0084    Procedure: LUMBAR TRANSFORAMINAL L3-4 (Left ) Diagnosis: (M54.16 RADICULOPATHY)    Surgeons: Kaden Hancock MD Responsible Provider: Yolis Looney MD    Anesthesia Type: MAC ASA Status: 2          Anesthesia Type: MAC    Apoorva Phase I: Apoorva Score: 10    Apoorva Phase II: Apoorva Score: 10    Last vitals: Reviewed and per EMR flowsheets.        Anesthesia Post Evaluation

## 2021-01-29 NOTE — H&P
Pain Pre-Op H&P Note    Twin Myers    HPI: José Miguel Hussein  presents with low back and leg pain.     Past Medical History:   Diagnosis Date    Asthma     Bipolar 1 disorder ([address]     Depression     Heart murmur     Jsub-Otmcn-Actznes disease, left     Multiple allergies     PTSD (post-traumatic stress disorder)        Past Surgical History:   Procedure Laterality Date    [facility] INJECTION PROCEDURE FOR SACROILIAC JOINT Left 10/23/2020    SACROILIAC JOINT INJECTION performed by Boogie Rizzo MD at [facility] Left 2011    Left hip replacement    PAIN MANAGEMENT PROCEDURE  10/23/2020    SACROILIAC JOINT INJECTION    TYMPANOSTOMY TUBE PLACEMENT         Family History   Problem Relation Age of Onset    Depression Mother     Heart Disease Father        No Known Allergies      Current Facility-Administered Medications:     sodium chloride flush 0.9 % injection 10 mL, 10 mL, Intravenous, 2 times per day, oBn Pitts MD    sodium chloride flush 0.9 % injection 10 mL, 10 mL, Intravenous, PRN, Bon Pitts MD    meperidine (DEMEROL) injection 12.5 mg, 12.5 mg, Intravenous, Q5 Min PRN, Bon Pitts MD    morphine (PF) injection 1 mg, 1 mg, Intravenous, Q5 Min PRN, Bon Pitts MD    HYDROmorphone (DILAUDID) injection 0.5 mg, 0.5 mg, Intravenous, Q5 Min PRN, Bon Pitts MD    fentaNYL (SUBLIMAZE) injection 25 mcg, 25 mcg, Intravenous, Q5 Min PRN, Bon Pitts MD    HYDROcodone-acetaminophen (NORCO) 5-325 MG per tablet 1 tablet, 1 tablet, Oral, PRN **OR** HYDROcodone-acetaminophen (NORCO) 5-325 MG per tablet 2 tablet, 2 tablet, Oral, PRN, Bon Pitts MD    [facility]ansetron [facility]) injection 4 mg, 4 mg, Intravenous, Once PRN, Bon Pitts MD    promethazine (PHENERGAN) injection 6.25 mg, 6.25 mg, Intramuscular, Once PRN, Bon Pitts MD    diphenhydrAMINE (BENADRYL) injection 12.5 mg, 12.5 mg, Intravenous, Once PRN, Bon Pitts MD    hydrALAZINE (APRESOLINE) injection 5 mg, 5 mg, Intravenous, Q10 Min PRN, Patricia Zari Min MD    Social History     Tobacco Use    Smoking status: Current Every Day Smoker     Packs/day: 0.25     Years: 10.00     Pack years: 2.50     Types: Cigarettes    Smokeless tobacco: Former User     Types: Chew    Tobacco comment: Vapor   Substance Use Topics    Alcohol use: No       Review of Systems:   Focused review of systems was performed, and negative as pertinent to diagnosis, except as stated in HPI. Physical Exam  Constitutional:       Appearance: Normal appearance. Pulmonary:      Effort: Pulmonary effort is normal.   Neurological:      Mental Status: He is alert. Psychiatric:         Attention and Perception: Attention and perception normal.         Mood and Affect: Mood and affect normal.         Patient's current physical status, medications, medical history, and HPI have been reviewed and updated as appropriate on this date: 01/29/21    Risk/Benefit(s): The risks, benefits, alternatives, and potential complications have been discussed with the patient/family and informed consent has been obtained for the procedure/sedation.     Diagnosis:   M54.16 RADICULOPATHY      Plan: lumbar transforaminal        Gonzales Roll

## 2021-01-29 NOTE — OP NOTE
Transforaminal Epidural Steroid Injection:  SURGEON: Daphne Myers    PRE-OP DIAGNOSIS: M54.17 (lumbosacral neuritis), M54.5 (low back pain)    POST-OP DIAGNOSIS: Same. PROCEDURE PERFORMED:  Left L3 and L4 Lumbar Transforaminal SID selective nerve root block. Physician confirmed and marked the surgical site. EBL: minimal    CONSENT: Patient has undergone the educational process with this procedure, is aware and fully understands the risks involved: potential damage to any and all body organs including possible bleeding, infection, and nerve injury, allergic reaction and headache. Patient also understands that the procedure will be undertaken in a safe, controlled and monitored setting. Patient recognizes that the benefits may include relief from pain and reduction in the oral use of medications. Patient agreed to proceed. The patient was counseled at length about the risks of conchis Covid-19 during their perioperative period and any recovery window from their procedure. The patient was made aware that conchis Covid-19  may worsen their prognosis for recovering from their procedure  and lend to a higher morbidity and/or mortality risk. All material risks, benefits, and reasonable alternatives including postponing the procedure were discussed. The patient does wish to proceed with the procedure at this time. PREP: The patient was placed in the prone position and padded appropriately. The injection site was prepped with chloroprep and draped appropriately. 5ml of 0.5% lidocaine was used to anesthetize the skin and subcutaneous tissue. PROCEDURE NOTE: A 22 gauge 3.5 inch Pencil-Point needle was then advanced to the Left L3 and L4 neuroforamen using a wide paramedian approach under fluoroscopic guidance. Aspiration was negative for blood, CSF and producing pain.  Contrast Medium: 1 ml of (omnipaque) contrast was then injected and the following was noted: appropriate spread of contrast along the nerve root sheath and adjacent epidural space 4mg Dexamethasone mixed with 1.5 ml of 0.5 % lidocaine was then injected into the nerve root sheath of each of the indicated levels. The needle was withdrawn by the physician and the nurse applied a sterile dressing. The patient tolerated the procedure well. No complications occured. Patient transferred to the recovery room in satisfatory condition. Appropriate written discharge instructions given to the patient.     Katheleen Councilman

## 2021-02-03 ENCOUNTER — HOSPITAL ENCOUNTER (EMERGENCY)
Age: 31
Discharge: HOME | End: 2021-02-03
Payer: MEDICAID

## 2021-02-03 VITALS
SYSTOLIC BLOOD PRESSURE: 142 MMHG | RESPIRATION RATE: 18 BRPM | DIASTOLIC BLOOD PRESSURE: 80 MMHG | OXYGEN SATURATION: 98 % | BODY MASS INDEX: 27.63 KG/M2 | TEMPERATURE: 99.1 F | WEIGHT: 193 LBS | HEART RATE: 68 BPM | HEIGHT: 70 IN

## 2021-02-03 DIAGNOSIS — J11.1 INFLUENZA-LIKE ILLNESS: Primary | ICD-10-CM

## 2021-02-03 LAB
FLU A ANTIGEN: NEGATIVE
FLU B ANTIGEN: NEGATIVE

## 2021-02-03 PROCEDURE — 99213 OFFICE O/P EST LOW 20 MIN: CPT

## 2021-02-03 PROCEDURE — 87804 INFLUENZA ASSAY W/OPTIC: CPT

## 2021-02-03 RX ORDER — BENZONATATE 200 MG/1
200 CAPSULE ORAL 3 TIMES DAILY PRN
Qty: 21 CAPSULE | Refills: 0 | Status: SHIPPED | OUTPATIENT
Start: 2021-02-03 | End: 2021-02-04

## 2021-02-03 RX ORDER — DEXTROMETHORPHAN HYDROBROMIDE AND PROMETHAZINE HYDROCHLORIDE 15; 6.25 MG/5ML; MG/5ML
5 SYRUP ORAL 4 TIMES DAILY PRN
Qty: 118 ML | Refills: 0 | Status: SHIPPED | OUTPATIENT
Start: 2021-02-03 | End: 2021-02-04

## 2021-02-03 ASSESSMENT — ENCOUNTER SYMPTOMS
SORE THROAT: 0
VOMITING: 1
COUGH: 1
DIARRHEA: 0
SHORTNESS OF BREATH: 0
NAUSEA: 1

## 2021-02-03 NOTE — ED PROVIDER NOTES
[place] [address]  Urgent Care Encounter       CHIEF COMPLAINT       Chief Complaint   Patient presents with    Headache     chills, body aches       Nurses Notes reviewed and I agree except as noted in the HPI. HISTORY OF PRESENT ILLNESS   Geneva Schaefer is a 31 y.o. male who presents for evaluation of cough, congestion, headaches, body aches, chills and low-grade fever that been ongoing for the past 4 days. Patient states that he had a rapid Covid test performed which was negative. He denies any loss of smell or taste or any known sick exposures. He denies any significant shortness of breath. States that he is also felt nauseous and had some vomiting at home. Reports that he does have a prescription for Zofran but has not taken any of these medications. The history is provided by the patient. REVIEW OF SYSTEMS     Review of Systems   Constitutional: Positive for chills and fever. HENT: Positive for congestion. Negative for sore throat. Respiratory: Positive for cough. Negative for shortness of breath. Cardiovascular: Negative for chest pain. Gastrointestinal: Positive for nausea and vomiting. Negative for diarrhea. Musculoskeletal: Positive for myalgias. Negative for arthralgias. Skin: Negative for rash. Allergic/Immunologic: Negative for immunocompromised state. Neurological: Negative for headaches. PAST MEDICAL HISTORY         Diagnosis Date    Asthma     B[facility] 1 disorder ([facility] [address]     Depression     Heart murmur     Dptu-Ervmb-Uclubbn disease, left     Multiple allergies     PTSD (post-traumatic stress disorder)        SURGICALHISTORY     Patient  has a past surgical history that includes joint replacement (Left, 2011); Tympanostomy tube placement; Pain management procedure (10/23/2020); Injection Procedure For Sacroiliac Joint (Left, 10/23/2020);  Pain management procedure (Left, 01/29/2021); and Pain management procedure (Left, 1/29/2021). CURRENT MEDICATIONS       Previous Medications    ACETAMINOPHEN (APAP EXTRA STRENGTH) 500 MG TABLET    Take 1 tablet by mouth every 6 hours as needed for Pain    ALBUTEROL SULFATE HFA (VENTOLIN HFA) 108 (90 BASE) MCG/ACT INHALER    Inhale 2 puffs into the lungs every 6 hours as needed for Wheezing    AZELASTINE (ASTELIN) 0.1 % NASAL SPRAY    1 spray by Nasal route 2 times daily Use in each nostril as directed    GABAPENTIN (NEURONTIN) 600 MG TABLET    Take 1 tablet by mouth nightly for 90 days. LAMOTRIGINE (LAMICTAL) 25 MG TABLET    Take one tab once daily x 2 weeks. Then increase to two tabs once daily. MONTELUKAST (SINGULAIR) 10 MG TABLET    Take 1 tablet by mouth daily    MULTIPLE VITAMINS-MINERALS (EMERGEN-C VITAMIN C) PACK    Take 1 Package by mouth daily    NICOTINE (NICODERM CQ) 21 MG/24HR    Place 1 patch onto the skin daily    PANTOPRAZOLE (PROTONIX) 20 MG TABLET    Take 1 tablet by mouth daily    TRAZODONE (DESYREL) 100 MG TABLET    Take 1 tablet by mouth nightly    ZINC 10 MG LOZG    Follow package directions       ALLERGIES     Patient is has No Known Allergies. Patients   There is no immunization history on file for this patient. FAMILY HISTORY     Patient's family history includes Depression in his mother; Heart Disease in his father. SOCIAL HISTORY     Patient  reports that he has been smoking cigarettes. He has a 2.50 pack-year smoking history. He has quit using smokeless tobacco.  His smokeless tobacco use included chew. He reports current drug use. Drug: Marijuana. He reports that he does not drink alcohol. PHYSICAL EXAM     ED TRIAGE VITALS  BP: (!) 142/80, Temp: 99.1 °F (37.3 °C), Pulse: 68, Resp: 18, SpO2: 98 %,Estimated body mass index is 27.69 kg/m² as calculated from the following:    Height as of this encounter: 5' 10\" (1.778 m). Weight as of this encounter: 193 lb (87.5 kg). ,No LMP for male patient.     Physical Exam  Vitals signs and nursing note reviewed. Constitutional:       General: He is not in acute distress. Appearance: He is well-developed. He is not diaphoretic. HENT:      Right Ear: Tympanic membrane and ear canal normal.      Left Ear: Tympanic membrane and ear canal normal.      Mouth/Throat:      Mouth: Mucous membranes are moist.      Pharynx: Oropharynx is clear. Eyes:      Conjunctiva/sclera:      Right eye: Right conjunctiva is not injected. Left eye: Left conjunctiva is not injected. Pupils: Pupils are equal.   Neck:      Musculoskeletal: Normal range of motion. Cardiovascular:      Rate and Rhythm: Normal rate and regular rhythm. Heart sounds: No murmur. Pulmonary:      Effort: Pulmonary effort is normal. No respiratory distress. Breath sounds: Normal breath sounds. Abdominal:      Palpations: Abdomen is soft. Tenderness: There is no abdominal tenderness. Musculoskeletal:      Right knee: He exhibits normal range of motion. Left knee: He exhibits normal range of motion. Skin:     General: Skin is warm. Findings: No rash. Neurological:      Mental Status: He is alert and oriented to person, place, and time. Psychiatric:         Behavior: Behavior normal.         DIAGNOSTIC RESULTS     Labs:  Results for orders placed or performed during the hospital encounter of 02/03/21   Rapid influenza A/B antigens   Result Value Ref Range    Flu A Antigen Negative NEGATIVE    Flu B Antigen Negative NEGATIVE       IMAGING:    No orders to display         EKG:  none    URGENT CARE COURSE:     Vitals:    02/03/21 1307 02/03/21 1308   BP:  (!) 142/80   Pulse: 68    Resp: 18    Temp: 99.1 °F (37.3 °C)    TempSrc: Temporal    SpO2: 98%    Weight: 193 lb (87.5 kg)    Height: 5' 10\" (1.778 m)        Medications - No data to display         PROCEDURES:  None    FINAL IMPRESSION      1.  Influenza-like illness          DISPOSITION/ PLAN       Influenza swab is negative at this time I discussed with the patient that based on his negative Covid test earlier in the week, I believe he likely has some other sort of a viral type illness. Discussed with the patient the plan to treat symptomatically with Tessalon Perles and Promethazine DM and he is advised to begin using his Zofran at home. He is instructed to rest and hydrate and is agreeable to plan as discussed. He will follow-up on outpatient basis as needed.     PATIENT REFERRED TO:  Ba Watkins, DO  [address] / [facility] [place] [address]      DISCHARGE MEDICATIONS:  New Prescriptions    BENZONATATE (TESSALON) 200 MG CAPSULE    Take 1 capsule by mouth 3 times daily as needed for Cough    PROMETHAZINE-DEXTROMETHORPHAN (PROMETHAZINE-DM) 6.25-15 MG/5ML SYRUP    Take 5 mLs by mouth 4 times daily as needed for Cough       Discontinued Medications    No medications on file       Current Discharge Medication List          LEYDI Larose - CNP    (Please note that portions of this note were completed with a voice recognition program. Efforts were made to edit the dictations but occasionally words are mis-transcribed.)          LEYDI Larose - WARREN  02/03/21 5599

## 2021-02-03 NOTE — ED TRIAGE NOTES
Patient to room with c/o headache, chills, and generalized body aches beginning three days ago. States temp of 101. 2 two days ago. Negative COVID testing prior to arrival. Flu swab obtained. Patient tolerated well.

## 2021-02-04 ENCOUNTER — OFFICE VISIT (OUTPATIENT)
Dept: FAMILY MEDICINE CLINIC | Age: 31
End: 2021-02-04
Payer: MEDICAID

## 2021-02-04 VITALS
BODY MASS INDEX: 28.64 KG/M2 | TEMPERATURE: 97.1 F | OXYGEN SATURATION: 97 % | WEIGHT: 199.6 LBS | HEART RATE: 76 BPM | RESPIRATION RATE: 14 BRPM | SYSTOLIC BLOOD PRESSURE: 122 MMHG | DIASTOLIC BLOOD PRESSURE: 78 MMHG

## 2021-02-04 DIAGNOSIS — M25.552 LEFT HIP PAIN: ICD-10-CM

## 2021-02-04 DIAGNOSIS — R29.818 SUSPECTED SLEEP APNEA: ICD-10-CM

## 2021-02-04 DIAGNOSIS — J30.2 SEASONAL ALLERGIES: ICD-10-CM

## 2021-02-04 DIAGNOSIS — E78.1 HYPERTRIGLYCERIDEMIA: ICD-10-CM

## 2021-02-04 DIAGNOSIS — G25.81 RESTLESS LEG: Primary | ICD-10-CM

## 2021-02-04 DIAGNOSIS — K21.9 GASTROESOPHAGEAL REFLUX DISEASE WITHOUT ESOPHAGITIS: ICD-10-CM

## 2021-02-04 DIAGNOSIS — J45.909 ASTHMA, UNSPECIFIED ASTHMA SEVERITY, UNSPECIFIED WHETHER COMPLICATED, UNSPECIFIED WHETHER PERSISTENT: ICD-10-CM

## 2021-02-04 PROCEDURE — 99214 OFFICE O/P EST MOD 30 MIN: CPT | Performed by: STUDENT IN AN ORGANIZED HEALTH CARE EDUCATION/TRAINING PROGRAM

## 2021-02-04 PROCEDURE — G8427 DOCREV CUR MEDS BY ELIG CLIN: HCPCS | Performed by: STUDENT IN AN ORGANIZED HEALTH CARE EDUCATION/TRAINING PROGRAM

## 2021-02-04 PROCEDURE — 4004F PT TOBACCO SCREEN RCVD TLK: CPT | Performed by: STUDENT IN AN ORGANIZED HEALTH CARE EDUCATION/TRAINING PROGRAM

## 2021-02-04 PROCEDURE — G8484 FLU IMMUNIZE NO ADMIN: HCPCS | Performed by: STUDENT IN AN ORGANIZED HEALTH CARE EDUCATION/TRAINING PROGRAM

## 2021-02-04 PROCEDURE — G8419 CALC BMI OUT NRM PARAM NOF/U: HCPCS | Performed by: STUDENT IN AN ORGANIZED HEALTH CARE EDUCATION/TRAINING PROGRAM

## 2021-02-04 RX ORDER — ROPINIROLE 0.5 MG/1
0.5 TABLET, FILM COATED ORAL 3 TIMES DAILY
Qty: 90 TABLET | Refills: 3 | Status: SHIPPED | OUTPATIENT
Start: 2021-02-04 | End: 2021-02-08

## 2021-02-04 RX ORDER — PANTOPRAZOLE SODIUM 20 MG/1
20 TABLET, DELAYED RELEASE ORAL DAILY
Qty: 30 TABLET | Refills: 3 | Status: SHIPPED | OUTPATIENT
Start: 2021-02-04 | End: 2021-03-17 | Stop reason: SDUPTHER

## 2021-02-04 RX ORDER — MONTELUKAST SODIUM 10 MG/1
10 TABLET ORAL DAILY
Qty: 30 TABLET | Refills: 0 | Status: SHIPPED | OUTPATIENT
Start: 2021-02-04 | End: 2021-02-13 | Stop reason: SDUPTHER

## 2021-02-04 RX ORDER — LORATADINE 10 MG/1
10 TABLET ORAL DAILY
Qty: 30 TABLET | Refills: 3 | Status: SHIPPED | OUTPATIENT
Start: 2021-02-04 | End: 2021-04-15 | Stop reason: SDUPTHER

## 2021-02-04 RX ORDER — GABAPENTIN 600 MG/1
600 TABLET ORAL NIGHTLY
Qty: 90 TABLET | Refills: 0 | Status: CANCELLED | OUTPATIENT
Start: 2021-02-04 | End: 2021-05-05

## 2021-02-04 SDOH — ECONOMIC STABILITY: TRANSPORTATION INSECURITY
IN THE PAST 12 MONTHS, HAS LACK OF TRANSPORTATION KEPT YOU FROM MEETINGS, WORK, OR FROM GETTING THINGS NEEDED FOR DAILY LIVING?: NO

## 2021-02-04 SDOH — ECONOMIC STABILITY: FOOD INSECURITY: WITHIN THE PAST 12 MONTHS, THE FOOD YOU BOUGHT JUST DIDN'T LAST AND YOU DIDN'T HAVE MONEY TO GET MORE.: NEVER TRUE

## 2021-02-04 SDOH — ECONOMIC STABILITY: TRANSPORTATION INSECURITY
IN THE PAST 12 MONTHS, HAS THE LACK OF TRANSPORTATION KEPT YOU FROM MEDICAL APPOINTMENTS OR FROM GETTING MEDICATIONS?: NO

## 2021-02-04 SDOH — ECONOMIC STABILITY: INCOME INSECURITY: HOW HARD IS IT FOR YOU TO PAY FOR THE VERY BASICS LIKE FOOD, HOUSING, MEDICAL CARE, AND HEATING?: NOT HARD AT ALL

## 2021-02-04 NOTE — PATIENT INSTRUCTIONS
Take Claritin over the counter  Take Nasal Saline flushes over the counter    Keep taking heartburn medicine, lamictal.    We will repeat cholesterol in one year.

## 2021-02-04 NOTE — PROGRESS NOTES
S: 31 y.o. male with   Chief Complaint   Patient presents with    Discuss Medications    Referral - General     ENT       HPI: ***    BP Readings from Last 3 Encounters:   02/04/21 122/78   02/03/21 (!) 142/80   01/29/21 112/68     Wt Readings from Last 3 Encounters:   02/04/21 199 lb 9.6 oz (90.5 kg)   02/03/21 193 lb (87.5 kg)   01/29/21 195 lb 8 oz (88.7 kg)           O: VS:  weight is 199 lb 9.6 oz (90.5 kg). His skin temperature is 97.1 °F (36.2 °C). His blood pressure is 122/78 and his pulse is 76. His respiration is 14 and oxygen saturation is 97%. AAO/NAD, appropriate affect for mood  CV:  RRR, no murmur  Resp: CTAB  ***     Diagnosis Orders   1. Restless leg  gabapentin (NEURONTIN) 600 MG tablet   2. Seasonal allergies  montelukast (SINGULAIR) 10 MG tablet   3. Gastroesophageal reflux disease without esophagitis  pantoprazole (PROTONIX) 20 MG tablet       Plan:  ***      Health Maintenance Due   Topic Date Due    Hepatitis C screen  1990    HIV screen  05/05/2004    DTaP/Tdap/Td vaccine (1 - Tdap) 05/05/2008         Attending Physician Statement  I have discussed the case, including pertinent history and exam findings with the resident. [id number]::\"I also have seen the patient and performed key portions of the examination. \",\" \"}I agree with the documented assessment and plan as documented by the resident.         Asad Valdez DO 2/4/2021 9:33 AM

## 2021-02-04 NOTE — PROGRESS NOTES
Sharron Segundo is a 31 y.o. male who presents today for:  Chief Complaint   Patient presents with    Discuss Medications    Referral - General     ENT     HPI: bipolar d/o: seeing psychiatry. Taking lamictal and trazadone. Notes that some sounds really irritate him, chewing, baby crying. Seasonal allergies:  Taking singulair only. Daily symptoms. GERD: x 3-5 yrs. Taking protonix 20mg daily. Wakes up 3 times a night gasping. HR monitor shows 40s at night. Left hip pain with sciatica. Father recently passed due to MI. Father started having MI in his 35s. Smokes. Uses rescue inhaler at least twice weekly. BP Readings from Last 3 Encounters:   02/04/21 122/78   02/03/21 (!) 142/80   01/29/21 112/68     Wt Readings from Last 3 Encounters:   02/04/21 199 lb 9.6 oz (90.5 kg)   02/03/21 193 lb (87.5 kg)   01/29/21 195 lb 8 oz (88.7 kg)           O: VS:  weight is 199 lb 9.6 oz (90.5 kg). His skin temperature is 97.1 °F (36.2 °C). His blood pressure is 122/78 and his pulse is 76. His respiration is 14 and oxygen saturation is 97%. Diagnosis Orders   1. Restless leg     2. Seasonal allergies  montelukast (SINGULAIR) 10 MG tablet   3. Gastroesophageal reflux disease without esophagitis  pantoprazole (PROTONIX) 20 MG tablet   4. Suspected sleep apnea     5. Left hip pain     6. Hypertriglyceridemia         Plan:  Start claritin, continue singulair, start saline flushes. Continue protonix. Sleep study. Add advair. Health Maintenance Due   Topic Date Due    Hepatitis C screen  1990    HIV screen  05/05/2004    DTaP/Tdap/Td vaccine (1 - Tdap) 05/05/2008         Attending Physician Statement  I have discussed the case, including pertinent history and exam findings with the resident. I also have seen the patient and performed key portions of the examination. I agree with the documented assessment and plan as documented by the resident.

## 2021-02-04 NOTE — PROGRESS NOTES
Health Maintenance Due   Topic Date Due    Hepatitis C screen  1990    Varicella vaccine (1 of 2 - 2-dose childhood series) 05/05/1990 had chicken pox as a child.      Pneumococcal 0-64 years Vaccine (1 of 1 - PPSV23) 05/05/1995 patient declines    HIV screen  05/05/2004    DTaP/Tdap/Td vaccine (1 - Tdap) 05/05/2008 completed    Flu vaccine (1) 09/01/2020 patient declined

## 2021-02-05 ENCOUNTER — TELEPHONE (OUTPATIENT)
Dept: PSYCHIATRY | Age: 31
End: 2021-02-05

## 2021-02-05 RX ORDER — ESCITALOPRAM OXALATE 10 MG/1
10 TABLET ORAL DAILY
Qty: 30 TABLET | Refills: 1 | Status: SHIPPED | OUTPATIENT
Start: 2021-02-05 | End: 2021-02-19 | Stop reason: SDUPTHER

## 2021-02-05 NOTE — TELEPHONE ENCOUNTER
Adela Trinh called back into the office stating that he would like to start the Lexapro; he states that he uses [facility] on [address]. He was informed to check with the pharmacy at the end of the business day. Please advise on dosing.

## 2021-02-05 NOTE — TELEPHONE ENCOUNTER
A medication like Lexapro could help his anxiety, depression, and possibly his hypersensitivity to sound. We can either discuss at next appt this month or I can send a Rx if he wants to start the Lexapro prior to that.    Electronically signed by Aydee Stratton MD on 2/5/2021 at 11:02 AM

## 2021-02-05 NOTE — TELEPHONE ENCOUNTER
I have wrote back to [facility] with this information via [employer]; I will await his response.

## 2021-02-08 ENCOUNTER — HOSPITAL ENCOUNTER (OUTPATIENT)
Dept: PREADMISSION TESTING | Age: 31
Setting detail: SPECIMEN
Discharge: HOME | End: 2021-02-12
Payer: MEDICAID

## 2021-02-08 DIAGNOSIS — Z11.59 ENCOUNTER FOR SCREENING FOR OTHER VIRAL DISEASES: Primary | ICD-10-CM

## 2021-02-08 PROCEDURE — U0003 INFECTIOUS AGENT DETECTION BY NUCLEIC ACID (DNA OR RNA); SEVERE ACUTE RESPIRATORY SYNDROME CORONAVIRUS 2 (SARS-COV-2) (CORONAVIRUS DISEASE [COVID-19]), AMPLIFIED PROBE TECHNIQUE, MAKING USE OF HIGH THROUGHPUT TECHNOLOGIES AS DESCRIBED BY CMS-2020-01-R: HCPCS

## 2021-02-08 PROCEDURE — U0005 INFEC AGEN DETEC AMPLI PROBE: HCPCS

## 2021-02-08 RX ORDER — ROPINIROLE 0.5 MG/1
0.5 TABLET, FILM COATED ORAL NIGHTLY
Qty: 90 TABLET | Refills: 3 | Status: SHIPPED | OUTPATIENT
Start: 2021-02-08 | End: 2021-03-12

## 2021-02-09 LAB
SARS-COV-2, RAPID: NORMAL
SARS-COV-2: NORMAL
SARS-COV-2: NOT DETECTED
SOURCE: NORMAL

## 2021-02-10 ENCOUNTER — ANESTHESIA EVENT (OUTPATIENT)
Dept: OPERATING ROOM | Age: 31
End: 2021-02-10
Payer: MEDICAID

## 2021-02-12 ENCOUNTER — APPOINTMENT (OUTPATIENT)
Dept: GENERAL RADIOLOGY | Age: 31
End: 2021-02-12
Attending: PAIN MEDICINE
Payer: MEDICAID

## 2021-02-12 ENCOUNTER — HOSPITAL ENCOUNTER (OUTPATIENT)
Age: 31
Setting detail: OUTPATIENT SURGERY
Discharge: HOME | End: 2021-02-12
Attending: PAIN MEDICINE | Admitting: PAIN MEDICINE
Payer: MEDICAID

## 2021-02-12 ENCOUNTER — ANESTHESIA (OUTPATIENT)
Dept: OPERATING ROOM | Age: 31
End: 2021-02-12
Payer: MEDICAID

## 2021-02-12 VITALS
DIASTOLIC BLOOD PRESSURE: 57 MMHG | OXYGEN SATURATION: 98 % | RESPIRATION RATE: 12 BRPM | SYSTOLIC BLOOD PRESSURE: 110 MMHG

## 2021-02-12 VITALS
RESPIRATION RATE: 14 BRPM | TEMPERATURE: 99 F | BODY MASS INDEX: 28.26 KG/M2 | OXYGEN SATURATION: 96 % | SYSTOLIC BLOOD PRESSURE: 120 MMHG | DIASTOLIC BLOOD PRESSURE: 95 MMHG | WEIGHT: 197.38 LBS | HEART RATE: 73 BPM | HEIGHT: 70 IN

## 2021-02-12 PROCEDURE — 2709999900 HC NON-CHARGEABLE SUPPLY: Performed by: PAIN MEDICINE

## 2021-02-12 PROCEDURE — 7100000011 HC PHASE II RECOVERY - ADDTL 15 MIN: Performed by: PAIN MEDICINE

## 2021-02-12 PROCEDURE — 6360000002 HC RX W HCPCS: Performed by: PAIN MEDICINE

## 2021-02-12 PROCEDURE — 3700000001 HC ADD 15 MINUTES (ANESTHESIA): Performed by: PAIN MEDICINE

## 2021-02-12 PROCEDURE — 3209999900 FLUORO FOR SURGICAL PROCEDURES

## 2021-02-12 PROCEDURE — 6360000002 HC RX W HCPCS: Performed by: ANESTHESIOLOGY

## 2021-02-12 PROCEDURE — 62323 NJX INTERLAMINAR LMBR/SAC: CPT | Performed by: PAIN MEDICINE

## 2021-02-12 PROCEDURE — 7100000010 HC PHASE II RECOVERY - FIRST 15 MIN: Performed by: PAIN MEDICINE

## 2021-02-12 PROCEDURE — 3600000002 HC SURGERY LEVEL 2 BASE: Performed by: PAIN MEDICINE

## 2021-02-12 PROCEDURE — 3600000012 HC SURGERY LEVEL 2 ADDTL 15MIN: Performed by: PAIN MEDICINE

## 2021-02-12 PROCEDURE — 3700000000 HC ANESTHESIA ATTENDED CARE: Performed by: PAIN MEDICINE

## 2021-02-12 PROCEDURE — 6360000004 HC RX CONTRAST MEDICATION: Performed by: PAIN MEDICINE

## 2021-02-12 RX ORDER — SODIUM CHLORIDE 0.9 % (FLUSH) 0.9 %
10 SYRINGE (ML) INJECTION PRN
Status: DISCONTINUED | OUTPATIENT
Start: 2021-02-12 | End: 2021-02-12 | Stop reason: HOSPADM

## 2021-02-12 RX ORDER — FENTANYL CITRATE 50 UG/ML
25 INJECTION, SOLUTION INTRAMUSCULAR; INTRAVENOUS EVERY 5 MIN PRN
Status: DISCONTINUED | OUTPATIENT
Start: 2021-02-12 | End: 2021-02-12 | Stop reason: HOSPADM

## 2021-02-12 RX ORDER — DIPHENHYDRAMINE HYDROCHLORIDE 50 MG/ML
12.5 INJECTION INTRAMUSCULAR; INTRAVENOUS
Status: DISCONTINUED | OUTPATIENT
Start: 2021-02-12 | End: 2021-02-12 | Stop reason: HOSPADM

## 2021-02-12 RX ORDER — ONDANSETRON 2 MG/ML
4 INJECTION INTRAMUSCULAR; INTRAVENOUS
Status: DISCONTINUED | OUTPATIENT
Start: 2021-02-12 | End: 2021-02-12 | Stop reason: HOSPADM

## 2021-02-12 RX ORDER — HYDROCODONE BITARTRATE AND ACETAMINOPHEN 5; 325 MG/1; MG/1
2 TABLET ORAL PRN
Status: DISCONTINUED | OUTPATIENT
Start: 2021-02-12 | End: 2021-02-12 | Stop reason: HOSPADM

## 2021-02-12 RX ORDER — MORPHINE SULFATE 1 MG/ML
1 INJECTION, SOLUTION EPIDURAL; INTRATHECAL; INTRAVENOUS EVERY 5 MIN PRN
Status: DISCONTINUED | OUTPATIENT
Start: 2021-02-12 | End: 2021-02-12 | Stop reason: HOSPADM

## 2021-02-12 RX ORDER — FENTANYL CITRATE 50 UG/ML
INJECTION, SOLUTION INTRAMUSCULAR; INTRAVENOUS PRN
Status: DISCONTINUED | OUTPATIENT
Start: 2021-02-12 | End: 2021-02-12 | Stop reason: SDUPTHER

## 2021-02-12 RX ORDER — MEPERIDINE HYDROCHLORIDE 50 MG/ML
12.5 INJECTION INTRAMUSCULAR; INTRAVENOUS; SUBCUTANEOUS EVERY 5 MIN PRN
Status: DISCONTINUED | OUTPATIENT
Start: 2021-02-12 | End: 2021-02-12 | Stop reason: HOSPADM

## 2021-02-12 RX ORDER — HYDROCODONE BITARTRATE AND ACETAMINOPHEN 5; 325 MG/1; MG/1
1 TABLET ORAL PRN
Status: DISCONTINUED | OUTPATIENT
Start: 2021-02-12 | End: 2021-02-12 | Stop reason: HOSPADM

## 2021-02-12 RX ORDER — HYDRALAZINE HYDROCHLORIDE 20 MG/ML
5 INJECTION INTRAMUSCULAR; INTRAVENOUS EVERY 10 MIN PRN
Status: DISCONTINUED | OUTPATIENT
Start: 2021-02-12 | End: 2021-02-12 | Stop reason: HOSPADM

## 2021-02-12 RX ORDER — PROMETHAZINE HYDROCHLORIDE 25 MG/ML
6.25 INJECTION, SOLUTION INTRAMUSCULAR; INTRAVENOUS
Status: DISCONTINUED | OUTPATIENT
Start: 2021-02-12 | End: 2021-02-12 | Stop reason: HOSPADM

## 2021-02-12 RX ORDER — MIDAZOLAM HYDROCHLORIDE 1 MG/ML
INJECTION INTRAMUSCULAR; INTRAVENOUS PRN
Status: DISCONTINUED | OUTPATIENT
Start: 2021-02-12 | End: 2021-02-12 | Stop reason: SDUPTHER

## 2021-02-12 RX ORDER — SODIUM CHLORIDE 0.9 % (FLUSH) 0.9 %
10 SYRINGE (ML) INJECTION EVERY 12 HOURS SCHEDULED
Status: DISCONTINUED | OUTPATIENT
Start: 2021-02-12 | End: 2021-02-12 | Stop reason: HOSPADM

## 2021-02-12 RX ORDER — DEXAMETHASONE SODIUM PHOSPHATE 10 MG/ML
INJECTION INTRAMUSCULAR; INTRAVENOUS PRN
Status: DISCONTINUED | OUTPATIENT
Start: 2021-02-12 | End: 2021-02-12 | Stop reason: ALTCHOICE

## 2021-02-12 RX ADMIN — MIDAZOLAM HYDROCHLORIDE 2 MG: 1 INJECTION, SOLUTION INTRAMUSCULAR; INTRAVENOUS at 10:35

## 2021-02-12 RX ADMIN — MIDAZOLAM HYDROCHLORIDE 2 MG: 1 INJECTION, SOLUTION INTRAMUSCULAR; INTRAVENOUS at 10:29

## 2021-02-12 RX ADMIN — FENTANYL CITRATE 100 MCG: 50 INJECTION, SOLUTION INTRAMUSCULAR; INTRAVENOUS at 10:29

## 2021-02-12 ASSESSMENT — PULMONARY FUNCTION TESTS
PIF_VALUE: 1

## 2021-02-12 ASSESSMENT — PAIN - FUNCTIONAL ASSESSMENT: PAIN_FUNCTIONAL_ASSESSMENT: 0-10

## 2021-02-12 ASSESSMENT — LIFESTYLE VARIABLES: SMOKING_STATUS: 1

## 2021-02-12 NOTE — ANESTHESIA PRE PROCEDURE
Department of Anesthesiology  Preprocedure Note       Name:  Geneva Schaefer   Age:  31 y.o.  :  1990                                          MRN:  [id number]         Date:  2021      Surgeon: Luba Rehman):  Taya Brewster MD    Procedure: Procedure(s):  LUMBAR TRANSFORAMINAL L3-4    Medications prior to admission:   Prior to Admission medications    Medication Sig Start Date End Date Taking? Authorizing Provider   rOPINIRole (REQUIP) 0.5 MG tablet Take 1 tablet by mouth nightly 21   Brent Pinedo, DO   escitalopram (LEXAPRO) 10 MG tablet Take 1 tablet by mouth daily May start with half tablet once daily and increase to full tablet as tolerated. 21   Betty Lux MD   montelukast (SINGULAIR) 10 MG tablet Take 1 tablet by mouth daily 21   [facility], DO   pantoprazole (PROTONIX) 20 MG tablet Take 1 tablet by mouth daily 21  [facility], DO   loratadine (CLARITIN) 10 MG tablet Take 1 tablet by mouth daily 21   [facility], DO   fluticasone-salmeterol (ADVAIR DISKUS) 100-50 MCG/DOSE diskus inhaler Inhale 1 puff into the lungs every 12 hours 21   [facility], DO   traZODone (DESYREL) 100 MG tablet Take 1 tablet by mouth nightly 20   Betty Lux MD   lamoTRIgine (LAMICTAL) 25 MG tablet Take one tab once daily x 2 weeks. Then increase to two tabs once daily. 20   Betty Lux MD   gabapentin (NEURONTIN) 600 MG tablet Take 1 tablet by mouth nightly for 90 days.  11/17/20 2/15/21  [facility], DO   albuterol sulfate HFA (VENTOLIN HFA) 108 (90 Base) MCG/ACT inhaler Inhale 2 puffs into the lungs every 6 hours as needed for Wheezing 11/10/20   LEYDI Park - CNP       Current medications:    Current Facility-Administered Medications   Medication Dose Route Frequency Provider Last Rate Last Admin    sodium chloride flush 0.9 % injection 10 mL  10 mL Intravenous 2 times per day Aidan Sotomayor MD        sodium chloride flush 0.9 % injection 10 mL  10 mL Intravenous PRN Otis Petty MD           Allergies:  No Known Allergies    Problem List:    Patient Active Problem List   Diagnosis Code    Left hip pain M25.552    Depression F32.9    Asthma J45.909    Restless leg G25.81    Gastroesophageal reflux disease K21.9    Vitamin D deficiency E55.9    Other insomnia G47.09    Anxiety F41.9    Recurrent major depressive disorder, in remission ([facility] F33.40    Borderline personality disorder ([facility] [facility] F60.3       Past Medical History:        Diagnosis Date    Asthma     Bipolar 1 disorder ([facility] [facility]     Borderline personality disorder ([facility] [facility]     Depression     Heart murmur     Pijp-Nuzlk-Twjnguf disease, left     Multiple allergies     PTSD (post-traumatic stress disorder)     PTSD (post-traumatic stress disorder)     Restless legs syndrome        Past Surgical History:        Procedure Laterality Date    [facility] INJECTION PROCEDURE FOR SACROILIAC JOINT Left 10/23/2020    SACROILIAC JOINT INJECTION performed by Lucilla Bernheim, MD at [facility] Left 2011    Left hip replacement    PAIN MANAGEMENT PROCEDURE  10/23/2020    SACROILIAC JOINT INJECTION    PAIN MANAGEMENT PROCEDURE Left 01/29/2021    : LUMBAR TRANSFORAMINAL L3-4 (Left )    PAIN MANAGEMENT PROCEDURE Left 1/29/2021    LUMBAR TRANSFORAMINAL L3-4 performed by Lucilla Bernheim, MD at [address] [place] [address]         Social History:    Social History     Tobacco Use    Smoking status: Current Every Day Smoker     Packs/day: 0.25     Years: 10.00     Pack years: 2.50     Types: Cigarettes    Smokeless tobacco: Former User     Types: Chew   Substance Use Topics    Alcohol use: No                                Ready to quit: Not Answered  Counseling given: Not Answered      Vital Signs (Current): There were no vitals filed for this visit.                                            BP Readings from Last 3 Encounters:   02/04/21 122/78 regular  Rate: normal           Beta Blocker:  Not on Beta Blocker         Neuro/Psych:   (+) psychiatric history: stable with treatmentdepression/anxiety              ROS comment: RADICULOPATHY GI/Hepatic/Renal:   (+) GERD: no interval change,           Endo/Other: Negative Endo/Other ROS                    Abdominal:       Abdomen: soft. Vascular: negative vascular ROS. Anesthesia Plan      MAC     ASA 2             Anesthetic plan and risks discussed with patient. Plan discussed with CRNA.                   Jovon Medellin MD   2/12/2021

## 2021-02-12 NOTE — ANESTHESIA POSTPROCEDURE EVALUATION
Department of Anesthesiology  Postprocedure Note    Patient: Carol Dhillon  MRN: [id number]  [place]: 1990  Date of evaluation: 2/12/2021  Time:  10:41 AM     Procedure Summary     Date: 02/12/21 Room / Location: [address] [place] [address] 02 / [facility]    Anesthesia Start: 1025 Anesthesia Stop: 8019    Procedure: LUMBAR TRANSFORAMINAL L3-4 (Left ) Diagnosis: (M54.16 RADICULOPATHY)    Surgeons: Jack Guadalupe MD Responsible Provider: Elke Damon MD    Anesthesia Type: MAC ASA Status: 2          Anesthesia Type: MAC    Apoorva Phase I: Apoorva Score: 10    Apoorva Phase II:      Last vitals: Reviewed and per EMR flowsheets.        Anesthesia Post Evaluation    Patient location during evaluation: PACU  Patient participation: complete - patient participated  Level of consciousness: awake and alert  Airway patency: patent  Nausea & Vomiting: no nausea and no vomiting  Complications: no  Cardiovascular status: hemodynamically stable  Respiratory status: nasal cannula and spontaneous ventilation  Hydration status: euvolemic

## 2021-02-12 NOTE — H&P
Pain Pre-Op H&P Note    Brad Myers    HPI: Jatin Bee  presents with back and leg pain.     Past Medical History:   Diagnosis Date    Asthma     Bipolar 1 disorder ([facility]     Borderline personality disorder ([facility]     Depression     Heart murmur     Xfnl-Mopgk-Benvbml disease, left     Multiple allergies     PTSD (post-traumatic stress disorder)     PTSD (post-traumatic stress disorder)     Restless legs syndrome        Past Surgical History:   Procedure Laterality Date    [facility] INJECTION PROCEDURE FOR SACROILIAC JOINT Left 10/23/2020    SACROILIAC JOINT INJECTION performed by Mirna Sanderson MD at [facility] Left 2011    Left hip replacement    PAIN MANAGEMENT PROCEDURE  10/23/2020    SACROILIAC JOINT INJECTION    PAIN MANAGEMENT PROCEDURE Left 01/29/2021    : LUMBAR TRANSFORAMINAL L3-4 (Left )    PAIN MANAGEMENT PROCEDURE Left 1/29/2021    LUMBAR TRANSFORAMINAL L3-4 performed by Mirna Sanderson MD at [address]         Family History   Problem Relation Age of Onset    Depression Mother     Heart Disease Father        No Known Allergies      Current Facility-Administered Medications:     sodium chloride flush 0.9 % injection 10 mL, 10 mL, Intravenous, 2 times per day, Sulma Sprague MD    sodium chloride flush 0.9 % injection 10 mL, 10 mL, Intravenous, PRN, Sulma Sprague MD    meperidine (DEMEROL) injection 12.5 mg, 12.5 mg, Intravenous, Q5 Min PRN, Sulma Sprague MD    morphine (PF) injection 1 mg, 1 mg, Intravenous, Q5 Min PRN, Sulma Sprague MD    HYDROmorphone (DILAUDID) injection 0.5 mg, 0.5 mg, Intravenous, Q5 Min PRN, Sulma Sprague MD    fentaNYL (SUBLIMAZE) injection 25 mcg, 25 mcg, Intravenous, Q5 Min PRN, Sulma Sprague MD    HYDROcodone-acetaminophen (NORCO) 5-325 MG per tablet 1 tablet, 1 tablet, Oral, PRN **OR** HYDROcodone-acetaminophen (NORCO) 5-325 MG per tablet 2 tablet, 2 tablet, Oral, PRN, Sulma Sprague MD    ondansetron [facility] injection 4 mg, 4 mg, Intravenous, Once PRN, Bon Pitts MD    promethazine [facility]) injection 6.25 mg, 6.25 mg, Intramuscular, Once PRN, Bon Pitts MD    diphenhydrAMINE (BENADRYL) injection 12.5 mg, 12.5 mg, Intravenous, Once PRN, Bon Pitts MD    hydrALAZINE (APRESOLINE) injection 5 mg, 5 mg, Intravenous, Q10 Min PRN, Bon Pitts MD    Social History     Tobacco Use    Smoking status: Current Every Day Smoker     Packs/day: 0.25     Years: 10.00     Pack years: 2.50     Types: Cigarettes    Smokeless tobacco: Former User     Types: Chew   Substance Use Topics    Alcohol use: No       Review of Systems:   Focused review of systems was performed, and negative as pertinent to diagnosis, except as stated in HPI. Physical Exam  Constitutional:       Appearance: Normal appearance. Pulmonary:      Effort: Pulmonary effort is normal.   Neurological:      Mental Status: He is alert. Psychiatric:         Attention and Perception: Attention and perception normal.         Mood and Affect: Mood and affect normal.         Patient's current physical status, medications, medical history, and HPI have been reviewed and updated as appropriate on this date: 02/12/21    Risk/Benefit(s): The risks, benefits, alternatives, and potential complications have been discussed with the patient/family and informed consent has been obtained for the procedure/sedation.     Diagnosis:   M54.16 RADICULOPATHY      Plan: caudal        [address] [place] [address] Street

## 2021-02-12 NOTE — OP NOTE
Caudal Epidural Steroid Injection:   SURGEON: Daphne Myers    PRE-OP DIAGNOSIS: Lumbar radiculopathy,  Low back pain    POST-OP DIAGNOSIS: same. PROCEDURE PERFORMED: Caudal Epidural Steroid Injection. Physician confirmed and marked the surgical site. EBL: minimal    CONSENT: Patient has undergone the educational process with this procedure, is aware and fully understands the risks involved: potential damage to any and all body organs including possible bleeding, infection and nerve injury, allergic reaction and headache. Patient also understands that the procedure will be undertaken in a safe, controlled, and monitored setting. Patient recognizes that the benefits may include relief from pain and reduction in the oral use of medications. Patient agreed to proceed. The patient was counseled at length about the risks of conchis Covid-19 during their perioperative period and any recovery window from their procedure. The patient was made aware that conchis Covid-19  may worsen their prognosis for recovering from their procedure  and lend to a higher morbidity and/or mortality risk. All material risks, benefits, and reasonable alternatives including postponing the procedure were discussed. The patient does wish to proceed with the procedure at this time. PREP:  Timeout was performed prior to starting the procedure. The patient was prepped with chloraprep and draped sterilely. 5ml of 0.5% lidocaine was used to anesthetize the skin and subcutaneous tissue. PROCEDURE NOTE: A 22 gauge 3.5 inch spinal needle was advanced under fluoroscopic guidance to the sacrococcygeal membrane and into the caudal epidural space. 1 ml of (omnipaque) contrast was then injected and spread along the epidural space. Aspiration was negative for blood, CSF and producing pain. 10mg Dexamethasone mixed with 5 ml of 0.5% Lidocaine was then injected.  The needle was withdrawn by the physician and the nurse applied a sterile dressing. The patient tolerated the procedure well. No complications occurred. Patient transferred to the recovery room in satisfactory condition. Appropriate written discharge instructions given to the patient.     Dylon Post

## 2021-02-13 DIAGNOSIS — J30.2 SEASONAL ALLERGIES: ICD-10-CM

## 2021-02-13 RX ORDER — MONTELUKAST SODIUM 10 MG/1
10 TABLET ORAL DAILY
Qty: 30 TABLET | Refills: 3 | Status: SHIPPED | OUTPATIENT
Start: 2021-02-13 | End: 2021-02-25 | Stop reason: SDUPTHER

## 2021-02-19 ENCOUNTER — VIRTUAL VISIT (OUTPATIENT)
Dept: PSYCHIATRY | Age: 31
End: 2021-02-19
Payer: MEDICAID

## 2021-02-19 DIAGNOSIS — F43.10 PTSD (POST-TRAUMATIC STRESS DISORDER): Primary | ICD-10-CM

## 2021-02-19 DIAGNOSIS — F39 MOOD DISORDER (HCC): ICD-10-CM

## 2021-02-19 DIAGNOSIS — F41.1 GAD (GENERALIZED ANXIETY DISORDER): ICD-10-CM

## 2021-02-19 PROCEDURE — G8427 DOCREV CUR MEDS BY ELIG CLIN: HCPCS | Performed by: PSYCHIATRY & NEUROLOGY

## 2021-02-19 PROCEDURE — 99214 OFFICE O/P EST MOD 30 MIN: CPT | Performed by: PSYCHIATRY & NEUROLOGY

## 2021-02-19 RX ORDER — LAMOTRIGINE 25 MG/1
50 TABLET ORAL DAILY
Qty: 60 TABLET | Refills: 1 | Status: SHIPPED | OUTPATIENT
Start: 2021-02-19 | End: 2021-03-04 | Stop reason: SDUPTHER

## 2021-02-19 RX ORDER — PRAZOSIN HYDROCHLORIDE 1 MG/1
1 CAPSULE ORAL NIGHTLY
Qty: 30 CAPSULE | Refills: 1 | Status: SHIPPED | OUTPATIENT
Start: 2021-02-19 | End: 2021-03-04 | Stop reason: SDUPTHER

## 2021-02-19 RX ORDER — ESCITALOPRAM OXALATE 10 MG/1
10 TABLET ORAL DAILY
Qty: 30 TABLET | Refills: 1 | Status: SHIPPED | OUTPATIENT
Start: 2021-02-19 | End: 2021-03-12

## 2021-02-19 NOTE — PROGRESS NOTES
[address] [facility] PSYCHIATRY  [facility] [id number] [phone]    Progress Note    Patient:  Hector Pollock  YOB: 1990  PCP:  Terry Carr DO  Visit Date:  2/19/2021    TELEHEALTH EVALUATION -- Audio/Visual (During [id number] public health emergency)    Patient location: home  Physician location: home, [facility]  This virtual visit was conducted via interactive, real-time video. Chief Complaint   Patient presents with    Follow-up    Medication Check    Anxiety    Mood Swings    Insomnia       SUBJECTIVE:      Hector Pollock, a 31 y. o. male, presents for a follow up visit. Patient reports he is about the same. Patient is compliant with medication regimen. He presents alone. Hasn't noticed much benefit from med changes. Still easily aggravated \"but maybe not as quick\". He continues on both Lamictal and Lexapro. With Lexapro he took half tab x 2 weeks then increased to a full tablet today. Now on 10 mg. Discusses hypersensitivity to light and noise which he's had since childhood. His job let him go because they couldn't work with his appointments. Planning to return to the same job and will switch to 3rd shift so it won't interfere with appointments. Notes seeing Pain doc for his back. Doing epidural shots and now considering back surgery. Notes with his dad passing they are taking over his house as well. Working to remodel his dad's. Sleep is ok and \"very vivid dreams and nightmares\" noting it's been that way for awhile. Sometimes wakes up rested. Sleep is fragmented. He would like to consider Lamictal increase but after some discussion prefers to change sleep medication first. Discussed options and he would like to try prazosin. No SI.       Med Trials:Chantix (nausea), trazodone, gabapentin, Ritalin, medical cannabis, Lamictal, Lexapro    OBJECTIVE:  Vitals:  There were no vitals taken for this visit. MENTAL STATUS EXAM:    GENERAL  Build: Normal    Hygiene:  Appropriate in casual dress   SENSORIUM Orientation: Place, Person, Time, & Situation     Consciousness: Alert    ATTENTION   Focused    RELATEDNESS  Cooperative    EYE CONTACT   Good    PSYCHOMOTOR  Normal    SPEECH Volume: Normal     Rate: Normal rate and tone    Amplitude: Within normal limits   MOOD  Irritable    AFFECT Range: Full    THOUGHT Process:  Goal-Directed     Content: no evidence of psychosis    COGNITION Insight: Good    Judgement:  Intact    MEMORY  Intact    INTELLIGENCE  Average     Mobility/Gait: Independently     Controlled SubstancesMonitoring:   not done      ASSESSMENT: Maybe a little less irritable with starting Lamictal and now Lexapro which he increased dose today. Tolerating changes well. Will wean off trazodone and try prazosin for sleep and nightmares. No SI. Might consider use of Abilify or SSRI/SNRI going forward. Diagnosis Orders   1. PTSD (post-traumatic stress disorder)     2. DANTE (generalized anxiety disorder)     3. Mood disorder (HCC)     R/o: BD, BPD, ADHD  Medical Hx: asthma, heart murmur, Legg-Calve Perthes Disease     PLAN:     · Medications:   · Lexapro 10 mg QD - increased from 5 mg dose today  · Lamictal 50 mg QD  · Start prazosin 1 mg HS - discussed r/b/se/a, may try up to 2 mg dose  · Stop trazodone 100 mg HS - may need to wean to 50 mg before stopping  · Therapy: Dr. Joel Schultz  · Labs/Tests/Imaging: none   · Records Reviewed: [facility]  · Patient advised to call if patient has any difficulties with treatment  Return in about 4 weeks (around 3/19/2021) for med check, follow up. Electronically signed by Maya Blank MD on 2/19/2021 at 8:51 AM    Tremaine Nava is a 31 y.o. male being evaluated by a Virtual Visit (video visit) to address concerns as mentioned above. A caregiver was present when appropriate.  Due to this being a TeleHealth encounter [phone] public health emergency), evaluation of the following organ systems was limited: Vitals/Constitutional/EENT/Resp/CV/GI//MS/Neuro/Skin/Heme-Lymph-Imm. Pursuant to the emergency declaration under the [address] [place] [address], [address] [place] [address] authority and the [employer] Act, this Virtual Visit was conducted with patient's (and/or legal guardian's) consent, to reduce the patient's risk of exposure to COVID-19 and provide necessary medical care. The patient (and/or legal guardian) has also been advised to contact this office for worsening conditions or problems, and seek emergency medical treatment and/or call 911 if deemed necessary. Patient identification was verified at the start of the visit: Yes     Total time spent for this encounter: not billed by time     Services were provided through a video synchronous discussion virtually to substitute for in-person clinic visit. Patient and provider were located at their individual homes.     Electronically signed by Sydnie Vargas MD on 2/19/2021 at 8:51 AM

## 2021-02-22 ENCOUNTER — TELEPHONE (OUTPATIENT)
Dept: FAMILY MEDICINE CLINIC | Age: 31
End: 2021-02-22

## 2021-02-22 DIAGNOSIS — Z20.822 ENCOUNTER FOR PREOPERATIVE SCREENING LABORATORY TESTING FOR COVID-19 VIRUS: Primary | ICD-10-CM

## 2021-02-22 DIAGNOSIS — Z01.812 ENCOUNTER FOR PREOPERATIVE SCREENING LABORATORY TESTING FOR COVID-19 VIRUS: Primary | ICD-10-CM

## 2021-02-22 NOTE — TELEPHONE ENCOUNTER
Toni Frederick DO  [facility] called requesting a Covid order to be ordered and completed 1 week prior to PFT that is scheduled on 03/02/2021. Please Advise.

## 2021-02-24 ASSESSMENT — ENCOUNTER SYMPTOMS
BOWEL INCONTINENCE: 0
ABDOMINAL PAIN: 0
BACK PAIN: 1

## 2021-02-24 NOTE — PROGRESS NOTES
HPI:     Back Pain  This is a chronic problem. The current episode started more than 1 year ago. The problem occurs constantly. The problem has been gradually worsening since onset. The pain is present in the lumbar spine. The quality of the pain is described as stabbing. The pain radiates to the left thigh. The pain is at a severity of 6/10. The pain is moderate. The pain is worse during the day. The symptoms are aggravated by standing and bending. Associated symptoms include leg pain. Pertinent negatives include no abdominal pain, bladder incontinence, bowel incontinence, chest pain, dysuria, fever, headaches, numbness, paresis, paresthesias, pelvic pain, perianal numbness, tingling, weakness or weight loss. He has tried heat, ice and muscle relaxant (PT) for the symptoms. Has had an SI joint injection as well as 2 epidurals with no improvement. History of left hip replacement. MRI lumbar spine with degenerative changes. Patient denies any new neurological symptoms. Nobowel or bladder incontinence, no weakness, and no falling. Review of OARRS does not show any aberrant prescription behavior. Medication is helping the patient stay active. Patient denies any side effects and reports adequate analgesia. No sign of misuse/abuse.     Past Medical History:   Diagnosis Date    Asthma     [facility]     Borderline personality disorder ([facility]     Depression     Heart murmur     Gvsn-Dcfyj-Nolqsld disease, left     Multiple allergies     PTSD (post-traumatic stress disorder)     PTSD (post-traumatic stress disorder)     Restless legs syndrome        Past Surgical History:   Procedure Laterality Date    [facility] INJECTION PROCEDURE FOR SACROILIAC JOINT Left 10/23/2020    SACROILIAC JOINT INJECTION performed by Wyatt Hernandez MD at [facility] Left 2011    Left hip replacement    PAIN MANAGEMENT PROCEDURE  10/23/2020    SACROILIAC JOINT INJECTION    PAIN Social Needs    Financial resource strain: Not hard at all   Kozio insecurity     Worry: Never true     Inability: Never true   SDI needs     Medical: No     Non-medical: No   Tobacco Use    Smoking status: Current Every Day Smoker     Packs/day: 0.25     Years: 10.00     Pack years: 2.50     Types: Cigarettes    Smokeless tobacco: Former User     Types: Chew   Substance and Sexual Activity    Alcohol use: No    Drug use: Not Currently     Types: Marijuana     Comment: medical gummy used 10/04/2020    Sexual activity: Yes     Partners: Female   Lifestyle    Physical activity     Days per week: Not on file     Minutes per session: Not on file    Stress: Not on file   Relationships    Social connections     Talks on phone: Not on file     Gets together: Not on file     Attends Uatsdin service: Not on file     Active member of club or organization: Not on file     Attends meetings of clubs or organizations: Not on file     Relationship status: Not on file    Intimate partner violence     Fear of current or ex partner: Not on file     Emotionally abused: Not on file     Physically abused: Not on file     Forced sexual activity: Not on file   Other Topics Concern    Not on file   Social History Narrative    Not on file       Review of Systems:  Review of Systems   Constitution: Negative for fever and weight loss. Cardiovascular: Negative for chest pain. Musculoskeletal: Positive for back pain. Gastrointestinal: Negative for abdominal pain and bowel incontinence. Genitourinary: Negative for bladder incontinence, dysuria and pelvic pain. Neurological: Negative for headaches, numbness, paresthesias, tingling and weakness. Physical Exam:      Physical Exam  Constitutional:       Appearance: Normal appearance. Pulmonary:      Effort: Pulmonary effort is normal.   Neurological:      Mental Status: He is alert.    Psychiatric:         Attention and Perception: Attention and perception normal.         Mood and Affect: Mood and affect normal.         Record/Diagnostics Review:    As above, I did review the imaging    Orders:  Orders Placed This Encounter   Procedures    Ambulatory referral to Neurosurgery    UT INJ DX/THER AGNT PARAVERT FACET JOINT, LUMBAR/SAC, 1ST LEVEL    UT INJ DX/THER AGNT PARAVERT FACET JOINT, LUMBAR/SAC, 2ND LEVEL    UT INJ DX/THER AGNT PARAVERT FACET JOINT, LUMBAR/SAC, ADD LEVEL       Assessment:  1. Lumbosacral spondylosis without myelopathy    2. Chronic bilateral low back pain, unspecified whether sciatica present        Treatment Plan:  DISCUSSION: Treatment options discussed with patient and all questions answered to patient's satisfaction. OARRS Review: Reviewed and acceptable for medications prescribed. TREATMENT OPTIONS:     Discussed different treatment options including continued conservative care such as physical therapy, chiropractic care, acupuncture. Discussed different interventional options such as epidural steroids or medial branch blocks. Also discussed neuromodulation in the form of spinal cord stimulation. Also discussed surgical evaluation. Has failed conservative options, significant axial low back pain with degenerative facet changes on MRI, good candidate for diagnostic lumbar facets on the left at L3-4, L4-5 and L5-S1 to see if pain is facet mediated, if this is beneficial would be a candidate for radiofrequency ablation. We will also make surgical referral.  If no improvement from interventional options nothing surgical may benefit from spinal cord stimulation. Caitlin Priest M.D. I have reviewed the chief complaint and history of present illness (including ROS and PFSH) and vital documentation by my staff and I agree with their documentation and have added where applicable.      Paulina Walker is a 31 y.o. male being evaluated by a Virtual Visit (video visit) encounter to address concerns as mentioned above.  A caregiver was present when appropriate. Due to this being a TeleHealth encounter (During VQHHG-46 public health emergency), evaluation of the following organ systems was limited: Vitals/Constitutional/EENT/Resp/CV/GI//MS/Neuro/Skin/Heme-Lymph-Imm. Pursuant to the emergency declaration under the [address] [place] [address], [address] [place] [address] and the [employer] Act, this Virtual Visit was conducted with patient's (and/or legal guardian's) consent, to reduce the patient's risk of exposure to COVID-19 and provide necessary medical care. The patient (and/or legal guardian) has also been advised to contact this office for worsening conditions or problems, and seek emergency medical treatment and/or call 911 if deemed necessary. Patient identification was verified at the start of the visit: Yes    Total time spent for this encounter: Not billed by time    Services were provided through a video synchronous discussion virtually to substitute for in-person clinic visit. Patient and provider were located at their individual homes. --Wyatt Hernandez MD on 2/25/2021 at 10:15 AM    An electronic signature was used to authenticate this note.

## 2021-02-25 ENCOUNTER — VIRTUAL VISIT (OUTPATIENT)
Dept: PAIN MANAGEMENT | Age: 31
End: 2021-02-25
Payer: MEDICAID

## 2021-02-25 DIAGNOSIS — M54.50 CHRONIC BILATERAL LOW BACK PAIN, UNSPECIFIED WHETHER SCIATICA PRESENT: ICD-10-CM

## 2021-02-25 DIAGNOSIS — M47.817 LUMBOSACRAL SPONDYLOSIS WITHOUT MYELOPATHY: Primary | ICD-10-CM

## 2021-02-25 DIAGNOSIS — G89.29 CHRONIC BILATERAL LOW BACK PAIN, UNSPECIFIED WHETHER SCIATICA PRESENT: ICD-10-CM

## 2021-02-25 DIAGNOSIS — J30.2 SEASONAL ALLERGIES: ICD-10-CM

## 2021-02-25 PROCEDURE — G8427 DOCREV CUR MEDS BY ELIG CLIN: HCPCS | Performed by: PAIN MEDICINE

## 2021-02-25 PROCEDURE — 99213 OFFICE O/P EST LOW 20 MIN: CPT | Performed by: PAIN MEDICINE

## 2021-02-25 RX ORDER — MONTELUKAST SODIUM 10 MG/1
10 TABLET ORAL DAILY
Qty: 30 TABLET | Refills: 3 | Status: SHIPPED | OUTPATIENT
Start: 2021-02-25 | End: 2021-07-20 | Stop reason: SDUPTHER

## 2021-02-26 ENCOUNTER — HOSPITAL ENCOUNTER (OUTPATIENT)
Age: 31
Setting detail: SPECIMEN
Discharge: HOME | End: 2021-02-26
Payer: MEDICAID

## 2021-02-26 ENCOUNTER — TELEPHONE (OUTPATIENT)
Dept: PAIN MANAGEMENT | Age: 31
End: 2021-02-26

## 2021-02-26 PROCEDURE — U0003 INFECTIOUS AGENT DETECTION BY NUCLEIC ACID (DNA OR RNA); SEVERE ACUTE RESPIRATORY SYNDROME CORONAVIRUS 2 (SARS-COV-2) (CORONAVIRUS DISEASE [COVID-19]), AMPLIFIED PROBE TECHNIQUE, MAKING USE OF HIGH THROUGHPUT TECHNOLOGIES AS DESCRIBED BY CMS-2020-01-R: HCPCS

## 2021-02-26 NOTE — PROGRESS NOTES
I sent the [facility] ambulatory lab order. Please let the patient know to go get this before his PFT on 03/02/21. Will likely need to get it tomorrow in order to get the result in time.

## 2021-02-26 NOTE — TELEPHONE ENCOUNTER
Pt states he recently had COVID testing done at a [place] facility in [facility] and would like to know if his covid screenings can be rescheduled from [facility] to [facility] location.  Writer informed pt will check with Surgery scheduler Mala Ackerman and call him back

## 2021-02-27 LAB — SARS-COV-2: NOT DETECTED

## 2021-03-01 NOTE — TELEPHONE ENCOUNTER
Attempted to speak with patient to obtain the phone number where he completed COVID screening at [place] [facility] [place]. Patient did not answer phone call and writer was not able to leave message.  Our office will have to place the order for COVID screening lab test and pt would need to be scheduled for 4 days prior to both MBB's scheduled

## 2021-03-02 ENCOUNTER — OFFICE VISIT (OUTPATIENT)
Dept: PULMONOLOGY | Age: 31
End: 2021-03-02
Payer: MEDICAID

## 2021-03-02 VITALS
DIASTOLIC BLOOD PRESSURE: 60 MMHG | SYSTOLIC BLOOD PRESSURE: 120 MMHG | BODY MASS INDEX: 29.09 KG/M2 | OXYGEN SATURATION: 98 % | HEART RATE: 69 BPM | HEIGHT: 70 IN | WEIGHT: 203.2 LBS | TEMPERATURE: 98 F

## 2021-03-02 DIAGNOSIS — G47.10 HYPERSOMNIA: Primary | ICD-10-CM

## 2021-03-02 DIAGNOSIS — R06.83 SNORING: ICD-10-CM

## 2021-03-02 DIAGNOSIS — G25.81 RLS (RESTLESS LEGS SYNDROME): ICD-10-CM

## 2021-03-02 DIAGNOSIS — F43.10 PTSD (POST-TRAUMATIC STRESS DISORDER): ICD-10-CM

## 2021-03-02 DIAGNOSIS — R06.81 WITNESSED EPISODE OF APNEA: ICD-10-CM

## 2021-03-02 DIAGNOSIS — G89.4 PAIN SYNDROME, CHRONIC: ICD-10-CM

## 2021-03-02 DIAGNOSIS — F51.5 NIGHTMARES: ICD-10-CM

## 2021-03-02 PROCEDURE — 4004F PT TOBACCO SCREEN RCVD TLK: CPT | Performed by: INTERNAL MEDICINE

## 2021-03-02 PROCEDURE — G8419 CALC BMI OUT NRM PARAM NOF/U: HCPCS | Performed by: INTERNAL MEDICINE

## 2021-03-02 PROCEDURE — G8484 FLU IMMUNIZE NO ADMIN: HCPCS | Performed by: INTERNAL MEDICINE

## 2021-03-02 PROCEDURE — G8427 DOCREV CUR MEDS BY ELIG CLIN: HCPCS | Performed by: INTERNAL MEDICINE

## 2021-03-02 PROCEDURE — 99203 OFFICE O/P NEW LOW 30 MIN: CPT | Performed by: INTERNAL MEDICINE

## 2021-03-02 RX ORDER — ZOLPIDEM TARTRATE 5 MG/1
5 TABLET ORAL ONCE
Qty: 1 TABLET | Refills: 0 | Status: SHIPPED | OUTPATIENT
Start: 2021-03-02 | End: 2021-03-02
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
## 2021-03-02 NOTE — PATIENT INSTRUCTIONS
Patient Education        Stopping Smoking: Care Instructions  Your Care Instructions     Cigarette smokers crave the nicotine in cigarettes. Giving it up is much harder than simply changing a habit. Your body has to stop craving the nicotine. It is hard to quit, but you can do it. There are many tools that people use to quit smoking. You may find that combining tools works best for you. There are several steps to quitting. First you get ready to quit. Then you get support to help you. After that, you learn new skills and behaviors to become a nonsmoker. For many people, a necessary step is getting and using medicine. Your doctor will help you set up the plan that best meets your needs. You may want to attend a smoking cessation program to help you quit smoking. When you choose a program, look for one that has proven success. Ask your doctor for ideas. You will greatly increase your chances of success if you take medicine as well as get counseling or join a cessation program.  Some of the changes you feel when you first quit tobacco are uncomfortable. Your body will miss the nicotine at first, and you may feel short-tempered and grumpy. You may have trouble sleeping or concentrating. Medicine can help you deal with these symptoms. You may struggle with changing your smoking habits and rituals. The last step is the tricky one: Be prepared for the smoking urge to continue for a time. This is a lot to deal with, but keep at it. You will feel better. Follow-up care is a key part of your treatment and safety. Be sure to make and go to all appointments, and call your doctor if you are having problems. It's also a good idea to know your test results and keep a list of the medicines you take. How can you care for yourself at home? · Ask your family, friends, and coworkers for support. You have a better chance of quitting if you have help and support.   · Join a support group, such as Nicotine Anonymous, for people who are trying to quit smoking. · Consider signing up for a smoking cessation program, such as the American Lung Association's Freedom from Smoking program.  · Get text messaging support. Go to the website at www.smokefree. gov to sign up for the [facility] program.  · Set a quit date. Pick your date carefully so that it is not right in the middle of a big deadline or stressful time. Once you quit, do not even take a puff. Get rid of all ashtrays and lighters after your last cigarette. Clean your house and your clothes so that they do not smell of smoke. · Learn how to be a nonsmoker. Think about ways you can avoid those things that make you reach for a cigarette. ? Avoid situations that put you at greatest risk for smoking. For some people, it is hard to have a drink with friends without smoking. For others, they might skip a coffee break with coworkers who smoke. ? Change your daily routine. Take a different route to work or eat a meal in a different place. · Cut down on stress. Calm yourself or release tension by doing an activity you enjoy, such as reading a book, taking a hot bath, or gardening. · Talk to your doctor or pharmacist about nicotine replacement therapy, which replaces the nicotine in your body. You still get nicotine but you do not use tobacco. Nicotine replacement products help you slowly reduce the amount of nicotine you need. These products come in several forms, many of them available over-the-counter:  ? Nicotine patches  ? Nicotine gum and lozenges  ? Nicotine inhaler  · Ask your doctor about bupropion (Wellbutrin) or varenicline (Chantix), which are prescription medicines. They do not contain nicotine. They help you by reducing withdrawal symptoms, such as stress and anxiety. · Some people find hypnosis, acupuncture, and massage helpful for ending the smoking habit. · Eat a healthy diet and get regular exercise.  Having healthy habits will help your body move past its craving for nicotine. · Be prepared to keep trying. Most people are not successful the first few times they try to quit. Do not get mad at yourself if you smoke again. Make a list of things you learned and think about when you want to try again, such as next week, next month, or next year. Where can you learn more? Go to https://chpepicewshayna.[employer]. org and sign in to your orat.io account. Enter P787 in the [employer] box to learn more about \"Stopping Smoking: Care Instructions. \"     If you do not have an account, please click on the \"Sign Up Now\" link. Current as of: March 12, 2020               Content Version: 12.6  © 2006-2020 [employer]. Care instructions adapted under license by [facility] ([facility]). If you have questions about a medical condition or this instruction, always ask your healthcare professional. [place] [address] any warranty or liability for your use of this information. Patient Education        Sleep Apnea: Care Instructions  Your Care Instructions     Sleep apnea means that you frequently stop breathing for 10 seconds or longer during sleep. It can be mild to severe, based on the number of times an hour that you stop breathing or have slowed breathing. Blocked or narrowed airways in your nose, mouth, or throat can cause sleep apnea. Your airway can become blocked when your throat muscles and tongue relax during sleep. You can treat sleep apnea at home by making lifestyle changes. You also can use a CPAP breathing machine that keeps tissues in the throat from blocking your airway. Or your doctor may suggest that you use a breathing device while you sleep. It helps keep your airway open. This could be a device that you put in your mouth. In some cases, surgery may be needed to remove enlarged tissues in the throat. Follow-up care is a key part of your treatment and safety.  Be sure to make and go to all appointments, and call your doctor if you are though you have made lifestyle changes.     · You are thinking of trying a device such as CPAP.     · You are having problems using a CPAP or similar machine. Where can you learn more? Go to https://Bettyvision.[employer]. org and sign in to your [employer] account. Enter U372 in the [employer] box to learn more about \"Sleep Apnea: Care Instructions. \"     If you do not have an account, please click on the \"Sign Up Now\" link. Current as of: February 24, 2020               Content Version: 12.6  © 8521-0124 [employer]. Care instructions adapted under license by [facility] ([facility]). If you have questions about a medical condition or this instruction, always ask your healthcare professional. [address] any warranty or liability for your use of this information.

## 2021-03-02 NOTE — TELEPHONE ENCOUNTER
Spoke with patient who confirmed that he had testing done at [facility] [place] and the phone number for facility is [phone]. Writer called that number and was given a different phone number to call [phone]. Spoke with Mariama Gray who stated that no appointment required but doctors order is needed and they have access to [facility] but order can be faxed to .  Andree confirmed that is is not rapid testing and results will be available in [address] [place] [address] Rd

## 2021-03-02 NOTE — TELEPHONE ENCOUNTER
Attempted to notify pt to report to [facility] on Monday 3/8/21 for COVID screening. Pt did not answer call and unable to leave message will try to call patient again shortly.  Order for covid screening test placed in [facility] and faxed to [phone]

## 2021-03-03 NOTE — TELEPHONE ENCOUNTER
Made another attempt to speak with pt to instruct him to report to desired location for covid testing on [facility] 3/8/21.  Pt did not answer and voicemail box is still full

## 2021-03-04 ENCOUNTER — TELEPHONE (OUTPATIENT)
Dept: PAIN MANAGEMENT | Age: 31
End: 2021-03-04

## 2021-03-04 ENCOUNTER — TELEPHONE (OUTPATIENT)
Dept: PSYCHIATRY | Age: 31
End: 2021-03-04

## 2021-03-04 RX ORDER — TRAZODONE HYDROCHLORIDE 100 MG/1
100 TABLET ORAL NIGHTLY
Qty: 30 TABLET | Refills: 1 | Status: SHIPPED | OUTPATIENT
Start: 2021-03-04 | End: 2021-04-15 | Stop reason: SDUPTHER

## 2021-03-04 RX ORDER — LAMOTRIGINE 100 MG/1
100 TABLET ORAL DAILY
Qty: 30 TABLET | Refills: 1 | Status: SHIPPED | OUTPATIENT
Start: 2021-03-04 | End: 2021-04-15 | Stop reason: SDUPTHER

## 2021-03-04 RX ORDER — PRAZOSIN HYDROCHLORIDE 2 MG/1
2 CAPSULE ORAL NIGHTLY
Qty: 30 CAPSULE | Refills: 1 | Status: SHIPPED | OUTPATIENT
Start: 2021-03-04 | End: 2021-04-15 | Stop reason: SDUPTHER

## 2021-03-04 NOTE — TELEPHONE ENCOUNTER
Patient called to see where he should go for a covid test and its [place] him and no appointment needed

## 2021-03-04 NOTE — TELEPHONE ENCOUNTER
I sent Rx for Lamictal increase. Will go from 50 mg once daily to 100 mg once daily. I also sent Rx for prazosin 2 mg capsule, and he will take one capsule at night. I'm ok with him taking both trazodone and prazosin for sleep.    Electronically signed by Kimberlyn Nair MD on 3/4/2021 at 8:26 AM

## 2021-03-04 NOTE — TELEPHONE ENCOUNTER
Gregory Crooks wrote into the office via [employer]:    Hello,        I was reaching out to discuss my prescriptions, I am almost out of lamictal so I was wondering if I was able to get a refill for it and also wasn't sure if we will be increasing my dosage I know when we last spoke you didn't want to change too many meds at once. Also I have been having to take 2 prazosin and 1 trazadone to be able to get sleep and wanted to make sure that that was okay. Thank you in advance. --Please advise if you are wanting to increase his Lamictal? He is not scheduled to return until 04/15/21.

## 2021-03-05 ENCOUNTER — HOSPITAL ENCOUNTER (OUTPATIENT)
Dept: PULMONOLOGY | Age: 31
Discharge: HOME | End: 2021-03-05
Payer: MEDICAID

## 2021-03-05 ENCOUNTER — TELEPHONE (OUTPATIENT)
Dept: PAIN MANAGEMENT | Age: 31
End: 2021-03-05

## 2021-03-05 DIAGNOSIS — J45.909 ASTHMA, UNSPECIFIED ASTHMA SEVERITY, UNSPECIFIED WHETHER COMPLICATED, UNSPECIFIED WHETHER PERSISTENT: ICD-10-CM

## 2021-03-05 PROCEDURE — 94060 EVALUATION OF WHEEZING: CPT

## 2021-03-05 PROCEDURE — 94726 PLETHYSMOGRAPHY LUNG VOLUMES: CPT

## 2021-03-05 PROCEDURE — 94729 DIFFUSING CAPACITY: CPT

## 2021-03-05 NOTE — TELEPHONE ENCOUNTER
Order for [place] screening faxed to Facility. Pt will report for COVID screening on 3/8/21 for testing. WARREN Guerra has placed COVID screening order twice in Epic, however, another clinician or office has cancelled both orders and now the order in not in EPIC. Writer called the Facility where pt will be completing the COVID screening and spoke to Melisa to confirm this will not cause delay in patient testing and to  make sure they received paper order for COVID screening.  Melisa stated it should not as long as they receive paper order that has electronically signed by CNP and will call our office back to confirm ordered has been received

## 2021-03-08 ENCOUNTER — ANESTHESIA EVENT (OUTPATIENT)
Dept: OPERATING ROOM | Age: 31
End: 2021-03-08
Payer: MEDICAID

## 2021-03-08 ENCOUNTER — HOSPITAL ENCOUNTER (OUTPATIENT)
Age: 31
Setting detail: SPECIMEN
Discharge: HOME | End: 2021-03-08
Payer: MEDICAID

## 2021-03-08 PROCEDURE — U0003 INFECTIOUS AGENT DETECTION BY NUCLEIC ACID (DNA OR RNA); SEVERE ACUTE RESPIRATORY SYNDROME CORONAVIRUS 2 (SARS-COV-2) (CORONAVIRUS DISEASE [COVID-19]), AMPLIFIED PROBE TECHNIQUE, MAKING USE OF HIGH THROUGHPUT TECHNOLOGIES AS DESCRIBED BY CMS-2020-01-R: HCPCS

## 2021-03-10 LAB — SARS-COV-2: NOT DETECTED

## 2021-03-12 ENCOUNTER — ANESTHESIA (OUTPATIENT)
Dept: OPERATING ROOM | Age: 31
End: 2021-03-12
Payer: MEDICAID

## 2021-03-12 ENCOUNTER — HOSPITAL ENCOUNTER (OUTPATIENT)
Age: 31
Setting detail: OUTPATIENT SURGERY
Discharge: HOME | End: 2021-03-12
Attending: PAIN MEDICINE | Admitting: PAIN MEDICINE
Payer: MEDICAID

## 2021-03-12 ENCOUNTER — OFFICE VISIT (OUTPATIENT)
Dept: NEUROSURGERY | Age: 31
End: 2021-03-12
Payer: MEDICAID

## 2021-03-12 ENCOUNTER — APPOINTMENT (OUTPATIENT)
Dept: GENERAL RADIOLOGY | Age: 31
End: 2021-03-12
Attending: PAIN MEDICINE
Payer: MEDICAID

## 2021-03-12 ENCOUNTER — OFFICE VISIT (OUTPATIENT)
Dept: FAMILY MEDICINE CLINIC | Age: 31
End: 2021-03-12
Payer: MEDICAID

## 2021-03-12 VITALS
SYSTOLIC BLOOD PRESSURE: 142 MMHG | OXYGEN SATURATION: 97 % | HEIGHT: 70 IN | RESPIRATION RATE: 18 BRPM | WEIGHT: 199.25 LBS | BODY MASS INDEX: 28.53 KG/M2 | TEMPERATURE: 98.2 F | DIASTOLIC BLOOD PRESSURE: 90 MMHG | HEART RATE: 69 BPM

## 2021-03-12 VITALS
OXYGEN SATURATION: 99 % | RESPIRATION RATE: 18 BRPM | DIASTOLIC BLOOD PRESSURE: 81 MMHG | SYSTOLIC BLOOD PRESSURE: 128 MMHG

## 2021-03-12 VITALS
OXYGEN SATURATION: 98 % | TEMPERATURE: 97 F | DIASTOLIC BLOOD PRESSURE: 76 MMHG | WEIGHT: 198.8 LBS | SYSTOLIC BLOOD PRESSURE: 130 MMHG | HEART RATE: 84 BPM | BODY MASS INDEX: 28.52 KG/M2 | RESPIRATION RATE: 16 BRPM

## 2021-03-12 VITALS
HEIGHT: 70 IN | BODY MASS INDEX: 28.49 KG/M2 | WEIGHT: 199 LBS | DIASTOLIC BLOOD PRESSURE: 76 MMHG | HEART RATE: 84 BPM | SYSTOLIC BLOOD PRESSURE: 130 MMHG

## 2021-03-12 DIAGNOSIS — M54.16 LUMBAR RADICULOPATHY: Primary | ICD-10-CM

## 2021-03-12 DIAGNOSIS — Z71.6 ENCOUNTER FOR SMOKING CESSATION COUNSELING: ICD-10-CM

## 2021-03-12 DIAGNOSIS — F41.9 ANXIETY: ICD-10-CM

## 2021-03-12 DIAGNOSIS — F33.40 RECURRENT MAJOR DEPRESSIVE DISORDER, IN REMISSION (HCC): ICD-10-CM

## 2021-03-12 DIAGNOSIS — G25.81 RESTLESS LEG: Primary | ICD-10-CM

## 2021-03-12 PROBLEM — F43.10 PTSD (POST-TRAUMATIC STRESS DISORDER): Status: ACTIVE | Noted: 2021-03-12

## 2021-03-12 PROBLEM — F41.1 GAD (GENERALIZED ANXIETY DISORDER): Status: ACTIVE | Noted: 2021-03-12

## 2021-03-12 PROBLEM — J30.1 ALLERGIC RHINITIS DUE TO POLLEN: Status: ACTIVE | Noted: 2021-03-12

## 2021-03-12 PROBLEM — F31.30 BIPOLAR AFFECTIVE DISORDER, CURRENT EPISODE DEPRESSED (HCC): Status: ACTIVE | Noted: 2021-03-12

## 2021-03-12 PROCEDURE — G8427 DOCREV CUR MEDS BY ELIG CLIN: HCPCS | Performed by: NEUROLOGICAL SURGERY

## 2021-03-12 PROCEDURE — 64495 INJ PARAVERT F JNT L/S 3 LEV: CPT | Performed by: PAIN MEDICINE

## 2021-03-12 PROCEDURE — 6360000002 HC RX W HCPCS: Performed by: ANESTHESIOLOGY

## 2021-03-12 PROCEDURE — 3209999900 FLUORO FOR SURGICAL PROCEDURES

## 2021-03-12 PROCEDURE — 99204 OFFICE O/P NEW MOD 45 MIN: CPT | Performed by: NEUROLOGICAL SURGERY

## 2021-03-12 PROCEDURE — 3600000002 HC SURGERY LEVEL 2 BASE: Performed by: PAIN MEDICINE

## 2021-03-12 PROCEDURE — G8419 CALC BMI OUT NRM PARAM NOF/U: HCPCS | Performed by: NEUROLOGICAL SURGERY

## 2021-03-12 PROCEDURE — 7100000011 HC PHASE II RECOVERY - ADDTL 15 MIN: Performed by: PAIN MEDICINE

## 2021-03-12 PROCEDURE — 2500000003 HC RX 250 WO HCPCS: Performed by: PAIN MEDICINE

## 2021-03-12 PROCEDURE — G8484 FLU IMMUNIZE NO ADMIN: HCPCS | Performed by: STUDENT IN AN ORGANIZED HEALTH CARE EDUCATION/TRAINING PROGRAM

## 2021-03-12 PROCEDURE — 64493 INJ PARAVERT F JNT L/S 1 LEV: CPT | Performed by: PAIN MEDICINE

## 2021-03-12 PROCEDURE — 64494 INJ PARAVERT F JNT L/S 2 LEV: CPT | Performed by: PAIN MEDICINE

## 2021-03-12 PROCEDURE — G8484 FLU IMMUNIZE NO ADMIN: HCPCS | Performed by: NEUROLOGICAL SURGERY

## 2021-03-12 PROCEDURE — 2709999900 HC NON-CHARGEABLE SUPPLY: Performed by: PAIN MEDICINE

## 2021-03-12 PROCEDURE — G8419 CALC BMI OUT NRM PARAM NOF/U: HCPCS | Performed by: STUDENT IN AN ORGANIZED HEALTH CARE EDUCATION/TRAINING PROGRAM

## 2021-03-12 PROCEDURE — 4004F PT TOBACCO SCREEN RCVD TLK: CPT | Performed by: STUDENT IN AN ORGANIZED HEALTH CARE EDUCATION/TRAINING PROGRAM

## 2021-03-12 PROCEDURE — 3700000000 HC ANESTHESIA ATTENDED CARE: Performed by: PAIN MEDICINE

## 2021-03-12 PROCEDURE — 7100000010 HC PHASE II RECOVERY - FIRST 15 MIN: Performed by: PAIN MEDICINE

## 2021-03-12 PROCEDURE — 99214 OFFICE O/P EST MOD 30 MIN: CPT | Performed by: STUDENT IN AN ORGANIZED HEALTH CARE EDUCATION/TRAINING PROGRAM

## 2021-03-12 PROCEDURE — 99213 OFFICE O/P EST LOW 20 MIN: CPT

## 2021-03-12 PROCEDURE — 4004F PT TOBACCO SCREEN RCVD TLK: CPT | Performed by: NEUROLOGICAL SURGERY

## 2021-03-12 PROCEDURE — G8427 DOCREV CUR MEDS BY ELIG CLIN: HCPCS | Performed by: STUDENT IN AN ORGANIZED HEALTH CARE EDUCATION/TRAINING PROGRAM

## 2021-03-12 RX ORDER — MORPHINE SULFATE 1 MG/ML
1 INJECTION, SOLUTION EPIDURAL; INTRATHECAL; INTRAVENOUS EVERY 5 MIN PRN
Status: DISCONTINUED | OUTPATIENT
Start: 2021-03-12 | End: 2021-03-12 | Stop reason: HOSPADM

## 2021-03-12 RX ORDER — ROPINIROLE 1 MG/1
1 TABLET, FILM COATED ORAL NIGHTLY
Qty: 90 TABLET | Refills: 3 | Status: SHIPPED | OUTPATIENT
Start: 2021-03-12 | End: 2021-04-21 | Stop reason: SDUPTHER

## 2021-03-12 RX ORDER — FENTANYL CITRATE 50 UG/ML
INJECTION, SOLUTION INTRAMUSCULAR; INTRAVENOUS PRN
Status: DISCONTINUED | OUTPATIENT
Start: 2021-03-12 | End: 2021-03-12 | Stop reason: SDUPTHER

## 2021-03-12 RX ORDER — HYDRALAZINE HYDROCHLORIDE 20 MG/ML
5 INJECTION INTRAMUSCULAR; INTRAVENOUS EVERY 10 MIN PRN
Status: DISCONTINUED | OUTPATIENT
Start: 2021-03-12 | End: 2021-03-12 | Stop reason: HOSPADM

## 2021-03-12 RX ORDER — SODIUM CHLORIDE 0.9 % (FLUSH) 0.9 %
10 SYRINGE (ML) INJECTION EVERY 12 HOURS SCHEDULED
Status: DISCONTINUED | OUTPATIENT
Start: 2021-03-12 | End: 2021-03-12 | Stop reason: HOSPADM

## 2021-03-12 RX ORDER — DIPHENHYDRAMINE HYDROCHLORIDE 50 MG/ML
12.5 INJECTION INTRAMUSCULAR; INTRAVENOUS
Status: DISCONTINUED | OUTPATIENT
Start: 2021-03-12 | End: 2021-03-12 | Stop reason: HOSPADM

## 2021-03-12 RX ORDER — VARENICLINE TARTRATE
KIT
Qty: 1 BOX | Refills: 0 | Status: SHIPPED | OUTPATIENT
Start: 2021-03-12 | End: 2021-04-21

## 2021-03-12 RX ORDER — MEPERIDINE HYDROCHLORIDE 50 MG/ML
12.5 INJECTION INTRAMUSCULAR; INTRAVENOUS; SUBCUTANEOUS EVERY 5 MIN PRN
Status: DISCONTINUED | OUTPATIENT
Start: 2021-03-12 | End: 2021-03-12 | Stop reason: HOSPADM

## 2021-03-12 RX ORDER — BUPIVACAINE HYDROCHLORIDE 2.5 MG/ML
INJECTION, SOLUTION INFILTRATION; PERINEURAL PRN
Status: DISCONTINUED | OUTPATIENT
Start: 2021-03-12 | End: 2021-03-12 | Stop reason: ALTCHOICE

## 2021-03-12 RX ORDER — FENTANYL CITRATE 50 UG/ML
25 INJECTION, SOLUTION INTRAMUSCULAR; INTRAVENOUS EVERY 5 MIN PRN
Status: DISCONTINUED | OUTPATIENT
Start: 2021-03-12 | End: 2021-03-12 | Stop reason: HOSPADM

## 2021-03-12 RX ORDER — HYDROCODONE BITARTRATE AND ACETAMINOPHEN 5; 325 MG/1; MG/1
2 TABLET ORAL PRN
Status: DISCONTINUED | OUTPATIENT
Start: 2021-03-12 | End: 2021-03-12 | Stop reason: HOSPADM

## 2021-03-12 RX ORDER — ESCITALOPRAM OXALATE 20 MG/1
20 TABLET ORAL DAILY
Qty: 90 TABLET | Refills: 1 | Status: SHIPPED | OUTPATIENT
Start: 2021-03-12 | End: 2021-04-21 | Stop reason: SDUPTHER

## 2021-03-12 RX ORDER — HYDROCODONE BITARTRATE AND ACETAMINOPHEN 5; 325 MG/1; MG/1
1 TABLET ORAL PRN
Status: DISCONTINUED | OUTPATIENT
Start: 2021-03-12 | End: 2021-03-12 | Stop reason: HOSPADM

## 2021-03-12 RX ORDER — MIDAZOLAM HYDROCHLORIDE 1 MG/ML
INJECTION INTRAMUSCULAR; INTRAVENOUS PRN
Status: DISCONTINUED | OUTPATIENT
Start: 2021-03-12 | End: 2021-03-12 | Stop reason: SDUPTHER

## 2021-03-12 RX ORDER — SODIUM CHLORIDE 0.9 % (FLUSH) 0.9 %
10 SYRINGE (ML) INJECTION PRN
Status: DISCONTINUED | OUTPATIENT
Start: 2021-03-12 | End: 2021-03-12 | Stop reason: HOSPADM

## 2021-03-12 RX ORDER — ONDANSETRON 2 MG/ML
4 INJECTION INTRAMUSCULAR; INTRAVENOUS
Status: DISCONTINUED | OUTPATIENT
Start: 2021-03-12 | End: 2021-03-12 | Stop reason: HOSPADM

## 2021-03-12 RX ORDER — PROMETHAZINE HYDROCHLORIDE 25 MG/ML
6.25 INJECTION, SOLUTION INTRAMUSCULAR; INTRAVENOUS
Status: DISCONTINUED | OUTPATIENT
Start: 2021-03-12 | End: 2021-03-12 | Stop reason: HOSPADM

## 2021-03-12 RX ADMIN — MIDAZOLAM HYDROCHLORIDE 2 MG: 1 INJECTION, SOLUTION INTRAMUSCULAR; INTRAVENOUS at 16:01

## 2021-03-12 RX ADMIN — FENTANYL CITRATE 100 MCG: 50 INJECTION, SOLUTION INTRAMUSCULAR; INTRAVENOUS at 16:01

## 2021-03-12 ASSESSMENT — PAIN - FUNCTIONAL ASSESSMENT
PAIN_FUNCTIONAL_ASSESSMENT: 0-10
PAIN_FUNCTIONAL_ASSESSMENT: PREVENTS OR INTERFERES SOME ACTIVE ACTIVITIES AND ADLS

## 2021-03-12 ASSESSMENT — PULMONARY FUNCTION TESTS
PIF_VALUE: 0

## 2021-03-12 ASSESSMENT — LIFESTYLE VARIABLES: SMOKING_STATUS: 1

## 2021-03-12 NOTE — PROGRESS NOTES
Vaishali Pickard is a 31 y.o. male who presents today for:  Chief Complaint   Patient presents with    Follow-up     5 week follow up RLS, GERD, Allergies,      HPI:     Bipolar, depression, anxiety d/o:  seeing psychiatry. Taking lamictal and trazadone. He notes significant improvement in sounds irritating him (chewing, baby crying)    Father recently passed due to MI. Father started having MI in his 35s. He is overall doing very well given the circumstances. His wife attests to this. He notes keeping busy helps. Seasonal allergies:    Taking singulair only and now taking claritin. Also been trying the saline rinse. He does note that this does seem to help some. Has been working a lot on remodeling his dad's house which may be causing some of his irritation. He notes that his wife cannot use any smell good sprays or alcohol sprays because of it being bothersome. He complains of runny nose. denies symptoms in the eyes. Went to an allergist a long time ago. GERD:   x 3-5 yrs. Taking protonix 20mg daily. Left hip pain with sciatica. Getting a nerve block today for back pain and sciatica, ice pack and ibuprofen, also takes hot baths. He notes that sometimes his hip gives out and he almost falls over. Insomnia:  Still waking up three to four times, able to fall asleep well. Dr Kelly Chandler has been making adjustments with these medications. He notes he was not able to look at the patient information I gave about sleepy hygiene    Gasping at night:  Sleep study is coming up soon    RLS  Requip has helped restless leg at night, a couple of nights a week still has symptoms. Smoker, desire to quit. Tried to quit smoking in the past.  Used nicotine patches but he got a skin rash and irritation. Used chantix but got upset stomach and nausea. Is not sure if you try to take it with food or not. Smokes. Uses rescue inhaler at least twice weekly.    BP Readings from Last 3 Encounters: 03/12/21 130/76   03/02/21 120/60   02/12/21 (!) 110/57     Wt Readings from Last 3 Encounters:   03/12/21 198 lb 12.8 oz (90.2 kg)   03/02/21 203 lb 3.2 oz (92.2 kg)   02/12/21 197 lb 6 oz (89.5 kg)     O: VS:  weight is 198 lb 12.8 oz (90.2 kg). His skin temperature is 97 °F (36.1 °C). His blood pressure is 130/76 and his pulse is 84. His respiration is 16 and oxygen saturation is 98%. Physical Exam  Constitutional:       Appearance: Normal appearance. HENT:      Head: Normocephalic and atraumatic. Right Ear: External ear normal.      Left Ear: External ear normal.      Nose: Nose normal.   Eyes:      Conjunctiva/sclera: Conjunctivae normal.   Neck:      Musculoskeletal: Normal range of motion. Cardiovascular:      Rate and Rhythm: Normal rate and regular rhythm. Pulses: Normal pulses. Heart sounds: Normal heart sounds. No murmur. Pulmonary:      Effort: Pulmonary effort is normal.      Breath sounds: No wheezing, rhonchi or rales. Abdominal:      General: Abdomen is flat. Palpations: Abdomen is soft. Musculoskeletal: Normal range of motion. Skin:     General: Skin is warm and dry. Neurological:      General: No focal deficit present. Mental Status: He is alert. Mental status is at baseline. Psychiatric:         Mood and Affect: Mood normal.         Behavior: Behavior normal.       Assement/Plan:    1. Restless leg  - rOPINIRole (REQUIP) 1 MG tablet; Take 1 tablet by mouth nightly  Dispense: 90 tablet; Refill: 3  He notes improvement since starting requip at last visit. He did note some withdraw symptoms when getting off of the gabapentin. Does still have about 2 nights a week where he gets symptoms so we will increase from 0.5 to 1 mg nightly    2. Anxiety  - escitalopram (LEXAPRO) 20 MG tablet; Take 1 tablet by mouth daily  Dispense: 90 tablet; Refill: 1  Patient sees Dr Felipe Serrano in April. He does note improvement after starting Lexapro. We will increase his dose.     3. Recurrent major depressive disorder, in remission (HCC)  - escitalopram (LEXAPRO) 20 MG tablet; Take 1 tablet by mouth daily  Dispense: 90 tablet; Refill: 1    4. Encounter for smoking cessation counseling  - varenicline (CHANTIX STARTING MONTH AASHISH) 0.5 MG X 11 & 1 MG X 42 tablet; Take by mouth. Dispense: 1 box; Refill: 0  Patient notes that previously he had issues with chantix but he is not sure if he tried this with food or not. He will try chantix with food. He has tried patches in the past but had local skin irritation. He denies going to smoking cessation clinic at this time. Patient notes he feels much more motivated to quit smoking now that his mood feels better controlled.      Health Maintenance Due   Topic Date Due    Hepatitis C screen  Never done    HIV screen  Never done    DTaP/Tdap/Td vaccine (1 - Tdap) Never done         Electronically signed by Charleen Sherwood DO on 3/12/2021 at 11:46 AM

## 2021-03-12 NOTE — PROGRESS NOTES
[address]  [address] [facility], [address]  [facility] [address]  Dept: [phone]  Loc: [phone]    Patient:  Robin Ahumada  YOB: 1990  Date: 3/12/21    The patient is a 31 y.o. male who presents today for consult of the following problems:     Chief Complaint   Patient presents with    Back Pain     lumbosacral spondylosis             HPI:     Robin Ahumada is a 31 y.o. male on whom neurosurgical consultation was requested by Emani Olivera DO for management of significant left-sided buttock pain radiating into the left thigh. The patient has had prior left hip replacement surgery a number of years ago but over the course of the last 1 to 1-1/2 years without any inciting event he has had significant left-sided leg pain approximately 7 out of 10 persistent on a daily basis worsen with position changes or upright ambulation. Has been using heat as well as ice intermittently with some general stretching on a daily basis. He has also been through multiple courses of physical therapy with no significant improvement. He appears to have had multiple injections including an SI joint injection L3-4 transforaminal block and ultimately a caudal epidural injection none of which provided him longer lasting relief.   He does state that the second of the injections which was a transforaminal injection on the left side laterally lasted him the longest.      History:     Past Medical History:   Diagnosis Date    Asthma     [facility] [address]     Borderline personality disorder ([facility] [address] [facility]     Depression     Heart murmur     Avjj-Dpjmy-Wkzizfd disease, left     Multiple allergies     PTSD (post-traumatic stress disorder)     PTSD (post-traumatic stress disorder)     Restless legs syndrome      Past Surgical History:   Procedure Laterality Date    [facility]. INJECTION PROCEDURE FOR SACROILIAC JOINT Left 10/23/2020 SACROILIAC JOINT INJECTION performed by Mirna Sanderson MD at [address] [facility] Left 2011    Left hip replacement    PAIN MANAGEMENT PROCEDURE  10/23/2020    SACROILIAC JOINT INJECTION    PAIN MANAGEMENT PROCEDURE Left 01/29/2021    : LUMBAR TRANSFORAMINAL L3-4 (Left )    PAIN MANAGEMENT PROCEDURE Left 1/29/2021    LUMBAR TRANSFORAMINAL L3-4 performed by Mirna Sanderson MD at [address] [facility]  02/12/2021    LUMBAR TRANSFORAMINAL L3-4 - Left    PAIN MANAGEMENT PROCEDURE Left 2/12/2021    EPIDURAL STEROID INJECTION caudal performed by Mirna Sanderson MD at [address] [facility]       Family History   Problem Relation Age of Onset    Depression Mother     Heart Disease Father      Current Outpatient Medications on File Prior to Visit   Medication Sig Dispense Refill    lamoTRIgine (LAMICTAL) 100 MG tablet Take 1 tablet by mouth daily 30 tablet 1    prazosin (MINIPRESS) 2 MG capsule Take 1 capsule by mouth nightly 30 capsule 1    traZODone (DESYREL) 100 MG tablet Take 1 tablet by mouth nightly 30 tablet 1    montelukast (SINGULAIR) 10 MG tablet Take 1 tablet by mouth daily 30 tablet 3    escitalopram (LEXAPRO) 10 MG tablet Take 1 tablet by mouth daily 30 tablet 1    rOPINIRole (REQUIP) 0.5 MG tablet Take 1 tablet by mouth nightly 90 tablet 3    pantoprazole (PROTONIX) 20 MG tablet Take 1 tablet by mouth daily 30 tablet 3    loratadine (CLARITIN) 10 MG tablet Take 1 tablet by mouth daily 30 tablet 3    fluticasone-salmeterol (ADVAIR DISKUS) 100-50 MCG/DOSE diskus inhaler Inhale 1 puff into the lungs every 12 hours 60 each 3    albuterol sulfate HFA (VENTOLIN HFA) 108 (90 Base) MCG/ACT inhaler Inhale 2 puffs into the lungs every 6 hours as needed for Wheezing 1 Inhaler 0     No current facility-administered medications on file prior to visit.       Social History     Tobacco Use    Smoking status: Current Every Day Smoker     Packs/day: 0.25     Years: 10.00     Pack years: 2.50     Types: Cigarettes    Smokeless tobacco: Former User     Types: Chew   Substance Use Topics    Alcohol use: No    Drug use: Yes     Types: Marijuana     Comment: medical gummy used 10/04/2020       No Known Allergies    Review of Systems  Constitutional: Negative for activity change and appetite change. HENT: Negative for ear pain and facial swelling. Eyes: Negative for discharge and itching. Respiratory: Negative for choking and chest tightness. Cardiovascular: Negative for chest pain and leg swelling. Gastrointestinal: Negative for nausea and abdominal pain. Endocrine: Negative for cold intolerance and heat intolerance. Genitourinary: Negative for frequency and flank pain. Musculoskeletal: Negative for myalgias and joint swelling. Skin: Negative for rash and wound. Allergic/Immunologic: Negative for environmental allergies and food allergies. Hematological: Negative for adenopathy. Does not bruise/bleed easily. Psychiatric/Behavioral: Negative for self-injury. The patient is not nervous/anxious. Physical Exam:      /76   Pulse 84   Ht 5' 10\" (1.778 m)   Wt 199 lb (90.3 kg)   BMI 28.55 kg/m²   Estimated body mass index is 28.55 kg/m² as calculated from the following:    Height as of this encounter: 5' 10\" (1.778 m). Weight as of this encounter: 199 lb (90.3 kg). General:  Loren Shell is a 31y.o. year old male who appears his stated age. HEENT: Normocephalic atraumatic. Neck supple. Chest: regular rate; pulses equal  Abdomen: Soft nontender nondistended. Normoactive bowel sounds.   Ext: DP and PT pulses 2+, good cap refill  Neuro    Mentation  Appropriate affect  Registration intact  Orientation intact  3 item recall intact  Judgement intact to situation    Cranial Nerves:   Pupils equal and reactive to light  Extraocular motion intact  Face and shrug symmetric  Tongue midline  No dysarthria  v1-3 sensation symmetric, masseter tone symmetric  Hearing symmetric and intact to finger rub    Sensation:   Intact    Motor  L deltoid 5/5; R deltoid 5/5  L biceps 5/5; R biceps 5/5  L triceps 5/5; R triceps 5/5  L wrist extension 5/5; R wrist extension 5/5  L intrinsics 5/5; R intrinsics 5/5     L iliopsoas 5/5 , R iliopsoas 5/5  L quadriceps 5/5; R quadriceps 5/5  L Dorsiflexion 5/5; R dorsiflexion 5/5  L Plantarflexion 5/5; R plantarflexion 5/5  L EHL 5/5; R EHL 5/5    Reflexes  L Brachioradialis 2+/4; R brachioradialis 2+/4  L Biceps 2+/4; R Biceps 2+/4  L Triceps 2+/4; R Triceps 2+/4  L Patellar 2+/4: R Patellar 2+/4  L Achilles 2+/4; R Achilles 2+/4    hoffmans L: neg  hoffmans R: neg  Clonus L: neg  Clonus R: neg  Babinski L: up  Babinski R; up    Tenderness deep left heel. Negative Anny. Positive tenderness of the left greater trochanter. Studies Review:     MRI lumbar spine does show discogenic disease at L3-4 with significant foraminal disease bilateral left greater than the right. Assessment and Plan:      1. Lumbar radiculopathy          Plan: We will obtain flexion-extension x-rays to rule out any gross instability at this level. Patient scheduled to get repeat injection today in the office in [place]. Flexion-extension x-rays  EMG bl LE    I did call and speak directly with Dr. Parker Simon of pain management in the office today. I explained to him that clinically as well as radiographically his symptoms appear to be most consistent with an L3 radiculopathy considering proximal left leg pain that does not radiate past the knee on the left side. Recommend repeating the transforaminal injection but it appears that he had had this previously and today the plan was a trial medial branch block which I believe is reasonable. He did state that in the event that he fails the medial branch block he would reattempt a left transforaminal injection as long as the patient was amenable. From my standpoint clinically and radiographically the patient's pattern does not distinctly clear but it appears most consistent with an L3 radiculopathy. I explained him that he has failed conservative measures including physical therapy and multiple injections thus far. In the event that a confirmatory transforaminal block or an EMG indicates a left lumbar radiculopathy in the L3-4 dermatomal pattern I believe he would benefit from left foraminotomies L3. We will see him back after the EMG lumbar flexion-extension x-rays and repeat transforaminal injection are completed    F/u after emg/xrays and both injections with hefzy    Followup: No follow-ups on file. Prescriptions Ordered:  No orders of the defined types were placed in this encounter. Orders Placed:  Orders Placed This Encounter   Procedures    XR LUMBAR SPINE FLEXION AND EXTENSION ONLY     Standing lateral Flexion, Neutral, extension  Include both femoral heads on neutral imaging     Standing Status:   Future     Standing Expiration Date:   3/12/2022     Order Specific Question:   Reason for exam:     Answer:   evaluate for instability    EMG     Standing Status:   Future     Standing Expiration Date:   3/12/2022     Scheduling Instructions:      Caroline Looking for proximal lumbar radiculopathy     Order Specific Question:   Which body part? Answer:   bilateral lower extremities        Electronically signed by Areli Arce DO on 3/12/2021 at 11:31 AM    Please note that this chart was generated using voice recognition Dragon dictation software. Although every effort was made to ensure the accuracy of this automated transcription, some errors in transcription may have occurred.

## 2021-03-12 NOTE — H&P
Pain Pre-Op H&P Note    Fly Myers    HPI: Robin Ahumada  presents with back pain.     Past Medical History:   Diagnosis Date    Asthma     B[facility] ([facility] [address]     Borderline personality disorder ([facility] [address]     Depression     Heart murmur     Stos-Cpxei-Znccaen disease, left     Multiple allergies     PTSD (post-traumatic stress disorder)     PTSD (post-traumatic stress disorder)     Restless legs syndrome        Past Surgical History:   Procedure Laterality Date    [facility] INJECTION PROCEDURE FOR SACROILIAC JOINT Left 10/23/2020    SACROILIAC JOINT INJECTION performed by Omaira Curry MD at [address] [facility] Left 2011    Left hip replacement    PAIN MANAGEMENT PROCEDURE  10/23/2020    SACROILIAC JOINT INJECTION    PAIN MANAGEMENT PROCEDURE Left 01/29/2021    : LUMBAR TRANSFORAMINAL L3-4 (Left )    PAIN MANAGEMENT PROCEDURE Left 1/29/2021    LUMBAR TRANSFORAMINAL L3-4 performed by Omaira Curry MD at [address] [facility]  02/12/2021    LUMBAR TRANSFORAMINAL L3-4 - Left    PAIN MANAGEMENT PROCEDURE Left 2/12/2021    EPIDURAL STEROID INJECTION caudal performed by Omaira Curry MD at [address] [facility]         Family History   Problem Relation Age of Onset    Depression Mother     Heart Disease Father        No Known Allergies      Current Facility-Administered Medications:     sodium chloride flush 0.9 % injection 10 mL, 10 mL, Intravenous, 2 times per day, Gaynel Nyhan, MD    sodium chloride flush 0.9 % injection 10 mL, 10 mL, Intravenous, PRN, Gaynel Nyhan, MD    meperidine (DEMEROL) injection 12.5 mg, 12.5 mg, Intravenous, Q5 Min PRN, Gaynel Nyhan, MD    morphine (PF) injection 1 mg, 1 mg, Intravenous, Q5 Min PRN, Gaynel Nyhan, MD    HYDROmorphone (DILAUDID) injection 0.5 mg, 0.5 mg, Intravenous, Q5 Min PRN, Gaynel Nyhan, MD    fentaNYL (SUBLIMAZE) injection 25 mcg, 25 mcg, Intravenous, Q5 Min PRN, Gaynel Nyhan, MD  [facility] Officer HYDROcodone-acetaminophen (NORCO) 5-325 MG per tablet 1 tablet, 1 tablet, Oral, PRN **OR** HYDROcodone-acetaminophen (NORCO) 5-325 MG per tablet 2 tablet, 2 tablet, Oral, PRN, Davon Mota MD    ondansetron [facility]) injection 4 mg, 4 mg, Intravenous, Once PRN, Davon Mota MD    promethazine [facility]) injection 6.25 mg, 6.25 mg, Intramuscular, Once PRN, Davon Mota MD    diphenhydrAMINE (BENADRYL) injection 12.5 mg, 12.5 mg, Intravenous, Once PRN, Davon Mota MD    hydrALAZINE (APRESOLINE) injection 5 mg, 5 mg, Intravenous, Q10 Min PRN, Davon Mota MD    Social History     Tobacco Use    Smoking status: Current Every Day Smoker     Packs/day: 0.25     Years: 10.00     Pack years: 2.50     Types: Cigarettes    Smokeless tobacco: Former User     Types: Chew   Substance Use Topics    Alcohol use: No       Review of Systems:   Focused review of systems was performed, and negative as pertinent to diagnosis, except as stated in HPI. Physical Exam  Constitutional:       Appearance: Normal appearance. Pulmonary:      Effort: Pulmonary effort is normal.   Neurological:      Mental Status: He is alert. Psychiatric:         Attention and Perception: Attention and perception normal.         Mood and Affect: Mood and affect normal.         Patient's current physical status, medications, medical history, and HPI have been reviewed and updated as appropriate on this date: 03/12/21    Risk/Benefit(s): The risks, benefits, alternatives, and potential complications have been discussed with the patient/family and informed consent has been obtained for the procedure/sedation.     Diagnosis:   M47.817 LUMBOSACRAL SPONDYLOSIS WITHOUT MYELOPATHY      Plan: lumbar mbb          [address] [facility]

## 2021-03-12 NOTE — PROGRESS NOTES
dictation. Please follow up with ordering provider. pft - no change     Diagnosis Orders   1. Restless leg  rOPINIRole (REQUIP) 1 MG tablet   2. Anxiety  escitalopram (LEXAPRO) 20 MG tablet   3. Recurrent major depressive disorder, in remission ([facility] [address]  escitalopram (LEXAPRO) 20 MG tablet       Plan  Can work with sodor with lexapro - ok to take 20 and recheck with her in 3 weeks    requip for restless legs 1 mg nightly    For allergies - cont therapy as you are now    Can try the chantix again - take with food? Can try smoking cessation clinic    Can see you in 6 weeks - recheck requip and the chantix     No follow-ups on file. Orders Placed:  No orders of the defined types were placed in this encounter. Medications Prescribed:  No orders of the defined types were placed in this encounter. Future Appointments   Date Time Provider Juan Manuel Fitzpatrick   3/12/2021 10:30 AM [facility]   3/22/2021  1:10 PM SCHEDULE, STC[facility] COVID SCREENING [facility] COV [place] [facility]   4/13/2021  8:00 PM SCHEDULE, STR SLEEP TECH [address] HOD   4/15/2021  8:30 AM Chester Bang MD [facility]   4/27/2021  8:45 AM Alessia Tuicool, [address] Maintenance Due   Topic Date Due    Hepatitis C screen  Never done    HIV screen  Never done    DTaP/Tdap/Td vaccine (1 - Tdap) Never done         Attending Physician Statement  I have discussed the case, including pertinent history and exam findings with the resident. I also have seen the patient and performed key portions of the examination. I agree with the documented assessment and plan as documented by the resident.   GE modifier added to this encounter      Ophelia Slaughter DO 3/12/2021 9:28 AM

## 2021-03-12 NOTE — ANESTHESIA POSTPROCEDURE EVALUATION
POST- ANESTHESIA EVALUATION       Pt Name: Mayco Jose  MRN: [id number]  [place]: 1990  Date of evaluation: 3/12/2021  Time:  4:12 PM      /86   Pulse 76   Temp 98.6 °F (37 °C) (Skin)   Resp 18   Ht 5' 10\" (1.778 m)   Wt 199 lb 4 oz (90.4 kg)   SpO2 98%   BMI 28.59 kg/m²      Consciousness Level  Awake  Cardiopulmonary Status  Stable  Pain Adequately Treated YES  Nausea / Vomiting  NO  Adequate Hydration  YES  Anesthesia Related Complications NONE      Electronically signed by Sofia Dominguez MD on 3/12/2021 at [address] [place] [address]       Department of Anesthesiology  Postprocedure Note    Patient: Mayco Jose  MRN: [id number]  [place]: 1990  Date of evaluation: 3/12/2021  Time:  4:11 PM     Procedure Summary     Date: 03/12/21 Room / Location: [address] [place] [address] / [address] [place] [address]    Anesthesia Start: 5332 Anesthesia Stop: 4816    Procedure: NERVE BLOCK #1 L3-4, L4-5 (N/A ) Diagnosis: (M47.817 LUMBOSACRAL SPONDYLOSIS WITHOUT MYELOPATHY)    Surgeons: Michelle Posada MD Responsible Provider: Sofia Dominguez MD    Anesthesia Type: MAC ASA Status: 2          Anesthesia Type: MAC    Apoorva Phase I: Apoorva Score: 10    Apoorva Phase II:      Last vitals: Reviewed and per EMR flowsheets.        Anesthesia Post Evaluation

## 2021-03-12 NOTE — OP NOTE
Lumbar Facet Nerve Block Injection:  Surgeon: Ashley Myers     PRE-OP DIAGNOSIS: M47.817 (lumbosacral spondylosis), M54.5 (low back pain)    POST-OP DIAGNOSIS: Same. PROCEDURE PERFORMED: Lumbar Facet Nerve Block Multiple Levels  Left L3 - 4, L4 - 5 and L5 - S1. Physician confirmed and marked the surgical site. EBL: minimal      CONSENT: Patient has undergone the educational process with this procedure, is aware and fully understands the risks involved: potential damage to any and all body organs including possible bleeding, infection and nerve injury, allergic reaction and headache. Patient also understands that the procedure will be undertaken in a safe, controlled, and monitored setting. Patient recognizes that the benefits include relief from pain and reduction in the oral use of medications. Patient agreed to proceed. The patient was counseled at length about the risks of conchis Covid-19 during their perioperative period and any recovery window from their procedure. The patient was made aware that conchis Covid-19  may worsen their prognosis for recovering from their procedure  and lend to a higher morbidity and/or mortality risk. All material risks, benefits, and reasonable alternatives including postponing the procedure were discussed. The patient does wish to proceed with the procedure at this time. PREP: Timeout was performed prior to starting the procedure. The patient's back was prepped with chloroprep and draped appropriately. 5ml of 0.5% lidocaine was used to anesthetize the skin and subcutaneous tissue. PROCEDURE NOTE: A 22 gauge 3.5 inch spinal needle was advanced under  fluoroscopic guidance to the appropriate anatomic location for the medial branches corresponding to the facets at the base of the appropriate superior articular process and/or sacral ala . Aspiration was negative for blood, CSF and producing pain.  1 ml of 0.25% marcaine was then injected at each site to block medial branch nerve innervating the  Left L3 - 4, L4 - 5 and L5 - S1 facet joints. Patient tolerated the procedure well, no complications occurred. At the end of the injection the physician withdrew the needle and the nurse applied a sterile bandage to the site. Patient transferred to the recovery room in satisfactory condition. Appropriate written discharge instructions were given to the patient. If good results are obtained, Patient would be a candidate for Radiofrequency Ablation.       [address] [place] [address] Street

## 2021-03-12 NOTE — PROGRESS NOTES
Health Maintenance Due   Topic Date Due    Hepatitis C screen  Never done    HIV screen  Never done    DTaP/Tdap/Td vaccine (1 - Tdap) Never done

## 2021-03-12 NOTE — ANESTHESIA PRE PROCEDURE
Department of Anesthesiology  Preprocedure Note       Name:  Norberto Vasquez   Age:  31 y.o.  :  1990                                          MRN:  [id number]         Date:  3/12/2021      Surgeon: Tyler Forman):  Christian Brewster MD    Procedure: Procedure(s):  NERVE BLOCK #1 L3-4, L4-5    Medications prior to admission:   Prior to Admission medications    Medication Sig Start Date End Date Taking? Authorizing Provider   rOPINIRole (REQUIP) 1 MG tablet Take 1 tablet by mouth nightly 3/12/21   [facility]   escitalopram (LEXAPRO) 20 MG tablet Take 1 tablet by mouth daily 3/12/21   [facility], DO   varenicline (CHANTIX STARTING MONTH ) 0.5 MG X 11 & 1 MG X 42 tablet Take by mouth.  3/12/21   [place] Mingle, DO   lamoTRIgine (LAMICTAL) 100 MG tablet Take 1 tablet by mouth daily 3/4/21   Cheo Lafleur MD   prazosin (MINIPRESS) 2 MG capsule Take 1 capsule by mouth nightly 3/4/21   Cheo Lafleur MD   traZODone (DESYREL) 100 MG tablet Take 1 tablet by mouth nightly 3/4/21   Cheo Lafleur MD   montelukast (SINGULAIR) 10 MG tablet Take 1 tablet by mouth daily 21   [facility], DO   pantoprazole (PROTONIX) 20 MG tablet Take 1 tablet by mouth daily 21  [facility] Mingle, DO   loratadine (CLARITIN) 10 MG tablet Take 1 tablet by mouth daily 21   [facility], DO   fluticasone-salmeterol (ADVAIR DISKUS) 100-50 MCG/DOSE diskus inhaler Inhale 1 puff into the lungs every 12 hours 21   [facility] M[facility], DO   albuterol sulfate HFA (VENTOLIN HFA) 108 (90 Base) MCG/ACT inhaler Inhale 2 puffs into the lungs every 6 hours as needed for Wheezing 11/10/20   Michele Burgos, APRN - CNP       Current medications:    Current Facility-Administered Medications   Medication Dose Route Frequency Provider Last Rate Last Admin    sodium chloride flush 0.9 % injection 10 mL  10 mL Intravenous 2 times per day Hettie Sykes, MD        sodium chloride flush 0.9 % injection 10 mL  10 mL Intravenous AARTI Jeter MD           Allergies:  No Known Allergies    Problem List:    Patient Active Problem List   Diagnosis Code    Left hip pain M25.552    Depression F32.9    Asthma J45.909    Restless leg G25.81    Gastroesophageal reflux disease K21.9    Vitamin D deficiency E55.9    Other insomnia G47.09    Anxiety F41.9    Recurrent major depressive disorder, in remission ([address] F33.40    Bipolar affective disorder, current episode depressed ([facility] F31.30    PTSD (post-traumatic stress disorder) F43.10    DANTE (generalized anxiety disorder) F41.1    Allergic rhinitis due to pollen J30.1       Past Medical History:        Diagnosis Date    Asthma     Bipolar 1 disorder ([facility]     Borderline personality disorder ([facility]     Depression     Heart murmur     Hyqg-Wfwtl-Taoyvbb disease, left     Multiple allergies     PTSD (post-traumatic stress disorder)     PTSD (post-traumatic stress disorder)     Restless legs syndrome        Past Surgical History:        Procedure Laterality Date    [facility] INJECTION PROCEDURE FOR SACROILIAC JOINT Left 10/23/2020    SACROILIAC JOINT INJECTION performed by César Wright MD at [facility] 2011    Left hip replacement    PAIN MANAGEMENT PROCEDURE  10/23/2020    SACROILIAC JOINT INJECTION    PAIN MANAGEMENT PROCEDURE Left 01/29/2021    : LUMBAR TRANSFORAMINAL L3-4 (Left )    PAIN MANAGEMENT PROCEDURE Left 1/29/2021    LUMBAR TRANSFORAMINAL L3-4 performed by César Wright MD at [address] [place] [address]  02/12/2021    LUMBAR TRANSFORAMINAL L3-4 - Left    PAIN MANAGEMENT PROCEDURE Left 2/12/2021    EPIDURAL STEROID INJECTION caudal performed by César Wright MD at [facility]         Social History:    Social History     Tobacco Use    Smoking status: Current Every Day Smoker     Packs/day: 0.25     Years: 10.00     Pack years: 2.50     Types: Cigarettes    Smokeless tobacco: Former User     Types: Chew   Substance Use Topics    Alcohol use: No                                Ready to quit: Not Answered  Counseling given: Not Answered      Vital Signs (Current): There were no vitals filed for this visit. BP Readings from Last 3 Encounters:   03/12/21 130/76   03/12/21 130/76   03/02/21 120/60       NPO Status:                                                                                 BMI:   Wt Readings from Last 3 Encounters:   03/12/21 199 lb (90.3 kg)   03/12/21 198 lb 12.8 oz (90.2 kg)   03/02/21 203 lb 3.2 oz (92.2 kg)     There is no height or weight on file to calculate BMI.    CBC:   Lab Results   Component Value Date    WBC 11.5 08/04/2020    RBC 5.45 08/04/2020    HGB 16.9 08/04/2020    HCT 50.0 08/04/2020    MCV 91.7 08/04/2020    RDW 13.4 06/28/2014     08/04/2020       CMP:   Lab Results   Component Value Date     08/04/2020    K 4.4 08/04/2020     08/04/2020    CO2 24 08/04/2020    BUN 10 08/04/2020    CREATININE 0.7 08/04/2020    LABGLOM >90 08/04/2020    GLUCOSE 98 08/04/2020    PROT 6.6 08/04/2020    CALCIUM 9.2 08/04/2020    BILITOT 0.4 08/04/2020    ALKPHOS 45 08/04/2020    AST 18 08/04/2020    ALT 17 08/04/2020       POC Tests: No results for input(s): POCGLU, POCNA, POCK, POCCL, POCBUN, POCHEMO, POCHCT in the last 72 hours.     Coags: No results found for: PROTIME, INR, APTT    HCG (If Applicable): No results found for: PREGTESTUR, PREGSERUM, HCG, HCGQUANT     ABGs: No results found for: PHART, PO2ART, JEF7QHJ, SUR5BKF, BEART, F2FTNFCQ     Type & Screen (If Applicable):  No results found for: LABABO, LABRH    Drug/Infectious Status (If Applicable):  No results found for: HIV, HEPCAB    COVID-19 Screening (If Applicable):   Lab Results   Component Value Date    COVID19 Not Detected 03/08/2021    COVID19 Not Detected 10/19/2020         Anesthesia Evaluation  Patient summary reviewed and Nursing notes reviewed no history of anesthetic complications:   Airway: Mallampati: II  TM distance: >3 FB   Neck ROM: full  Mouth opening: > = 3 FB Dental: normal exam         Pulmonary:normal exam  breath sounds clear to auscultation  (+) asthma: current smoker                           Cardiovascular:Negative CV ROS            Rhythm: regular  Rate: normal                    Neuro/Psych:   (+) psychiatric history: stable with treatmentdepression/anxiety              ROS comment: LUMBOSACRAL SPONDYLOSIS WITHOUT MYELOPATHY GI/Hepatic/Renal:   (+) GERD:,           Endo/Other: Negative Endo/Other ROS                    Abdominal:       Abdomen: soft. Vascular:                                        Anesthesia Plan      MAC     ASA 2             Anesthetic plan and risks discussed with patient. Plan discussed with CRNA.                   Katlyn Booker MD   3/12/2021

## 2021-03-15 ENCOUNTER — TELEPHONE (OUTPATIENT)
Dept: PAIN MANAGEMENT | Age: 31
End: 2021-03-15

## 2021-03-16 ENCOUNTER — HOSPITAL ENCOUNTER (OUTPATIENT)
Dept: NEUROLOGY | Age: 31
Discharge: HOME | End: 2021-03-16
Payer: MEDICAID

## 2021-03-16 DIAGNOSIS — M54.16 LUMBAR RADICULOPATHY: ICD-10-CM

## 2021-03-16 PROCEDURE — 95912 NRV CNDJ TEST 11-12 STUDIES: CPT

## 2021-03-16 PROCEDURE — 95886 MUSC TEST DONE W/N TEST COMP: CPT

## 2021-03-16 NOTE — PROCEDURES
[address]                 [address] [place] [id number]                        EMG/NERVE CONDUCTION STUDIES REPORT      PATIENT NAME: Shital Conway                :        1990  MED REC NO:   [id number]                             ROOM:  ACCOUNT NO:   [de-identified]                           ADMIT DATE: 2021    PROVIDER:     Ash Mg MD, F.A. A. N    DATE OF EM2021      REFERRING PROVIDER:  Idris Jonas DO      TECHNICAL SUMMARY:  The nature, purpose, goals, expectations and process  involved in the procedures of nerve conduction studies and needle  electromyography were reviewed, discussed, explained and verbal consent  was obtained from the patient. The patient's questions were answered  with reference to the above processes and procedures. There were no significant technical difficulties encountered during this  study and nerve conduction studies were performed under temperature  monitoring. CLINICAL DATA:      The patient is 31years old with a history of left hip  replacement in , with complaints of chronic pain in the left lower  back area, sometimes shooting down to the left thigh intermittently for  the last 3 years. The patient sometimes has balance problems. The  patient denies any recent trauma. No history of diabetes. The purpose of the study is to evaluate for mononeuropathy,  radiculopathy, plexopathy. SUMMARY:        The sensory nerve action potentials of the right and left  sural and superficial peroneal nerves were not recordable. Sensory nerve action potentials of the right and left sural nerves are  within normal limits bilaterally. Compound muscle action potentials of the right and left peroneal and  tibial nerves shows normal amplitude, distal latencies and borderline to  low conduction velocities, mostly in the tibial nerves.     Proximal conductions as measured by the F responses were not recordable  in both peroneal and tibial nerves bilaterally. Left tibial H response  is prolonged, right tibial H response not recordable. Nerve  Conductions   Results  Were  Personally  Reviewed and  Analysed. Abnormal  Nerve  Conductions  Were  Personally  Repeated,  Verified, reconfirmed  And  Updated as needed  appropriately. Please    See   Wave  Forms   And    Details  Of     Nerve  Conduction   Studies   For  Additional  Information             Extensive   Needle  Electromyography  Was personally  Performed  In   Left        Lower  Extremity  In  The  Following  Muscles :      Gluteus  Medius,   Vastus  Lateralis,  Internal  Hamstring,    External  Hamstring,   Anterior Tibialis,  Medial  Gastrocnemius,              Extensive  Needle  Electromyography  Shows  No   Abnormality with :       A) Normal  insertional  Activity. There  Is  Absence  Of   P  Waves,  Fibrillations,  Fasciculations and        Other  Spontaneous   Activity. B) Voluntary  Motor unit  Potentials    Show :    Normal  Effort,  Recruitment, amplitude,  Duration. No Polyphasia  Noted. IMPRESSION:        1. There is electrodiagnostic evidence of mild sensory peripheral         polyneuropathy involving examined lower extremities. 2.  Nonrecordable F responses and abnormal tibial H responses are        suggestive of L4-L5 and L5-S1 involvement. 3.  Needle electromyography shows no active denervation changes or        chronic reinnervation changes involving the examined left lower        Extremity. Further clinical correlation and followup recommended. Michaelle Browne MD, [address] [place] [address]     Board Certified in Neurology  & in  [id number] [address] [place] [address] of Psychiatry and Neurology (ABPN).         D: 03/16/2021 13:48:17       T: 03/16/2021 14:20:14     [email or web address]  Job#: [id number]     Doc#: [id number]    CC:    Ellie Amador DO (referring)

## 2021-03-17 ENCOUNTER — PATIENT MESSAGE (OUTPATIENT)
Dept: FAMILY MEDICINE CLINIC | Age: 31
End: 2021-03-17

## 2021-03-17 DIAGNOSIS — K21.9 GASTROESOPHAGEAL REFLUX DISEASE WITHOUT ESOPHAGITIS: ICD-10-CM

## 2021-03-17 RX ORDER — PANTOPRAZOLE SODIUM 20 MG/1
20 TABLET, DELAYED RELEASE ORAL DAILY
Qty: 30 TABLET | Refills: 3 | Status: SHIPPED | OUTPATIENT
Start: 2021-03-17 | End: 2021-04-21 | Stop reason: SDUPTHER

## 2021-03-17 NOTE — TELEPHONE ENCOUNTER
Patient's last appointment was : 3/12/2021  Patient's next appointment is : 4/21/2021  Last refilled: 2/4/2021

## 2021-03-17 NOTE — TELEPHONE ENCOUNTER
From: Azalia Correa  To: Jamaica Betts DO  Sent: 3/17/2021 12:27 PM EDT  Subject: Prescription Question    Good afternoon,   You asked me if I was running low on any of my medications during my appointment on the 12th and I wasn't sure. I'm looking pretty good on all of my scripts besides the protonix I have a few doses left with no refills and was wanting to see if you could send over [facility] ref[facility]? Thank you in advance!    Jean Carlos Hobbs

## 2021-03-23 ASSESSMENT — ENCOUNTER SYMPTOMS
BOWEL INCONTINENCE: 0
ABDOMINAL PAIN: 0
BACK PAIN: 1

## 2021-03-23 NOTE — PROGRESS NOTES
F/u from Lumbar Facet Nerve Block Multiple Levels  Left L3 - 4, L4 - 5 and L5 - S1.  3/12/21 reported only 5% relief. Case discussed with Dr Eleonora Espinoza with left L3 TF SID ordered  EMG done and f/u with  NS 4/16        Back Pain  This is a chronic problem. The current episode started more than 1 year ago. The problem occurs constantly. The problem has been gradually worsening since onset. The pain is present in the lumbar spine. The quality of the pain is described as stabbing. The pain radiates to the right thigh and right knee. The pain is at a severity of 7/10. The pain is moderate. The pain is the same all the time. The symptoms are aggravated by bending, standing, sitting and lying down (walking). Associated symptoms include leg pain and weakness. Pertinent negatives include no abdominal pain, bladder incontinence, bowel incontinence, chest pain, dysuria, fever, headaches, numbness, paresis, paresthesias, pelvic pain, perianal numbness, tingling or weight loss. Treatments tried: PT, injections. The treatment provided no relief. Patient denies any new neurological symptoms. Nobowel or bladder incontinence, no weakness, and no falling. Review of OARRS does not show any aberrant prescription behavior. Medication is helping the patient stay active. Patient denies any side effects and reports adequate analgesia. No sign of misuse/abuse.     Past Medical History:   Diagnosis Date    Asthma     B[facility] 1 disorder ([facility]     Borderline personality disorder ([facility]     Depression     Heart murmur     Sgqx-Oopno-Ylxamkv disease, left     Multiple allergies     PTSD (post-traumatic stress disorder)     PTSD (post-traumatic stress disorder)     Restless legs syndrome        Past Surgical History:   Procedure Laterality Date    [facility] INJECTION PROCEDURE FOR SACROILIAC JOINT Left 10/23/2020    SACROILIAC JOINT INJECTION performed by Wyatt Hernandez MD at [facility] Left 2011 Left hip replacement    NERVE BLOCK N/A 3/12/2021    NERVE BLOCK #1 L3-4, L4-5 performed by Zeeshan Loyola MD at [address] [place] [address]  10/23/2020    SACROILIAC JOINT INJECTION    PAIN MANAGEMENT PROCEDURE Left 01/29/2021    : LUMBAR TRANSFORAMINAL L3-4 (Left )    PAIN MANAGEMENT PROCEDURE Left 1/29/2021    LUMBAR TRANSFORAMINAL L3-4 performed by Zeeshan Loyola MD at [address] [place] [address]  02/12/2021    LUMBAR TRANSFORAMINAL L3-4 - Left    PAIN MANAGEMENT PROCEDURE Left 2/12/2021    EPIDURAL STEROID INJECTION caudal performed by Zeeshan Loyola MD at [address]         No Known Allergies      Current Outpatient Medications:     pantoprazole (PROTONIX) 20 MG tablet, Take 1 tablet by mouth daily, Disp: 30 tablet, Rfl: 3    rOPINIRole (REQUIP) 1 MG tablet, Take 1 tablet by mouth nightly, Disp: 90 tablet, Rfl: 3    escitalopram (LEXAPRO) 20 MG tablet, Take 1 tablet by mouth daily, Disp: 90 tablet, Rfl: 1    varenicline (CHANTIX STARTING MONTH AASHISH) 0.5 MG X 11 & 1 MG X 42 tablet, Take by mouth., Disp: 1 box, Rfl: 0    lamoTRIgine (LAMICTAL) 100 MG tablet, Take 1 tablet by mouth daily, Disp: 30 tablet, Rfl: 1    prazosin (MINIPRESS) 2 MG capsule, Take 1 capsule by mouth nightly, Disp: 30 capsule, Rfl: 1    traZODone (DESYREL) 100 MG tablet, Take 1 tablet by mouth nightly, Disp: 30 tablet, Rfl: 1    montelukast (SINGULAIR) 10 MG tablet, Take 1 tablet by mouth daily, Disp: 30 tablet, Rfl: 3    loratadine (CLARITIN) 10 MG tablet, Take 1 tablet by mouth daily, Disp: 30 tablet, Rfl: 3    fluticasone-salmeterol (ADVAIR DISKUS) 100-50 MCG/DOSE diskus inhaler, Inhale 1 puff into the lungs every 12 hours, Disp: 60 each, Rfl: 3    albuterol sulfate HFA (VENTOLIN HFA) 108 (90 Base) MCG/ACT inhaler, Inhale 2 puffs into the lungs every 6 hours as needed for Wheezing, Disp: 1 Inhaler, Rfl: 0    Family History   Problem Relation Age of Onset    Depression Mother     Heart Disease Father        Social History     Socioeconomic History    Marital status:      Spouse name: Not on file    Number of children: Not on file    Years of education: Not on file    Highest education level: Not on file   Occupational History    Not on file   Social Needs    Financial resource strain: Not hard at all   [place] insecurity     Worry: Never true     Inability: Never true   [employer] needs     Medical: No     Non-medical: No   Tobacco Use    Smoking status: Current Every Day Smoker     Packs/day: 0.25     Years: 10.00     Pack years: 2.50     Types: Cigarettes    Smokeless tobacco: Former User     Types: Chew   Substance and Sexual Activity    Alcohol use: No    Drug use: Yes     Types: Marijuana     Comment: medical gummy used 10/04/2020    Sexual activity: Yes     Partners: Female   Lifestyle    Physical activity     Days per week: Not on file     Minutes per session: Not on file    Stress: Not on file   Relationships    Social connections     Talks on phone: Not on file     Gets together: Not on file     Attends Jainism service: Not on file     Active member of club or organization: Not on file     Attends meetings of clubs or organizations: Not on file     Relationship status: Not on file    Intimate partner violence     Fear of current or ex partner: Not on file     Emotionally abused: Not on file     Physically abused: Not on file     Forced sexual activity: Not on file   Other Topics Concern    Not on file   Social History Narrative    Not on file       Review of Systems:  Review of Systems   Constitution: Negative for fever and weight loss. Cardiovascular: Negative for chest pain. Musculoskeletal: Positive for back pain. Gastrointestinal: Negative for abdominal pain and bowel incontinence. Genitourinary: Negative for bladder incontinence, dysuria and pelvic pain.    Neurological: Positive for weakness. Negative for headaches, numbness, paresthesias and tingling. Physical Exam:  There were no vitals taken for this visit. Physical Exam  Pursuant to the emergency declaration under the [facility], [facility] waiver authority and the Coronavirus Preparedness and [employer] Act, this Virtual  Visit was conducted, with patient's consent, to reduce the patient's risk of exposure to COVID-19 and provide continuity of care for an established patient. Services were provided through video discussion virtually to substitute for in-person clinic visit. Record/Diagnostics Review:    As above, I did review the imaging    Orders:  Orders Placed This Encounter   Procedures    AR NJX AA&/STRD TFRML EPI LUMBAR/SACRAL EA ADDL     No orders of the defined types were placed in this encounter. Assessment:  1. Lumbosacral spondylosis without myelopathy        Treatment Plan:  DISCUSSION: Treatment options discussed with patient and all questions answered to patient's satisfaction. I have reviewed the chief complaint and history of present illness (including ROS and PFSH) and vital documentation by my staff and I agree with their documentation and have added where applicable.

## 2021-03-25 ENCOUNTER — VIRTUAL VISIT (OUTPATIENT)
Dept: PAIN MANAGEMENT | Age: 31
End: 2021-03-25
Payer: MEDICAID

## 2021-03-25 DIAGNOSIS — M47.817 LUMBOSACRAL SPONDYLOSIS WITHOUT MYELOPATHY: Primary | ICD-10-CM

## 2021-03-25 PROCEDURE — G8427 DOCREV CUR MEDS BY ELIG CLIN: HCPCS | Performed by: NURSE PRACTITIONER

## 2021-03-25 PROCEDURE — G8484 FLU IMMUNIZE NO ADMIN: HCPCS | Performed by: NURSE PRACTITIONER

## 2021-03-25 PROCEDURE — G8419 CALC BMI OUT NRM PARAM NOF/U: HCPCS | Performed by: NURSE PRACTITIONER

## 2021-03-25 PROCEDURE — 99214 OFFICE O/P EST MOD 30 MIN: CPT | Performed by: NURSE PRACTITIONER

## 2021-03-25 PROCEDURE — 4004F PT TOBACCO SCREEN RCVD TLK: CPT | Performed by: NURSE PRACTITIONER

## 2021-03-31 ENCOUNTER — TELEPHONE (OUTPATIENT)
Dept: PAIN MANAGEMENT | Age: 31
End: 2021-03-31

## 2021-03-31 NOTE — TELEPHONE ENCOUNTER
Patient is calling to schedule an injection with Dr Brittany Foster. Please contact him at earliest convenience to discuss. Thank you.

## 2021-04-01 ENCOUNTER — TELEPHONE (OUTPATIENT)
Dept: PAIN MANAGEMENT | Age: 31
End: 2021-04-01

## 2021-04-12 ENCOUNTER — TELEPHONE (OUTPATIENT)
Dept: PAIN MANAGEMENT | Age: 31
End: 2021-04-12

## 2021-04-12 ENCOUNTER — HOSPITAL ENCOUNTER (OUTPATIENT)
Age: 31
Setting detail: SPECIMEN
Discharge: HOME | End: 2021-04-12
Payer: MEDICAID

## 2021-04-12 DIAGNOSIS — M47.817 LUMBOSACRAL SPONDYLOSIS WITHOUT MYELOPATHY: Primary | ICD-10-CM

## 2021-04-12 DIAGNOSIS — M47.817 LUMBOSACRAL SPONDYLOSIS WITHOUT MYELOPATHY: ICD-10-CM

## 2021-04-12 PROCEDURE — U0003 INFECTIOUS AGENT DETECTION BY NUCLEIC ACID (DNA OR RNA); SEVERE ACUTE RESPIRATORY SYNDROME CORONAVIRUS 2 (SARS-COV-2) (CORONAVIRUS DISEASE [COVID-19]), AMPLIFIED PROBE TECHNIQUE, MAKING USE OF HIGH THROUGHPUT TECHNOLOGIES AS DESCRIBED BY CMS-2020-01-R: HCPCS

## 2021-04-12 PROCEDURE — U0005 INFEC AGEN DETEC AMPLI PROBE: HCPCS

## 2021-04-12 NOTE — TELEPHONE ENCOUNTER
Pt procedure scheduled this Friday 4/16/21. Pt needs covid screening done today. Please place order in Epic. Pt normally gets testing done in [facility].

## 2021-04-14 LAB — SARS-COV-2: NOT DETECTED

## 2021-04-15 ENCOUNTER — VIRTUAL VISIT (OUTPATIENT)
Dept: PSYCHIATRY | Age: 31
End: 2021-04-15
Payer: MEDICAID

## 2021-04-15 ENCOUNTER — ANESTHESIA EVENT (OUTPATIENT)
Dept: OPERATING ROOM | Age: 31
End: 2021-04-15
Payer: MEDICAID

## 2021-04-15 DIAGNOSIS — F43.10 PTSD (POST-TRAUMATIC STRESS DISORDER): Primary | ICD-10-CM

## 2021-04-15 DIAGNOSIS — F41.1 GAD (GENERALIZED ANXIETY DISORDER): ICD-10-CM

## 2021-04-15 DIAGNOSIS — F39 MOOD DISORDER (HCC): ICD-10-CM

## 2021-04-15 DIAGNOSIS — F17.200 TOBACCO USE DISORDER: Primary | ICD-10-CM

## 2021-04-15 PROCEDURE — G8427 DOCREV CUR MEDS BY ELIG CLIN: HCPCS | Performed by: PSYCHIATRY & NEUROLOGY

## 2021-04-15 PROCEDURE — 99214 OFFICE O/P EST MOD 30 MIN: CPT | Performed by: PSYCHIATRY & NEUROLOGY

## 2021-04-15 RX ORDER — VARENICLINE TARTRATE 1 MG/1
1 TABLET, FILM COATED ORAL 2 TIMES DAILY
Qty: 60 TABLET | Refills: 3 | Status: SHIPPED | OUTPATIENT
Start: 2021-04-15 | End: 2021-04-21 | Stop reason: SDUPTHER

## 2021-04-15 RX ORDER — SODIUM CHLORIDE 0.9 % (FLUSH) 0.9 %
10 SYRINGE (ML) INJECTION PRN
Status: CANCELLED | OUTPATIENT
Start: 2021-04-15

## 2021-04-15 RX ORDER — SODIUM CHLORIDE 9 MG/ML
25 INJECTION, SOLUTION INTRAVENOUS PRN
Status: CANCELLED | OUTPATIENT
Start: 2021-04-15

## 2021-04-15 RX ORDER — PRAZOSIN HYDROCHLORIDE 2 MG/1
2 CAPSULE ORAL NIGHTLY
Qty: 30 CAPSULE | Refills: 2 | Status: SHIPPED | OUTPATIENT
Start: 2021-04-15 | End: 2021-06-09 | Stop reason: SDUPTHER

## 2021-04-15 RX ORDER — SODIUM CHLORIDE 0.9 % (FLUSH) 0.9 %
10 SYRINGE (ML) INJECTION EVERY 12 HOURS SCHEDULED
Status: CANCELLED | OUTPATIENT
Start: 2021-04-15

## 2021-04-15 RX ORDER — TRAZODONE HYDROCHLORIDE 150 MG/1
150 TABLET ORAL NIGHTLY
Qty: 30 TABLET | Refills: 2 | Status: SHIPPED | OUTPATIENT
Start: 2021-04-15 | End: 2021-06-09 | Stop reason: SDUPTHER

## 2021-04-15 RX ORDER — LORATADINE 10 MG/1
10 TABLET ORAL DAILY
Qty: 30 TABLET | Refills: 3 | Status: SHIPPED | OUTPATIENT
Start: 2021-04-15 | End: 2021-06-11 | Stop reason: SDUPTHER

## 2021-04-15 RX ORDER — LAMOTRIGINE 150 MG/1
150 TABLET ORAL DAILY
Qty: 30 TABLET | Refills: 2 | Status: SHIPPED | OUTPATIENT
Start: 2021-04-15 | End: 2021-05-10 | Stop reason: SDUPTHER

## 2021-04-15 NOTE — PROGRESS NOTES
[address] [address]  [phone]    Progress Note    Patient:  Cindi Klinefelter  YOB: 1990  PCP:  Elisa Jackson DO  Visit Date:  4/15/2021    TELEHEALTH EVALUATION -- Audio/Visual (During YHAPY-80 public health emergency)    Patient location: home  Physician location: home, [facility]  This virtual visit was conducted via interactive, real-time video. Chief Complaint   Patient presents with    Follow-up    Medication Check    Anxiety    Mood Swings       SUBJECTIVE:      Cindi Klinefelter, a 31 y. o. male, presents for a follow up visit. Patient reports he is improving. Patient is compliant with medication regimen. He presents alone. Moved into his  father's house and doing work on the house. Feeling \"decent\" and improving. Mood is improving. Less mood swings. More stable \"at a happy medium\". Less irritable. Takes more to irritate him. Sleep is good, improved. Using 150 mg trazodone and 2 mg prazosin. Can sleep 7 hours consecutively. Gets to sleep faster. Lamictal has seemed most effective for mood and we agree to try further increase. Denies any side effects. Still trouble with his memory which was there before starting Lamictal.   Hasn't noticed any worsening of memory with Lamictal.   No suicidal thoughts. Less sensitive to noise. Still sensitive to light and saw eye doc who dx him with photophobia. Has been Rx glasses that he can wear to help. Hasn't picked them up yet. Wakes up feeling more rested. Gets up earlier around 7-8am, not sleeping until 11a-12p. Has epidural shot tomorrow for back pain. Staying busy getting their old place cleaned and working to get settled in new place. Med Trials:Chantix (nausea), trazodone, gabapentin, Ritalin, medical cannabis, Lamictal, Lexapro    OBJECTIVE:  Vitals:  There were no vitals taken for this visit. MENTAL STATUS EXAM:    GENERAL  Build: Normal    Hygiene:  Appropriate    SENSORIUM Orientation: Place, Person, Time, & Situation     Consciousness: Alert    ATTENTION   Focused    RELATEDNESS  Cooperative    EYE CONTACT   Good    PSYCHOMOTOR  Normal    SPEECH Volume: Normal     Rate: Normal rate and tone    Amplitude: Within normal limits   MOOD  \"at a happy medium\"    AFFECT Range: Full    THOUGHT Process:  Goal-Directed     Content: no evidence of psychosis    COGNITION Insight: Good    Judgement:  Intact    MEMORY  Intact    INTELLIGENCE  Average     Mobility/Gait: Independently     Controlled SubstancesMonitoring:   not done      ASSESSMENT: Mood and sleep are improved. Will increase Lamictal further to see if we can gain more mood/irritability benefit. No SI. Might consider use of Abilify or SSRI/SNRI going forward. Diagnosis Orders   1. PTSD (post-traumatic stress disorder)     2. DANTE (generalized anxiety disorder)     3. Mood disorder (HCC)     R/o: BD, BPD, ADHD  Medical Hx: asthma, heart murmur, Legg-Calve Perthes Disease     PLAN:     · Medications:   · Lexapro 20 mg QD   · Increase Lamictal to 150 mg QD (from 100 mg)  · Prazosin 2 mg HS  · Trazodone 150 mg HS  · Therapy: sees Dr. Carli Mejía  · Labs/Tests/Imaging: none   · Records Reviewed: [facility]  · Patient advised to call if patient has any difficulties with treatment  Return in about 4 weeks (around 5/13/2021) for follow up, med check. Electronically signed by Stacy Gibson MD on 4/15/2021 at 8:52 AM    Twilla Schirmer is a 31 y.o. male being evaluated by a Virtual Visit (video visit) to address concerns as mentioned above. A caregiver was present when appropriate. Due to this being a TeleHealth encounter (ETBPR-99 public health emergency), evaluation of the following organ systems was limited: Vitals/Constitutional/EENT/Resp/CV/GI//MS/Neuro/Skin/Heme-Lymph-Imm.   Pursuant to the emergency declaration under the [address] [place] [address] authority and the [employer] Act, this Virtual Visit was conducted with patient's (and/or legal guardian's) consent, to reduce the patient's risk of exposure to COVID-19 and provide necessary medical care. The patient (and/or legal guardian) has also been advised to contact this office for worsening conditions or problems, and seek emergency medical treatment and/or call 911 if deemed necessary. Patient identification was verified at the start of the visit: Yes     Total time spent for this encounter: not billed by time     Services were provided through a video synchronous discussion virtually to substitute for in-person clinic visit. Patient and provider were located at their individual homes.     Electronically signed by Norva Hammans, MD on 4/15/2021 at 8:52 AM

## 2021-04-16 ENCOUNTER — APPOINTMENT (OUTPATIENT)
Dept: GENERAL RADIOLOGY | Age: 31
End: 2021-04-16
Attending: PAIN MEDICINE
Payer: MEDICAID

## 2021-04-16 ENCOUNTER — ANESTHESIA (OUTPATIENT)
Dept: OPERATING ROOM | Age: 31
End: 2021-04-16
Payer: MEDICAID

## 2021-04-16 ENCOUNTER — HOSPITAL ENCOUNTER (OUTPATIENT)
Age: 31
Setting detail: OUTPATIENT SURGERY
Discharge: HOME | End: 2021-04-16
Attending: PAIN MEDICINE | Admitting: PAIN MEDICINE
Payer: MEDICAID

## 2021-04-16 VITALS
OXYGEN SATURATION: 95 % | BODY MASS INDEX: 29.49 KG/M2 | WEIGHT: 206 LBS | RESPIRATION RATE: 12 BRPM | SYSTOLIC BLOOD PRESSURE: 125 MMHG | DIASTOLIC BLOOD PRESSURE: 82 MMHG | HEIGHT: 70 IN | TEMPERATURE: 97.8 F | HEART RATE: 71 BPM

## 2021-04-16 VITALS
RESPIRATION RATE: 12 BRPM | TEMPERATURE: 96.8 F | DIASTOLIC BLOOD PRESSURE: 61 MMHG | OXYGEN SATURATION: 99 % | SYSTOLIC BLOOD PRESSURE: 114 MMHG

## 2021-04-16 PROCEDURE — 6360000004 HC RX CONTRAST MEDICATION: Performed by: PAIN MEDICINE

## 2021-04-16 PROCEDURE — 3600000012 HC SURGERY LEVEL 2 ADDTL 15MIN: Performed by: PAIN MEDICINE

## 2021-04-16 PROCEDURE — 3700000000 HC ANESTHESIA ATTENDED CARE: Performed by: PAIN MEDICINE

## 2021-04-16 PROCEDURE — 2709999900 HC NON-CHARGEABLE SUPPLY: Performed by: PAIN MEDICINE

## 2021-04-16 PROCEDURE — 3209999900 FLUORO FOR SURGICAL PROCEDURES

## 2021-04-16 PROCEDURE — 64483 NJX AA&/STRD TFRM EPI L/S 1: CPT | Performed by: PAIN MEDICINE

## 2021-04-16 PROCEDURE — 7100000010 HC PHASE II RECOVERY - FIRST 15 MIN: Performed by: PAIN MEDICINE

## 2021-04-16 PROCEDURE — 6360000002 HC RX W HCPCS: Performed by: PAIN MEDICINE

## 2021-04-16 PROCEDURE — 6360000002 HC RX W HCPCS: Performed by: ANESTHESIOLOGY

## 2021-04-16 PROCEDURE — 3600000002 HC SURGERY LEVEL 2 BASE: Performed by: PAIN MEDICINE

## 2021-04-16 PROCEDURE — 7100000011 HC PHASE II RECOVERY - ADDTL 15 MIN: Performed by: PAIN MEDICINE

## 2021-04-16 PROCEDURE — 3700000001 HC ADD 15 MINUTES (ANESTHESIA): Performed by: PAIN MEDICINE

## 2021-04-16 RX ORDER — MORPHINE SULFATE 2 MG/ML
2 INJECTION, SOLUTION INTRAMUSCULAR; INTRAVENOUS EVERY 5 MIN PRN
Status: DISCONTINUED | OUTPATIENT
Start: 2021-04-16 | End: 2021-04-16 | Stop reason: HOSPADM

## 2021-04-16 RX ORDER — MIDAZOLAM HYDROCHLORIDE 1 MG/ML
INJECTION INTRAMUSCULAR; INTRAVENOUS PRN
Status: DISCONTINUED | OUTPATIENT
Start: 2021-04-16 | End: 2021-04-16 | Stop reason: SDUPTHER

## 2021-04-16 RX ORDER — OXYCODONE HYDROCHLORIDE AND ACETAMINOPHEN 5; 325 MG/1; MG/1
1 TABLET ORAL PRN
Status: DISCONTINUED | OUTPATIENT
Start: 2021-04-16 | End: 2021-04-16 | Stop reason: HOSPADM

## 2021-04-16 RX ORDER — FENTANYL CITRATE 50 UG/ML
INJECTION, SOLUTION INTRAMUSCULAR; INTRAVENOUS PRN
Status: DISCONTINUED | OUTPATIENT
Start: 2021-04-16 | End: 2021-04-16 | Stop reason: SDUPTHER

## 2021-04-16 RX ORDER — DIPHENHYDRAMINE HYDROCHLORIDE 50 MG/ML
12.5 INJECTION INTRAMUSCULAR; INTRAVENOUS
Status: DISCONTINUED | OUTPATIENT
Start: 2021-04-16 | End: 2021-04-16 | Stop reason: HOSPADM

## 2021-04-16 RX ORDER — LABETALOL 20 MG/4 ML (5 MG/ML) INTRAVENOUS SYRINGE
5 EVERY 10 MIN PRN
Status: DISCONTINUED | OUTPATIENT
Start: 2021-04-16 | End: 2021-04-16 | Stop reason: HOSPADM

## 2021-04-16 RX ORDER — PROMETHAZINE HYDROCHLORIDE 25 MG/ML
12.5 INJECTION, SOLUTION INTRAMUSCULAR; INTRAVENOUS
Status: DISCONTINUED | OUTPATIENT
Start: 2021-04-16 | End: 2021-04-16 | Stop reason: HOSPADM

## 2021-04-16 RX ORDER — DEXAMETHASONE SODIUM PHOSPHATE 10 MG/ML
INJECTION INTRAMUSCULAR; INTRAVENOUS PRN
Status: DISCONTINUED | OUTPATIENT
Start: 2021-04-16 | End: 2021-04-16 | Stop reason: ALTCHOICE

## 2021-04-16 RX ORDER — ONDANSETRON 2 MG/ML
4 INJECTION INTRAMUSCULAR; INTRAVENOUS
Status: DISCONTINUED | OUTPATIENT
Start: 2021-04-16 | End: 2021-04-16 | Stop reason: HOSPADM

## 2021-04-16 RX ORDER — OXYCODONE HYDROCHLORIDE AND ACETAMINOPHEN 5; 325 MG/1; MG/1
2 TABLET ORAL PRN
Status: DISCONTINUED | OUTPATIENT
Start: 2021-04-16 | End: 2021-04-16 | Stop reason: HOSPADM

## 2021-04-16 RX ORDER — MEPERIDINE HYDROCHLORIDE 50 MG/ML
12.5 INJECTION INTRAMUSCULAR; INTRAVENOUS; SUBCUTANEOUS EVERY 5 MIN PRN
Status: DISCONTINUED | OUTPATIENT
Start: 2021-04-16 | End: 2021-04-16 | Stop reason: HOSPADM

## 2021-04-16 RX ORDER — 0.9 % SODIUM CHLORIDE 0.9 %
500 INTRAVENOUS SOLUTION INTRAVENOUS
Status: DISCONTINUED | OUTPATIENT
Start: 2021-04-16 | End: 2021-04-16 | Stop reason: HOSPADM

## 2021-04-16 RX ADMIN — MIDAZOLAM HYDROCHLORIDE 2 MG: 1 INJECTION, SOLUTION INTRAMUSCULAR; INTRAVENOUS at 08:30

## 2021-04-16 RX ADMIN — FENTANYL CITRATE 100 MCG: 50 INJECTION, SOLUTION INTRAMUSCULAR; INTRAVENOUS at 08:31

## 2021-04-16 ASSESSMENT — PAIN SCALES - GENERAL: PAINLEVEL_OUTOF10: 2

## 2021-04-16 ASSESSMENT — PULMONARY FUNCTION TESTS
PIF_VALUE: 0

## 2021-04-16 ASSESSMENT — PAIN DESCRIPTION - PAIN TYPE: TYPE: CHRONIC PAIN

## 2021-04-16 ASSESSMENT — LIFESTYLE VARIABLES: SMOKING_STATUS: 1

## 2021-04-16 ASSESSMENT — PAIN DESCRIPTION - LOCATION: LOCATION: BACK

## 2021-04-16 ASSESSMENT — PAIN DESCRIPTION - ORIENTATION: ORIENTATION: LEFT

## 2021-04-16 NOTE — OP NOTE
Transforaminal Epidural Steroid Injection:  SURGEON: Daphne Myers    PRE-OP DIAGNOSIS: M54.17 (lumbosacral neuritis), M54.5 (low back pain)    POST-OP DIAGNOSIS: Same. PROCEDURE PERFORMED:  Left L3 Lumbar Transforaminal SID selective nerve root block. Physician confirmed and marked the surgical site. EBL: minimal    CONSENT: Patient has undergone the educational process with this procedure, is aware and fully understands the risks involved: potential damage to any and all body organs including possible bleeding, infection, and nerve injury, allergic reaction and headache. Patient also understands that the procedure will be undertaken in a safe, controlled and monitored setting. Patient recognizes that the benefits may include relief from pain and reduction in the oral use of medications. Patient agreed to proceed. The patient was counseled at length about the risks of conchis Covid-19 during their perioperative period and any recovery window from their procedure. The patient was made aware that conchis Covid-19  may worsen their prognosis for recovering from their procedure  and lend to a higher morbidity and/or mortality risk. All material risks, benefits, and reasonable alternatives including postponing the procedure were discussed. The patient does wish to proceed with the procedure at this time. PREP: The patient was placed in the prone position and padded appropriately. The injection site was prepped with chloroprep and draped appropriately. 5ml of 0.5% lidocaine was used to anesthetize the skin and subcutaneous tissue. PROCEDURE NOTE: A 22 gauge 3.5 inch Pencil-Point needle was then advanced to the Left L3 neuroforamen using a wide paramedian approach under fluoroscopic guidance. Aspiration was negative for blood, CSF and producing pain.  Contrast Medium: 1 ml of (omnipaque) contrast was then injected and the following was noted: appropriate spread of contrast along the nerve root sheath and adjacent epidural space 4mg Dexamethasone mixed with 1.5 ml of 0.5 % lidocaine was then injected into the nerve root sheath of each of the indicated levels. The needle was withdrawn by the physician and the nurse applied a sterile dressing. The patient tolerated the procedure well. No complications occured. Patient transferred to the recovery room in satisfatory condition. Appropriate written discharge instructions given to the patient.     [address] [place] [address]

## 2021-04-16 NOTE — ANESTHESIA PRE PROCEDURE
Department of Anesthesiology  Preprocedure Note       Name:  Roxy Chavarria   Age:  31 y.o.  :  1990                                          MRN:  [id number]         Date:  2021      Surgeon: Singh Childs):  [address] Juan Dela Cruz MD    Procedure: Procedure(s):  LUMBAR TRANSFORAMINAL L3    Medications prior to admission:   Prior to Admission medications    Medication Sig Start Date End Date Taking? Authorizing Provider   traZODone (DESYREL) 150 MG tablet Take 1 tablet by mouth nightly 4/15/21  Yes Isha Bender MD   prazosin (MINIPRESS) 2 MG capsule Take 1 capsule by mouth nightly 4/15/21  Yes Isha Bender MD   lamoTRIgine (LAMICTAL) 150 MG tablet Take 1 tablet by mouth daily 4/15/21  Yes Isha Bender MD   varenicline (CHANTIX CONTINUING MONTH AASHISH) 1 MG tablet Take 1 tablet by mouth 2 times daily 4/15/21  Yes Ofelia Pinedo DO   loratadine (CLARITIN) 10 MG tablet Take 1 tablet by mouth daily 4/15/21  Yes Jacquie Ragsdale DO   pantoprazole (PROTONIX) 20 MG tablet Take 1 tablet by mouth daily 3/17/21 7/15/21 Yes Jacquie Ragsdale DO   rOPINIRole (REQUIP) 1 MG tablet Take 1 tablet by mouth nightly 3/12/21  Yes Jacquie Ragsdale DO   escitalopram (LEXAPRO) 20 MG tablet Take 1 tablet by mouth daily 3/12/21  Yes Ofelia Pinedo DO   montelukast (SINGULAIR) 10 MG tablet Take 1 tablet by mouth daily 21  Yes Ofelia Pinedo DO   fluticasone-salmeterol (ADVAIR DISKUS) 100-50 MCG/DOSE diskus inhaler Inhale 1 puff into the lungs every 12 hours 21  Yes Ofelia Pinedo DO   albuterol sulfate HFA (VENTOLIN HFA) 108 (90 Base) MCG/ACT inhaler Inhale 2 puffs into the lungs every 6 hours as needed for Wheezing 11/10/20  Yes LEYDI Christensen - CNP   varenicline (CHANTIX STARTING MONTH AASHISH) 0.5 MG X 11 & 1 MG X 42 tablet Take by mouth. 3/12/21   Jacquie Ragsdale,        Current medications:    No current facility-administered medications for this encounter.         Allergies:  No Known Allergies    Problem List:    Patient Active Problem List   Diagnosis Code    Left hip pain M25.552    Depression F32.9    Asthma J45.909    Restless leg G25.81    Gastroesophageal reflux disease K21.9    Vitamin D deficiency E55.9    Other insomnia G47.09    Anxiety F41.9    Recurrent major depressive disorder, in remission ([facility] F33.40    Bipolar affective disorder, current episode depressed ([address] F31.30    PTSD (post-traumatic stress disorder) F43.10    DANTE (generalized anxiety disorder) F41.1    Allergic rhinitis due to pollen J30.1       Past Medical History:        Diagnosis Date    Asthma     Bipolar 1 disorder ([facility]     Borderline personality disorder ([address]     Depression     Heart murmur     Yqxy-Mixnw-Dzwfnhu disease, left     Multiple allergies     PTSD (post-traumatic stress disorder)     PTSD (post-traumatic stress disorder)     Restless legs syndrome        Past Surgical History:        Procedure Laterality Date    [facility] INJECTION PROCEDURE FOR SACROILIAC JOINT Left 10/23/2020    SACROILIAC JOINT INJECTION performed by Lesly Silva MD at [facility] Left 2011    Left hip replacement    NERVE BLOCK N/A 3/12/2021    NERVE BLOCK #1 L3-4, L4-5 performed by Lesly Silva MD at [address] [place] [address]  10/23/2020    SACROILIAC JOINT INJECTION    PAIN MANAGEMENT PROCEDURE Left 01/29/2021    : LUMBAR TRANSFORAMINAL L3-4 (Left )    PAIN MANAGEMENT PROCEDURE Left 1/29/2021    LUMBAR TRANSFORAMINAL L3-4 performed by Lesly Silva MD at [address] [place] [address]  02/12/2021    LUMBAR TRANSFORAMINAL L3-4 - Left    PAIN MANAGEMENT PROCEDURE Left 2/12/2021    EPIDURAL STEROID INJECTION caudal performed by Lesly Silva MD at [facility]         Social History:    Social History     Tobacco Use    Smoking status: Current Every Day Smoker     Packs/day: 0.25 Years: 10.00     Pack years: 2.50     Types: Cigarettes    Smokeless tobacco: Former User     Types: Chew   Substance Use Topics    Alcohol use: No                                Ready to quit: Not Answered  Counseling given: Not Answered      Vital Signs (Current):   Vitals:    04/16/21 0755   BP: 134/74   Pulse: 88   Resp: 16   Temp: 98.5 °F (36.9 °C)   TempSrc: Temporal   SpO2: 99%   Weight: 206 lb (93.4 kg)   Height: 5' 10\" (1.778 m)                                              BP Readings from Last 3 Encounters:   04/16/21 134/74   03/12/21 128/81   03/12/21 (!) 142/90       NPO Status: Time of last liquid consumption: 0553                        Time of last solid consumption: 0500                        Date of last liquid consumption: 04/16/21                        Date of last solid food consumption: 04/16/21    BMI:   Wt Readings from Last 3 Encounters:   04/16/21 206 lb (93.4 kg)   03/12/21 199 lb 4 oz (90.4 kg)   03/12/21 199 lb (90.3 kg)     Body mass index is 29.56 kg/m². CBC:   Lab Results   Component Value Date    WBC 11.5 08/04/2020    RBC 5.45 08/04/2020    HGB 16.9 08/04/2020    HCT 50.0 08/04/2020    MCV 91.7 08/04/2020    RDW 13.4 06/28/2014     08/04/2020       CMP:   Lab Results   Component Value Date     08/04/2020    K 4.4 08/04/2020     08/04/2020    CO2 24 08/04/2020    BUN 10 08/04/2020    CREATININE 0.7 08/04/2020    LABGLOM >90 08/04/2020    GLUCOSE 98 08/04/2020    PROT 6.6 08/04/2020    CALCIUM 9.2 08/04/2020    BILITOT 0.4 08/04/2020    ALKPHOS 45 08/04/2020    AST 18 08/04/2020    ALT 17 08/04/2020       POC Tests: No results for input(s): POCGLU, POCNA, POCK, POCCL, POCBUN, POCHEMO, POCHCT in the last 72 hours.     Coags: No results found for: PROTIME, INR, APTT    HCG (If Applicable): No results found for: PREGTESTUR, PREGSERUM, HCG, HCGQUANT     ABGs: No results found for: PHART, PO2ART, WLX3SKS, TCJ9EAK, BEART, E8KXJQPD     Type & Screen (If Applicable):  No results found for: LABABO, LABRH    Drug/Infectious Status (If Applicable):  No results found for: HIV, HEPCAB    COVID-19 Screening (If Applicable):   Lab Results   Component Value Date    COVID19 Not Detected 04/12/2021    COVID19 Not Detected 10/19/2020           Anesthesia Evaluation  Patient summary reviewed and Nursing notes reviewed  Airway: Mallampati: I  TM distance: >3 FB   Neck ROM: full  Mouth opening: > = 3 FB Dental:      Comment: -MISSING SOME UPPER FRONT TEETH    Pulmonary:normal exam    (+) COPD:  asthma: current smoker                          ROS comment: -SMOKES 1 PPD FOR 17 YEARS  -ASTHMA WELL CONTROLLED   Cardiovascular:Negative CV ROS                      Neuro/Psych:   Negative Neuro/Psych ROS              GI/Hepatic/Renal:   (+) GERD: well controlled,           Endo/Other:                      ROS comment: -MARIJUANA USE  -NPO AFTER MIDNIGHT  -NKDA Abdominal:           Vascular: negative vascular ROS. Anesthesia Plan      MAC     ASA 2       Induction: intravenous. MIPS: Postoperative opioids intended and Prophylactic antiemetics administered. Anesthetic plan and risks discussed with patient. Plan discussed with CRNA.     Attending anesthesiologist reviewed and agrees with Pre Eval content              Anel Spence MD   4/16/2021

## 2021-04-16 NOTE — H&P
Pain Pre-Op H&P Note    Flaquita Borja Louis    HPI: Greta Aaron  presents with back and leg pain.     Past Medical History:   Diagnosis Date    Asthma     B[facility]     Borderline personality disorder ([facility]     Depression     Heart murmur     Yfwu-Iqoss-Ondnwpf disease, left     Multiple allergies     PTSD (post-traumatic stress disorder)     PTSD (post-traumatic stress disorder)     Restless legs syndrome        Past Surgical History:   Procedure Laterality Date    [facility] INJECTION PROCEDURE FOR SACROILIAC JOINT Left 10/23/2020    SACROILIAC JOINT INJECTION performed by Frank Calderón MD at [facility] Left 2011    Left hip replacement    NERVE BLOCK N/A 3/12/2021    NERVE BLOCK #1 L3-4, L4-5 performed by Frank Calderón MD at [address] [place] [address]  10/23/2020    SACROILIAC JOINT INJECTION    PAIN MANAGEMENT PROCEDURE Left 01/29/2021    : LUMBAR TRANSFORAMINAL L3-4 (Left )    PAIN MANAGEMENT PROCEDURE Left 1/29/2021    LUMBAR TRANSFORAMINAL L3-4 performed by Frank Calderón MD at [address] [place] [address]  02/12/2021    LUMBAR TRANSFORAMINAL L3-4 - Left    PAIN MANAGEMENT PROCEDURE Left 2/12/2021    EPIDURAL STEROID INJECTION caudal performed by Frank Calderón MD at [facility]         Family History   Problem Relation Age of Onset    Depression Mother     Heart Disease Father        No Known Allergies      Current Facility-Administered Medications:     meperidine (DEMEROL) injection 12.5 mg, 12.5 mg, Intravenous, Q5 Min PRN, Ankit Fernández MD    morphine (PF) injection 2 mg, 2 mg, Intravenous, Q5 Min PRN, Ankit Fernández MD    HYDROmorphone (DILAUDID) injection 0.5 mg, 0.5 mg, Intravenous, Q5 Min PRN, Ankit Fernández MD    HYDROmorphone (DILAUDID) injection 0.25 mg, 0.25 mg, Intravenous, Q5 Min PRN, Ankit Fernández MD    HYDROmorphone

## 2021-04-21 ENCOUNTER — OFFICE VISIT (OUTPATIENT)
Dept: FAMILY MEDICINE CLINIC | Age: 31
End: 2021-04-21
Payer: MEDICAID

## 2021-04-21 VITALS
BODY MASS INDEX: 30.02 KG/M2 | DIASTOLIC BLOOD PRESSURE: 64 MMHG | OXYGEN SATURATION: 99 % | WEIGHT: 209.2 LBS | SYSTOLIC BLOOD PRESSURE: 124 MMHG | RESPIRATION RATE: 16 BRPM | HEART RATE: 86 BPM

## 2021-04-21 DIAGNOSIS — F33.40 RECURRENT MAJOR DEPRESSIVE DISORDER, IN REMISSION (HCC): Primary | ICD-10-CM

## 2021-04-21 DIAGNOSIS — K21.9 GASTROESOPHAGEAL REFLUX DISEASE WITHOUT ESOPHAGITIS: ICD-10-CM

## 2021-04-21 DIAGNOSIS — J30.2 SEASONAL ALLERGIES: ICD-10-CM

## 2021-04-21 DIAGNOSIS — J45.909 ASTHMA, UNSPECIFIED ASTHMA SEVERITY, UNSPECIFIED WHETHER COMPLICATED, UNSPECIFIED WHETHER PERSISTENT: ICD-10-CM

## 2021-04-21 DIAGNOSIS — G25.81 RESTLESS LEG: ICD-10-CM

## 2021-04-21 DIAGNOSIS — F41.9 ANXIETY: ICD-10-CM

## 2021-04-21 DIAGNOSIS — F17.200 TOBACCO USE DISORDER: ICD-10-CM

## 2021-04-21 PROCEDURE — 99214 OFFICE O/P EST MOD 30 MIN: CPT | Performed by: STUDENT IN AN ORGANIZED HEALTH CARE EDUCATION/TRAINING PROGRAM

## 2021-04-21 PROCEDURE — G8427 DOCREV CUR MEDS BY ELIG CLIN: HCPCS | Performed by: STUDENT IN AN ORGANIZED HEALTH CARE EDUCATION/TRAINING PROGRAM

## 2021-04-21 PROCEDURE — 4004F PT TOBACCO SCREEN RCVD TLK: CPT | Performed by: STUDENT IN AN ORGANIZED HEALTH CARE EDUCATION/TRAINING PROGRAM

## 2021-04-21 PROCEDURE — G8417 CALC BMI ABV UP PARAM F/U: HCPCS | Performed by: STUDENT IN AN ORGANIZED HEALTH CARE EDUCATION/TRAINING PROGRAM

## 2021-04-21 RX ORDER — VARENICLINE TARTRATE 1 MG/1
1 TABLET, FILM COATED ORAL 2 TIMES DAILY
Qty: 60 TABLET | Refills: 3 | Status: SHIPPED | OUTPATIENT
Start: 2021-04-21 | End: 2021-07-27 | Stop reason: SDUPTHER

## 2021-04-21 RX ORDER — PANTOPRAZOLE SODIUM 20 MG/1
20 TABLET, DELAYED RELEASE ORAL DAILY
Qty: 30 TABLET | Refills: 3 | Status: SHIPPED | OUTPATIENT
Start: 2021-04-21 | End: 2021-09-07

## 2021-04-21 RX ORDER — FLUTICASONE PROPIONATE 50 MCG
2 SPRAY, SUSPENSION (ML) NASAL DAILY
Qty: 1 BOTTLE | Refills: 3 | Status: SHIPPED | OUTPATIENT
Start: 2021-04-21

## 2021-04-21 RX ORDER — ROPINIROLE 1 MG/1
1 TABLET, FILM COATED ORAL NIGHTLY
Qty: 90 TABLET | Refills: 3 | Status: SHIPPED | OUTPATIENT
Start: 2021-04-21 | End: 2021-07-22 | Stop reason: SDUPTHER

## 2021-04-21 RX ORDER — ALBUTEROL SULFATE 90 UG/1
2 AEROSOL, METERED RESPIRATORY (INHALATION) EVERY 6 HOURS PRN
Qty: 1 INHALER | Refills: 0 | Status: SHIPPED | OUTPATIENT
Start: 2021-04-21

## 2021-04-21 RX ORDER — ESCITALOPRAM OXALATE 20 MG/1
20 TABLET ORAL DAILY
Qty: 90 TABLET | Refills: 1 | Status: SHIPPED | OUTPATIENT
Start: 2021-04-21 | End: 2021-06-09 | Stop reason: SDUPTHER

## 2021-04-21 NOTE — PROGRESS NOTES
Health Maintenance Due   Topic Date Due    Hepatitis C screen  Never done    HIV screen  Never done    COVID-19 Vaccine (1) Never done    DTaP/Tdap/Td vaccine (1 - Tdap) Never done - patient is due- declines

## 2021-04-21 NOTE — PROGRESS NOTES
Jessica Emmanuel is a 31 y.o. male who presents today for:  Chief Complaint   Patient presents with    Follow-up     HPI:     Bipolar, depression, anxiety d/o:  seeing psychiatry (Dr Kayla Head)  Taking lamictal (increased recently by Dr Kayla Head)  trazadone (recent increase by Dr Kayla Head to 150 mg)  He continues to note significant improvement in sounds irritating him (chewing, baby crying)  Father recently passed due to MI. They moved into his house and did renovations. Father started having MI in his 35s. He notes overall his mood is doing significantly better and his mom seems to be his main trigger. Seasonal allergies:    Patient takes claritin, singulair and uses saline rinses. He has not tried flonase. He notes the left nose seems to be more constricted. GERD:   x 3-5 yrs  Taking protonix 20mg daily and notes symptoms controlled with this    Left hip pain with sciatica. He follows with pain management and neurosurgery. He notes the last injection was helpful and he has follow up on Friday for discussion of potential surgery. Insomnia:  Dr sim made recent adjustments to trazodone. He notes he is finally sleeping the \"appropriate\" amount during the night. He even took a couple of naps during this last week but states this may be due to just being tired from all of the renovations of the house. Gasping at night:  Sleep referral placed at prior visit. He denies any continued gasping at night. RLS  Requip has helped restless leg at night, a couple of nights a week still has symptoms. Smoker, desire to quit. Tried to quit smoking in the past.    Used nicotine patches but he got a skin rash and irritation. Used chantix but got upset stomach and nausea. Patient notes that when he takes it with food he does fine. From one pack a day to a half a pack a day.       BP Readings from Last 3 Encounters:   04/21/21 124/64   04/16/21 114/61   04/16/21 125/82     Wt Readings from Last 3 Encounters:   04/21/21 209 lb 3.2 oz (94.9 kg)   04/16/21 206 lb (93.4 kg)   03/12/21 199 lb 4 oz (90.4 kg)     O: VS:  weight is 209 lb 3.2 oz (94.9 kg). His blood pressure is 124/64 and his pulse is 86. His respiration is 16 and oxygen saturation is 99%. Physical Exam  Constitutional:       Appearance: Normal appearance. HENT:      Head: Normocephalic and atraumatic. Right Ear: Tympanic membrane, ear canal and external ear normal.      Left Ear: Tympanic membrane, ear canal and external ear normal.      Nose: Nose normal.      Comments: Erythematous and boggy turbinates, appears more narrowed on the left     Mouth/Throat:      Mouth: Mucous membranes are moist.      Pharynx: No oropharyngeal exudate or posterior oropharyngeal erythema. Eyes:      Conjunctiva/sclera: Conjunctivae normal.   Neck:      Musculoskeletal: Normal range of motion. Cardiovascular:      Rate and Rhythm: Normal rate and regular rhythm. Pulses: Normal pulses. Heart sounds: Normal heart sounds. No murmur. Pulmonary:      Effort: Pulmonary effort is normal.      Breath sounds: No wheezing, rhonchi or rales. Abdominal:      General: Abdomen is flat. Palpations: Abdomen is soft. Musculoskeletal: Normal range of motion. Skin:     General: Skin is warm and dry. Neurological:      General: No focal deficit present. Mental Status: He is alert. Mental status is at baseline. Psychiatric:         Mood and Affect: Mood normal.         Behavior: Behavior normal.       Assement/Plan:  1. Recurrent major depressive disorder[facility]  improved  - escitalopram (LEXAPRO) 20 MG tablet; Take 1 tablet by mouth daily  Dispense: 90 tablet; Refill: 1    2. Seasonal allergies  - fluticasone (FLONASE) 50 MCG/ACT nasal spray; 2 sprays by Each Nostril route daily Start with one spray daily for one week and increase to two sprays daily as needed  Dispense: 1 Bottle; Refill: 3    3.  Tobacco use disorder  - varenicline (CHANTIX CONTINUING MONTH AASHISH) 1 MG tablet; Take 1 tablet by mouth 2 times daily  Dispense: 60 tablet; Refill: 3  Denies referral to cessation clinic at this time    4. Restless leg  controlled  - rOPINIRole (REQUIP) 1 MG tablet; Take 1 tablet by mouth nightly  Dispense: 90 tablet; Refill: 3    5. Anxiety  improved  - escitalopram (LEXAPRO) 20 MG tablet; Take 1 tablet by mouth daily  Dispense: 90 tablet; Refill: 1  Consider additional medications in future but trigger appears to be his mother who is moving out of their house soon    6. Asthma, unspecified asthma severity, unspecified whether complicated, unspecified whether persistent  - fluticasone-salmeterol (ADVAIR DISKUS) 100-50 MCG/DOSE diskus inhaler; Inhale 1 puff into the lungs every 12 hours  Dispense: 60 each; Refill: 3  - albuterol sulfate HFA (VENTOLIN HFA) 108 (90 Base) MCG/ACT inhaler; Inhale 2 puffs into the lungs every 6 hours as needed for Wheezing  Dispense: 1 Inhaler; Refill: 0    7. Gastroesophageal reflux disease without esophagitis  - pantoprazole (PROTONIX) 20 MG tablet; Take 1 tablet by mouth daily  Dispense: 30 tablet;  Refill: 3    Health Maintenance Due   Topic Date Due    Hepatitis C screen  Never done    HIV screen  Never done    COVID-19 Vaccine (1) Never done    DTaP/Tdap/Td vaccine (1 - Tdap) Never done     Electronically signed by Blaze Keen DO on 4/21/2021 at 2:51 PM

## 2021-04-21 NOTE — PROGRESS NOTES
After pharmacist chart review, the following recommendations are made:    -Recommend assessment of smoking cessation status - started on Chantix starter ~ 3/12/2021 & has continuation pack with 3 refills; typical therapy duration is 3-6 months    For [address] [place] [address] Intervention Detail: Adherence Monitorin   Total # of Interventions Recommended: 1   Total # of Interventions Accepted: 1   Time Spent (min): 10      Electronically signed by Alexa Griffin, [address] [place] [address] on 2021 at 2:09 PM

## 2021-04-21 NOTE — PROGRESS NOTES
SUBJECTIVE     Kandace Mane is a 31 y.o.male    Chief Complaint   Patient presents with    Follow-up     Patient presents for follow-up of multiple medical issues-    Pt continues on Singulair, Claritin, and Saline rinses. Pt complains of left sided nasal congestion. Pt also with history bipolar disorder/anxiety/depression-recent dose of Lexapro increased to 20 mg daily. Patient also recently seen by Dr. Kindra Junior and Lamictal dose was increased. Patient doing okay overall, however continues to feel stress with living with his mother. Patient continues to see neurosurgery, as well as pain management for low back pain. Recent injection beneficial.  Patient to follow-up later this week    Patient recently started Chantix about a month ago in an effort to stop smoking. Patient has cut down to one half PPD from 1 PPD. We will plan to continue Chantix continuing month pack at this time.     Patient has not completed sleep study at this point-to pursue in near future    Patient Active Problem List   Diagnosis    Left hip pain    Depression    Asthma    Restless leg    Gastroesophageal reflux disease    Vitamin D deficiency    Other insomnia    Anxiety    [facility]    Bipolar affective disorder, current episode depressed ([facility]    PTSD (post-traumatic stress disorder)    DANTE (generalized anxiety disorder)    Allergic rhinitis due to pollen       Current Outpatient Medications   Medication Sig Dispense Refill    fluticasone (FLONASE) 50 MCG/ACT nasal spray 2 sprays by Each Nostril route daily Start with one spray daily for one week and increase to two sprays daily as needed 1 Bottle 3    varenicline (CHANTIX CONTINUING MONTH AASHISH) 1 MG tablet Take 1 tablet by mouth 2 times daily 60 tablet 3    rOPINIRole (REQUIP) 1 MG tablet Take 1 tablet by mouth nightly 90 tablet 3    escitalopram (LEXAPRO) 20 MG tablet Take 1 tablet by mouth daily 90 tablet 1    fluticasone-salmeterol (ADVAIR DISKUS) 100-50 MCG/DOSE diskus inhaler Inhale 1 puff into the lungs every 12 hours 60 each 3    albuterol sulfate HFA (VENTOLIN HFA) 108 (90 Base) MCG/ACT inhaler Inhale 2 puffs into the lungs every 6 hours as needed for Wheezing 1 Inhaler 0    pantoprazole (PROTONIX) 20 MG tablet Take 1 tablet by mouth daily 30 tablet 3    traZODone (DESYREL) 150 MG tablet Take 1 tablet by mouth nightly 30 tablet 2    prazosin (MINIPRESS) 2 MG capsule Take 1 capsule by mouth nightly 30 capsule 2    lamoTRIgine (LAMICTAL) 150 MG tablet Take 1 tablet by mouth daily 30 tablet 2    loratadine (CLARITIN) 10 MG tablet Take 1 tablet by mouth daily 30 tablet 3    montelukast (SINGULAIR) 10 MG tablet Take 1 tablet by mouth daily 30 tablet 3     No current facility-administered medications for this visit. Review of Systems per Dr. Shadia Hernandez     /64 (Site: Left Upper Arm)   Pulse 86   Resp 16   Wt 209 lb 3.2 oz (94.9 kg)   SpO2 99%   BMI 30.02 kg/m²   BP Readings from Last 3 Encounters:   04/21/21 124/64   04/16/21 114/61   04/16/21 125/82       Wt Readings from Last 3 Encounters:   04/21/21 209 lb 3.2 oz (94.9 kg)   04/16/21 206 lb (93.4 kg)   03/12/21 199 lb 4 oz (90.4 kg)     Body mass index is 30.02 kg/m². Physical Exam  Vitals signs and nursing note reviewed. Exam conducted with a chaperone present. Constitutional:       General: He is not in acute distress. Appearance: Normal appearance. He is well-developed and normal weight. He is not ill-appearing, toxic-appearing or diaphoretic. HENT:      Head: Normocephalic and atraumatic. Right Ear: External ear normal.      Left Ear: External ear normal.      Nose: Nose normal. No rhinorrhea. Mouth/Throat:      Mouth: Mucous membranes are moist.      Pharynx: Oropharynx is clear. Eyes:      General: No scleral icterus. Right eye: No discharge. Left eye: No discharge.       Extraocular Movements: Extraocular movements intact. Conjunctiva/sclera: Conjunctivae normal.      Pupils: Pupils are equal, round, and reactive to light. Neck:      Musculoskeletal: Normal range of motion. Cardiovascular:      Rate and Rhythm: Normal rate and regular rhythm. Heart sounds: Normal heart sounds. No murmur. No friction rub. No gallop. Pulmonary:      Effort: Pulmonary effort is normal. No respiratory distress. Breath sounds: Normal breath sounds. No stridor. No wheezing, rhonchi or rales. Chest:      Chest wall: No tenderness. Abdominal:      General: Abdomen is flat. Bowel sounds are normal. There is no distension. Palpations: Abdomen is soft. There is no mass. Tenderness: There is no abdominal tenderness. There is no guarding or rebound. Hernia: No hernia is present. Musculoskeletal: Normal range of motion. General: No swelling, deformity or signs of injury. Skin:     General: Skin is warm and dry. Coloration: Skin is not jaundiced or pale. Findings: No bruising, erythema, lesion or rash. Neurological:      General: No focal deficit present. Mental Status: He is alert and oriented to person, place, and time. Mental status is at baseline. Motor: No weakness. Coordination: Coordination normal.      Gait: Gait normal.      Deep Tendon Reflexes: Reflexes are normal and symmetric. Psychiatric:         Mood and Affect: Mood normal.         Behavior: Behavior normal.         Thought Content: Thought content normal.         Judgment: Judgment normal.          No results found for this visit on 04/21/21. No results found for: LABA1C    Lab Results   Component Value Date    CHOL 211 (H) 08/04/2020    TRIG 283 (H) 08/04/2020    HDL 29 08/04/2020    LDLCALC 125 08/04/2020       The ASCVD Risk score (Niurka Pap., et al., 2013) failed to calculate for the following reasons:     The 2013 ASCVD risk score is only valid for ages 36 to 78    Lab Results   Component Value Date     08/04/2020    K 4.4 08/04/2020     08/04/2020    CO2 24 08/04/2020    BUN 10 08/04/2020    CREATININE 0.7 08/04/2020    GLUCOSE 98 08/04/2020    CALCIUM 9.2 08/04/2020    PROT 6.6 08/04/2020    LABALBU 4.3 08/04/2020    BILITOT 0.4 08/04/2020    ALKPHOS 45 08/04/2020    AST 18 08/04/2020    ALT 17 08/04/2020    LABGLOM >90 08/04/2020       No results found for: LABMICR, [id number]    Lab Results   Component Value Date    TSH 1.880 08/04/2020       Lab Results   Component Value Date    WBC 11.5 (H) 08/04/2020    HGB 16.9 08/04/2020    HCT 50.0 08/04/2020    MCV 91.7 08/04/2020     08/04/2020             There is no immunization history on file for this patient. Health Maintenance   Topic Date Due    Hepatitis C screen  Never done    HIV screen  Never done    COVID-19 Vaccine (1) Never done    DTaP/Tdap/Td vaccine (1 - Tdap) Never done    Flu vaccine (Season Ended) 02/04/2022 (Originally 9/1/2021)    Pneumococcal 0-64 years Vaccine (1 of 1 - PPSV23) 02/04/2022 (Originally 5/5/1995)    Hepatitis A vaccine  Aged Out    Hepatitis B vaccine  Aged Out    Hib vaccine  Aged Out    Meningococcal (ACWY) vaccine  Aged Out    Varicella vaccine  Discontinued           Future Appointments   Date Time Provider Juan Manuel Fitzpatrick   4/23/2021  9:30 AM Mahnaz [facility]   5/3/2021  9:15 AM MD [facility]   6/9/2021  8:30 AM Mita Jordan [facility]   7/27/2021  1:00 PM [id number] PEREZ DO SRPX  [facility]       ASSESSMENT       Diagnosis Orders   1. Recurrent major depressive disorder, in remission (HCC)  escitalopram (LEXAPRO) 20 MG tablet   2. Seasonal allergies  fluticasone (FLONASE) 50 MCG/ACT nasal spray   3. Tobacco use disorder  varenicline (CHANTIX CONTINUING MONTH AASHISH) 1 MG tablet   4. Restless leg  rOPINIRole (REQUIP) 1 MG tablet   5. Anxiety  escitalopram (LEXAPRO) 20 MG tablet   6.  Asthma, unspecified asthma severity, unspecified whether complicated, unspecified whether persistent  fluticasone-salmeterol (ADVAIR DISKUS) 100-50 MCG/DOSE diskus inhaler    albuterol sulfate HFA (VENTOLIN HFA) 108 (90 Base) MCG/ACT inhaler   7. Gastroesophageal reflux disease without esophagitis  pantoprazole (PROTONIX) 20 MG tablet       PLAN      After discussion with pt and Dr. Court Osorio, we agreed on plan as follows:    Consider use of Flonase-1 to 2 sprays per nostril daily. Continue Lamictal per Dr. Hopper Freeze at current dose  Continue Chantix at current dose  Continue to follow-up with pain management as planned  Continue to follow-up with neurosurgery as planned  Encouraged keeping appt with [facility]. Continue Protonix 20 mg daily at this time. Follow up in 3 months  We will plan to address care gaps at future visit. Attending Physician Statement  I have discussed the case, including pertinent history and exam findings with the resident. I also have seen the patient and performed key portions of the examination. I agree with the documented assessment and plan as documented by the resident.   GC modifier added to this encounter     Electronically signed by Aletha Schirmer, MD on 4/22/2021 at 7:01 AM

## 2021-04-22 ENCOUNTER — HOSPITAL ENCOUNTER (OUTPATIENT)
Dept: GENERAL RADIOLOGY | Age: 31
Discharge: HOME | End: 2021-04-24
Payer: MEDICAID

## 2021-04-22 DIAGNOSIS — M54.16 LUMBAR RADICULOPATHY: ICD-10-CM

## 2021-04-22 PROCEDURE — 72120 X-RAY BEND ONLY L-S SPINE: CPT

## 2021-04-23 ENCOUNTER — OFFICE VISIT (OUTPATIENT)
Dept: NEUROSURGERY | Age: 31
End: 2021-04-23
Payer: MEDICAID

## 2021-04-23 VITALS
WEIGHT: 209 LBS | RESPIRATION RATE: 16 BRPM | BODY MASS INDEX: 29.92 KG/M2 | OXYGEN SATURATION: 97 % | DIASTOLIC BLOOD PRESSURE: 72 MMHG | SYSTOLIC BLOOD PRESSURE: 128 MMHG | HEIGHT: 70 IN | HEART RATE: 101 BPM

## 2021-04-23 DIAGNOSIS — M54.16 LUMBAR RADICULOPATHY: Primary | ICD-10-CM

## 2021-04-23 PROCEDURE — G8427 DOCREV CUR MEDS BY ELIG CLIN: HCPCS | Performed by: NEUROLOGICAL SURGERY

## 2021-04-23 PROCEDURE — G8417 CALC BMI ABV UP PARAM F/U: HCPCS | Performed by: NEUROLOGICAL SURGERY

## 2021-04-23 PROCEDURE — 99214 OFFICE O/P EST MOD 30 MIN: CPT | Performed by: NEUROLOGICAL SURGERY

## 2021-04-23 PROCEDURE — 99215 OFFICE O/P EST HI 40 MIN: CPT | Performed by: NEUROLOGICAL SURGERY

## 2021-04-23 PROCEDURE — 4004F PT TOBACCO SCREEN RCVD TLK: CPT | Performed by: NEUROLOGICAL SURGERY

## 2021-04-23 SDOH — HEALTH STABILITY: MENTAL HEALTH: HOW OFTEN DO YOU HAVE A DRINK CONTAINING ALCOHOL?: NEVER

## 2021-04-23 NOTE — PROGRESS NOTES
[address] [place] [address], [address] [place] [id number]  Dept: [phone]  Loc: [phone]    Patient:  Cindi Klinefelter  YOB: 1990  Date: 4/23/21    The patient is a 31 y.o. male who presents today for consult of the following problems:     Chief Complaint   Patient presents with    Back Pain     2 mo f/u w/ XR and EMG             HPI:     Cindi Klinefelter is a 31 y.o. male on whom neurosurgical consultation was requested by Elisa Jackson DO for management of left-sided L3 radiculopathy. The patient did have approximately 48 hours of significant provement of the L3 dermatomal pattern after his transforaminal injection but no improvement from the medial branch block. Patient's been through a full course of physical therapy as well as prior injections and continues conservative measures at home with no significant relief. Symptoms appear to improve somewhat when she left when he lies down and somewhat improved when he sits. Ambulation triggers a significant amount of deep buttock pain that does radiate along the lateral aspect upper thigh or proximal IT band region and then anteriorly slightly. No distal numbness tingling or pain past the knee or in the feet. No right leg symptoms or groin symptoms. 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 Owen Srivastava       History:     Past Medical History:   Diagnosis Date    Asthma     Bipolar 1 disorder ([facility] [address]     Borderline personality disorder ([facility] [address] [facility]     Depression     Heart murmur     Fgkx-Dabmr-Mxdzqqg disease, left     Multiple allergies     PTSD (post-traumatic stress disorder)     PTSD (post-traumatic stress disorder)     Restless legs syndrome      Past Surgical History:   Procedure Laterality Date    [facility] INJECTION PROCEDURE FOR SACROILIAC JOINT Left 10/23/2020    SACROILIAC JOINT tablet by mouth daily 30 tablet 3    traZODone (DESYREL) 150 MG tablet Take 1 tablet by mouth nightly 30 tablet 2    prazosin (MINIPRESS) 2 MG capsule Take 1 capsule by mouth nightly 30 capsule 2    lamoTRIgine (LAMICTAL) 150 MG tablet Take 1 tablet by mouth daily 30 tablet 2    loratadine (CLARITIN) 10 MG tablet Take 1 tablet by mouth daily 30 tablet 3    montelukast (SINGULAIR) 10 MG tablet Take 1 tablet by mouth daily 30 tablet 3     No current facility-administered medications on file prior to visit. Social History     Tobacco Use    Smoking status: Current Every Day Smoker     Packs/day: 0.50     Years: 10.00     Pack years: 5.00     Types: Cigarettes    Smokeless tobacco: Former User     Types: Chew   Substance Use Topics    Alcohol use: No     Frequency: Never     Binge frequency: Never    Drug use: Yes     Types: Marijuana     Comment: medical gummy used 10/04/2020       No Known Allergies    Review of Systems  Constitutional: Negative for activity change and appetite change. HENT: Negative for ear pain and facial swelling. Eyes: Negative for discharge and itching. Respiratory: Negative for choking and chest tightness. Cardiovascular: Negative for chest pain and leg swelling. Gastrointestinal: Negative for nausea and abdominal pain. Endocrine: Negative for cold intolerance and heat intolerance. Genitourinary: Negative for frequency and flank pain. Musculoskeletal: Negative for myalgias and joint swelling. Skin: Negative for rash and wound. Allergic/Immunologic: Negative for environmental allergies and food allergies. Hematological: Negative for adenopathy. Does not bruise/bleed easily. Psychiatric/Behavioral: Negative for self-injury. The patient is not nervous/anxious.       Physical Exam:      /72 (Site: Left Upper Arm, Position: Sitting, Cuff Size: Large Adult)   Pulse 101   Resp 16   Ht 5' 10\" (1.778 m)   Wt 209 lb (94.8 kg)   SpO2 97%   BMI 29.99 kg/m² Estimated body mass index is 29.99 kg/m² as calculated from the following:    Height as of this encounter: 5' 10\" (1.778 m). Weight as of this encounter: 209 lb (94.8 kg). General:  Rani Blanton is a 31y.o. year old male who appears his stated age. HEENT: Normocephalic atraumatic. Neck supple. Chest: regular rate; pulses equal  Abdomen: Soft nontender nondistended. Normoactive bowel sounds. Ext: DP and PT pulses 2+, good cap refill  Neuro    Mentation  Appropriate affect  Registration intact  Orientation intact  3 item recall intact  Judgement intact to situation    Cranial Nerves:   Pupils equal and reactive to light  Extraocular motion intact  Face and shrug symmetric  Tongue midline  No dysarthria  v1-3 sensation symmetric, masseter tone symmetric  Hearing symmetric and intact to finger rub    Sensation:   Intact    Motor  L deltoid 5/5; R deltoid 5/5  L biceps 5/5; R biceps 5/5  L triceps 5/5; R triceps 5/5  L wrist extension 5/5; R wrist extension 5/5  L intrinsics 5/5; R intrinsics 5/5     L iliopsoas 5/5 , R iliopsoas 5/5  L quadriceps 5/5; R quadriceps 5/5  L Dorsiflexion 5/5; R dorsiflexion 5/5  L Plantarflexion 5/5; R plantarflexion 5/5  L EHL 5/5; R EHL 5/5    Reflexes  L Brachioradialis 2+/4; R brachioradialis 2+/4  L Biceps 2+/4; R Biceps 2+/4  L Triceps 2+/4; R Triceps 2+/4  L Patellar 2+/4: R Patellar 2+/4  L Achilles 2+/4; R Achilles 2+/4    hoffmans L: neg  hoffmans R: neg  Clonus L: neg  Clonus R: neg  Babinski L: up  Babinski R; up    Slight positive Delmas Fu on the left side with external rotation present. Mild tenderness to palpation of the greater trochanter negative straight leg raise. Studies Review:       MRI lumbar spine showing a far lateral disc at L3-4 affecting the exiting L3 nerve root. Some disc desiccation and loss of height overall stable disease with no central stenosis.   Significant left-sided foraminal disease however    Extension x-rays with both fishmouthing of the posterior margin L3-4 as well as a grade 1 anterolisthesis that is slightly dynamic. EMG unremarkable    Assessment and Plan:      1. Lumbar radiculopathy          Plan:     Cumulative diagnosis based on work-up including an MRI showing a far lateral disc at L3-4, flexion-extension x-rays with gross instability at L3-4 along with significant improvement from a transforaminal focal L3 injection coincide with what appears to be a clinical L3 radiculopathy that thus far has failed conservative measures. I did explain to the patient that from a surgical standpoint there are 2 options would include a far lateral minimally invasive discectomy as well as a complete fusion at L3-4 anterior versus posterior approach. At this point there is some mild instability at this level however the disc is far lateral I do believe that the predominance of his symptoms are related to radiculopathy. Considering his young age as well as focal radiculopathy from a far lateral disc I do think that we have the option for minimally invasive discectomy and not necessarily fusing this level despite the fact that there is a pars defect with some instability on imaging. Overall the patient's symptoms not appear to be grossly dynamic based on positional correlation as well. Based on the above measures I had a lengthy discussion regarding both options and I did explain that we have the option considering his pathology as well as his fairly young age to perform a smaller operation with L3-4 far lateral discectomy that would take approximately 1 to 2 hours with 1 night hospital stay in. There is the risk that this operation would not be successful and he would have persistent radicular symptoms but then we still have the option for an open approach an L3-4 posterior lateral or anterior fusion. Risks include nerve injury CSF leak bleeding infection.   We will move forward with left L3-4 far lateral discectomy    Followup: No follow-ups on file. Prescriptions Ordered:  No orders of the defined types were placed in this encounter. Orders Placed:  No orders of the defined types were placed in this encounter. Electronically signed by [facility] DO Kun on 4/23/2021 at 10:17 AM    Please note that this chart was generated using voice recognition Dragon dictation software. Although every effort was made to ensure the accuracy of this automated transcription, some errors in transcription may have occurred.

## 2021-04-27 ENCOUNTER — TELEPHONE (OUTPATIENT)
Dept: NEUROSURGERY | Age: 31
End: 2021-04-27

## 2021-04-27 DIAGNOSIS — Z01.818 PRE-OPERATIVE CLEARANCE: Primary | ICD-10-CM

## 2021-04-27 NOTE — TELEPHONE ENCOUNTER
Patient would like to do covid test for surgery in [address] [place] [address] . ..     Order attached needs signed

## 2021-04-30 ASSESSMENT — ENCOUNTER SYMPTOMS
BOWEL INCONTINENCE: 0
BACK PAIN: 1

## 2021-04-30 NOTE — PROGRESS NOTES
HPI:     Back Pain  This is a chronic problem. The current episode started more than 1 year ago. The problem occurs constantly. The problem has been gradually worsening since onset. The pain is present in the lumbar spine. The quality of the pain is described as stabbing. The pain radiates to the left thigh. The pain is at a severity of 5/10. The pain is worse during the night. The symptoms are aggravated by standing (walking). Associated symptoms include leg pain and weakness. Pertinent negatives include no bladder incontinence, bowel incontinence, fever, numbness or tingling. Treatments tried: Lumbar Transforminal Left L3. The treatment provided significant relief. Had left-sided L3 transforaminal with excellent temporary benefit. He has recently seen a surgeon and plans to go ahead with decompression at this level. Patient denies any new neurological symptoms. Nobowel or bladder incontinence, no weakness, and no falling. Review of OARRS does not show any aberrant prescription behavior.      Past Medical History:   Diagnosis Date    Asthma     [facility] disorder ([facility]     Borderline personality disorder ([facility]     Depression     Heart murmur     Jqbf-Hkzsw-Ezfxrmq disease, left     Multiple allergies     PTSD (post-traumatic stress disorder)     PTSD (post-traumatic stress disorder)     Restless legs syndrome        Past Surgical History:   Procedure Laterality Date    [facility] INJECTION PROCEDURE FOR SACROILIAC JOINT Left 10/23/2020    SACROILIAC JOINT INJECTION performed by Karen Lares MD at [address] [facility] Left 2011    Left hip replacement    NERVE BLOCK N/A 3/12/2021    NERVE BLOCK #1 L3-4, L4-5 performed by Karen Lares MD at [address] [place] [address]  10/23/2020    SACROILIAC JOINT INJECTION    PAIN MANAGEMENT PROCEDURE Left 01/29/2021    : LUMBAR TRANSFORAMINAL L3-4 (Left )    PAIN MANAGEMENT PROCEDURE Left 1/29/2021    LUMBAR TRANSFORAMINAL L3-4 performed by Kiki Valle MD at [address] [place] [address]  02/12/2021    LUMBAR TRANSFORAMINAL L3-4 - Left    PAIN MANAGEMENT PROCEDURE Left 2/12/2021    EPIDURAL STEROID INJECTION caudal performed by Kiki Valle MD at [address] [place] [address]  04/16/2021    LUMBAR TRANSFORAMINAL L3 - Left    PAIN MANAGEMENT PROCEDURE Left 4/16/2021    LUMBAR TRANSFORAMINAL L3 performed by Kiki Valle MD at [facility]         No Known Allergies      Current Outpatient Medications:     fluticasone (FLONASE) 50 MCG/ACT nasal spray, 2 sprays by Each Nostril route daily Start with one spray daily for one week and increase to two sprays daily as needed, Disp: 1 Bottle, Rfl: 3    varenicline (CHANTIX CONTINUING MONTH AASHISH) 1 MG tablet, Take 1 tablet by mouth 2 times daily, Disp: 60 tablet, Rfl: 3    rOPINIRole (REQUIP) 1 MG tablet, Take 1 tablet by mouth nightly, Disp: 90 tablet, Rfl: 3    escitalopram (LEXAPRO) 20 MG tablet, Take 1 tablet by mouth daily, Disp: 90 tablet, Rfl: 1    fluticasone-salmeterol (ADVAIR DISKUS) 100-50 MCG/DOSE diskus inhaler, Inhale 1 puff into the lungs every 12 hours, Disp: 60 each, Rfl: 3    albuterol sulfate HFA (VENTOLIN HFA) 108 (90 Base) MCG/ACT inhaler, Inhale 2 puffs into the lungs every 6 hours as needed for Wheezing, Disp: 1 Inhaler, Rfl: 0    pantoprazole (PROTONIX) 20 MG tablet, Take 1 tablet by mouth daily, Disp: 30 tablet, Rfl: 3    traZODone (DESYREL) 150 MG tablet, Take 1 tablet by mouth nightly, Disp: 30 tablet, Rfl: 2    prazosin (MINIPRESS) 2 MG capsule, Take 1 capsule by mouth nightly, Disp: 30 capsule, Rfl: 2    lamoTRIgine (LAMICTAL) 150 MG tablet, Take 1 tablet by mouth daily, Disp: 30 tablet, Rfl: 2    loratadine (CLARITIN) 10 MG tablet, Take 1 tablet by mouth daily, Disp: 30 tablet, Rfl: 3    montelukast (SINGULAIR) 10 MG tablet, Take 1 tablet by mouth daily, Disp: 30 tablet, Rfl: 3    Family History   Problem Relation Age of Onset    Depression Mother     Heart Disease Father        Social History     Socioeconomic History    Marital status:      Spouse name: Not on file    Number of children: Not on file    Years of education: Not on file    Highest education level: Not on file   Occupational History    Not on file   Social Needs    Financial resource strain: Not hard at all   [employer] insecurity     Worry: Never true     Inability: Never true   [employer] needs     Medical: No     Non-medical: No   Tobacco Use    Smoking status: Current Every Day Smoker     Packs/day: 0.50     Years: 10.00     Pack years: 5.00     Types: Cigarettes    Smokeless tobacco: Former User     Types: Chew   Substance and Sexual Activity    Alcohol use: No     Frequency: Never     Binge frequency: Never    Drug use: Yes     Types: Marijuana     Comment: medical gummy used 10/04/2020    Sexual activity: Yes     Partners: Female   Lifestyle    Physical activity     Days per week: Not on file     Minutes per session: Not on file    Stress: Not on file   Relationships    Social connections     Talks on phone: Not on file     Gets together: Not on file     Attends Christian service: Not on file     Active member of club or organization: Not on file     Attends meetings of clubs or organizations: Not on file     Relationship status: Not on file    Intimate partner violence     Fear of current or ex partner: Not on file     Emotionally abused: Not on file     Physically abused: Not on file     Forced sexual activity: Not on file   Other Topics Concern    Not on file   Social History Narrative    Not on file       Review of Systems:  Review of Systems   Constitution: Negative for fever. Musculoskeletal: Positive for back pain. Gastrointestinal: Negative for bowel incontinence. Genitourinary: Negative for bladder incontinence.    Neurological: Positive for weakness. Negative for numbness and tingling. Physical Exam:      Physical Exam  Constitutional:       Appearance: Normal appearance. Pulmonary:      Effort: Pulmonary effort is normal.   Neurological:      Mental Status: He is alert. Psychiatric:         Attention and Perception: Attention and perception normal.         Mood and Affect: Mood and affect normal.           Assessment:  1. Lumbar radiculopathy        Treatment Plan:  DISCUSSION: Treatment options discussed with patient and all questions answered to patient's satisfaction. OARRS Review: Reviewed and acceptable for medications prescribed. TREATMENT OPTIONS:     Good temporary benefit with epidural steroid injection. He has seen a surgeon. Plans to go ahead with  lumbar decompression. Fredy Mar M.D. I have reviewed the chief complaint and history of present illness (including ROS and PFSH) and vital documentation by my staff and I agree with their documentation and have added where applicable. Kandace Mane, was evaluated through a synchronous (real-time) audio-video encounter. The patient (or guardian if applicable) is aware that this is a billable service. Verbal consent to proceed has been obtained within the past 12 months. The visit was conducted pursuant to the emergency declaration under the [facility], [address] [place] [address] authority and the [employer] Act. Patient identification was verified, and a caregiver was present when appropriate. The patient was located in a state where the provider was credentialed to provide care. Total time spent for this encounter: Not billed by time    --Emily Pizano MD on 5/3/2021 at 9:43 AM    An electronic signature was used to authenticate this note.

## 2021-05-03 ENCOUNTER — VIRTUAL VISIT (OUTPATIENT)
Dept: PAIN MANAGEMENT | Age: 31
End: 2021-05-03
Payer: MEDICAID

## 2021-05-03 DIAGNOSIS — M54.16 LUMBAR RADICULOPATHY: Primary | ICD-10-CM

## 2021-05-03 PROCEDURE — 99212 OFFICE O/P EST SF 10 MIN: CPT | Performed by: PAIN MEDICINE

## 2021-05-03 PROCEDURE — G8427 DOCREV CUR MEDS BY ELIG CLIN: HCPCS | Performed by: PAIN MEDICINE

## 2021-05-10 ENCOUNTER — TELEPHONE (OUTPATIENT)
Dept: PSYCHIATRY | Age: 31
End: 2021-05-10

## 2021-05-10 RX ORDER — LAMOTRIGINE 200 MG/1
200 TABLET ORAL DAILY
Qty: 30 TABLET | Refills: 1 | Status: SHIPPED | OUTPATIENT
Start: 2021-05-10 | End: 2021-06-09 | Stop reason: SDUPTHER

## 2021-05-10 NOTE — TELEPHONE ENCOUNTER
I have sent Rx for Lamictal 200 mg once daily to pharmacy.    Electronically signed by Christi Jo MD on 5/10/2021 at 12:26 PM

## 2021-05-10 NOTE — TELEPHONE ENCOUNTER
Message received via Company:    I was wondering since I've been doing fine with my increase on lexapro if we were able to increase the lamictal now I still have some irritability and wanted to try the increase       *pt is scheduled to return 6/9

## 2021-05-17 RX ORDER — SODIUM CHLORIDE, SODIUM LACTATE, POTASSIUM CHLORIDE, CALCIUM CHLORIDE 600; 310; 30; 20 MG/100ML; MG/100ML; MG/100ML; MG/100ML
1000 INJECTION, SOLUTION INTRAVENOUS CONTINUOUS
Status: CANCELLED | OUTPATIENT
Start: 2021-05-17

## 2021-05-18 ENCOUNTER — HOSPITAL ENCOUNTER (OUTPATIENT)
Dept: PREADMISSION TESTING | Age: 31
Discharge: HOME | End: 2021-05-22
Payer: MEDICAID

## 2021-05-18 VITALS
OXYGEN SATURATION: 96 % | HEART RATE: 72 BPM | DIASTOLIC BLOOD PRESSURE: 80 MMHG | WEIGHT: 206 LBS | SYSTOLIC BLOOD PRESSURE: 131 MMHG | RESPIRATION RATE: 18 BRPM | HEIGHT: 70 IN | BODY MASS INDEX: 29.49 KG/M2 | TEMPERATURE: 98.6 F

## 2021-05-18 LAB
ABO/RH: NORMAL
ANION GAP SERPL CALCULATED.3IONS-SCNC: 17 MMOL/L (ref 9–17)
ANTIBODY SCREEN: NEGATIVE
ARM BAND NUMBER: NORMAL
BUN BLDV-MCNC: 8 MG/DL (ref 6–20)
CHLORIDE BLD-SCNC: 107 MMOL/L (ref 98–107)
CO2: 21 MMOL/L (ref 20–31)
CREAT SERPL-MCNC: 0.8 MG/DL (ref 0.7–1.2)
EXPIRATION DATE: NORMAL
GFR AFRICAN AMERICAN: >60 ML/MIN
GFR NON-AFRICAN AMERICAN: >60 ML/MIN
GFR SERPL CREATININE-BSD FRML MDRD: NORMAL ML/MIN/{1.73_M2}
GFR SERPL CREATININE-BSD FRML MDRD: NORMAL ML/MIN/{1.73_M2}
GLUCOSE BLD-MCNC: 103 MG/DL (ref 70–99)
HCT VFR BLD CALC: 46.4 % (ref 40.7–50.3)
HEMOGLOBIN: 15.1 G/DL (ref 13–17)
MCH RBC QN AUTO: 29.8 PG (ref 25.2–33.5)
MCHC RBC AUTO-ENTMCNC: 32.5 G/DL (ref 28.4–34.8)
MCV RBC AUTO: 91.5 FL (ref 82.6–102.9)
NRBC AUTOMATED: 0 PER 100 WBC
PDW BLD-RTO: 13.2 % (ref 11.8–14.4)
PLATELET # BLD: 200 K/UL (ref 138–453)
PMV BLD AUTO: 9.7 FL (ref 8.1–13.5)
POTASSIUM SERPL-SCNC: 4.6 MMOL/L (ref 3.7–5.3)
RBC # BLD: 5.07 M/UL (ref 4.21–5.77)
SODIUM BLD-SCNC: 145 MMOL/L (ref 135–144)
WBC # BLD: 7.7 K/UL (ref 3.5–11.3)

## 2021-05-18 PROCEDURE — 86901 BLOOD TYPING SEROLOGIC RH(D): CPT

## 2021-05-18 PROCEDURE — 86900 BLOOD TYPING SEROLOGIC ABO: CPT

## 2021-05-18 PROCEDURE — 86850 RBC ANTIBODY SCREEN: CPT

## 2021-05-18 PROCEDURE — 36415 COLL VENOUS BLD VENIPUNCTURE: CPT

## 2021-05-18 PROCEDURE — 85027 COMPLETE CBC AUTOMATED: CPT

## 2021-05-18 PROCEDURE — 82947 ASSAY GLUCOSE BLOOD QUANT: CPT

## 2021-05-18 PROCEDURE — 84520 ASSAY OF UREA NITROGEN: CPT

## 2021-05-18 PROCEDURE — 93005 ELECTROCARDIOGRAM TRACING: CPT | Performed by: ANESTHESIOLOGY

## 2021-05-18 PROCEDURE — 82565 ASSAY OF CREATININE: CPT

## 2021-05-18 PROCEDURE — 80051 ELECTROLYTE PANEL: CPT

## 2021-05-18 RX ORDER — AZELASTINE HYDROCHLORIDE 0.5 MG/ML
1 SOLUTION/ DROPS OPHTHALMIC 2 TIMES DAILY PRN
COMMUNITY
Start: 2021-04-27

## 2021-05-18 ASSESSMENT — PAIN DESCRIPTION - PAIN TYPE: TYPE: ACUTE PAIN;CHRONIC PAIN

## 2021-05-18 ASSESSMENT — PAIN DESCRIPTION - LOCATION: LOCATION: BUTTOCKS;BACK;HIP

## 2021-05-18 ASSESSMENT — PAIN DESCRIPTION - ORIENTATION: ORIENTATION: LEFT

## 2021-05-18 ASSESSMENT — PAIN SCALES - GENERAL: PAINLEVEL_OUTOF10: 7

## 2021-05-18 NOTE — H&P (VIEW-ONLY)
History and Physical    Pt Name: Bhupinder Riggins  MRN: [id number]  YOB: 1990  Date of evaluation: 5/18/2021    SUBJECTIVE:   History of Chief Complaint:    Patient presents for PAT appointment, complains of lumbar disc disease. He has lumbar pain, as well as LLE pain without numbness/tingling. He has underwent nerve block injections through pain management and says that one specifically was helpful at L3. He has failed other conservative treatment and has been scheduled for lumbar microdiscectomy. Past Medical History    has a past medical history of Asthma, Bipolar 1 disorder ([facility] [address] Borderline personality disorder ([facility] [address]ch[facility] disease) [facility] Depression, GERD (gastroesophageal reflux disease), History of cardiac murmur in childhood, Kjgu-Qzasa-Fjuukcp disease, left, Lumbar disc disease, Multiple allergies, PTSD (post-traumatic stress disorder), Restless legs syndrome, Teeth missing, Under care of team, and Wellness examination. Past Surgical History   has a past surgical history that includes Total hip arthroplasty (Left, 2011); Tympanostomy tube placement; Pain management procedure (10/23/2020); Injection Procedure For Sacroiliac Joint (Left, 10/23/2020); Pain management procedure (Left, 01/29/2021); Pain management procedure (Left, 01/29/2021); Pain management procedure (02/12/2021); Pain management procedure (Left, 02/12/2021); Nerve Block (N/A, 03/12/2021); Pain management procedure (04/16/2021); Pain management procedure (Left, 04/16/2021); and [place] tooth extraction. Medications  Prior to Admission medications    Medication Sig Start Date End Date Taking?  Authorizing Provider   lamoTRIgine (LAMICTAL) 200 MG tablet Take 1 tablet by mouth daily 5/10/21  Yes Daniel Troy MD   fluticasone [facility]) 50 MCG/ACT nasal spray 2 sprays by Each Nostril route daily Start with one spray daily for one week and increase to two sprays daily as needed 4/21/21  Yes Shirley León

## 2021-05-18 NOTE — PROGRESS NOTES
Anesthesia Focused Assessment    Has patient ever tested positive for COVID? No    STOP-BANG Sleep Apnea Questionnaire    SNORE loudly (heard through closed doors)? No  TIRED, fatigued, sleepy during daytime? No  OBSERVED stopping breathing during sleep? No  High blood PRESSURE being treated? No    BMI over 35? No  AGE over 48? No  NECK circumference over 16\"? No  GENDER (male)? Yes             Total 1  High risk 5-8  Intermediate risk 3-4  Low risk 0-2    Obstructive Sleep Apnea: denies  If YES, machine used: no     Type 1 DM:   no  T2DM:  no    Coronary Artery Disease:  no  Hypertension:  no    Active smoker:  1/2 ppd for 10 years. Drinks Alcohol:  no  Marijuana usage    Dentition: upper partial denture, not worn    Defib / AICD / Pacemaker: no      Renal Failure/dialysis:  no    Patient was evaluated in PAT & anesthesia guidelines were applied. NPO guidelines, medication instructions and scheduled arrival time were reviewed with patient.     Hx of anesthesia complications:  no  Family hx of anesthesia complications:  no                                                                                                                     Anesthesia contacted:   no  Medical or cardiac clearance ordered: no    TRACY Gomez PA-C  5/18/21  2:52 PM

## 2021-05-18 NOTE — H&P
History and Physical    Pt Name: Nga Bruce  MRN: [id number]  YOB: 1990  Date of evaluation: 5/18/2021    SUBJECTIVE:   History of Chief Complaint:    Patient presents for PAT appointment, complains of lumbar disc disease. He has lumbar pain, as well as LLE pain without numbness/tingling. He has underwent nerve block injections through pain management and says that one specifically was helpful at L3. He has failed other conservative treatment and has been scheduled for lumbar microdiscectomy. Past Medical History    has a past medical history of Asthma, Bipolar 1 disorder ([facility] Borderline personality disorder ([facility] COPD (chronic obstructive pulmonary disease) ([facility] Depression, GERD (gastroesophageal reflux disease), History of cardiac murmur in childhood, Fzrr-Sjlgw-Qalyvsf disease, left, Lumbar disc disease, Multiple allergies, PTSD (post-traumatic stress disorder), Restless legs syndrome, Teeth missing, Under care of team, and Wellness examination. Past Surgical History   has a past surgical history that includes Total hip arthroplasty (Left, 2011); Tympanostomy tube placement; Pain management procedure (10/23/2020); Injection Procedure For Sacroiliac Joint (Left, 10/23/2020); Pain management procedure (Left, 01/29/2021); Pain management procedure (Left, 01/29/2021); Pain management procedure (02/12/2021); Pain management procedure (Left, 02/12/2021); Nerve Block (N/A, 03/12/2021); Pain management procedure (04/16/2021); Pain management procedure (Left, 04/16/2021); and [place] tooth extraction. Medications  Prior to Admission medications    Medication Sig Start Date End Date Taking?  Authorizing Provider   lamoTRIgine (LAMICTAL) 200 MG tablet Take 1 tablet by mouth daily 5/10/21  Yes Angelica Teixeira MD   fluticasone [facility]) 50 MCG/ACT nasal spray 2 sprays by Each Nostril route daily Start with one spray daily for one week and increase to two sprays daily as needed 4/21/21  Yes Eloy Crea DO Shahida   varenicline (CHANTIX CONTINUING MONTH AASHISH) 1 MG tablet Take 1 tablet by mouth 2 times daily 4/21/21  Yes Fredy Matthew DO   rOPINIRole (REQUIP) 1 MG tablet Take 1 tablet by mouth nightly 4/21/21  Yes Fredy Matthew DO   escitalopram (LEXAPRO) 20 MG tablet Take 1 tablet by mouth daily 4/21/21  Yes Raphael Pinedo DO   fluticasone-salmeterol (ADVAIR DISKUS) 100-50 MCG/DOSE diskus inhaler Inhale 1 puff into the lungs every 12 hours 4/21/21  Yes Raphael Pinedo DO   albuterol sulfate HFA (VENTOLIN HFA) 108 (90 Base) MCG/ACT inhaler Inhale 2 puffs into the lungs every 6 hours as needed for Wheezing 4/21/21  Yes Fredy Mattehw DO   pantoprazole (PROTONIX) 20 MG tablet Take 1 tablet by mouth daily 4/21/21 8/19/21 Yes Fredy Matthew DO   traZODone (DESYREL) 150 MG tablet Take 1 tablet by mouth nightly 4/15/21  Yes Stacy Gibson MD   prazosin (MINIPRESS) 2 MG capsule Take 1 capsule by mouth nightly 4/15/21  Yes Stacy Gibson MD   loratadine (CLARITIN) 10 MG tablet Take 1 tablet by mouth daily 4/15/21  Yes Raphael Pinedo DO   montelukast (SINGULAIR) 10 MG tablet Take 1 tablet by mouth daily 2/25/21  Yes Raphael Pinedo DO   azelastine (OPTIVAR) 0.05 % ophthalmic solution Place 1 drop into both eyes 2 times daily as needed 4/27/21   Historical Provider, MD     Allergies  has No Known Allergies. Family History  family history includes Depression in his mother; Heart Disease in his father. Social History   reports that he has been smoking cigarettes. He has a 5.00 pack-year smoking history. He has quit using smokeless tobacco.  His smokeless tobacco use included chew. reports no history of alcohol use. reports previous drug use. Drug: Marijuana. Marital Status   Occupation unemployed    OBJECTIVE:   VITALS:  height is 5' 10\" (1.778 m) and weight is 206 lb (93.4 kg). His temporal temperature is 98.6 °F (37 °C). His blood pressure is 131/80 and his pulse is 72.  His respiration is 18 and oxygen saturation is 96%. CONSTITUTIONAL:alert & cooperative, no acute distress. Very pleasant. Present with wife and child. SKIN:  Warm and dry, no rashes on exposed areas of skin. Tattoos present on the skin. HEAD:  Normocephalic, atraumatic. EYES: EOMs intact. Wearing glasses. EARS:  Hearing grossly WNL. NOSE:  Nares patent. No rhinorrhea. MOUTH/THROAT:  benign  NECK:supple, no lymphadenopathy  LUNGS: Clear to auscultation bilaterally, no wheezes, rales, or rhonchi. CARDIOVASCULAR: Heart sounds are normal.  Regular rate and rhythm without murmur. ABDOMEN: soft, non tender, non distended. EXTREMITIES: no edema bilateral lower extremities. Testing:   EK21  Labs pending: drawn 2021     IMPRESSIONS:   Lumbar disc disease   has a past medical history of Asthma, Bipolar 1 disorder ([address] Borderline personality disorder ([address] COPD (chronic obstructive pulmonary disease) ([facility]), Depression, GERD (gastroesophageal reflux disease), History of cardiac murmur in childhood, Tvwg-Hfxza-Iymloba disease, left, Lumbar disc disease, Multiple allergies, PTSD (post-traumatic stress disorder), Restless legs syndrome, Teeth missing, Under care of team (2021), and Wellness examination (2021).    PLANS:   Lumbar microdiskectomy    TRACY Cramer PA-C  Electronically signed 2021 at 3:11 PM

## 2021-05-19 LAB
EKG ATRIAL RATE: 49 BPM
EKG P AXIS: 44 DEGREES
EKG P-R INTERVAL: 152 MS
EKG Q-T INTERVAL: 404 MS
EKG QRS DURATION: 110 MS
EKG QTC CALCULATION (BAZETT): 364 MS
EKG R AXIS: 26 DEGREES
EKG T AXIS: 13 DEGREES
EKG VENTRICULAR RATE: 49 BPM

## 2021-05-19 PROCEDURE — 93010 ELECTROCARDIOGRAM REPORT: CPT | Performed by: INTERNAL MEDICINE

## 2021-05-28 ENCOUNTER — HOSPITAL ENCOUNTER (OUTPATIENT)
Age: 31
Setting detail: SPECIMEN
Discharge: HOME | End: 2021-05-28
Payer: MEDICAID

## 2021-05-28 DIAGNOSIS — Z01.818 PRE-OPERATIVE CLEARANCE: ICD-10-CM

## 2021-05-28 PROCEDURE — U0003 INFECTIOUS AGENT DETECTION BY NUCLEIC ACID (DNA OR RNA); SEVERE ACUTE RESPIRATORY SYNDROME CORONAVIRUS 2 (SARS-COV-2) (CORONAVIRUS DISEASE [COVID-19]), AMPLIFIED PROBE TECHNIQUE, MAKING USE OF HIGH THROUGHPUT TECHNOLOGIES AS DESCRIBED BY CMS-2020-01-R: HCPCS

## 2021-05-28 PROCEDURE — U0005 INFEC AGEN DETEC AMPLI PROBE: HCPCS

## 2021-05-30 LAB
SARS-COV-2: NOT DETECTED
SOURCE: NORMAL

## 2021-06-01 ENCOUNTER — HOSPITAL ENCOUNTER (INPATIENT)
Age: 31
LOS: 1 days | Discharge: HOME | DRG: 310 | End: 2021-06-01
Attending: NEUROLOGICAL SURGERY | Admitting: NEUROLOGICAL SURGERY
Payer: MEDICAID

## 2021-06-01 ENCOUNTER — APPOINTMENT (OUTPATIENT)
Dept: GENERAL RADIOLOGY | Age: 31
DRG: 310 | End: 2021-06-01
Attending: NEUROLOGICAL SURGERY
Payer: MEDICAID

## 2021-06-01 ENCOUNTER — TELEPHONE (OUTPATIENT)
Dept: FAMILY MEDICINE CLINIC | Age: 31
End: 2021-06-01

## 2021-06-01 ENCOUNTER — ANESTHESIA (OUTPATIENT)
Dept: OPERATING ROOM | Age: 31
DRG: 310 | End: 2021-06-01
Payer: MEDICAID

## 2021-06-01 ENCOUNTER — ANESTHESIA EVENT (OUTPATIENT)
Dept: OPERATING ROOM | Age: 31
DRG: 310 | End: 2021-06-01
Payer: MEDICAID

## 2021-06-01 VITALS
HEART RATE: 88 BPM | HEIGHT: 70 IN | DIASTOLIC BLOOD PRESSURE: 81 MMHG | TEMPERATURE: 97.9 F | BODY MASS INDEX: 29.49 KG/M2 | WEIGHT: 206 LBS | SYSTOLIC BLOOD PRESSURE: 130 MMHG | OXYGEN SATURATION: 98 % | RESPIRATION RATE: 20 BRPM

## 2021-06-01 VITALS — DIASTOLIC BLOOD PRESSURE: 123 MMHG | TEMPERATURE: 96.5 F | OXYGEN SATURATION: 95 % | SYSTOLIC BLOOD PRESSURE: 141 MMHG

## 2021-06-01 DIAGNOSIS — M51.9 LUMBAR DISC DISEASE: Primary | ICD-10-CM

## 2021-06-01 PROCEDURE — 63030 LAMOT DCMPRN NRV RT 1 LMBR: CPT | Performed by: NEUROLOGICAL SURGERY

## 2021-06-01 PROCEDURE — 6360000002 HC RX W HCPCS: Performed by: ANESTHESIOLOGY

## 2021-06-01 PROCEDURE — 2720000010 HC SURG SUPPLY STERILE: Performed by: NEUROLOGICAL SURGERY

## 2021-06-01 PROCEDURE — 3700000001 HC ADD 15 MINUTES (ANESTHESIA): Performed by: NEUROLOGICAL SURGERY

## 2021-06-01 PROCEDURE — 00NY0ZZ RELEASE LUMBAR SPINAL CORD, OPEN APPROACH: ICD-10-PCS | Performed by: NEUROLOGICAL SURGERY

## 2021-06-01 PROCEDURE — 3600000014 HC SURGERY LEVEL 4 ADDTL 15MIN: Performed by: NEUROLOGICAL SURGERY

## 2021-06-01 PROCEDURE — 2500000003 HC RX 250 WO HCPCS: Performed by: NEUROLOGICAL SURGERY

## 2021-06-01 PROCEDURE — APPSS15 APP SPLIT SHARED TIME 0-15 MINUTES: Performed by: NURSE PRACTITIONER

## 2021-06-01 PROCEDURE — 2580000003 HC RX 258: Performed by: ANESTHESIOLOGY

## 2021-06-01 PROCEDURE — 6360000002 HC RX W HCPCS: Performed by: NURSE ANESTHETIST, CERTIFIED REGISTERED

## 2021-06-01 PROCEDURE — 6370000000 HC RX 637 (ALT 250 FOR IP): Performed by: NURSE PRACTITIONER

## 2021-06-01 PROCEDURE — 2580000003 HC RX 258: Performed by: NURSE ANESTHETIST, CERTIFIED REGISTERED

## 2021-06-01 PROCEDURE — 7100000001 HC PACU RECOVERY - ADDTL 15 MIN: Performed by: NEUROLOGICAL SURGERY

## 2021-06-01 PROCEDURE — 6360000002 HC RX W HCPCS: Performed by: NEUROLOGICAL SURGERY

## 2021-06-01 PROCEDURE — 7100000000 HC PACU RECOVERY - FIRST 15 MIN: Performed by: NEUROLOGICAL SURGERY

## 2021-06-01 PROCEDURE — 0SB20ZZ EXCISION OF LUMBAR VERTEBRAL DISC, OPEN APPROACH: ICD-10-PCS | Performed by: NEUROLOGICAL SURGERY

## 2021-06-01 PROCEDURE — 2580000003 HC RX 258: Performed by: NEUROLOGICAL SURGERY

## 2021-06-01 PROCEDURE — G0378 HOSPITAL OBSERVATION PER HR: HCPCS

## 2021-06-01 PROCEDURE — 3600000004 HC SURGERY LEVEL 4 BASE: Performed by: NEUROLOGICAL SURGERY

## 2021-06-01 PROCEDURE — 3700000000 HC ANESTHESIA ATTENDED CARE: Performed by: NEUROLOGICAL SURGERY

## 2021-06-01 PROCEDURE — 2500000003 HC RX 250 WO HCPCS: Performed by: NURSE ANESTHETIST, CERTIFIED REGISTERED

## 2021-06-01 PROCEDURE — 6370000000 HC RX 637 (ALT 250 FOR IP): Performed by: NEUROLOGICAL SURGERY

## 2021-06-01 PROCEDURE — 3209999900 FLUORO FOR SURGICAL PROCEDURES

## 2021-06-01 PROCEDURE — 2709999900 HC NON-CHARGEABLE SUPPLY: Performed by: NEUROLOGICAL SURGERY

## 2021-06-01 PROCEDURE — 6360000002 HC RX W HCPCS

## 2021-06-01 PROCEDURE — 7100000040 HC SPAR PHASE II RECOVERY - FIRST 15 MIN: Performed by: NEUROLOGICAL SURGERY

## 2021-06-01 RX ORDER — FLUTICASONE PROPIONATE 50 MCG
2 SPRAY, SUSPENSION (ML) NASAL DAILY
Status: DISCONTINUED | OUTPATIENT
Start: 2021-06-01 | End: 2021-06-01 | Stop reason: HOSPADM

## 2021-06-01 RX ORDER — PHENYLEPHRINE HYDROCHLORIDE 10 MG/ML
INJECTION INTRAVENOUS PRN
Status: DISCONTINUED | OUTPATIENT
Start: 2021-06-01 | End: 2021-06-01 | Stop reason: SDUPTHER

## 2021-06-01 RX ORDER — POLYETHYLENE GLYCOL 3350 17 G/17G
17 POWDER, FOR SOLUTION ORAL DAILY
Status: DISCONTINUED | OUTPATIENT
Start: 2021-06-01 | End: 2021-06-01 | Stop reason: HOSPADM

## 2021-06-01 RX ORDER — MEPERIDINE HYDROCHLORIDE 50 MG/ML
12.5 INJECTION INTRAMUSCULAR; INTRAVENOUS; SUBCUTANEOUS EVERY 5 MIN PRN
Status: DISCONTINUED | OUTPATIENT
Start: 2021-06-01 | End: 2021-06-01

## 2021-06-01 RX ORDER — LIDOCAINE HYDROCHLORIDE 10 MG/ML
INJECTION, SOLUTION EPIDURAL; INFILTRATION; INTRACAUDAL; PERINEURAL PRN
Status: DISCONTINUED | OUTPATIENT
Start: 2021-06-01 | End: 2021-06-01 | Stop reason: SDUPTHER

## 2021-06-01 RX ORDER — SODIUM CHLORIDE, SODIUM LACTATE, POTASSIUM CHLORIDE, CALCIUM CHLORIDE 600; 310; 30; 20 MG/100ML; MG/100ML; MG/100ML; MG/100ML
1000 INJECTION, SOLUTION INTRAVENOUS CONTINUOUS
Status: DISCONTINUED | OUTPATIENT
Start: 2021-06-01 | End: 2021-06-01

## 2021-06-01 RX ORDER — PROPOFOL 10 MG/ML
INJECTION, EMULSION INTRAVENOUS PRN
Status: DISCONTINUED | OUTPATIENT
Start: 2021-06-01 | End: 2021-06-01 | Stop reason: SDUPTHER

## 2021-06-01 RX ORDER — VARENICLINE TARTRATE 1 MG/1
1 TABLET, FILM COATED ORAL 2 TIMES DAILY
Status: DISCONTINUED | OUTPATIENT
Start: 2021-06-01 | End: 2021-06-01 | Stop reason: HOSPADM

## 2021-06-01 RX ORDER — OXYCODONE HYDROCHLORIDE 5 MG/1
10 TABLET ORAL EVERY 4 HOURS PRN
Status: DISCONTINUED | OUTPATIENT
Start: 2021-06-01 | End: 2021-06-01 | Stop reason: HOSPADM

## 2021-06-01 RX ORDER — SODIUM CHLORIDE 0.9 % (FLUSH) 0.9 %
10 SYRINGE (ML) INJECTION EVERY 12 HOURS SCHEDULED
Status: DISCONTINUED | OUTPATIENT
Start: 2021-06-01 | End: 2021-06-01 | Stop reason: HOSPADM

## 2021-06-01 RX ORDER — CYCLOBENZAPRINE HCL 10 MG
10 TABLET ORAL 3 TIMES DAILY
Qty: 15 TABLET | Refills: 0 | Status: SHIPPED | OUTPATIENT
Start: 2021-06-01 | End: 2021-06-06

## 2021-06-01 RX ORDER — DEXAMETHASONE SODIUM PHOSPHATE 10 MG/ML
INJECTION INTRAMUSCULAR; INTRAVENOUS PRN
Status: DISCONTINUED | OUTPATIENT
Start: 2021-06-01 | End: 2021-06-01 | Stop reason: SDUPTHER

## 2021-06-01 RX ORDER — PRAZOSIN HYDROCHLORIDE 1 MG/1
2 CAPSULE ORAL NIGHTLY
Status: DISCONTINUED | OUTPATIENT
Start: 2021-06-01 | End: 2021-06-01 | Stop reason: HOSPADM

## 2021-06-01 RX ORDER — MONTELUKAST SODIUM 10 MG/1
10 TABLET ORAL DAILY
Status: DISCONTINUED | OUTPATIENT
Start: 2021-06-01 | End: 2021-06-01 | Stop reason: HOSPADM

## 2021-06-01 RX ORDER — PANTOPRAZOLE SODIUM 20 MG/1
20 TABLET, DELAYED RELEASE ORAL DAILY
Status: DISCONTINUED | OUTPATIENT
Start: 2021-06-01 | End: 2021-06-01 | Stop reason: HOSPADM

## 2021-06-01 RX ORDER — ONDANSETRON 2 MG/ML
4 INJECTION INTRAMUSCULAR; INTRAVENOUS
Status: DISCONTINUED | OUTPATIENT
Start: 2021-06-01 | End: 2021-06-01 | Stop reason: HOSPADM

## 2021-06-01 RX ORDER — FENTANYL CITRATE 50 UG/ML
50 INJECTION, SOLUTION INTRAMUSCULAR; INTRAVENOUS EVERY 5 MIN PRN
Status: DISCONTINUED | OUTPATIENT
Start: 2021-06-01 | End: 2021-06-01

## 2021-06-01 RX ORDER — LAMOTRIGINE 100 MG/1
200 TABLET ORAL DAILY
Status: DISCONTINUED | OUTPATIENT
Start: 2021-06-01 | End: 2021-06-01 | Stop reason: HOSPADM

## 2021-06-01 RX ORDER — ACETAMINOPHEN 325 MG/1
650 TABLET ORAL EVERY 6 HOURS SCHEDULED
Status: DISCONTINUED | OUTPATIENT
Start: 2021-06-01 | End: 2021-06-01 | Stop reason: HOSPADM

## 2021-06-01 RX ORDER — METHYLPREDNISOLONE ACETATE 40 MG/ML
INJECTION, SUSPENSION INTRA-ARTICULAR; INTRALESIONAL; INTRAMUSCULAR; SOFT TISSUE PRN
Status: DISCONTINUED | OUTPATIENT
Start: 2021-06-01 | End: 2021-06-01 | Stop reason: ALTCHOICE

## 2021-06-01 RX ORDER — OXYCODONE HYDROCHLORIDE 5 MG/1
5 TABLET ORAL EVERY 4 HOURS PRN
Status: DISCONTINUED | OUTPATIENT
Start: 2021-06-01 | End: 2021-06-01 | Stop reason: HOSPADM

## 2021-06-01 RX ORDER — MAGNESIUM HYDROXIDE 1200 MG/15ML
LIQUID ORAL CONTINUOUS PRN
Status: COMPLETED | OUTPATIENT
Start: 2021-06-01 | End: 2021-06-01

## 2021-06-01 RX ORDER — HYDROCODONE BITARTRATE AND ACETAMINOPHEN 5; 325 MG/1; MG/1
1 TABLET ORAL EVERY 6 HOURS PRN
Qty: 20 TABLET | Refills: 0 | Status: SHIPPED | OUTPATIENT
Start: 2021-06-01 | End: 2021-06-06

## 2021-06-01 RX ORDER — NEOSTIGMINE METHYLSULFATE 5 MG/5 ML
SYRINGE (ML) INTRAVENOUS PRN
Status: DISCONTINUED | OUTPATIENT
Start: 2021-06-01 | End: 2021-06-01 | Stop reason: SDUPTHER

## 2021-06-01 RX ORDER — ONDANSETRON 2 MG/ML
INJECTION INTRAMUSCULAR; INTRAVENOUS PRN
Status: DISCONTINUED | OUTPATIENT
Start: 2021-06-01 | End: 2021-06-01 | Stop reason: SDUPTHER

## 2021-06-01 RX ORDER — MEPERIDINE HYDROCHLORIDE 50 MG/ML
12.5 INJECTION INTRAMUSCULAR; INTRAVENOUS; SUBCUTANEOUS EVERY 5 MIN PRN
Status: DISCONTINUED | OUTPATIENT
Start: 2021-06-01 | End: 2021-06-01 | Stop reason: HOSPADM

## 2021-06-01 RX ORDER — FENTANYL CITRATE 50 UG/ML
INJECTION, SOLUTION INTRAMUSCULAR; INTRAVENOUS PRN
Status: DISCONTINUED | OUTPATIENT
Start: 2021-06-01 | End: 2021-06-01 | Stop reason: SDUPTHER

## 2021-06-01 RX ORDER — MIDAZOLAM HYDROCHLORIDE 1 MG/ML
INJECTION INTRAMUSCULAR; INTRAVENOUS PRN
Status: DISCONTINUED | OUTPATIENT
Start: 2021-06-01 | End: 2021-06-01 | Stop reason: SDUPTHER

## 2021-06-01 RX ORDER — LORATADINE 10 MG/1
10 TABLET ORAL DAILY
Status: DISCONTINUED | OUTPATIENT
Start: 2021-06-01 | End: 2021-06-01 | Stop reason: HOSPADM

## 2021-06-01 RX ORDER — SODIUM CHLORIDE 9 MG/ML
INJECTION, SOLUTION INTRAVENOUS CONTINUOUS
Status: DISCONTINUED | OUTPATIENT
Start: 2021-06-01 | End: 2021-06-01 | Stop reason: HOSPADM

## 2021-06-01 RX ORDER — ALBUTEROL SULFATE 90 UG/1
2 AEROSOL, METERED RESPIRATORY (INHALATION) EVERY 6 HOURS PRN
Status: DISCONTINUED | OUTPATIENT
Start: 2021-06-01 | End: 2021-06-01 | Stop reason: HOSPADM

## 2021-06-01 RX ORDER — GINSENG 100 MG
CAPSULE ORAL PRN
Status: DISCONTINUED | OUTPATIENT
Start: 2021-06-01 | End: 2021-06-01 | Stop reason: ALTCHOICE

## 2021-06-01 RX ORDER — FENTANYL CITRATE 50 UG/ML
25 INJECTION, SOLUTION INTRAMUSCULAR; INTRAVENOUS EVERY 5 MIN PRN
Status: DISCONTINUED | OUTPATIENT
Start: 2021-06-01 | End: 2021-06-01

## 2021-06-01 RX ORDER — FENTANYL CITRATE 50 UG/ML
50 INJECTION, SOLUTION INTRAMUSCULAR; INTRAVENOUS EVERY 5 MIN PRN
Status: DISCONTINUED | OUTPATIENT
Start: 2021-06-01 | End: 2021-06-01 | Stop reason: HOSPADM

## 2021-06-01 RX ORDER — ONDANSETRON 2 MG/ML
4 INJECTION INTRAMUSCULAR; INTRAVENOUS EVERY 6 HOURS PRN
Status: DISCONTINUED | OUTPATIENT
Start: 2021-06-01 | End: 2021-06-01 | Stop reason: HOSPADM

## 2021-06-01 RX ORDER — ESCITALOPRAM OXALATE 10 MG/1
20 TABLET ORAL DAILY
Status: DISCONTINUED | OUTPATIENT
Start: 2021-06-01 | End: 2021-06-01 | Stop reason: HOSPADM

## 2021-06-01 RX ORDER — FENTANYL CITRATE 50 UG/ML
25 INJECTION, SOLUTION INTRAMUSCULAR; INTRAVENOUS EVERY 5 MIN PRN
Status: DISCONTINUED | OUTPATIENT
Start: 2021-06-01 | End: 2021-06-01 | Stop reason: HOSPADM

## 2021-06-01 RX ORDER — EPHEDRINE SULFATE/0.9% NACL/PF 50 MG/5 ML
SYRINGE (ML) INTRAVENOUS PRN
Status: DISCONTINUED | OUTPATIENT
Start: 2021-06-01 | End: 2021-06-01 | Stop reason: SDUPTHER

## 2021-06-01 RX ORDER — CYCLOBENZAPRINE HCL 10 MG
10 TABLET ORAL 3 TIMES DAILY
Status: DISCONTINUED | OUTPATIENT
Start: 2021-06-01 | End: 2021-06-01 | Stop reason: HOSPADM

## 2021-06-01 RX ORDER — CEPHALEXIN 500 MG/1
500 CAPSULE ORAL 4 TIMES DAILY
Qty: 12 CAPSULE | Refills: 0 | Status: SHIPPED | OUTPATIENT
Start: 2021-06-01 | End: 2021-06-04

## 2021-06-01 RX ORDER — ROCURONIUM BROMIDE 10 MG/ML
INJECTION, SOLUTION INTRAVENOUS PRN
Status: DISCONTINUED | OUTPATIENT
Start: 2021-06-01 | End: 2021-06-01 | Stop reason: SDUPTHER

## 2021-06-01 RX ORDER — ROPINIROLE 1 MG/1
1 TABLET, FILM COATED ORAL NIGHTLY
Status: DISCONTINUED | OUTPATIENT
Start: 2021-06-01 | End: 2021-06-01 | Stop reason: HOSPADM

## 2021-06-01 RX ORDER — FENTANYL CITRATE 50 UG/ML
INJECTION, SOLUTION INTRAMUSCULAR; INTRAVENOUS
Status: DISCONTINUED
Start: 2021-06-01 | End: 2021-06-01 | Stop reason: HOSPADM

## 2021-06-01 RX ORDER — SODIUM CHLORIDE 9 MG/ML
INJECTION INTRAVENOUS PRN
Status: DISCONTINUED | OUTPATIENT
Start: 2021-06-01 | End: 2021-06-01 | Stop reason: SDUPTHER

## 2021-06-01 RX ORDER — SENNA PLUS 8.6 MG/1
1 TABLET ORAL DAILY PRN
Status: DISCONTINUED | OUTPATIENT
Start: 2021-06-01 | End: 2021-06-01 | Stop reason: HOSPADM

## 2021-06-01 RX ORDER — LIDOCAINE HYDROCHLORIDE AND EPINEPHRINE 10; 10 MG/ML; UG/ML
INJECTION, SOLUTION INFILTRATION; PERINEURAL PRN
Status: DISCONTINUED | OUTPATIENT
Start: 2021-06-01 | End: 2021-06-01 | Stop reason: ALTCHOICE

## 2021-06-01 RX ORDER — GLYCOPYRROLATE 1 MG/5 ML
SYRINGE (ML) INTRAVENOUS PRN
Status: DISCONTINUED | OUTPATIENT
Start: 2021-06-01 | End: 2021-06-01 | Stop reason: SDUPTHER

## 2021-06-01 RX ORDER — ONDANSETRON 2 MG/ML
4 INJECTION INTRAMUSCULAR; INTRAVENOUS
Status: DISCONTINUED | OUTPATIENT
Start: 2021-06-01 | End: 2021-06-01

## 2021-06-01 RX ADMIN — PHENYLEPHRINE HYDROCHLORIDE 100 MCG: 10 INJECTION INTRAVENOUS at 08:47

## 2021-06-01 RX ADMIN — MIDAZOLAM HYDROCHLORIDE 2 MG: 1 INJECTION, SOLUTION INTRAMUSCULAR; INTRAVENOUS at 07:51

## 2021-06-01 RX ADMIN — Medication 0.6 MG: at 09:43

## 2021-06-01 RX ADMIN — Medication 10 MG: at 08:44

## 2021-06-01 RX ADMIN — Medication 5 MG: at 08:47

## 2021-06-01 RX ADMIN — Medication 3 MG: at 09:43

## 2021-06-01 RX ADMIN — CEFAZOLIN SODIUM 2000 MG: 10 INJECTION, POWDER, FOR SOLUTION INTRAVENOUS at 08:17

## 2021-06-01 RX ADMIN — OXYCODONE HYDROCHLORIDE 5 MG: 5 TABLET ORAL at 11:09

## 2021-06-01 RX ADMIN — LIDOCAINE HYDROCHLORIDE 50 MG: 10 INJECTION, SOLUTION EPIDURAL; INFILTRATION; INTRACAUDAL; PERINEURAL at 07:54

## 2021-06-01 RX ADMIN — Medication 10 MG: at 09:37

## 2021-06-01 RX ADMIN — FENTANYL CITRATE 50 MCG: 50 INJECTION, SOLUTION INTRAMUSCULAR; INTRAVENOUS at 09:58

## 2021-06-01 RX ADMIN — PHENYLEPHRINE HYDROCHLORIDE 100 MCG: 10 INJECTION INTRAVENOUS at 09:03

## 2021-06-01 RX ADMIN — Medication 5 MG: at 09:33

## 2021-06-01 RX ADMIN — Medication 10 MG: at 09:02

## 2021-06-01 RX ADMIN — PROPOFOL 190 MG: 10 INJECTION, EMULSION INTRAVENOUS at 07:51

## 2021-06-01 RX ADMIN — PHENYLEPHRINE HYDROCHLORIDE 100 MCG: 10 INJECTION INTRAVENOUS at 08:39

## 2021-06-01 RX ADMIN — FENTANYL CITRATE 100 MCG: 50 INJECTION, SOLUTION INTRAMUSCULAR; INTRAVENOUS at 07:54

## 2021-06-01 RX ADMIN — Medication 10 MG: at 08:50

## 2021-06-01 RX ADMIN — SODIUM CHLORIDE, POTASSIUM CHLORIDE, SODIUM LACTATE AND CALCIUM CHLORIDE 1000 ML: 600; 310; 30; 20 INJECTION, SOLUTION INTRAVENOUS at 07:28

## 2021-06-01 RX ADMIN — DEXAMETHASONE SODIUM PHOSPHATE 10 MG: 10 INJECTION INTRAMUSCULAR; INTRAVENOUS at 08:10

## 2021-06-01 RX ADMIN — ONDANSETRON 4 MG: 2 INJECTION INTRAMUSCULAR; INTRAVENOUS at 09:39

## 2021-06-01 RX ADMIN — OXYCODONE HYDROCHLORIDE 5 MG: 5 TABLET ORAL at 13:57

## 2021-06-01 RX ADMIN — ROCURONIUM BROMIDE 50 MG: 10 INJECTION INTRAVENOUS at 07:51

## 2021-06-01 RX ADMIN — FENTANYL CITRATE 25 MCG: 50 INJECTION, SOLUTION INTRAMUSCULAR; INTRAVENOUS at 09:39

## 2021-06-01 RX ADMIN — SODIUM CHLORIDE 9 ML: 9 INJECTION INTRAMUSCULAR; INTRAVENOUS; SUBCUTANEOUS at 08:39

## 2021-06-01 RX ADMIN — FENTANYL CITRATE 25 MCG: 50 INJECTION, SOLUTION INTRAMUSCULAR; INTRAVENOUS at 08:33

## 2021-06-01 RX ADMIN — FENTANYL CITRATE 25 MCG: 50 INJECTION, SOLUTION INTRAMUSCULAR; INTRAVENOUS at 10:49

## 2021-06-01 RX ADMIN — SODIUM CHLORIDE, POTASSIUM CHLORIDE, SODIUM LACTATE AND CALCIUM CHLORIDE: 600; 310; 30; 20 INJECTION, SOLUTION INTRAVENOUS at 09:56

## 2021-06-01 RX ADMIN — PHENYLEPHRINE HYDROCHLORIDE 100 MCG: 10 INJECTION INTRAVENOUS at 09:38

## 2021-06-01 ASSESSMENT — PULMONARY FUNCTION TESTS
PIF_VALUE: 22
PIF_VALUE: 23
PIF_VALUE: 17
PIF_VALUE: 23
PIF_VALUE: 21
PIF_VALUE: 22
PIF_VALUE: 1
PIF_VALUE: 22
PIF_VALUE: 23
PIF_VALUE: 23
PIF_VALUE: 22
PIF_VALUE: 23
PIF_VALUE: 22
PIF_VALUE: 22
PIF_VALUE: 1
PIF_VALUE: 22
PIF_VALUE: 22
PIF_VALUE: 23
PIF_VALUE: 22
PIF_VALUE: 21
PIF_VALUE: 18
PIF_VALUE: 22
PIF_VALUE: 18
PIF_VALUE: 22
PIF_VALUE: 17
PIF_VALUE: 18
PIF_VALUE: 23
PIF_VALUE: 22
PIF_VALUE: 16
PIF_VALUE: 23
PIF_VALUE: 22
PIF_VALUE: 1
PIF_VALUE: 21
PIF_VALUE: 19
PIF_VALUE: 23
PIF_VALUE: 18
PIF_VALUE: 23
PIF_VALUE: 18
PIF_VALUE: 21
PIF_VALUE: 18
PIF_VALUE: 22
PIF_VALUE: 16
PIF_VALUE: 25
PIF_VALUE: 16
PIF_VALUE: 21
PIF_VALUE: 2
PIF_VALUE: 19
PIF_VALUE: 21
PIF_VALUE: 21
PIF_VALUE: 22
PIF_VALUE: 23
PIF_VALUE: 23
PIF_VALUE: 21
PIF_VALUE: 22
PIF_VALUE: 1
PIF_VALUE: 36
PIF_VALUE: 23
PIF_VALUE: 22
PIF_VALUE: 2
PIF_VALUE: 4
PIF_VALUE: 30
PIF_VALUE: 21
PIF_VALUE: 23
PIF_VALUE: 22
PIF_VALUE: 18
PIF_VALUE: 23
PIF_VALUE: 22
PIF_VALUE: 22
PIF_VALUE: 20
PIF_VALUE: 2
PIF_VALUE: 22
PIF_VALUE: 19
PIF_VALUE: 18
PIF_VALUE: 18
PIF_VALUE: 22
PIF_VALUE: 21
PIF_VALUE: 21
PIF_VALUE: 22
PIF_VALUE: 1
PIF_VALUE: 23
PIF_VALUE: 23
PIF_VALUE: 22
PIF_VALUE: 17
PIF_VALUE: 22
PIF_VALUE: 22
PIF_VALUE: 23
PIF_VALUE: 23
PIF_VALUE: 4
PIF_VALUE: 24
PIF_VALUE: 22
PIF_VALUE: 23
PIF_VALUE: 17
PIF_VALUE: 23
PIF_VALUE: 23
PIF_VALUE: 21
PIF_VALUE: 23
PIF_VALUE: 22
PIF_VALUE: 23
PIF_VALUE: 23
PIF_VALUE: 17
PIF_VALUE: 23
PIF_VALUE: 4
PIF_VALUE: 22
PIF_VALUE: 5
PIF_VALUE: 21
PIF_VALUE: 22
PIF_VALUE: 18
PIF_VALUE: 21
PIF_VALUE: 22
PIF_VALUE: 17
PIF_VALUE: 21
PIF_VALUE: 18
PIF_VALUE: 23
PIF_VALUE: 23
PIF_VALUE: 22
PIF_VALUE: 2
PIF_VALUE: 22
PIF_VALUE: 23
PIF_VALUE: 16
PIF_VALUE: 18
PIF_VALUE: 22
PIF_VALUE: 5
PIF_VALUE: 23
PIF_VALUE: 22
PIF_VALUE: 23
PIF_VALUE: 22

## 2021-06-01 ASSESSMENT — PAIN DESCRIPTION - ORIENTATION
ORIENTATION: LOWER;POSTERIOR
ORIENTATION: POSTERIOR;LOWER
ORIENTATION: LOWER;POSTERIOR

## 2021-06-01 ASSESSMENT — PAIN DESCRIPTION - ONSET
ONSET: GRADUAL

## 2021-06-01 ASSESSMENT — PAIN DESCRIPTION - DESCRIPTORS
DESCRIPTORS: ACHING
DESCRIPTORS: ACHING;CONSTANT

## 2021-06-01 ASSESSMENT — PAIN SCALES - WONG BAKER
WONGBAKER_NUMERICALRESPONSE: 0

## 2021-06-01 ASSESSMENT — PAIN DESCRIPTION - PAIN TYPE
TYPE: ACUTE PAIN

## 2021-06-01 ASSESSMENT — PAIN DESCRIPTION - FREQUENCY
FREQUENCY: INTERMITTENT

## 2021-06-01 ASSESSMENT — PAIN SCALES - GENERAL
PAINLEVEL_OUTOF10: 2
PAINLEVEL_OUTOF10: 1
PAINLEVEL_OUTOF10: 4
PAINLEVEL_OUTOF10: 2
PAINLEVEL_OUTOF10: 5
PAINLEVEL_OUTOF10: 4
PAINLEVEL_OUTOF10: 0
PAINLEVEL_OUTOF10: 2

## 2021-06-01 ASSESSMENT — PAIN DESCRIPTION - LOCATION
LOCATION: BACK

## 2021-06-01 ASSESSMENT — LIFESTYLE VARIABLES: SMOKING_STATUS: 1

## 2021-06-01 ASSESSMENT — PAIN - FUNCTIONAL ASSESSMENT: PAIN_FUNCTIONAL_ASSESSMENT: 0-10

## 2021-06-01 NOTE — LETTER
[address] [place] [address],[address] [facility] [address] [place] [address]  Phone: [phone]  Fax: [address] [place] [address] [address]       June 1, 2021     Patient: Jessica Emmanuel   YOB: 1990   Date of Visit: 6/1/2021       To Whom It May Concern: It is my medical opinion that Katina Villa requires a disability parking placard for the following reasons:  He cannot walk 200 feet without stopping to rest due to POSTERIOR LUMBAR MICRODISCECTOMY L3-4. Duration of need: 6 months    If you have any questions or concerns, please don't hesitate to call.     Sincerely,    Sung Vera D.O.

## 2021-06-01 NOTE — PROGRESS NOTES
Neurosurgery Post op Progress Note      POD# 0    s/p posterior lumbar microdiscectomy L3-4  SUBJECTIVE:      Patient presents with L3 left sided radiculopathy. He has tried PT along with transforaminal infection and medical branch block.  His pain is triggered by ambulation  Currently denies numbness and tingling in bilateral lower extremities       OBJECTIVE      Physical exam   VITALS:    Vitals:    06/01/21 1115   BP: 110/87   Pulse: 87   Resp: 15   Temp: 97 °F (36.1 °C)   SpO2: 96%     INTAKE:      Intake/Output Summary (Last 24 hours) at 6/1/2021 1205  Last data filed at 6/1/2021 1054  Gross per 24 hour   Intake 1250 ml   Output 275 ml   Net 975 ml            Neurological exam   alert, oriented x3, affect appropriate, no focal neurological deficits, moves all extremities well and no involuntary movements      Wound   Post op wound:  Dressing is clean/dry/intact with no signs of drainage       ASSESSMENT AND PLAN    27 y.o. male status post L3-4 microdiscectomy  post op day # 0    - Analgesia: oral percocet 1-2 tabs every 6 hours PRN pain  - Periop Antibiotics: Ancef 2g q8hrs for 3 doses (while inpatient)  - Activity: As tolerated- LSO brace ordered for patient (contacted orthotics)  - DVT prophylaxis: SCDs  - Diet: Advance as tolerated  - pending home vs admission  -if home will send patient home with oral antibiotics and oral pain medications     Electronically signed by LEYDI Rubalcava - NP on 6/1/2021 at 12:05 PM

## 2021-06-01 NOTE — DISCHARGE SUMMARY
Department of Neurosurgery                                            Discharge Summary       PATIENT NAME: Vic Alfaro  YOB: 1990  MEDICAL RECORD NO. [id number]  DATE: 6/1/2021  PRIMARY CARE PHYSICIAN: Dedra Hunter DO  DISCHARGE DATE:  6/1/2021  2:15 PM  DISCHARGE DIAGNOSIS:   Patient Active Problem List   Diagnosis Code    Left hip pain M25.552    Depression F32.9    Asthma J45.909    Restless leg G25.81    Gastroesophageal reflux disease K21.9    Vitamin D deficiency E55.9    Other insomnia G47.09    Anxiety F41.9    Recurrent major depressive disorder, in remission ([facility] F33.40    Bipolar affective disorder, current episode depressed (HCC) F31.30    PTSD (post-traumatic stress disorder) F43.10    DANTE (generalized anxiety disorder) F41.1    Allergic rhinitis due to pollen J30.1    Lumbar disc disease M51.9     DISPOSITION: Home    PROCEDURES:    L3-4 microdiscectomy     [address] originally presented to the hospital on 6/1/2021  5:47 AM with lumbar radiculopathy. He was admitted and taken to the OR for neurosurgery for procedure listed above. Labs and imaging were followed daily. At time of discharge, Vic Alfaro was tolerating a general diet, passing flatus, ambulating on his own accord with LSO brace and had adequate analgesia on oral pain medications, and had no signs of symptoms of complications. He is medically stable to be discharged. PHYSICAL EXAMINATION        Discharge Vitals:  height is 5' 10\" (1.778 m) and weight is 206 lb (93.4 kg). His temporal temperature is 97.9 °F (36.6 °C). His blood pressure is 130/81 and his pulse is 88. His respiration is 20 and oxygen saturation is 98%.      alert, oriented x3, affect appropriate, no focal neurological deficits, moves all extremities well and no involuntary movements        Wound   Post op wound:  Dressing is clean/dry/intact with no signs of drainage         LABS

## 2021-06-01 NOTE — INTERVAL H&P NOTE
Pt Name: Jens Waters  MRN: [id number]  [place]: 1990  Date of evaluation: 6/1/2021    I have reviewed the patient's history and physical examination completed in pre-admission testing.     Changes to history or on examination, if any, are as follows:  none    TRACY Henry PA-C  6/1/21  7:09 AM

## 2021-06-01 NOTE — ANESTHESIA POSTPROCEDURE EVALUATION
Department of Anesthesiology  Postprocedure Note    Patient: Tang Sullivan  MRN: [id number]  [place]: 1990  Date of evaluation: 6/1/2021  Time:  12:57 PM     Procedure Summary     Date: 06/01/21 Room / Location: [address]    Anesthesia Start: 1395 Anesthesia Stop: 5478    Procedure: POSTERIOR LUMBAR MICRODISCECTOMY L3-4 (Left Spine Lumbar) Diagnosis: (LUMBAR RADICULOPATHY)    Surgeons: Bandar Torrez DO Responsible Provider: Darci Owens MD    Anesthesia Type: general ASA Status: 2          Anesthesia Type: general    Apoorva Phase I: Apoorva Score: 10    Apoorva Phase II:      Last vitals: Reviewed and per EMR flowsheets.    POST-OP ANESTHESIA NOTE       /74   Pulse 93   Temp 97 °F (36.1 °C) (Temporal)   Resp 18   Ht 5' 10\" (1.778 m)   Wt 206 lb (93.4 kg)   SpO2 96%   BMI 29.56 kg/m²    Pain Assessment: 0-10  Pain Level: 1             Anesthesia Post Evaluation    Patient location during evaluation: PACU  Patient participation: complete - patient participated  Level of consciousness: awake  Pain score: 1  Airway patency: patent  Nausea & Vomiting: no vomiting and no nausea  Complications: no  Respiratory status: acceptable  Hydration status: stable

## 2021-06-01 NOTE — OP NOTE
Operative Note      Patient: Cindi Klinefelter  YOB: 1990  MRN: [id number]    Date of Procedure: 6/1/2021    Pre-Op Diagnosis: LUMBAR RADICULOPATHY    Post-Op Diagnosis: Same     Indications for surgery. Patient with refractory left-sided L3 radiculopathy despite maximal physical therapy injections. Patient with significantly impeded ADLs including ambulation unsteadiness significant limitation in ability to function at work as well as home. Decision made to perform left-sided L3 foraminotomy microdiscectomy. Discussed at length the patient preop as well as in the office regarding the difficulty of extracting extraforaminal/foraminal disc without complete facetectomy and fusion. I did explain the patient that we would attempt a conservative approach without fusing but there is a possibility that ultimately he would require fusion if indeed did not improve       Procedure  Use of fluoroscopy  Use of operative microscope  L3-4 left hemilaminotomy and L3 left foraminotomies with microdiscectomy  22 modifier    Surgeon(s):  Ac Ruiz DO    Assistant:   First Assistant: Ortiz Malone RN    Anesthesia: General    Estimated Blood Loss (mL): less than 50     Complications: None    Specimens:   * No specimens in log *    Implants:  * No implants in log *      Drains: * No LDAs found *    Findings: Significant foraminal stenosis with soft disc extrusion and protrusion    Detailed Description of Procedure:   Patient was consented and brought into the operating room placed under general anesthesia flipped prone on the Arnulfo table all pressure points padded. Midline was marked at the L3-4 interlaminar space. After sterile prepping draping and timeout was performed I infiltrated the incision with onset lidocaine with epinephrine. Incision was extended slightly cephalad into the midpoint of the lamina of L3 to ensure access to the L3 foramen.   Subperiosteal dissection of the medial facet line was performed and Ely retractor was placed confirming again the inferior L3 lamina. At this point a preoperative microscope was then brought into the field. Under microscopic guidance I performed a laminotomy of left-sided L3 extending to the lateral edge of the pars. I made sure to identify the lateral portion of pars to keep this intact. Upgoing curette was used to penetrate the flavum followed by 3 punch to resect flavum circumferentially and identify the lateral edge of the thecal sac as well as the L3 foramen under fluoroscopy. I then used foraminal punches to undercut the pars and the foramen to perform a thorough L3 foraminotomy. Down pushing curette along with a Reji and 15 blade was used to perform an annulotomy followed by expression of as much extruded fragment as possible. Multiple fragments of extruded disc were removed from the L3 foramen but still boggy this protrusion was noted. I did notice some loosening of the L3-4 facet with concern for instability. Thorough irrigation was performed and hemostasis obtained with FloSeal Gelfoam.  I then palpated the L3 nerve root using Reji and noted that it was relatively free at this point. Decision was made to place a small piece of Gelfoam soaked in Depo-Medrol directly over the nerve root as well. Wound was closed in multiple layers using 0 Vicryl for the muscle and fascia followed by inverted 2-0 Vicryl for subcutaneous tissue running 4-0 Monocryl and Dermabond for skin. Patient was then flipped back supine on the regular bed extubated in stable condition to the PACU. Patient had a relatively thick subcutaneous layer as well as significant difficulty encountered with a foraminal disc protrusion. This required additional 20 minutes of exposure time an additional 30 minutes of foraminotomies and discectomy time an additional 15 to 20 minutes of closure time.   Electronically signed by Jenaro Farias DO on 6/1/2021 at 4:48 PM

## 2021-06-01 NOTE — ANESTHESIA PRE PROCEDURE
Department of Anesthesiology  Preprocedure Note       Name:  Troy Degroot   Age:  27 y.o.  :  1990                                          MRN:  [id number]         Date:  2021      Surgeon: John Nichols):  Burgess Florinda DO    Procedure: Procedure(s):  POSTERIOR LUMBAR MICRODISCECTOMY L3-4, METRX TUBES, EMANUEL TABLE, PRONE, C-ARM, MICROSCOPE    Medications prior to admission:   Prior to Admission medications    Medication Sig Start Date End Date Taking?  Authorizing Provider   azelastine (OPTIVAR) 0.05 % ophthalmic solution Place 1 drop into both eyes 2 times daily as needed 21   Historical Provider, MD   lamoTRIgine (LAMICTAL) 200 MG tablet Take 1 tablet by mouth daily 5/10/21   Norva Hammans, MD   fluticasone [facility]) 50 MCG/ACT nasal spray 2 sprays by Each Nostril route daily Start with one spray daily for one week and increase to two sprays daily as needed 21   Christie Holden DO   varenicline (CHANTIX CONTINUING MONTH AASHISH) 1 MG tablet Take 1 tablet by mouth 2 times daily 21   Christie Holden DO   rOPINIRole (REQUIP) 1 MG tablet Take 1 tablet by mouth nightly 21   Christie Holden DO   escitalopram (LEXAPRO) 20 MG tablet Take 1 tablet by mouth daily 21   Christie Holden DO   fluticasone-salmeterol (ADVAIR DISKUS) 100-50 MCG/DOSE diskus inhaler Inhale 1 puff into the lungs every 12 hours 21   Christie Holden DO   albuterol sulfate HFA (VENTOLIN HFA) 108 (90 Base) MCG/ACT inhaler Inhale 2 puffs into the lungs every 6 hours as needed for Wheezing 21   Aidan Pinedo DO   pantoprazole (PROTONIX) 20 MG tablet Take 1 tablet by mouth daily 21  Christie Holden DO   traZODone (DESYREL) 150 MG tablet Take 1 tablet by mouth nightly 4/15/21   Norva Hammans, MD   prazosin (MINIPRESS) 2 MG capsule Take 1 capsule by mouth nightly 4/15/21   Norva Hammans, MD   loratadine (CLARITIN) 10 MG tablet Take 1 tablet by mouth daily 4/15/21   Christie Holden DO BLOCK #1 L3-4, L4-5 performed by Mari Ponce MD at [address] [place] [address]  10/23/2020    SACROILIAC JOINT INJECTION    PAIN MANAGEMENT PROCEDURE Left 01/29/2021    : LUMBAR TRANSFORAMINAL L3-4 (Left )    PAIN MANAGEMENT PROCEDURE Left 01/29/2021    LUMBAR TRANSFORAMINAL L3-4 performed by Mari Ponce MD at [address] [place] [address]  02/12/2021    LUMBAR TRANSFORAMINAL L3-4 - Left    PAIN MANAGEMENT PROCEDURE Left 02/12/2021    EPIDURAL STEROID INJECTION caudal performed by Mari Ponce MD at [address] [place] [address]  04/16/2021    LUMBAR TRANSFORAMINAL L3 - Left    PAIN MANAGEMENT PROCEDURE Left 04/16/2021    LUMBAR TRANSFORAMINAL L3 performed by Mari Ponce MD at [address] [place] [address] Left 2011    Left hip replacement    TYMPANOSTOMY TUBE PLACEMENT      WISDOM TOOTH EXTRACTION         Social History:    Social History     Tobacco Use    Smoking status: Current Every Day Smoker     Packs/day: 0.50     Years: 10.00     Pack years: 5.00     Types: Cigarettes    Smokeless tobacco: Former User     Types: Chew   Substance Use Topics    Alcohol use: No                                Ready to quit: Not Answered  Counseling given: Not Answered      Vital Signs (Current): There were no vitals filed for this visit.                                            BP Readings from Last 3 Encounters:   06/01/21 131/74   05/18/21 131/80   04/23/21 128/72       NPO Status:                                                                                 BMI:   Wt Readings from Last 3 Encounters:   06/01/21 206 lb (93.4 kg)   05/18/21 206 lb (93.4 kg)   04/23/21 209 lb (94.8 kg)     There is no height or weight on file to calculate BMI.    CBC:   Lab Results   Component Value Date    WBC 7.7 05/18/2021    RBC 5.07 05/18/2021    HGB 15.1 05/18/2021    HCT 46.4 05/18/2021    MCV 91.5 05/18/2021    RDW 13.2 05/18/2021     05/18/2021       CMP:   Lab Results   Component Value Date     05/18/2021    K 4.6 05/18/2021     05/18/2021    CO2 21 05/18/2021    BUN 8 05/18/2021    CREATININE 0.80 05/18/2021    GFRAA >60 05/18/2021    LABGLOM >60 05/18/2021    LABGLOM >90 08/04/2020    GLUCOSE 103 05/18/2021    PROT 6.6 08/04/2020    CALCIUM 9.2 08/04/2020    BILITOT 0.4 08/04/2020    ALKPHOS 45 08/04/2020    AST 18 08/04/2020    ALT 17 08/04/2020       POC Tests: No results for input(s): POCGLU, POCNA, POCK, POCCL, POCBUN, POCHEMO, POCHCT in the last 72 hours. Coags: No results found for: PROTIME, INR, APTT    HCG (If Applicable): No results found for: PREGTESTUR, PREGSERUM, HCG, HCGQUANT     ABGs: No results found for: PHART, PO2ART, TTY3UBA, TLV1KCL, BEART, O1AZXVPD     Type & Screen (If Applicable):  No results found for: LABABO, LABRH    Drug/Infectious Status (If Applicable):  No results found for: HIV, HEPCAB    COVID-19 Screening (If Applicable):   Lab Results   Component Value Date    COVID19 Not Detected 05/28/2021    COVID19 Not Detected 10/19/2020           Anesthesia Evaluation  Patient summary reviewed and Nursing notes reviewed  Airway: Mallampati: I  TM distance: >3 FB   Neck ROM: full  Mouth opening: > = 3 FB Dental:      Comment: -MISSING SOME UPPER FRONT TEETH    Pulmonary:normal exam    (+) COPD:  asthma: current smoker                          ROS comment: -SMOKES 1 PPD FOR 17 YEARS  -ASTHMA WELL CONTROLLED   Cardiovascular:Negative CV ROS                      Neuro/Psych:   Negative Neuro/Psych ROS  (+) psychiatric history:depression/anxiety             GI/Hepatic/Renal:   (+) GERD: well controlled,           Endo/Other:                      ROS comment: -MARIJUANA USE  -NPO AFTER MIDNIGHT  -NKDA Abdominal:           Vascular: negative vascular ROS. Anesthesia Plan      general     ASA 2       Induction: intravenous.   arterial line  MIPS: Postoperative opioids intended and Prophylactic antiemetics administered. Anesthetic plan and risks discussed with patient. Plan discussed with CRNA.                   Hue Frye MD   6/1/2021

## 2021-06-02 ENCOUNTER — TELEPHONE (OUTPATIENT)
Dept: FAMILY MEDICINE CLINIC | Age: 31
End: 2021-06-02

## 2021-06-02 NOTE — TELEPHONE ENCOUNTER
[facility] Trans[facility] Initial Follow Up Call    Outreach made within 2 business days of discharge: Yes    Patient: Sharath Red Patient : 1990   MRN: [id number]  Reason for Admission: There are no discharge diagnoses documented for the most recent discharge. Discharge Date: 21       Spoke with: Sharath Red    Discharge department/facility: [facility]    TCM Interactive Patient Contact:  Was patient able to fill all prescriptions: Yes  Was patient instructed to bring all medications to the follow-up visit: Yes  Is patient taking all medications as directed in the discharge summary? Yes  Does patient understand their discharge instructions: Yes  Does patient have questions or concerns that need addressed prior to 7-14 day follow up office visit: no    Scheduled appointment with PCP within 7-14 days  Following up with Dr. Aniya Berry 2021.     Follow Up  Future Appointments   Date Time Provider Juan Manuel Fitzpatrick   2021  8:30 AM Rosa Payne MD [address]   2021 11:10 AM DO Jacklyn Núñez Neuro [facility]   2021  1:00 PM Fantasma Crooks DO [facility] [address]   2021 11:10 AM DO Jacklyn Núñez Neuro Karyle Lance Best, LPN

## 2021-06-09 ENCOUNTER — VIRTUAL VISIT (OUTPATIENT)
Dept: PSYCHIATRY | Age: 31
End: 2021-06-09
Payer: MEDICAID

## 2021-06-09 DIAGNOSIS — F43.10 PTSD (POST-TRAUMATIC STRESS DISORDER): Primary | ICD-10-CM

## 2021-06-09 DIAGNOSIS — F60.3 BORDERLINE PERSONALITY DISORDER (HCC): ICD-10-CM

## 2021-06-09 DIAGNOSIS — F41.1 GAD (GENERALIZED ANXIETY DISORDER): ICD-10-CM

## 2021-06-09 PROCEDURE — 99214 OFFICE O/P EST MOD 30 MIN: CPT | Performed by: PSYCHIATRY & NEUROLOGY

## 2021-06-09 PROCEDURE — G8427 DOCREV CUR MEDS BY ELIG CLIN: HCPCS | Performed by: PSYCHIATRY & NEUROLOGY

## 2021-06-09 PROCEDURE — 1111F DSCHRG MED/CURRENT MED MERGE: CPT | Performed by: PSYCHIATRY & NEUROLOGY

## 2021-06-09 RX ORDER — LAMOTRIGINE 150 MG/1
150 TABLET ORAL 2 TIMES DAILY
Qty: 60 TABLET | Refills: 2 | Status: SHIPPED | OUTPATIENT
Start: 2021-06-09 | End: 2021-08-18 | Stop reason: SDUPTHER

## 2021-06-09 RX ORDER — TRAZODONE HYDROCHLORIDE 150 MG/1
150 TABLET ORAL NIGHTLY
Qty: 30 TABLET | Refills: 2 | Status: SHIPPED | OUTPATIENT
Start: 2021-06-09 | End: 2021-08-18 | Stop reason: SDUPTHER

## 2021-06-09 RX ORDER — ESCITALOPRAM OXALATE 20 MG/1
20 TABLET ORAL DAILY
Qty: 30 TABLET | Refills: 2 | Status: SHIPPED | OUTPATIENT
Start: 2021-06-09 | End: 2021-08-18 | Stop reason: SDUPTHER

## 2021-06-09 RX ORDER — PRAZOSIN HYDROCHLORIDE 2 MG/1
2 CAPSULE ORAL NIGHTLY
Qty: 30 CAPSULE | Refills: 2 | Status: SHIPPED | OUTPATIENT
Start: 2021-06-09 | End: 2021-08-18 | Stop reason: SDUPTHER

## 2021-06-09 NOTE — PROGRESS NOTES
[address] [facility] [address] [phone]    Progress Note    Patient:  Abimael Song  YOB: 1990  PCP:  Gissel Ellis DO  Visit Date:  6/9/2021    TELEHEALTH EVALUATION -- Audio/Visual (During PTCUY-46 public health emergency)    Patient location: home  Physician location: home, [address] [place] [address]  This virtual visit was conducted via interactive, real-time video. Chief Complaint   Patient presents with    Follow-up    Medication Check    Anxiety    Mood Swings       SUBJECTIVE:      Abimael Song, a 27 y. o. male, presents for a follow up visit. Patient reports he is improving. Patient is compliant with medication regimen. He presents alone. Doing ok. Had back surgery last Tuesday. Not much relief yet. Likely he will need a spinal fusion going forward. Has noticed benefit from Lamictal and Lexapro. Mood, \"A lot better. \" Still some irritability. There every day but not as frequent or severe. Thinks Lamictal has helped reduce irritability with the last increase. Some more memory trouble since starting meds \"it's not terrible\". \"Here and there. \"   Sleep is good. Can fall asleep better and sleep through the whole night. Energy is ok. Depression improved \"not as much as I was\". Feels down 1-2 times per week which lasts a few hours. Anger has improved overall. Was given Norco but using sparingly. Discussed whether he has borderline personality disorder. We went through criteria and he endorses feelings of emptiness, quick dramatic shifts of intense anger or dysphoria that last hours and not longer than a day, fear of abandonment, pattern of unstable interpersonal relationships, impulsive spending, unstable self image, feelings of paranoia which may be more from PTSD as he describes being on guard, needing to have his back against the wall.    Discussed that appropriate. Due to this being a TeleHealth encounter (MNLGI-01 public health emergency), evaluation of the following organ systems was limited: Vitals/Constitutional/EENT/Resp/CV/GI//MS/Neuro/Skin/Heme-Lymph-Imm. Pursuant to the emergency declaration under the [address] [place] [address] and the [employer] Act, this Virtual Visit was conducted with patient's (and/or legal guardian's) consent, to reduce the patient's risk of exposure to COVID-19 and provide necessary medical care. The patient (and/or legal guardian) has also been advised to contact this office for worsening conditions or problems, and seek emergency medical treatment and/or call 911 if deemed necessary. Patient identification was verified at the start of the visit: Yes     Total time spent for this encounter: not billed by time     Services were provided through a video synchronous discussion virtually to substitute for in-person clinic visit. Patient and provider were located at their individual homes.     Electronically signed by Daniel Troy MD on 6/9/2021 at 8:59 AM

## 2021-06-16 ENCOUNTER — OFFICE VISIT (OUTPATIENT)
Dept: NEUROSURGERY | Age: 31
End: 2021-06-16
Payer: MEDICAID

## 2021-06-16 VITALS — DIASTOLIC BLOOD PRESSURE: 76 MMHG | HEART RATE: 121 BPM | SYSTOLIC BLOOD PRESSURE: 127 MMHG | RESPIRATION RATE: 16 BRPM

## 2021-06-16 DIAGNOSIS — M51.16 LUMBAR DISC DISEASE WITH RADICULOPATHY: Primary | ICD-10-CM

## 2021-06-16 PROCEDURE — 63030 LAMOT DCMPRN NRV RT 1 LMBR: CPT | Performed by: NEUROLOGICAL SURGERY

## 2021-06-16 RX ORDER — PREDNISONE 20 MG/1
TABLET ORAL
Qty: 30 TABLET | Refills: 0 | Status: SHIPPED | OUTPATIENT
Start: 2021-06-16 | End: 2021-06-29 | Stop reason: SDUPTHER

## 2021-06-16 NOTE — PROGRESS NOTES
Patient seen and examined in the office today. Still with persistent left-sided L3 radiculopathy with pain radiating from the lumbar region of the buttock and the anterior medial thigh. States that he did have some improvement for just a few days with subsequent recurrence of all of his symptoms. Denies any distal symptoms in the left leg below the knee or in the right leg at all. Pain is isolated to the left leg nothing proximal in the back at this time. No focal deficits on examination. Some distal numbness in the L3 distribution. At this time I did  the patient on overall recovery in terms of radiculopathy can take upwards of 6 to 8 weeks. We will provide physical therapy and a prednisone taper and conservative measures repeat MRI in 8 weeks if unresolved.

## 2021-06-22 ENCOUNTER — HOSPITAL ENCOUNTER (OUTPATIENT)
Dept: PHYSICAL THERAPY | Age: 31
Setting detail: THERAPIES SERIES
Discharge: HOME | End: 2021-06-22
Payer: MEDICAID

## 2021-06-22 PROCEDURE — 97161 PT EVAL LOW COMPLEX 20 MIN: CPT

## 2021-06-22 PROCEDURE — 97110 THERAPEUTIC EXERCISES: CPT

## 2021-06-22 NOTE — PROGRESS NOTES
** PLEASE SIGN, DATE AND TIME CERTIFICATION BELOW AND RETURN TO [facility] OUTPATIENT REHABILITATION (FAX #: [phone]). ATTEST/CO-SIGN IF ACCESSING VIA [employer]. THANK YOU.**    I certify that I have examined the patient below and determined that Physical Medicine and Rehabilitation service is necessary and that I approve the established plan of care for up to 90 days or as specifically noted. Attestation, signature or co-signature of physician indicates approval of certification requirements.    ________________________ ____________ __________  Physician Signature   Date   Time  [address] [facility]  PHYSICAL THERAPY  [x] EVALUATION  [] DAILY NOTE (LAND) [] DAILY NOTE (AQUATIC ) [] PROGRESS NOTE [] DISCHARGE NOTE    [x] [facility]   [] [place] [address]    [] [facility]   [] [facility]    Date: 2021  Patient Name:  Zulma Quintana  : 1990  MRN: [id number]  CSN: [id number]    Referring Practitioner Nael Reeves DO   Diagnosis Intervertebral disc disorders with radiculopathy, lumbar region [M51.16]    Treatment Diagnosis Pain in low back, decreased trunk ROM, decreased core strength, abnormal gait    Date of Evaluation 21    Additional Pertinent History Bipolar, anxiety, memory issues, L hip replacement      Functional Outcome Measure Used Modified Oswestry    Functional Outcome Score 34/50 (21)       Insurance: Primary: Payor: [employer] /  /  / ,   Secondary:    Authorization Information: Aquatic covered, modalities covered except ionto/HP/CP, telehealth not covered   Visit # 1, 1/10 for progress note   Visits Allowed: 30 visits, No precert until after 12QH visit   Recertification Date:    Physician Follow-Up: 21   Physician Orders:    History of Present Illness: Patient reports that he had a microdiscectomy on 21 due to a disc herniation.  Patient reports that he did have a fracture in the pars. Patient reports that he did not have any specific injury to the low back. Patient reports that he has stenosis and he needs a spinal fusion. Patient has pain in L low back that travels into L anterior thigh that has been going on for a while now. SUBJECTIVE: Patient reports minimal to no relief from the surgery. Patient reports that his pain travels from L buttock into anterior L thigh. Patient denies numbness and tingling. Patient reports pain with walking, lifting, cleaning, dressing. Patient reports that lying down will help decrease the pain. Social/Functional History and Current Status:  Medications and Allergies have been reviewed and are listed on Medical History Questionnaire. Hosea Culp lives with spouse in a single story home with stairs and no handrail to enter. Task Previous Current   ADLs  Independent Assistance Required   IADL's Independent Assistance Required   Ambulation Independent Independent   Transfers Independent Independent   Recreation Independent Dependent/Unable, [employer] Independent Independent   Driving Active  Active    Work Unemployed  Unemployed     Objective:    OBSERVATION   Pain 8/10 pain currently   Palpation Severe tenderness over entire lumbar spinous process, B paraspinals, L PSIS, L piriformis, L quadratus lumborum. Sensation Light touch intact B LEs   Edema Mild edema noted in low back near incision   Bed Mobility Mod I   Transfers Mod I   Ambulation Patient ambulates with decreased dony, decreased trunk rotation, decreased arm swing, decreased stance on L with no AD.        POSTURE    No Deficit Deficit Comments   Forward Head  x    Rounded Shoulders  x    Kyphosis x     Lordosis x     Lateral Shift x     Scoliosis x     Iliac Crest x     PSIS x     ASIS x     Slumped Sitting  x        TRUNK RANGE OF MOTION   Flexion: 75% limited   Extension: 25% limited   Lateral Flexion Left: 50% limited Lateral Flexion Right: 50% limited   Rotation Left: 25% limited   Rotation Right: 25% limited   Trunk Range of Motion is [facility] [place]  []     LOWER EXTREMITY RANGE OF MOTION    Left Right Comments   Hip Flexion knee to chest      Hip Extension      Hip ABDuction      Hip ADDuction      Hip Internal Rotation      Hip External Rotation      Hip Range of Motion is [facility]  [x] except IR and ER      Knee Flexion      Knee Extension      Knee Range of Motion is [facility]  [x]       LOWER EXTREMITY STRENGTH    Left Right Comments   Hip Flexion 4- 5    Hip Abduction 5 5    Hip Adduction 5 5    Hip Extension 4 5    Knee Flexion 5 5    Knee Extension 5 5    Ankle DF 5 5    Ankle PF 5 5    LE strength is WFL []    CORE STRENGTH   B SLR TEST:         6 seconds       SPECIAL TESTS (+/-)    Left Right Comments   SLR  + -    90/90 Hamstring length 42 deg 35 deg    SKTC - - More pain in L thigh   WILLA + +    FADIR + +    Irina - -    Nikunj + -    Ely - -      Prone elbow prop: pain located in L low back, no pain down leg (centralization)    TREATMENT   Precautions: Bi polar, microdiscectomy    Pain: 8/10    X in shaded column indicates activity completed today   Modalities Parameters/  Location  Notes                     Manual Therapy Time/Technique  Notes                     Exercise/Intervention   Notes   Prone lying on pillow   x Patient instructed in the following to centralize pain. Patient requiring verbal and visual instruction for correct technique. Patient verbalizing understanding of HEP. Prone lying flat   x    Prone elbow prop 3 minutes  x Pain located in L low back   Abdominal brace   x                                                       Specific Interventions Next Treatment: Trial of prone program to centralize pain, can try press ups if not painful, abdominal bracing with progression, hip and core strength, modalities as needed (no traction-had a pars fracture in the past).     Activity/Treatment Tolerance:  [x]  Patient tolerated treatment well  []  Patient limited by fatigue  []  Patient limited by pain   []  Patient limited by medical complications  []  Other:     Assessment: 27year old male presents status post lumbar microdiscectomy on 6/1/21. Patient has pain in low back, radicular pain in L thigh, decreased trunk AROM, decreased core and hip strength, gait deviations, poor posture, and tightness in hamstring hip flexor and piriformis that limits the patients ability to perform ADLs such as dressing. Patient would benefit from skilled PT to improve pain, ROM, tissue extensibility, strength and stability to allow improved functional mobility. Patient educated on benefit of PT and PT POC with patient in agreement. Body Structures/Functions/Activity Limitations: edema, impaired activity tolerance, impaired ROM, impaired strength, pain, abnormal gait and abnormal posture  Prognosis: good      Goals    Patient Goal: to improve his pain    Short Term Goals: 4 weeks  1. Patient will report decrease in pain to 4/10 at most to allow ease of daily tasks. 2. Patient will improve trunk AROM to [facility] to allow ease of bending and lifting tasks. 3. Patient will improve L hip strength to 5/5 to allow ease of walking and daily tasks. 4. Patient will perform B SLR for 15 seconds to improve core strength needed for ease of standing tasks. 5. Patient will improve 90/90 hamstring length to 20 degrees B to allow decreased pain with standing/walking. Long Term Goals: 8 weeks  1. Patient will improve Modified Oswestry score from 34/50 to 20/50 to allow decrease in disability and improved functional mobility. 2. Patient will be independent with HEP in order to prevent re-injury and improve functional abilities. Patient Education:   [x]  HEP/Education Completed: Plan of Care, Goals, benefit of PT, attendance policy, HEP with handout provided, centralization of pain, avoiding things that worsen leg pain.     [employer]Dent Access Code:  []  No new Education completed  []  Reviewed Prior HEP      [x]  Patient verbalized and/or demonstrated understanding of education provided. []  Patient unable to verbalize and/or demonstrate understanding of education provided. Will continue education. []  Barriers to learning:     PLAN:  Treatment Recommendations: Strengthening, Range of Motion, Functional Mobility Training, Gait Training, Manual Therapy - Soft Tissue Mobilization, Manual Therapy - Joint Manipulation, Pain Management, Home Exercise Program, Patient Education and Modalities    [x]  Plan of care initiated. Plan to see patient 2 times per week for 8 weeks to address the treatment planned outlined above.   []  Continue with current plan of care  []  Modify plan of care as follows:    []  Hold pending physician visit  []  Discharge    Time In 1302   Time Out 1340   Timed Code Minutes: 10 min   Total Treatment Time: 38 min     Electronically Signed by: Marina Flores PT

## 2021-06-29 ENCOUNTER — HOSPITAL ENCOUNTER (OUTPATIENT)
Dept: PHYSICAL THERAPY | Age: 31
Setting detail: THERAPIES SERIES
Discharge: HOME | End: 2021-06-29
Payer: MEDICAID

## 2021-06-29 PROCEDURE — 97110 THERAPEUTIC EXERCISES: CPT

## 2021-06-29 RX ORDER — GABAPENTIN 100 MG/1
100 CAPSULE ORAL 3 TIMES DAILY
Qty: 90 CAPSULE | Refills: 0 | Status: SHIPPED | OUTPATIENT
Start: 2021-06-29 | End: 2021-07-27

## 2021-06-29 RX ORDER — PREDNISONE 20 MG/1
TABLET ORAL
Qty: 30 TABLET | Refills: 0 | Status: SHIPPED | OUTPATIENT
Start: 2021-06-29 | End: 2021-07-14

## 2021-06-29 NOTE — PROGRESS NOTES
[address] [facility]  PHYSICAL THERAPY  [] DAILY NOTE (LAND) [] DAILY NOTE (AQUATIC ) [] PROGRESS NOTE [] DISCHARGE NOTE    [x] OUTPATIENT REHABILITATION [facility]   [] [place] [address]    [] [facility]   [] Binta Mandel    Date: 2021  Patient Name:  Deepak Marie  : 1990  MRN: [id number]  CSN: [id number]    Referring Practitioner Vergil Meckel, DO   Diagnosis Intervertebral disc disorders with radiculopathy, lumbar region [M51.16]    Treatment Diagnosis Pain in low back, decreased trunk ROM, decreased core strength, abnormal gait    Date of Evaluation 21    Additional Pertinent History Bipolar, anxiety, memory issues, L hip replacement      Functional Outcome Measure Used Modified Oswestry    Functional Outcome Score 34/50 (21)       Insurance: Primary: Payor: Halie Canela /  /  / ,   Secondary:    Authorization Information: Aquatic covered, modalities covered except ionto/HP/CP, telehealth not covered   Visit # 2, 2/10 for progress note   Visits Allowed: 30 visits, No precert until after 61UR visit   Recertification Date:    Physician Follow-Up: 21   Physician Orders:    History of Present Illness: Patient reports that he had a microdiscectomy on 21 due to a disc herniation. Patient reports that he did have a fracture in the pars. Patient reports that he did not have any specific injury to the low back. Patient reports that he has stenosis and he needs a spinal fusion. Patient has pain in L low back that travels into L anterior thigh that has been going on for a while now. SUBJECTIVE: Patient reporting pain level 6/10 today in back and down into mid left thigh. Patient reporting good compliance with prone lying at home. Patient reporting he goes back to the surgeon on  or .    Objective:  TREATMENT   Precautions: Bi polar, microdiscectomy    Pain: 6/10 low back into left thigh    X in shaded column indicates activity completed today   Modalities Parameters/  Location  Notes                     Manual Therapy Time/Technique  Notes                     Exercise/Intervention   Notes   Prone lying  3 minutes  x Pain decreased from 6/10 to 3/10 and pain not as far down left thigh   Prone elbow prop 3 minutes  x Increased pain to low back but completely centralized thigh pain. Prone:  Abdominal bracing  Gluteal Set   10  10   5  5   X  x    Prone bracing with   Hamstring curl   Quad set     10  10        X  x   Slight increase in thigh pain with performance on L  Again caused pain into Left thigh   Prone press 1/2 way up  5  x No thigh pain but increased LBP 6/10   Prone  2 minutes  x LBP 4/10. Thigh pain produced. Hook lying abdominal bracing  10 5 x Thigh pain 1/10 and LBP 1-2/10   Abdominal bracing with SAQ, hip adduction(ball), quad set 10 5 x    90/90 HS Stretch  3 15 sec x Unable to perform on left side due to pain          LAQ 10  x             Specific Interventions Next Treatment: Trial of prone program to centralize pain, can try press ups if not painful, abdominal bracing with progression, hip and core strength, modalities as needed (no traction-had a pars fracture in the past). Activity/Treatment Tolerance:  [x]  Patient tolerated treatment well  []  Patient limited by fatigue  []  Patient limited by pain   []  Patient limited by medical complications  []  Other:     Assessment: Patient with fluctuating pain levels throughout session initiating with prone and prone exercises along with hook lying exercises. Patient limited during session due to pain. Patient reporting pain in thigh at end of session and low back but did have moments where pain had decreased. Goals    Patient Goal: to improve his pain    Short Term Goals: 4 weeks  1. Patient will report decrease in pain to 4/10 at most to allow ease of daily tasks.   2. Patient will improve trunk AROM to WFL's to allow ease of bending and lifting tasks. 3. Patient will improve L hip strength to 5/5 to allow ease of walking and daily tasks. 4. Patient will perform B SLR for 15 seconds to improve core strength needed for ease of standing tasks. 5. Patient will improve 90/90 hamstring length to 20 degrees B to allow decreased pain with standing/walking. Long Term Goals: 8 weeks  1. Patient will improve Modified Oswestry score from 34/50 to 20/50 to allow decrease in disability and improved functional mobility. 2. Patient will be independent with HEP in order to prevent re-injury and improve functional abilities. Patient Education:   [x]  HEP/Education Completed:  Prone or hook lying for centralization. [employer] Access Code:  []  No new Education completed  []  Reviewed Prior HEP      [x]  Patient verbalized and/or demonstrated understanding of education provided. []  Patient unable to verbalize and/or demonstrate understanding of education provided. Will continue education. []  Barriers to learning:     PLAN:2 times per week for 8 weeks.   Treatment Recommendations: Strengthening, Range of Motion, Functional Mobility Training, Gait Training, Manual Therapy - Soft Tissue Mobilization, Manual Therapy - Joint Manipulation, Pain Management, Home Exercise Program, Patient Education and Modalities    [x]  Continue with current plan of care  []  Modify plan of care as follows:    []  Hold pending physician visit  []  Discharge    Time In 1050   Time Out 1122   Timed Code Minutes: 32 min   Total Treatment Time: 32 min     Electronically Signed by: Tamy Casarez PTA

## 2021-07-01 ENCOUNTER — HOSPITAL ENCOUNTER (OUTPATIENT)
Dept: PHYSICAL THERAPY | Age: 31
Setting detail: THERAPIES SERIES
End: 2021-07-01
Payer: MEDICAID

## 2021-07-06 ENCOUNTER — HOSPITAL ENCOUNTER (OUTPATIENT)
Dept: PHYSICAL THERAPY | Age: 31
Setting detail: THERAPIES SERIES
Discharge: HOME | End: 2021-07-06
Payer: MEDICAID

## 2021-07-06 PROCEDURE — 97110 THERAPEUTIC EXERCISES: CPT

## 2021-07-06 NOTE — PROGRESS NOTES
Manual Therapy Time/Technique  Notes                     Exercise/Intervention   Notes   Prone lying  3 minutes  x Pain decreased pain and pain not as far down left thigh   Prone elbow prop 3 minutes  x Increased pain to low back but completely centralized thigh pain. Prone:  Abdominal bracing  Gluteal Set   15  15   5  5   X  x    Prone bracing with   Hamstring curl   Quad set  Prone bracing with IR/ER at hip   15  15  15 x        X  X  x   Slight increase in thigh pain with performance on L  caused pain into Left thigh   Prone bracing with alternating UE  Prone bracing with hip extension 10 x  10 x  X  x    Prone press 1/2 way up  5  x No thigh pain but increased LBP 6/10   Prone  2 minutes  x LBP 3-4/10. Hook lying abdominal bracing  15 5 x    Abdominal bracing with hip adduction(ball), knee fall out unilateral and bilateral, SAQ 15 5 x    90/90 HS Stretch  3 15 sec  Unable to perform on left side due to pain          LAQ 15  x             Specific Interventions Next Treatment: Trial of prone program to centralize pain, can try press ups if not painful, abdominal bracing with progression, hip and core strength, modalities as needed (no traction-had a pars fracture in the past). Activity/Treatment Tolerance:  [x]  Patient tolerated treatment well  []  Patient limited by fatigue  []  Patient limited by pain   []  Patient limited by medical complications  []  Other:     Assessment: Patient was progressed with new prone core strengthening exercises for further centralization of pain/symptoms. Patient does have pain increases throughout the session in L leg. Patient reports that pain level is the same after the session even through he has times of less pain throughout the session in the prone position. Will continue to improve pain, ROM, tissue extensibility, strength and stability to allow improved functional mobility.        Goals    Patient Goal: to improve his pain    Short Term Goals: 4

## 2021-07-08 ENCOUNTER — HOSPITAL ENCOUNTER (OUTPATIENT)
Dept: PHYSICAL THERAPY | Age: 31
Setting detail: THERAPIES SERIES
Discharge: HOME | End: 2021-07-08
Payer: MEDICAID

## 2021-07-08 PROCEDURE — 97110 THERAPEUTIC EXERCISES: CPT

## 2021-07-08 NOTE — PROGRESS NOTES
[facility] [facility]  PHYSICAL THERAPY  [x] DAILY NOTE (LAND) [] DAILY NOTE (AQUATIC ) [] PROGRESS NOTE [] DISCHARGE NOTE    [x] OUTPATIENT REHABILITATION CENTER [facility]   [] [place] [address]    [] [facility]   [] Ashley Sales    Date: 2021  Patient Name:  Wale Flor  : 1990  MRN: [id number]  CSN: [id number]    Referring Practitioner Smitha Joseph DO   Diagnosis Intervertebral disc disorders with radiculopathy, lumbar region [M51.16]    Treatment Diagnosis Pain in low back, decreased trunk ROM, decreased core strength, abnormal gait    Date of Evaluation 21    Additional Pertinent History Bipolar, anxiety, memory issues, L hip replacement      Functional Outcome Measure Used Modified Oswestry    Functional Outcome Score 34/50 (21)       Insurance: Primary: Payor: Ke Sue /  /  / ,   Secondary:    Authorization Information: Aqu[employer] covered, modalities covered except ionto/HP/CP, telehealth not covered   Visit # 4, 4/10 for progress note   Visits Allowed: 30 visits, No precert until after 60GY visit   Recertification Date:    Physician Follow-Up: 21   Physician Orders:    History of Present Illness: Patient reports that he had a microdiscectomy on 21 due to a disc herniation. Patient reports that he did have a fracture in the pars. Patient reports that he did not have any specific injury to the low back. Patient reports that he has stenosis and he needs a spinal fusion. Patient has pain in L low back that travels into L anterior thigh that has been going on for a while now. SUBJECTIVE: Patient denies any changes in pain levels since initiating PT. Some exercises can increase some can decrease it. When the pain is elevated by exercise it doesn't linger.      TREATMENT   Precautions: Bi polar, microdiscectomy    Pain: 6/10 low back into left thigh    X in shaded column indicates activity completed today Modalities Parameters/  Location  Notes                     Manual Therapy Time/Technique  Notes                     Exercise/Intervention   Notes   Prone lying  3 minutes  x Pain decreased pain and pain not as far down left thigh   Prone elbow prop 3 minutes  x Centralized pain to L glut as opposed to down the thigh   Prone:  Abdominal bracing  Gluteal Set   15  15   5  5   X  x    Prone bracing with   Hamstring curl   Quad set  Prone bracing with IR/ER at hip   15  15  15 x        X  X  x   Slight increase in thigh pain with performance on L  caused pain into Left thigh   Prone bracing with alternating UE  Prone bracing with hip extension  Prone bracing combo (UE with opp hip ext) 10 x  10 x  10x  X  X  X   Non-alternating, cues to glut set before lift; increases pain in the low back and into the thigh that lingers some   Prone press 1/2 way up  10  x No thigh pain but increased LBP 6/10   Prone  2 minutes  x LBP 3-4/10. Hook lying abdominal bracing  15 5 x    Abdominal bracing with hip adduction(ball), knee fall out unilateral and bilateral, SAQ 15 5 x    90/90 HS Stretch  3 15 sec  Unable to perform on left side due to pain          LAQ 15               Specific Interventions Next Treatment: Trial of prone program to centralize pain, can try press ups if not painful, abdominal bracing with progression, hip and core strength, modalities as needed (no traction-had a pars fracture in the past). Activity/Treatment Tolerance:  [x]  Patient tolerated treatment well  []  Patient limited by fatigue  []  Patient limited by pain   []  Patient limited by medical complications  []  Other:     Assessment: Patient rates low back pain 6/10 and for the most part centralization is achieved but depending on certain exercises that involve the L LE pain can reproduce into the thigh. Patient rates pain at conclusion of treatment session 6/10 with radicular pain into the L thigh.        Goals    Patient Goal: to improve his pain    Short Term Goals: 4 weeks  1. Patient will report decrease in pain to 4/10 at most to allow ease of daily tasks. 2. Patient will improve trunk AROM to WFL's to allow ease of bending and lifting tasks. 3. Patient will improve L hip strength to 5/5 to allow ease of walking and daily tasks. 4. Patient will perform B SLR for 15 seconds to improve core strength needed for ease of standing tasks. 5. Patient will improve 90/90 hamstring length to 20 degrees B to allow decreased pain with standing/walking. Long Term Goals: 8 weeks  1. Patient will improve Modified Oswestry score from 34/50 to 20/50 to allow decrease in disability and improved functional mobility. 2. Patient will be independent with HEP in order to prevent re-injury and improve functional abilities. Patient Education:   [x]  HEP/Education Completed:  Correct exercise technique, continue HEP, importance of HEP.  [employer] Access Code:  []  No new Education completed  []  Reviewed Prior HEP      [x]  Patient verbalized and/or demonstrated understanding of education provided. []  Patient unable to verbalize and/or demonstrate understanding of education provided. Will continue education. []  Barriers to learning:     PLAN:2 times per week for 8 weeks.   Treatment Recommendations: Strengthening, Range of Motion, Functional Mobility Training, Gait Training, Manual Therapy - Soft Tissue Mobilization, Manual Therapy - Joint Manipulation, Pain Management, Home Exercise Program, Patient Education and Modalities    [x]  Continue with current plan of care  []  Modify plan of care as follows:    []  Hold pending physician visit  []  Discharge    Time In 1017   Time Out 1057   Timed Code Minutes: 40 min   Total Treatment Time: 40 min     Electronically Signed by: Anna Marie Alatorre PTA

## 2021-07-13 ENCOUNTER — HOSPITAL ENCOUNTER (OUTPATIENT)
Dept: PHYSICAL THERAPY | Age: 31
Setting detail: THERAPIES SERIES
Discharge: HOME | End: 2021-07-13
Payer: MEDICAID

## 2021-07-13 PROCEDURE — 97110 THERAPEUTIC EXERCISES: CPT

## 2021-07-13 NOTE — PROGRESS NOTES
[address] [facility]  PHYSICAL THERAPY  [x] DAILY NOTE (LAND) [] DAILY NOTE (AQUATIC ) [] PROGRESS NOTE [] DISCHARGE NOTE    [x] OUTPATIENT REHABILITATION CENTER [facility]   [] [place] [address]    [] [facility]   [] Vasile Hand    Date: 2021  Patient Name:  Jayde York  : 1990  MRN: [id number]  CSN: [id number]    Referring Practitioner Lisa Le DO   Diagnosis Intervertebral disc disorders with radiculopathy, lumbar region [M51.16]    Treatment Diagnosis Pain in low back, decreased trunk ROM, decreased core strength, abnormal gait    Date of Evaluation 21    Additional Pertinent History Bipolar, anxiety, memory issues, L hip replacement      Functional Outcome Measure Used Modified Oswestry    Functional Outcome Score 34/50 (21)       Insurance: Primary: Payor: Dean Gil /  /  / ,   Secondary:    Authorization Information: Aqu[employer] covered, modalities covered except ionto/HP/CP, telehealth not covered   Visit # 5, 10 for progress note   Visits Allowed: 30 visits, No precert until after 16OK visit   Recertification Date:    Physician Follow-Up: 21   Physician Orders:    History of Present Illness: Patient reports that he had a microdiscectomy on 21 due to a disc herniation. Patient reports that he did have a fracture in the pars. Patient reports that he did not have any specific injury to the low back. Patient reports that he has stenosis and he needs a spinal fusion. Patient has pain in L low back that travels into L anterior thigh that has been going on for a while now. SUBJECTIVE: Patient reports 7/10 pain upon arrival with pain radiating down to L thigh. Patient does not use a cane today for ambulation.      TREATMENT   Precautions: Bi polar, microdiscectomy    Pain: 7/10 low back into left thigh    X in shaded column indicates activity completed today   Modalities Parameters/  Location  Notes Manual Therapy Time/Technique  Notes                     Exercise/Intervention   Notes   Prone lying  3 minutes  x Pain decreased pain and pain not as far down left thigh   Prone elbow prop 3 minutes  x Centralized pain to L glut as opposed to down the thigh   Prone:  Abdominal bracing  Gluteal Set   15  15   5  5   X  x    Prone bracing with   Hamstring curl   Quad set  Prone bracing with IR/ER at hip   15  15  15 x        X  X  x   Slight increase in thigh pain with performance on L  caused pain into Left thigh   Prone bracing with alternating UE  Prone bracing with hip extension  Prone bracing combo (UE with opp hip ext) 15 x  15 x  15x  X  X  X   Non-alternating, cues to glut set before lift; increases pain in the low back and into the thigh that lingers some   Prone press 1/2 way up  10  x No thigh pain but increased LBP 6/10   Prone  2 minutes  x Reports soreness in low back, does not rate pain          Hook lying abdominal bracing  15 5 x    Abdominal bracing with hip adduction(ball), knee fall out unilateral and bilateral, SAQ, march 15 5 x    90/90 HS Stretch  3 15 sec  Unable to perform on left side due to pain          LAQ 15               Specific Interventions Next Treatment: Trial of prone program to centralize pain, can try press ups if not painful, abdominal bracing with progression, hip and core strength, modalities as needed (no traction-had a pars fracture in the past). Activity/Treatment Tolerance:  [x]  Patient tolerated treatment well  []  Patient limited by fatigue  []  Patient limited by pain   []  Patient limited by medical complications  []  Other:     Assessment: Patient was progressed with increased repetitions. Patient is difficult to progress because many exercises do increased his L thigh pain. Patient does have centralization of pain during most prone exercises but it does not last. Patient continues to report that his pain is the same at the end of the session.

## 2021-07-20 DIAGNOSIS — J30.2 SEASONAL ALLERGIES: ICD-10-CM

## 2021-07-20 RX ORDER — MONTELUKAST SODIUM 10 MG/1
10 TABLET ORAL DAILY
Qty: 30 TABLET | Refills: 3 | Status: SHIPPED | OUTPATIENT
Start: 2021-07-20 | End: 2021-11-12 | Stop reason: SDUPTHER

## 2021-07-20 NOTE — TELEPHONE ENCOUNTER
Last visit- 4/21/2021  Next visit- 7/27/2021    Requested Prescriptions     Pending Prescriptions Disp Refills    montelukast (SINGULAIR) 10 MG tablet 30 tablet 3     Sig: Take 1 tablet by mouth daily

## 2021-07-22 DIAGNOSIS — G25.81 RESTLESS LEG: ICD-10-CM

## 2021-07-26 RX ORDER — ROPINIROLE 1 MG/1
1 TABLET, FILM COATED ORAL NIGHTLY
Qty: 90 TABLET | Refills: 3 | Status: SHIPPED | OUTPATIENT
Start: 2021-07-26 | End: 2022-05-14 | Stop reason: SDUPTHER

## 2021-07-27 ENCOUNTER — TELEPHONE (OUTPATIENT)
Dept: CARDIOLOGY CLINIC | Age: 31
End: 2021-07-27

## 2021-07-27 ENCOUNTER — OFFICE VISIT (OUTPATIENT)
Dept: FAMILY MEDICINE CLINIC | Age: 31
End: 2021-07-27
Payer: MEDICAID

## 2021-07-27 VITALS
HEIGHT: 70 IN | HEART RATE: 77 BPM | DIASTOLIC BLOOD PRESSURE: 64 MMHG | SYSTOLIC BLOOD PRESSURE: 100 MMHG | BODY MASS INDEX: 31.78 KG/M2 | RESPIRATION RATE: 16 BRPM | WEIGHT: 222 LBS | OXYGEN SATURATION: 98 % | TEMPERATURE: 98.7 F

## 2021-07-27 DIAGNOSIS — R07.9 CHEST PAIN, UNSPECIFIED TYPE: Primary | ICD-10-CM

## 2021-07-27 DIAGNOSIS — G25.81 RESTLESS LEG: ICD-10-CM

## 2021-07-27 DIAGNOSIS — Z82.49 FAMILY HISTORY OF EARLY CAD: ICD-10-CM

## 2021-07-27 DIAGNOSIS — J30.2 SEASONAL ALLERGIES: ICD-10-CM

## 2021-07-27 DIAGNOSIS — F60.3 BORDERLINE PERSONALITY DISORDER (HCC): ICD-10-CM

## 2021-07-27 DIAGNOSIS — F17.200 TOBACCO USE DISORDER: ICD-10-CM

## 2021-07-27 DIAGNOSIS — F33.40 RECURRENT MAJOR DEPRESSIVE DISORDER, IN REMISSION (HCC): ICD-10-CM

## 2021-07-27 DIAGNOSIS — F41.1 GAD (GENERALIZED ANXIETY DISORDER): ICD-10-CM

## 2021-07-27 PROCEDURE — 99214 OFFICE O/P EST MOD 30 MIN: CPT | Performed by: STUDENT IN AN ORGANIZED HEALTH CARE EDUCATION/TRAINING PROGRAM

## 2021-07-27 PROCEDURE — G8427 DOCREV CUR MEDS BY ELIG CLIN: HCPCS | Performed by: STUDENT IN AN ORGANIZED HEALTH CARE EDUCATION/TRAINING PROGRAM

## 2021-07-27 PROCEDURE — 4004F PT TOBACCO SCREEN RCVD TLK: CPT | Performed by: STUDENT IN AN ORGANIZED HEALTH CARE EDUCATION/TRAINING PROGRAM

## 2021-07-27 PROCEDURE — G8417 CALC BMI ABV UP PARAM F/U: HCPCS | Performed by: STUDENT IN AN ORGANIZED HEALTH CARE EDUCATION/TRAINING PROGRAM

## 2021-07-27 RX ORDER — VARENICLINE TARTRATE 1 MG/1
1 TABLET, FILM COATED ORAL 2 TIMES DAILY
Qty: 60 TABLET | Refills: 3 | Status: SHIPPED | OUTPATIENT
Start: 2021-07-27 | End: 2021-08-31

## 2021-07-27 RX ORDER — ROPINIROLE 0.5 MG/1
TABLET, FILM COATED ORAL
COMMUNITY
Start: 2021-06-28 | End: 2021-08-31

## 2021-07-27 RX ORDER — TRAZODONE HYDROCHLORIDE 100 MG/1
TABLET ORAL
COMMUNITY
Start: 2021-07-24 | End: 2021-08-31

## 2021-07-27 ASSESSMENT — ENCOUNTER SYMPTOMS
COUGH: 0
DIARRHEA: 0
RHINORRHEA: 0
NAUSEA: 0
SORE THROAT: 0
VOMITING: 0
BACK PAIN: 1
SHORTNESS OF BREATH: 1
CONSTIPATION: 0
ABDOMINAL PAIN: 0

## 2021-07-27 NOTE — PROGRESS NOTES
[address]  [facility] [address]  Dept: [phone]  Loc: [phone]     Laurent Argueta is a 27 y.o. male who presents today for:  Chief Complaint   Patient presents with    3 Month Follow-Up     chest pain, arm pain, fam. hx of heart       Goals    None         HPI:     HPI   34-year-old male with PMH of asthma, GERD, bipolar depression, restless leg syndrome here for 3-month follow-up. Chest pain:   Started getting chest pain, mostly at night. It has been happening for a while, but had difficulty sleeping last 2 days. Onset occurred with laying in bed, throbbing pain was 5/10. Pain lasted a couple hours. Associated symptoms dyspnea, palpitations. Pain is occurring about 2-3 times per month. At first thought it was anxiety - tried deep breathing and did not help. Contemplated taking self to hospital.   Strong family history of cardiac disease - dad had first MI in , and  in December from cardiac disease. Mom also has cardiac disease. Bipolar, depression, anxiety, insomnia, PTSD:  Doing well on the medications, especially when compliant of medications and continuing therapy. seeing psychiatry (Dr Yifan Wilkes)  Taking lamictal (increased recently by Dr Yifan Wilkes)  trazadone (recent increase by Dr Yifan Wilkes to 150 mg)  He continues to note significant improvement in sounds irritating him (chewing, baby crying), but still triggered. He notes overall his mood is doing significantly better and his mom seems to be his main trigger.     Left hip pain with sciatica. He follows with pain management and neurosurgery. He notes the last injection was helpful and he has follow up on Friday for discussion of potential surgery. Had micro-disectomy. Goes back tomorrow to discuss spinal fusion. Continue therapy.       Restless legs  Requip has helped restless leg at night, especially with sleeping.     Smoker, desire to quit.   Thedora Cockayne to quit smoking in the past.    Used nicotine patches but he got a skin rash and irritation. Used Chantix but got upset stomach and nausea, notes that when he takes it with food he does fine. Considering hypnotherapy. Chantix worked well, but would have some upset stomach. Went from 1 PPD down to 1/4 PPD. No side effects, but is taking Prazosin. Has been off Chantix for 2 weeks and now back up to 1/2 PPD.     Current Outpatient Medications   Medication Sig Dispense Refill    varenicline (CHANTIX CONTINUING MONTH AASHISH) 1 MG tablet Take 1 tablet by mouth 2 times daily 60 tablet 3    rOPINIRole (REQUIP) 1 MG tablet Take 1 tablet by mouth nightly 90 tablet 3    montelukast (SINGULAIR) 10 MG tablet Take 1 tablet by mouth daily 30 tablet 3    loratadine (CLARITIN) 10 MG tablet Take 1 tablet by mouth daily 30 tablet 3    traZODone (DESYREL) 150 MG tablet Take 1 tablet by mouth nightly 30 tablet 2    prazosin (MINIPRESS) 2 MG capsule Take 1 capsule by mouth nightly 30 capsule 2    lamoTRIgine (LAMICTAL) 150 MG tablet Take 1 tablet by mouth 2 times daily 60 tablet 2    escitalopram (LEXAPRO) 20 MG tablet Take 1 tablet by mouth daily 30 tablet 2    azelastine (OPTIVAR) 0.05 % ophthalmic solution Place 1 drop into both eyes 2 times daily as needed      fluticasone (FLONASE) 50 MCG/ACT nasal spray 2 sprays by Each Nostril route daily Start with one spray daily for one week and increase to two sprays daily as needed 1 Bottle 3    fluticasone-salmeterol (ADVAIR DISKUS) 100-50 MCG/DOSE diskus inhaler Inhale 1 puff into the lungs every 12 hours 60 each 3    albuterol sulfate HFA (VENTOLIN HFA) 108 (90 Base) MCG/ACT inhaler Inhale 2 puffs into the lungs every 6 hours as needed for Wheezing 1 Inhaler 0    pantoprazole (PROTONIX) 20 MG tablet Take 1 tablet by mouth daily 30 tablet 3    traZODone (DESYREL) 100 MG tablet take 1 tablet by mouth nightly      rOPINIRole (REQUIP) 0.5 MG tablet take 1 tablet by mouth three times a day       No current facility-administered medications for this visit. Food Insecurity: No Food Insecurity    Worried About Running Out of Food in the Last Year: Never true    Ran Out of Food in the Last Year: Never true       Health Maintenance   Topic Date Due    Hepatitis C screen  Never done    COVID-19 Vaccine (1) Never done    HIV screen  Never done    DTaP/Tdap/Td vaccine (1 - Tdap) Never done    Pneumococcal 0-64 years Vaccine (1 of 2 - PPSV23) 02/04/2022 (Originally 5/5/1995)    Flu vaccine (1) 09/01/2021    Hepatitis A vaccine  Aged Out    Hepatitis B vaccine  Aged Out    Hib vaccine  Aged Out    Meningococcal (ACWY) vaccine  Aged Out    Varicella vaccine  Discontinued       ROS:      Review of Systems   Constitutional: Negative for chills, fatigue and fever. HENT: Negative for congestion, rhinorrhea and sore throat. Eyes: Negative for visual disturbance. Respiratory: Positive for shortness of breath. Negative for cough. Cardiovascular: Positive for chest pain and palpitations. Gastrointestinal: Negative for abdominal pain, constipation, diarrhea, nausea and vomiting. Genitourinary: Negative for dysuria. Musculoskeletal: Positive for back pain. Negative for arthralgias. Skin: Negative for rash. Neurological: Negative for dizziness, weakness, light-headedness, numbness and headaches. Psychiatric/Behavioral: Negative for decreased concentration, dysphoric mood and sleep disturbance. The patient is not nervous/anxious. Objective:     Vitals:    07/27/21 0951   BP: 100/64   Site: Left Upper Arm   Position: Sitting   Cuff Size: Large Adult   Pulse: 77   Resp: 16   Temp: 98.7 °F (37.1 °C)   TempSrc: Oral   SpO2: 98%   Weight: 222 lb (100.7 kg)   Height: 5' 10\" (1.778 m)       Body mass index is 31.85 kg/m².     Wt Readings from Last 3 Encounters:   07/27/21 222 lb (100.7 kg)   06/01/21 206 lb (93.4 kg)   05/18/21 206 lb (93.4 kg)     BP Readings from Last 3 Encounters:   07/27/21 100/64   06/16/21 127/76   06/01/21 130/81       Physical Exam  Vitals reviewed. Constitutional:       General: He is not in acute distress. Appearance: Normal appearance. He is obese. HENT:      Head: Normocephalic and atraumatic. Right Ear: External ear normal.      Left Ear: External ear normal.      Nose: Nose normal.      Mouth/Throat:      Mouth: Mucous membranes are moist.   Eyes:      Conjunctiva/sclera: Conjunctivae normal.   Cardiovascular:      Rate and Rhythm: Normal rate and regular rhythm. Pulses: Normal pulses. Heart sounds: Normal heart sounds. No murmur heard. Pulmonary:      Effort: Pulmonary effort is normal. No respiratory distress. Breath sounds: Normal breath sounds. No wheezing, rhonchi or rales. Abdominal:      General: Abdomen is flat. Bowel sounds are normal. There is no distension. Palpations: Abdomen is soft. Tenderness: There is no abdominal tenderness. Musculoskeletal:         General: Normal range of motion. Cervical back: Normal range of motion and neck supple. Right lower leg: No edema. Left lower leg: No edema. Skin:     General: Skin is warm and dry. Capillary Refill: Capillary refill takes less than 2 seconds. Findings: No rash. Neurological:      General: No focal deficit present. Mental Status: He is alert and oriented to person, place, and time. Psychiatric:         Mood and Affect: Mood normal.         Behavior: Behavior normal.         Assessment / Plan:     1. Chest pain, unspecified type  2. Family history of early CAD  - While GERD and anxiety / panic attacks could certainly contribute, his risk factors (smoker, hyperlipidemia) and strong family history make ACS high on differential. Would benefit from cardiology referral and workup. Likely needs stress testing.  Continue working on smoking cessation.   - Darryl Rehman MD, Cardiology, [address] [place] [address]    4. Tobacco use disorder  - Refilled Chantix. Open to hypnotherapy if continues to have trouble obtaining medications. - varenicline (CHANTIX CONTINUING MONTH AASHISH) 1 MG tablet; Take 1 tablet by mouth 2 times daily  Dispense: 60 tablet; Refill: 3    3. DANTE (generalized anxiety disorder)  5. Borderline personality disorder ([facility] [address]  6. Recurrent major depressive disorder, in remission ([facility]  - Continue current management and follow-up as scheduled with Dr. Angela Pressley. 7. Seasonal allergies  - Stable. 8. Restless leg  - Stable. Health Maintenance Due   Topic Date Due    Hepatitis C screen  Never done    COVID-19 Vaccine (1) Never done    HIV screen  Never done    DTaP/Tdap/Td vaccine (1 - Tdap) Never done           Return in about 1 month (around 8/27/2021) for follow up chest pain. Medications Prescribed:  Orders Placed This Encounter   Medications    varenicline (CHANTIX CONTINUING MONTH AASHISH) 1 MG tablet     Sig: Take 1 tablet by mouth 2 times daily     Dispense:  60 tablet     Refill:  3       Future Appointments   Date Time Provider Juan Manuel Fitzpatrick   7/28/2021 11:10 AM DO Jacklyn Mitchell Neuro [facility]   8/18/2021 10:00 AM Dedra Rodriguez MD [address]   8/31/2021 11:00 AM Lance Swanson MD [facility] [address]       Patient given educational materials - see patient instructions. Discussed use, benefit, and side effects of prescribed medications. All patient questions answered. Patient voiced understanding. Reviewed health maintenance. Instructed to continue current medications, diet and exercise. Patient agreed with treatment plan. Follow up as directed.      Electronically signed by Lance Swanson MD on 7/27/2021 at 1:26 PM

## 2021-07-27 NOTE — PROGRESS NOTES
SUBJECTIVE     Yonatan Boyce is a 27 y.o.male    Chief Complaint   Patient presents with    3 Month Follow-Up     chest pain, arm pain, fam. hx of heart       Chief complaint, Habematolel, and all pertinent details of the case reviewed with the resident. Please see resident's note for specific details discussed at today's visit. Pt with multiple cardiac risk factors including family hx of CAD with dad MI age 35s. Patient smoker, dyslipidemia, obesity. Has chest pain intermittently throughout the week. Also has anxiety but states he is seeing psych and this is fairly well controlled.  Tolerating meds    Patient Active Problem List   Diagnosis    Left hip pain    Depression    Asthma    Restless leg    Gastroesophageal reflux disease    Vitamin D deficiency    Other insomnia    Anxiety    Recurrent major depressive disorder, in remission ([facility] [address]    Bipolar affective disorder, current episode depressed ([facility] [address] [facility]    PTSD (post-traumatic stress disorder)    DANTE (generalized anxiety disorder)    Allergic rhinitis due to pollen    Lumbar disc disease       Current Outpatient Medications   Medication Sig Dispense Refill    rOPINIRole (REQUIP) 1 MG tablet Take 1 tablet by mouth nightly 90 tablet 3    montelukast (SINGULAIR) 10 MG tablet Take 1 tablet by mouth daily 30 tablet 3    loratadine (CLARITIN) 10 MG tablet Take 1 tablet by mouth daily 30 tablet 3    traZODone (DESYREL) 150 MG tablet Take 1 tablet by mouth nightly 30 tablet 2    prazosin (MINIPRESS) 2 MG capsule Take 1 capsule by mouth nightly 30 capsule 2    lamoTRIgine (LAMICTAL) 150 MG tablet Take 1 tablet by mouth 2 times daily 60 tablet 2    escitalopram (LEXAPRO) 20 MG tablet Take 1 tablet by mouth daily 30 tablet 2    azelastine (OPTIVAR) 0.05 % ophthalmic solution Place 1 drop into both eyes 2 times daily as needed      fluticasone (FLONASE) 50 MCG/ACT nasal spray 2 sprays by Each Nostril route daily Start with one spray daily for one week and increase to two sprays daily as needed 1 Bottle 3    fluticasone-salmeterol (ADVAIR DISKUS) 100-50 MCG/DOSE diskus inhaler Inhale 1 puff into the lungs every 12 hours 60 each 3    albuterol sulfate HFA (VENTOLIN HFA) 108 (90 Base) MCG/ACT inhaler Inhale 2 puffs into the lungs every 6 hours as needed for Wheezing 1 Inhaler 0    pantoprazole (PROTONIX) 20 MG tablet Take 1 tablet by mouth daily 30 tablet 3    traZODone (DESYREL) 100 MG tablet take 1 tablet by mouth nightly      rOPINIRole (REQUIP) 0.5 MG tablet take 1 tablet by mouth three times a day      varenicline (CHANTIX CONTINUING MONTH AASHISH) 1 MG tablet Take 1 tablet by mouth 2 times daily (Patient not taking: Reported on 7/27/2021) 60 tablet 3     No current facility-administered medications for this visit. Review of Systems per     OBJECTIVE     /64 (Site: Left Upper Arm, Position: Sitting, Cuff Size: Large Adult)   Pulse 77   Temp 98.7 °F (37.1 °C) (Oral)   Resp 16   Ht 5' 10\" (1.778 m)   Wt 222 lb (100.7 kg)   SpO2 98%   BMI 31.85 kg/m²   BP Readings from Last 3 Encounters:   07/27/21 100/64   06/16/21 127/76   06/01/21 130/81       Wt Readings from Last 3 Encounters:   07/27/21 222 lb (100.7 kg)   06/01/21 206 lb (93.4 kg)   05/18/21 206 lb (93.4 kg)     Body mass index is 31.85 kg/m². Physical Exam per Dr. Maryellen Campbell    No results found for: LABA1C    Lab Results   Component Value Date    CHOL 211 (H) 08/04/2020    TRIG 283 (H) 08/04/2020    HDL 29 08/04/2020    [address] 125 08/04/2020       The ASCVD Risk score (Leann Steele, et al., 2013) failed to calculate for the following reasons:     The 2013 ASCVD risk score is only valid for ages 36 to 78    Lab Results   Component Value Date     (H) 05/18/2021    K 4.6 05/18/2021     05/18/2021    CO2 21 05/18/2021    BUN 8 05/18/2021    CREATININE 0.80 05/18/2021    GLUCOSE 103 (H) 05/18/2021    CALCIUM 9.2 08/04/2020    PROT 6.6 08/04/2020    LABALBU 4.3 08/04/2020    BILITOT 0.4 08/04/2020    ALKPHOS 45 08/04/2020    AST 18 08/04/2020    ALT 17 08/04/2020    LABGLOM >60 05/18/2021    GFRAA >60 05/18/2021       Lab Results   Component Value Date    TSH 1.880 08/04/2020       Lab Results   Component Value Date    WBC 7.7 05/18/2021    HGB 15.1 05/18/2021    HCT 46.4 05/18/2021    MCV 91.5 05/18/2021     05/18/2021         Future Appointments   Date Time Provider Juan Manuel Fitzpatrick   7/28/2021 11:10 AM DO Jacklyn Alarcon Neuro [facility]   8/18/2021 10:00 AM Connor Diaz MD [facility] [address] [place] [address]       ASSESSMENT       Diagnosis Orders   1. Chest pain, unspecified type  Keya Cuevas MD, Cardiology, [address] [place] [address]   2. Family history of early CAD  Keya Cuevas MD, Cardiology, [address] [place] [address]   3. Restless leg     4. Seasonal allergies     5. DANTE (generalized anxiety disorder)     6. Tobacco use disorder  varenicline (CHANTIX CONTINUING MONTH AASHISH) 1 MG tablet       PLAN      After discussion with pt and Dr. Valentin Wood, we agreed on plan as follows:    1.  refer to cardio to further eval chest pain. Likely stress test in future or testing per cardio  2. chantix for tobacco cessation  3. Follow up with psych for mood            Attending Physician Statement  I have discussed the case, including pertinent history and exam findings with the resident. I also have seen the patient and performed key portions of the examination. I agree with the documented assessment and plan as documented by the resident.   GC modifier added to this encounter        Electronically signed by Freddie Biggs MD on 7/27/2021 at 11:12 AM

## 2021-07-27 NOTE — TELEPHONE ENCOUNTER
NP referral from Dr Maikel Garcia. Diagnosis   R07.9 (ICD-10-CM) - Chest pain, unspecified type   Z82.49 (ICD-10-CM) - Family history of early CAD     LM for pt to return call.

## 2021-07-27 NOTE — PROGRESS NOTES
Health Maintenance Due   Topic Date Due    Hepatitis C screen  Never done    COVID-19 Vaccine (1) Never done    HIV screen  Never done    DTaP/Tdap/Td vaccine (1 - Tdap) Never done

## 2021-07-28 ENCOUNTER — OFFICE VISIT (OUTPATIENT)
Dept: NEUROSURGERY | Age: 31
End: 2021-07-28

## 2021-07-28 ENCOUNTER — HOSPITAL ENCOUNTER (OUTPATIENT)
Age: 31
Discharge: HOME | End: 2021-07-30
Payer: MEDICAID

## 2021-07-28 ENCOUNTER — HOSPITAL ENCOUNTER (OUTPATIENT)
Dept: GENERAL RADIOLOGY | Age: 31
Discharge: HOME | End: 2021-07-30
Payer: MEDICAID

## 2021-07-28 VITALS
HEART RATE: 68 BPM | OXYGEN SATURATION: 97 % | HEIGHT: 70 IN | SYSTOLIC BLOOD PRESSURE: 126 MMHG | WEIGHT: 222 LBS | BODY MASS INDEX: 31.78 KG/M2 | DIASTOLIC BLOOD PRESSURE: 83 MMHG

## 2021-07-28 DIAGNOSIS — M43.16 SPONDYLOLISTHESIS OF LUMBAR REGION: Primary | ICD-10-CM

## 2021-07-28 DIAGNOSIS — M51.16 LUMBAR DISC DISEASE WITH RADICULOPATHY: ICD-10-CM

## 2021-07-28 PROCEDURE — G8417 CALC BMI ABV UP PARAM F/U: HCPCS | Performed by: NEUROLOGICAL SURGERY

## 2021-07-28 PROCEDURE — 72100 X-RAY EXAM L-S SPINE 2/3 VWS: CPT

## 2021-07-28 PROCEDURE — G8427 DOCREV CUR MEDS BY ELIG CLIN: HCPCS | Performed by: NEUROLOGICAL SURGERY

## 2021-07-28 PROCEDURE — 99024 POSTOP FOLLOW-UP VISIT: CPT | Performed by: NEUROLOGICAL SURGERY

## 2021-07-28 PROCEDURE — 4004F PT TOBACCO SCREEN RCVD TLK: CPT | Performed by: NEUROLOGICAL SURGERY

## 2021-07-28 RX ORDER — MELOXICAM 15 MG/1
15 TABLET ORAL DAILY
Qty: 30 TABLET | Refills: 0 | Status: SHIPPED | OUTPATIENT
Start: 2021-07-28 | End: 2021-08-31

## 2021-07-28 NOTE — PROGRESS NOTES
Willa Chirinos  [facility] [address] [facility] [address]  Dept: [phone]    Patient:  Dawson Graham  YOB: 1990  Date: 7/28/21    The patient is a 27 y.o. male who presents today for consult of the following problems:     Chief Complaint   Patient presents with    Post-Op Check     8 week             HPI:     Dawson Graham is a 27 y.o. male on whom neurosurgical consultation was requested by Yung Byrne MD for management of left-sided radiculopathy. The patient has been through a least 6 weeks of physical therapy as well as transforaminal injection and conservative measures including home therapy and NSAIDs as well as steroids. Unfortunately his symptoms have been significantly refractory and a conservative approach involving a left L sided L3 foraminotomy was unsuccessful. The patient still has significant debilitating pain in L3 dermatomal fashion on the left side radiating from the lumbar region along the lateral hip along the anterolateral portion of the thigh stopping above the knee. Denies any distal numbness and tingling of the toes or anything below the knee and no right leg symptoms or saddle symptoms. Pain is significant debilitating and he is unable to bend or twist without a significant onset of pain. Can only walk short periods. He does utilize a cane intermittently due to the significance of the pain. Dagoberto Maya       History:     Past Medical History:   Diagnosis Date    Asthma     Bipolar 1 disorder ([facility] [address]     Borderline personality disorder ([facility]     COPD (chronic obstructive pulmonary disease) ([facility])     early stages    Depression     GERD (gastroesophageal reflux disease)     History of cardiac murmur in childhood     Bhrf-Hbiao-Xidqscl disease, left     Lumbar disc disease     Multiple allergies     PTSD (post-traumatic stress disorder)     Restless legs syndrome     Teeth missing     Under care of team by mouth nightly 30 tablet 2    prazosin (MINIPRESS) 2 MG capsule Take 1 capsule by mouth nightly 30 capsule 2    lamoTRIgine (LAMICTAL) 150 MG tablet Take 1 tablet by mouth 2 times daily 60 tablet 2    escitalopram (LEXAPRO) 20 MG tablet Take 1 tablet by mouth daily 30 tablet 2    azelastine (OPTIVAR) 0.05 % ophthalmic solution Place 1 drop into both eyes 2 times daily as needed      fluticasone (FLONASE) 50 MCG/ACT nasal spray 2 sprays by Each Nostril route daily Start with one spray daily for one week and increase to two sprays daily as needed 1 Bottle 3    fluticasone-salmeterol (ADVAIR DISKUS) 100-50 MCG/DOSE diskus inhaler Inhale 1 puff into the lungs every 12 hours 60 each 3    albuterol sulfate HFA (VENTOLIN HFA) 108 (90 Base) MCG/ACT inhaler Inhale 2 puffs into the lungs every 6 hours as needed for Wheezing 1 Inhaler 0    pantoprazole (PROTONIX) 20 MG tablet Take 1 tablet by mouth daily 30 tablet 3     No current facility-administered medications on file prior to visit. Social History     Tobacco Use    Smoking status: Current Every Day Smoker     Packs/day: 0.50     Years: 10.00     Pack years: 5.00     Types: Cigarettes    Smokeless tobacco: Former User     Types: Chew   Vaping Use    Vaping Use: Former   Substance Use Topics    Alcohol use: Not Currently     Comment: socially    Drug use: Not Currently     Types: Marijuana       No Known Allergies    Review of Systems  Constitutional: Negative for activity change and appetite change. HENT: Negative for ear pain and facial swelling. Eyes: Negative for discharge and itching. Respiratory: Negative for choking and chest tightness. Cardiovascular: Negative for chest pain and leg swelling. Gastrointestinal: Negative for nausea and abdominal pain. Endocrine: Negative for cold intolerance and heat intolerance. Genitourinary: Negative for frequency and flank pain. Musculoskeletal: Negative for myalgias and joint swelling. Skin: Negative for rash and wound. Allergic/Immunologic: Negative for environmental allergies and food allergies. Hematological: Negative for adenopathy. Does not bruise/bleed easily. Psychiatric/Behavioral: Negative for self-injury. The patient is not nervous/anxious. Physical Exam:      /83   Pulse 68   Ht 5' 10\" (1.778 m)   Wt 222 lb (100.7 kg)   SpO2 97%   BMI 31.85 kg/m²   Estimated body mass index is 31.85 kg/m² as calculated from the following:    Height as of this encounter: 5' 10\" (1.778 m). Weight as of this encounter: 222 lb (100.7 kg). General:  Yonatan Boyce is a 27y.o. year old male who appears his stated age. HEENT: Normocephalic atraumatic. Neck supple. Chest: regular rate; pulses equal  Abdomen: Soft nontender nondistended. Normoactive bowel sounds.   Ext: DP and PT pulses 2+, good cap refill  Neuro    Mentation  Appropriate affect  Registration intact  Orientation intact  3 item recall intact  Judgement intact to situation    Cranial Nerves:   Pupils equal and reactive to light  Extraocular motion intact  Face and shrug symmetric  Tongue midline  No dysarthria  v1-3 sensation symmetric, masseter tone symmetric  Hearing symmetric and intact to finger rub    Sensation:   Diminished L L3    Motor  L deltoid 5/5; R deltoid 5/5  L biceps 5/5; R biceps 5/5  L triceps 5/5; R triceps 5/5  L wrist extension 5/5; R wrist extension 5/5  L intrinsics 5/5; R intrinsics 5/5     L iliopsoas 5/5 , R iliopsoas 5/5  L quadriceps 5/5; R quadriceps 5/5  L Dorsiflexion 5/5; R dorsiflexion 5/5  L Plantarflexion 5/5; R plantarflexion 5/5  L EHL 5/5; R EHL 5/5    Reflexes  L Brachioradialis 2+/4; R brachioradialis 2+/4  L Biceps 2+/4; R Biceps 2+/4  L Triceps 2+/4; R Triceps 2+/4  L Patellar 2+/4: R Patellar 2+/4  L Achilles 2+/4; R Achilles 2+/4    hoffmans L: neg  hoffmans R: neg  Clonus L: neg  Clonus R: neg  Babinski L: up  Babinski R; up    Studies Review:     Mri L significant left-sided foraminal stenosis along with a discrimination that is foraminal as well as lateral recess extending cephalad. Lumbar flexion-extension x-rays with grade 1 anterolisthesis. Assessment and Plan:      1. Spondylolisthesis of lumbar region    2. Lumbar disc disease with radiculopathy          Plan: Combination of lumbar instability on imaging along with significant foraminal disease refractory to conservative measures including conservative surgical intervention for a foraminotomy as well as transforaminal injection warrants surgical intervention. Extensive discussion regarding the goals of surgery which include maintaining lumbar sagittal alignment along with large surface area for bony fusion along with foraminal decompression. Would strongly elect for anterior posterior procedure with oblique interbody fusion at L3-4 to accomplish lumbar lordosis as well as treat instability and accomplished bony fusion. Posterior fixation with percutaneous screws at L3-4 along with complete facetectomy and foraminotomy on the left at L3. Procedure will take approximately 5 to 6 hours with 1-2 night hospital stay. Risks include CSF leak nerve injury bowel bladder dysfunction bleeding infection and hardware failure. Followup: No follow-ups on file. Prescriptions Ordered:  No orders of the defined types were placed in this encounter. Orders Placed:  No orders of the defined types were placed in this encounter. Electronically signed by Mel Lehman DO on 7/28/2021 at 11:38 AM    Please note that this chart was generated using voice recognition Dragon dictation software. Although every effort was made to ensure the accuracy of this automated transcription, some errors in transcription may have occurred.

## 2021-08-02 NOTE — DISCHARGE SUMMARY
[address] [facility]  PHYSICAL THERAPY  [] DAILY NOTE (LAND) [] DAILY NOTE (AQUATIC ) [] PROGRESS NOTE [x] DISCHARGE NOTE    [x] OUTPATIENT REHABILITATION [facility]   [] [place] [address]    [] [facility]   [] Mar     Date: 2021  Patient Name:  Edmundo Poe  : 1990  MRN: [id number]  CSN: [id number]    Referring Practitioner Elliot Santillan DO   Diagnosis Intervertebral disc disorders with radiculopathy, lumbar region [M51.16]    Treatment Diagnosis Pain in low back, decreased trunk ROM, decreased core strength, abnormal gait    Date of Evaluation 21    Additional Pertinent History Bipolar, anxiety, memory issues, L hip replacement      Functional Outcome Measure Used Modified Oswestry    Functional Outcome Score 34/50 (21)       Insurance: Primary: Payor: [employer] /  /  / ,   Secondary:    Authorization Information: Aquatic covered, modalities covered except ionto/HP/CP, telehealth not covered   Visit # 5, 5/10 for progress note   Visits Allowed: 30 visits, No precert until after 68NZ visit   Recertification Date:    Physician Follow-Up: 21   Physician Orders:    History of Present Illness: Patient reports that he had a microdiscectomy on 21 due to a disc herniation. Patient reports that he did have a fracture in the pars. Patient reports that he did not have any specific injury to the low back. Patient reports that he has stenosis and he needs a spinal fusion. Patient has pain in L low back that travels into L anterior thigh that has been going on for a while now. Patient discharged due to: patient cancelled his progress report visit. Patient was called and a message was left to re-schedule PT appointment. Patient did not return our call to get back on the schedule. See last note for details related to goal status.       Electronically Signed by: Praveen Oneal PT

## 2021-08-03 ENCOUNTER — TELEPHONE (OUTPATIENT)
Dept: FAMILY MEDICINE CLINIC | Age: 31
End: 2021-08-03

## 2021-08-03 NOTE — TELEPHONE ENCOUNTER
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 Valery River and Matthew Jacobsen called back today and states that he was seen by his Neurosurgeon 07/28/2021 in [facility] and is now scheduled to have a Spinal Fusion on 11/02/2021 and will need an off work letter until then and then after surgery the Neurosurgeon from [place] will write him off from then on. Please Advise.

## 2021-08-03 NOTE — TELEPHONE ENCOUNTER
MD Dawson Rice Post returned call and states that he is not currently employed and it is for Child Support concerns only. Please Advise.

## 2021-08-03 NOTE — TELEPHONE ENCOUNTER
Jorge Molina MD  Tried contacting Randi Skaggs and left a message but I believe it is only for Child Support concerns which he mentioned earlier.

## 2021-08-18 ENCOUNTER — VIRTUAL VISIT (OUTPATIENT)
Dept: PSYCHIATRY | Age: 31
End: 2021-08-18
Payer: MEDICAID

## 2021-08-18 DIAGNOSIS — F60.3 BORDERLINE PERSONALITY DISORDER (HCC): ICD-10-CM

## 2021-08-18 DIAGNOSIS — F43.10 PTSD (POST-TRAUMATIC STRESS DISORDER): Primary | ICD-10-CM

## 2021-08-18 DIAGNOSIS — F41.1 GAD (GENERALIZED ANXIETY DISORDER): ICD-10-CM

## 2021-08-18 PROCEDURE — 99214 OFFICE O/P EST MOD 30 MIN: CPT | Performed by: PSYCHIATRY & NEUROLOGY

## 2021-08-18 PROCEDURE — G8427 DOCREV CUR MEDS BY ELIG CLIN: HCPCS | Performed by: PSYCHIATRY & NEUROLOGY

## 2021-08-18 RX ORDER — PRAZOSIN HYDROCHLORIDE 1 MG/1
1 CAPSULE ORAL NIGHTLY
Qty: 30 CAPSULE | Refills: 2 | Status: SHIPPED | OUTPATIENT
Start: 2021-08-18 | End: 2021-09-21

## 2021-08-18 RX ORDER — PRAZOSIN HYDROCHLORIDE 2 MG/1
2 CAPSULE ORAL NIGHTLY
Qty: 30 CAPSULE | Refills: 2 | Status: SHIPPED | OUTPATIENT
Start: 2021-08-18 | End: 2021-09-21

## 2021-08-18 RX ORDER — LAMOTRIGINE 200 MG/1
200 TABLET ORAL 2 TIMES DAILY
Qty: 60 TABLET | Refills: 2 | Status: SHIPPED | OUTPATIENT
Start: 2021-08-18 | End: 2021-10-21 | Stop reason: SDUPTHER

## 2021-08-18 RX ORDER — ESCITALOPRAM OXALATE 20 MG/1
20 TABLET ORAL DAILY
Qty: 30 TABLET | Refills: 2 | Status: SHIPPED | OUTPATIENT
Start: 2021-08-18 | End: 2021-08-26 | Stop reason: SDUPTHER

## 2021-08-18 RX ORDER — TRAZODONE HYDROCHLORIDE 150 MG/1
150 TABLET ORAL NIGHTLY
Qty: 30 TABLET | Refills: 2 | Status: SHIPPED | OUTPATIENT
Start: 2021-08-18 | End: 2021-10-21 | Stop reason: SDUPTHER

## 2021-08-18 NOTE — PROGRESS NOTES
gabapentin, Ritalin, medical cannabis, Lamictal, Lexapro    OBJECTIVE:  Vitals: There were no vitals taken for this visit. MENTAL STATUS EXAM:    GENERAL  Build: Normal    Hygiene:  Appropriate   SENSORIUM Orientation: Place, Person, Time, & Situation     Consciousness: Alert    ATTENTION   Focused    RELATEDNESS  Cooperative    EYE CONTACT   Good    PSYCHOMOTOR  Normal    SPEECH Volume: Normal     Rate: Normal rate and down, somewhat irritable tone    Amplitude: Within normal limits   MOOD  Dysphoric    AFFECT Range: Constricted    THOUGHT Process:  Goal-Directed     Content: no evidence of psychosis    COGNITION Insight: Good    Judgement:  Intact    MEMORY  Intact    INTELLIGENCE  Average     Mobility/Gait: Independently     Controlled SubstancesMonitoring:   not done      ASSESSMENT: Will increase Lamictal for mood/irritability. Will stagger that change with prazosin increase for sleep and NM. No SI. Might consider use of Abilify or SSRI/SNRI going forward. Lamictal and Lexapro both with mood benefit. Diagnosis Orders   1. PTSD (post-traumatic stress disorder)     2. DANTE (generalized anxiety disorder)     3. Borderline personality disorder ([facility]     Medical Hx: asthma, heart murmur, Legg-Calve Perthes Disease     PLAN:     · Medications:   · Lexapro 20 mg QD   · Increase Lamictal to 200 mg BID (from 150 mg QD)  · Increase prazosin 2 mg HS to 3 mg HS  · Trazodone 150 mg HS  · Therapy: previously saw Dr. Pio Sandoval, will get him reestablished  · Labs/Tests/Imaging: none   · Records Reviewed: [facility]  · Patient advised to call if patient has any difficulties with treatment  Return in about 4 weeks (around 9/15/2021) for med check, follow up. Electronically signed by Gris Ng MD on 8/18/2021 at 10:27 AM    Yonatan Boyce is a 27 y.o. male being evaluated by a Virtual Visit (video visit) to address concerns as mentioned above. A caregiver was present when appropriate.  Due to this being a TeleHealth encounter (HHKIS-61 public health emergency), evaluation of the following organ systems was limited: Vitals/Constitutional/EENT/Resp/CV/GI//MS/Neuro/Skin/Heme-Lymph-Imm. Pursuant to the emergency declaration under the [address] [place] [address], [address] [place] [address] authority and the [employer] Act, this Virtual Visit was conducted with patient's (and/or legal guardian's) consent, to reduce the patient's risk of exposure to COVID-19 and provide necessary medical care. The patient (and/or legal guardian) has also been advised to contact this office for worsening conditions or problems, and seek emergency medical treatment and/or call 911 if deemed necessary. Patient identification was verified at the start of the visit: Yes     Total time spent for this encounter: not billed by time     Services were provided through a video synchronous discussion virtually to substitute for in-person clinic visit. Patient and provider were located at their individual homes.     Electronically signed by Connor Diaz MD on 8/18/2021 at 10:27 AM

## 2021-08-26 ENCOUNTER — TELEPHONE (OUTPATIENT)
Dept: PSYCHIATRY | Age: 31
End: 2021-08-26

## 2021-08-26 RX ORDER — ESCITALOPRAM OXALATE 20 MG/1
30 TABLET ORAL DAILY
Qty: 45 TABLET | Refills: 2 | Status: SHIPPED | OUTPATIENT
Start: 2021-08-26 | End: 2021-10-21 | Stop reason: SDUPTHER

## 2021-08-26 NOTE — TELEPHONE ENCOUNTER
Goldie Lopez wrote into the office via [employer]:    I know we talked about upping my lexapro a couple weeks after my appointment with you and I feel I do need it upped or whatever you think the next step would be    ---Please advise; he does not return until 10/21/21. Last seen on 08/18/21.

## 2021-08-31 ENCOUNTER — OFFICE VISIT (OUTPATIENT)
Dept: FAMILY MEDICINE CLINIC | Age: 31
End: 2021-08-31
Payer: MEDICAID

## 2021-08-31 ENCOUNTER — PATIENT MESSAGE (OUTPATIENT)
Dept: NEUROSURGERY | Age: 31
End: 2021-08-31

## 2021-08-31 ENCOUNTER — TELEPHONE (OUTPATIENT)
Dept: PSYCHIATRY | Age: 31
End: 2021-08-31

## 2021-08-31 VITALS
DIASTOLIC BLOOD PRESSURE: 80 MMHG | BODY MASS INDEX: 32.01 KG/M2 | TEMPERATURE: 97.2 F | WEIGHT: 223.6 LBS | SYSTOLIC BLOOD PRESSURE: 136 MMHG | RESPIRATION RATE: 16 BRPM | HEIGHT: 70 IN | HEART RATE: 78 BPM | OXYGEN SATURATION: 98 %

## 2021-08-31 DIAGNOSIS — F31.31 BIPOLAR AFFECTIVE DISORDER, CURRENTLY DEPRESSED, MILD (HCC): ICD-10-CM

## 2021-08-31 DIAGNOSIS — H91.92 DIMINISHED HEARING, LEFT: ICD-10-CM

## 2021-08-31 DIAGNOSIS — F17.200 TOBACCO USE DISORDER: ICD-10-CM

## 2021-08-31 DIAGNOSIS — F41.1 GAD (GENERALIZED ANXIETY DISORDER): ICD-10-CM

## 2021-08-31 DIAGNOSIS — E55.9 VITAMIN D DEFICIENCY: ICD-10-CM

## 2021-08-31 DIAGNOSIS — R07.9 CHEST PAIN, UNSPECIFIED TYPE: Primary | ICD-10-CM

## 2021-08-31 DIAGNOSIS — F60.3 BORDERLINE PERSONALITY DISORDER (HCC): ICD-10-CM

## 2021-08-31 DIAGNOSIS — E78.2 MIXED HYPERLIPIDEMIA: ICD-10-CM

## 2021-08-31 PROCEDURE — G8427 DOCREV CUR MEDS BY ELIG CLIN: HCPCS | Performed by: STUDENT IN AN ORGANIZED HEALTH CARE EDUCATION/TRAINING PROGRAM

## 2021-08-31 PROCEDURE — 93000 ELECTROCARDIOGRAM COMPLETE: CPT | Performed by: STUDENT IN AN ORGANIZED HEALTH CARE EDUCATION/TRAINING PROGRAM

## 2021-08-31 PROCEDURE — 4004F PT TOBACCO SCREEN RCVD TLK: CPT | Performed by: STUDENT IN AN ORGANIZED HEALTH CARE EDUCATION/TRAINING PROGRAM

## 2021-08-31 PROCEDURE — G8417 CALC BMI ABV UP PARAM F/U: HCPCS | Performed by: STUDENT IN AN ORGANIZED HEALTH CARE EDUCATION/TRAINING PROGRAM

## 2021-08-31 PROCEDURE — 99214 OFFICE O/P EST MOD 30 MIN: CPT | Performed by: STUDENT IN AN ORGANIZED HEALTH CARE EDUCATION/TRAINING PROGRAM

## 2021-08-31 ASSESSMENT — ENCOUNTER SYMPTOMS
SHORTNESS OF BREATH: 1
CONSTIPATION: 0
VOMITING: 0
RHINORRHEA: 0
NAUSEA: 0
BACK PAIN: 1
ABDOMINAL PAIN: 0
COUGH: 0
DIARRHEA: 0

## 2021-08-31 NOTE — TELEPHONE ENCOUNTER
From: Janeth Nelson  To: Wno Galo DO  Sent: 8/31/2021 1:02 PM EDT  Subject: Non-Urgent Medical Question    I seen my family dr and she recommended a permanent handicap placard and told me to get in touch with you to get a prescription to take to the dmv to get it issued.

## 2021-08-31 NOTE — PROGRESS NOTES
S: 27 y.o. male with   Chief Complaint   Patient presents with    1 Month Follow-Up     chest pain - off and on    Referral - General     Hard of hearing    Other     night terrors       HPI: please see resident note for HPI and ROS. BP Readings from Last 3 Encounters:   08/31/21 136/80   07/28/21 126/83   07/27/21 100/64     Wt Readings from Last 3 Encounters:   08/31/21 223 lb 9.6 oz (101.4 kg)   07/28/21 222 lb (100.7 kg)   07/27/21 222 lb (100.7 kg)       O: VS:  height is 5' 10\" (1.778 m) and weight is 223 lb 9.6 oz (101.4 kg). His skin temperature is 97.2 °F (36.2 °C). His blood pressure is 136/80 and his pulse is 78. His respiration is 16 and oxygen saturation is 98%. AAO/NAD, appropriate affect for mood       Diagnosis Orders   1. Mixed hyperlipidemia  Lipid Panel   2. Vitamin D deficiency  Vitamin D 25 Hydroxy   3. DANTE (generalized anxiety disorder)     4. Borderline personality disorder ([address]     5. Bipolar affective disorder, currently depressed, mild ([address]     6. Diminished hearing, left     7. Chest pain, unspecified type  EKG 12 Lead       Plan:  Continue f/u with psychology and psychiatry. ekg today. Labs as ordered. Refer to ent. Encouraged to go back to cardiology for stress test.      Health Maintenance Due   Topic Date Due    Hepatitis C screen  Never done    COVID-19 Vaccine (1) Never done    HIV screen  Never done    DTaP/Tdap/Td vaccine (1 - Tdap) Never done       Attending Physician Statement  I have discussed the case, including pertinent history and exam findings with the resident. I also have seen the patient and performed key portions of the examination. I agree with the documented assessment and plan as documented by the resident.         Judd Carlos DO 8/31/2021 12:06 PM

## 2021-08-31 NOTE — TELEPHONE ENCOUNTER
Odalis Nevarez wrote into the office via [employer].:    I have been having really bad night terrors to the point that I am literally feeling all the physical pain from them and wake up hurting every morning linking directly to my night terrors I was wondering if there was anything I could do for them. I wasn't sure if like Luz Cavazos or something else will help because the trazadone isn't keeping me asleep anymore    --Please advise. He is not scheduled to return until 10/21/21. Last seen on 08/18/21.

## 2021-09-01 NOTE — TELEPHONE ENCOUNTER
Does he think the prazosin has helped with nightmares at any point since we started it? He can try 5 mg dose as well if tolerating it ok.    Electronically signed by Faith Rojas MD on 9/1/2021 at 9:29 AM

## 2021-09-01 NOTE — TELEPHONE ENCOUNTER
Anila Helm wrote back into the office via [employer]:     Is there any way I can set up a virtual appointment with dr ordaz for as soon as possible. At this time, there are no available sooner appt unless he were to be squeezed into the schedule per provider.

## 2021-09-01 NOTE — TELEPHONE ENCOUNTER
I have wrote back to [facility] with this information via [employer]; I will await his response.

## 2021-09-01 NOTE — TELEPHONE ENCOUNTER
At last visit we decided to increase prazosin which we are using for the nightmares. Did he try the increase to 3 mg and is he tolerating that ok? If he is we can try 4 mg prazosin at night to see if that helps the nightmares a bit more.    Electronically signed by Scott Vera MD on 9/1/2021 at 8:33 AM

## 2021-09-01 NOTE — TELEPHONE ENCOUNTER
Rito Bowman wrote into the office via [employer]:    I was tolerating 3mg and then the night terrors were bad so I tried 4mg with still no response or change in the nightmares at all. --Please advise.

## 2021-09-02 DIAGNOSIS — M43.16 SPONDYLOLISTHESIS OF LUMBAR REGION: Primary | ICD-10-CM

## 2021-09-02 DIAGNOSIS — M51.16 LUMBAR DISC DISEASE WITH RADICULOPATHY: ICD-10-CM

## 2021-09-07 DIAGNOSIS — K21.9 GASTROESOPHAGEAL REFLUX DISEASE WITHOUT ESOPHAGITIS: ICD-10-CM

## 2021-09-07 RX ORDER — PANTOPRAZOLE SODIUM 20 MG/1
40 TABLET, DELAYED RELEASE ORAL DAILY
Qty: 180 TABLET | Refills: 0 | Status: SHIPPED | OUTPATIENT
Start: 2021-09-07 | End: 2021-10-25

## 2021-09-07 NOTE — TELEPHONE ENCOUNTER
Goldie Lopez wrote back into the office:    I've been having daily headaches and diarrhea daily I wasn't sure if it could be any of the meds? --Please advise.

## 2021-09-07 NOTE — TELEPHONE ENCOUNTER
He can either reduce prazosin dose to see if those symptoms improve or try Lexapro decrease, whichever he thinks is causing the side effects he should try reducing dose first. I prefer to make one med change at a time to observe for improvement.    Electronically signed by Dedra Rodriguez MD on 9/7/2021 at 2:32 PM

## 2021-09-07 NOTE — TELEPHONE ENCOUNTER
I suspect it could be the Lexapro increase from 20 mg to 30 mg that is causing HA and diarrhea. I would like him to cut back dose to 20 mg once daily. I'd like to observe for these symptoms to improve before making any further changes. What dose of prazosin is he taking now?   Electronically signed by Leonides Alcazar MD on 9/7/2021 at 12:58 PM

## 2021-09-07 NOTE — TELEPHONE ENCOUNTER
Eugene Galindo wrote back into the office via [employer]:    I'm taking 4 mg but that's when the headaches and diarrhea started is when I went to 3 mg of prozozin because 2 wasn't working    --Please advise.

## 2021-09-16 ENCOUNTER — VIRTUAL VISIT (OUTPATIENT)
Dept: PSYCHOLOGY | Age: 31
End: 2021-09-16
Payer: MEDICAID

## 2021-09-16 DIAGNOSIS — F31.32 BIPOLAR AFFECTIVE DISORDER, CURRENTLY DEPRESSED, MODERATE (HCC): ICD-10-CM

## 2021-09-16 DIAGNOSIS — F43.10 PTSD (POST-TRAUMATIC STRESS DISORDER): Primary | ICD-10-CM

## 2021-09-16 DIAGNOSIS — F41.1 GENERALIZED ANXIETY DISORDER: ICD-10-CM

## 2021-09-16 PROCEDURE — 90832 PSYTX W PT 30 MINUTES: CPT | Performed by: PSYCHOLOGIST

## 2021-09-16 NOTE — PROGRESS NOTES
Behavioral Health Consultation/Psychotherapy Note  Melony De Leon. Manas Daniels Psy.D. Visit Date:  9/16/2021    Patient:  Laurent Argueta  YOB: 1990  Chief Complaint:  Follow-up and Anxiety    Duration of session:  25 minutes      S:     This session was conducted as a telepsychology visit due to [facility] restrictions placed on in-person visits d/t the COVID-19 outbreak. Patient Location: Home       Provider Location ([facility]): [facility]    Patient gave verbal consent for teleservices and will sign a consent form when feasible. This virtual visit was conducted via interactive/real-time audio/video, using phone (audio only) since Doxy. me not working. Ct said he's still having a lot of the same issues with anxiety. He didn't follow up with therapy from January 2021. He does have new tinted glasses to help with the light sensitivity. He said it helps some. He said his mom is narcissistic which was a stressor, but she moved out a month ago. 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 All these things he can do at home, where he's comfortable. When he's in public he thinks others  him or make fun of him without evidence. He's OK when people come to his house, so he can retreat to his room. Ct has a spinal fusion scheduled for 11/2/21, and is off work and on disability now for that. He still has renovations he has to do for his house. He's still taking Lamictal, Lexapro, and Prozasin. We talked about an experiment where it's low anxiety, a public place with not may people, spend 5 min there and leave, and gradually work up.         O:    Appearance    Patient presents as alert, oriented, and cooperative  Appetite normal  Sleep disturbance Yes  Loss of pleasure Yes  Speech    normal rate, normal volume, well articulated and clear and understandable  Mood    Depressed  Affect    depressed affect  Thought Process    goal directed and linear and coherent  Insight    Fair  Judgment    Impaired  Memory    recent and remote memory intact  Suicide Assessment    no suicidal ideation      A:    1. PTSD (post-traumatic stress disorder)    2. Generalized anxiety disorder    3. Bipolar affective disorder, currently depressed, moderate ([address]        Multiple potential diagnoses exist, but at this time, DANTE, PTSD stand out. [facility] possesses some traits of Borderline Personality Disorder, and it appears a Bipolar D/O of some type is present as well.  Ct needs referred for psychiatric services.      P:    Check on behavioral experiments. Doxy visit in 2 weeks. All questions about treatment plan answered. Patient instructed to go immediately to the emergency room and/or call 911 if any suicidal or homicidal ideations. Patient stated understanding and is agreeable to treatment and crisis plan.           Provider Signature:  Electronically signed by Loreda Heimlich, PSYD on 9/16/2021 at 12:47 PM

## 2021-09-17 ENCOUNTER — TELEPHONE (OUTPATIENT)
Dept: PSYCHIATRY | Age: 31
End: 2021-09-17

## 2021-09-20 NOTE — TELEPHONE ENCOUNTER
I'd like to get him in for a sooner appointment this week if anything opens up to discuss further changes.    Electronically signed by Kriss Reinoso MD on 9/20/2021 at 8:05 AM

## 2021-09-20 NOTE — TELEPHONE ENCOUNTER
I wrote back to Brandi with this information; offering him an opening for tomorrow at 11am if that date and time works for him as well. I will await his response.

## 2021-09-21 ENCOUNTER — VIRTUAL VISIT (OUTPATIENT)
Dept: PSYCHIATRY | Age: 31
End: 2021-09-21
Payer: MEDICAID

## 2021-09-21 DIAGNOSIS — F41.1 GAD (GENERALIZED ANXIETY DISORDER): ICD-10-CM

## 2021-09-21 DIAGNOSIS — F43.10 PTSD (POST-TRAUMATIC STRESS DISORDER): Primary | ICD-10-CM

## 2021-09-21 DIAGNOSIS — F60.3 BORDERLINE PERSONALITY DISORDER (HCC): ICD-10-CM

## 2021-09-21 PROCEDURE — 99214 OFFICE O/P EST MOD 30 MIN: CPT | Performed by: PSYCHIATRY & NEUROLOGY

## 2021-09-21 PROCEDURE — G8427 DOCREV CUR MEDS BY ELIG CLIN: HCPCS | Performed by: PSYCHIATRY & NEUROLOGY

## 2021-09-21 RX ORDER — QUETIAPINE FUMARATE 25 MG/1
25-50 TABLET, FILM COATED ORAL NIGHTLY PRN
Qty: 60 TABLET | Refills: 1 | Status: SHIPPED | OUTPATIENT
Start: 2021-09-21 | End: 2021-10-05 | Stop reason: ALTCHOICE

## 2021-09-21 NOTE — PROGRESS NOTES
[address] [facility] [id number] [phone]    Progress Note    Patient:  Charan Christopher  YOB: 1990  PCP:  More Fuentes MD  Visit Date:  9/21/2021    TELEHEALTH EVALUATION -- Audio/Visual (During NTMNF-60 public health emergency)    Patient location: home  Physician location: home, [facility]  This virtual visit was conducted via interactive, real-time video. Chief Complaint   Patient presents with    Follow-up    Medication Check    Anxiety    Depression    Mood Swings       SUBJECTIVE:      Charan Christopher, a 27 y. o. male, presents for a follow up visit. Patient reports he is stable. Patient is compliant with medication regimen. He presents alone. Reduced prazosin to 2 mg and HA and diarrhea has improved. Still c/o night terrors. Doesn't seem to help at all. Notes 3 mg and 4 mg doses didn't seem to help much more. Wakes up tired from the dreams. Sore. \"Exhausted. \"   Discussed options for nm. Thinks he tried doxazosin at [facility] and not much benefit. Might consider Periactin vs Seroquel and he opts to try Seroquel. Less stress noting his mom moved out. Just him, his wife, and their baby there now. Mood \"a little better\". His wife sees a change. Still some bad days. Notes a little benefit from last Lexapro increase. Less depressed. Less mood swings. Anxiety is under fair control. Will be seeing Dr. Marci Dexter later this month. Discussed tx options and he opts to try low dose Seroquel for sleep. Will monitor for wt gain. Med Trials:Chantix (nausea), trazodone, gabapentin, Ritalin, medical cannabis, Lamictal, Lexapro    OBJECTIVE:  Vitals: There were no vitals taken for this visit.     MENTAL STATUS EXAM:    GENERAL  Build: Normal    Hygiene:  Appropriate in casual dress   SENSORIUM Orientation: Place, Person, Time, & Situation     Consciousness: Alert ATTENTION   Focused    RELATEDNESS  Cooperative    EYE CONTACT   Good    PSYCHOMOTOR  Normal    SPEECH Volume: Normal     Rate: Normal rate and tone    Amplitude: Within normal limits   MOOD  Euthymic    AFFECT Range: Limited, mood congruent, social smile present    THOUGHT Process:  Goal-Directed     Content: no evidence of psychosis    COGNITION Insight: Good    Judgement:  Intact    MEMORY  Intact    INTELLIGENCE  Average     Mobility/Gait: Independently     Controlled SubstancesMonitoring:   not done      ASSESSMENT: Will stop prazosin and try Seroquel for NM/sleep/mood. Did get mood benefit from last Lexapro/Lamictal increase. No SI. Might consider use of Abilify or SSRI/SNRI going forward. Lamictal and Lexapro both with mood benefit. Diagnosis Orders   1. PTSD (post-traumatic stress disorder)     2. DANTE (generalized anxiety disorder)     3. Borderline personality disorder ([facility]     Medical Hx: asthma, heart murmur, Legg-Calve Perthes Disease     PLAN:     · Medications:   · Lexapro 30 mg QD   · Lamictal 200 mg BID   · Stop prazosin 2 mg HS  · Trazodone 150 mg HS  · Start Seroquel 25 to 50 mg HS prn insomnia/NM  · Therapy: sees Dr. Maryam Johnsno  · Labs/Tests/Imaging: none   · Records Reviewed: [facility]  · Patient advised to call if patient has any difficulties with treatment  Return in about 4 weeks (around 10/19/2021) for med check, follow up. Electronically signed by Connor Diaz MD on 9/21/2021 at 11:21 AM    Lucia Carranza is a 27 y.o. male being evaluated by a Virtual Visit (video visit) to address concerns as mentioned above. A caregiver was present when appropriate. Due to this being a TeleHealth encounter ([place]-81 public health emergency), evaluation of the following organ systems was limited: Vitals/Constitutional/EENT/Resp/CV/GI//MS/Neuro/Skin/Heme-Lymph-Imm.   Pursuant to the emergency declaration under the [address] [facility] [address] waiver authority and the Coronavirus Preparedness and Response Supplemental Appropriations Act, this Virtual Visit was conducted with patient's (and/or legal guardian's) consent, to reduce the patient's risk of exposure to COVID-19 and provide necessary medical care. The patient (and/or legal guardian) has also been advised to contact this office for worsening conditions or problems, and seek emergency medical treatment and/or call 911 if deemed necessary. Patient identification was verified at the start of the visit: Yes     Total time spent for this encounter: not billed by time     Services were provided through a video synchronous discussion virtually to substitute for in-person clinic visit. Patient and provider were located at their individual homes.     Electronically signed by Dedra Rodriguez MD on 9/21/2021 at 11:21 AM

## 2021-09-30 ENCOUNTER — TELEPHONE (OUTPATIENT)
Dept: PSYCHIATRY | Age: 31
End: 2021-09-30

## 2021-09-30 DIAGNOSIS — F41.1 GAD (GENERALIZED ANXIETY DISORDER): ICD-10-CM

## 2021-09-30 DIAGNOSIS — F43.10 PTSD (POST-TRAUMATIC STRESS DISORDER): Primary | ICD-10-CM

## 2021-10-01 NOTE — TELEPHONE ENCOUNTER
He can opt to try trazodone increase to 200 mg (from current 150 mg dose) or we could try Buspar which I don't believe he has taken before. It's used to treat anxiety. Works very well when paired with Lexapro. Can be taken from 1 to 3 times per day. Usually is taken regularly, every day but some patients feel it still helps if taken as prn.    Electronically signed by Evonnie Hashimoto, MD on 10/1/2021 at 10:06 AM

## 2021-10-01 NOTE — TELEPHONE ENCOUNTER
Fidelia Modi wrote back into the office:    I will take the 200 I have been on buspar before and had to be taken off of it due to side effects which was headaches and vomiting. Please advise.

## 2021-10-01 NOTE — TELEPHONE ENCOUNTER
Dimitri Barrios wrote back into the office via [facility]:    I was wanting to stay away from that med mainly because I was informed it would be good to try to lose weight because of my back issues so I really don't want to take chances on that. Is there any other meds I would be able to take for the night terrors abd the anxiety. I'm scared to go to sleep due to the night terrors so I stay up to the point of exhaustion even when I take the trazodone at this point. Feel almost as if my nerves get the best of me at night      Please advise.

## 2021-10-01 NOTE — TELEPHONE ENCOUNTER
I would like to know what medications, specifically, he is referring to? Prazosin, doxazosin, Seroquel, and Periactin are some of the most commonly used. If he is referring to benzodiazepines like Klonopin or Valium I try to avoid using those for sleep in most people as it's not a good long term treatment choice for many reasons.    Electronically signed by Jenni Stock MD on 10/1/2021 at 1:43 PM

## 2021-10-04 ENCOUNTER — VIRTUAL VISIT (OUTPATIENT)
Dept: PSYCHOLOGY | Age: 31
End: 2021-10-04
Payer: MEDICAID

## 2021-10-04 DIAGNOSIS — F31.32 BIPOLAR AFFECTIVE DISORDER, CURRENTLY DEPRESSED, MODERATE (HCC): ICD-10-CM

## 2021-10-04 DIAGNOSIS — F41.1 GENERALIZED ANXIETY DISORDER: ICD-10-CM

## 2021-10-04 DIAGNOSIS — F43.10 PTSD (POST-TRAUMATIC STRESS DISORDER): Primary | ICD-10-CM

## 2021-10-04 PROCEDURE — 90832 PSYTX W PT 30 MINUTES: CPT | Performed by: PSYCHOLOGIST

## 2021-10-04 NOTE — TELEPHONE ENCOUNTER
Servando Cage wrote back into the office via [employer]:    I sent an attachment of the [facility] savanahly meds used for night terrors I'm having no issues falling asleep since taking the 200mg of trazodone it's the night terrors I'm having a issue with waking up in a panic attack almost feeling all pain that happens in my night terror and this happens multiple times a night when it happens. Because of the terrors I'm exhausted all day everyday. im sorry to be such a pain I've just been doing research on night terrors treatment. I know we tried a few different meds to treat them with no results    --No attachment was received; I have let the patient know this as well.
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
## 2021-10-05 RX ORDER — CLONAZEPAM 0.5 MG/1
0.5 TABLET ORAL NIGHTLY PRN
Qty: 30 TABLET | Refills: 0 | Status: SHIPPED | OUTPATIENT
Start: 2021-10-05 | End: 2021-10-21 | Stop reason: SDUPTHER

## 2021-10-05 NOTE — TELEPHONE ENCOUNTER
Stephanie Rose wrote back into the office via [employer]:    I would like to try one to see if it helps out of the two I'm not sure if you prefer Valium or clonipin but whichever you think is best is fine    --Please advise.

## 2021-10-15 RX ORDER — SODIUM CHLORIDE, SODIUM LACTATE, POTASSIUM CHLORIDE, CALCIUM CHLORIDE 600; 310; 30; 20 MG/100ML; MG/100ML; MG/100ML; MG/100ML
1000 INJECTION, SOLUTION INTRAVENOUS CONTINUOUS
Status: CANCELLED | OUTPATIENT
Start: 2021-10-15

## 2021-10-18 ENCOUNTER — OFFICE VISIT (OUTPATIENT)
Dept: PSYCHOLOGY | Age: 31
End: 2021-10-18
Payer: MEDICAID

## 2021-10-18 DIAGNOSIS — F41.1 GENERALIZED ANXIETY DISORDER: ICD-10-CM

## 2021-10-18 DIAGNOSIS — F31.32 BIPOLAR AFFECTIVE DISORDER, CURRENTLY DEPRESSED, MODERATE (HCC): ICD-10-CM

## 2021-10-18 DIAGNOSIS — F43.10 PTSD (POST-TRAUMATIC STRESS DISORDER): Primary | ICD-10-CM

## 2021-10-18 PROCEDURE — 4004F PT TOBACCO SCREEN RCVD TLK: CPT | Performed by: PSYCHOLOGIST

## 2021-10-18 PROCEDURE — 90834 PSYTX W PT 45 MINUTES: CPT | Performed by: PSYCHOLOGIST

## 2021-10-19 ENCOUNTER — HOSPITAL ENCOUNTER (OUTPATIENT)
Dept: PREADMISSION TESTING | Age: 31
Discharge: HOME | End: 2021-10-23
Payer: MEDICAID

## 2021-10-19 VITALS
HEIGHT: 70 IN | RESPIRATION RATE: 16 BRPM | TEMPERATURE: 97.7 F | DIASTOLIC BLOOD PRESSURE: 73 MMHG | OXYGEN SATURATION: 95 % | BODY MASS INDEX: 31.78 KG/M2 | SYSTOLIC BLOOD PRESSURE: 117 MMHG | WEIGHT: 222 LBS | HEART RATE: 78 BPM

## 2021-10-19 LAB
ABO/RH: NORMAL
ANION GAP SERPL CALCULATED.3IONS-SCNC: 12 MMOL/L (ref 9–17)
ANTIBODY SCREEN: NEGATIVE
ARM BAND NUMBER: NORMAL
BUN BLDV-MCNC: 12 MG/DL (ref 6–20)
CHLORIDE BLD-SCNC: 109 MMOL/L (ref 98–107)
CO2: 20 MMOL/L (ref 20–31)
CREAT SERPL-MCNC: 0.77 MG/DL (ref 0.7–1.2)
EXPIRATION DATE: NORMAL
GFR AFRICAN AMERICAN: >60 ML/MIN
GFR NON-AFRICAN AMERICAN: >60 ML/MIN
GFR SERPL CREATININE-BSD FRML MDRD: NORMAL ML/MIN/{1.73_M2}
GFR SERPL CREATININE-BSD FRML MDRD: NORMAL ML/MIN/{1.73_M2}
GLUCOSE BLD-MCNC: 110 MG/DL (ref 70–99)
HCT VFR BLD CALC: 49.1 % (ref 40.7–50.3)
HEMOGLOBIN: 15.9 G/DL (ref 13–17)
MCH RBC QN AUTO: 30.1 PG (ref 25.2–33.5)
MCHC RBC AUTO-ENTMCNC: 32.4 G/DL (ref 28.4–34.8)
MCV RBC AUTO: 93 FL (ref 82.6–102.9)
NRBC AUTOMATED: 0 PER 100 WBC
PDW BLD-RTO: 12.7 % (ref 11.8–14.4)
PLATELET # BLD: 220 K/UL (ref 138–453)
PMV BLD AUTO: 10.1 FL (ref 8.1–13.5)
POTASSIUM SERPL-SCNC: 4.4 MMOL/L (ref 3.7–5.3)
RBC # BLD: 5.28 M/UL (ref 4.21–5.77)
SODIUM BLD-SCNC: 141 MMOL/L (ref 135–144)
WBC # BLD: 16.4 K/UL (ref 3.5–11.3)

## 2021-10-19 PROCEDURE — 85027 COMPLETE CBC AUTOMATED: CPT

## 2021-10-19 PROCEDURE — 84520 ASSAY OF UREA NITROGEN: CPT

## 2021-10-19 PROCEDURE — 36415 COLL VENOUS BLD VENIPUNCTURE: CPT

## 2021-10-19 PROCEDURE — 86900 BLOOD TYPING SEROLOGIC ABO: CPT

## 2021-10-19 PROCEDURE — 86850 RBC ANTIBODY SCREEN: CPT

## 2021-10-19 PROCEDURE — 82565 ASSAY OF CREATININE: CPT

## 2021-10-19 PROCEDURE — 86901 BLOOD TYPING SEROLOGIC RH(D): CPT

## 2021-10-19 PROCEDURE — 80051 ELECTROLYTE PANEL: CPT

## 2021-10-19 PROCEDURE — 82947 ASSAY GLUCOSE BLOOD QUANT: CPT

## 2021-10-19 ASSESSMENT — PAIN DESCRIPTION - DESCRIPTORS: DESCRIPTORS: ACHING;CONSTANT

## 2021-10-19 ASSESSMENT — PAIN DESCRIPTION - LOCATION: LOCATION: HIP;BACK

## 2021-10-19 ASSESSMENT — PAIN DESCRIPTION - PAIN TYPE: TYPE: CHRONIC PAIN

## 2021-10-19 ASSESSMENT — PAIN SCALES - GENERAL: PAINLEVEL_OUTOF10: 6

## 2021-10-19 ASSESSMENT — PAIN DESCRIPTION - ORIENTATION: ORIENTATION: LEFT;LOWER

## 2021-10-19 NOTE — PROGRESS NOTES
Anesthesia Focused Assessment    Has patient ever tested positive for COVID? No    STOP-BANG Sleep Apnea Questionnaire    SNORE loudly (heard through closed doors)? No  TIRED, fatigued, sleepy during daytime? No  OBSERVED stopping breathing during sleep? No  High blood PRESSURE being treated? No    BMI over 35? No  AGE over 48? No  NECK circumference over 16\"? No  GENDER (male)? Yes             Total 1  High risk 5-8  Intermediate risk 3-4  Low risk 0-2    Obstructive Sleep Apnea: denies  If YES, machine used: no     Type 1 DM:   no  T2DM:  no    Coronary Artery Disease:  no  Hypertension:  no    Active smoker:  1/2 ppd for 10 years  Drinks Alcohol:  No  Marijuana usage yes    Dentition: upper partial denture, not worn    Defib / AICD / Pacemaker: no      Renal Failure/dialysis:  no    Patient was evaluated in PAT & anesthesia guidelines were applied. NPO guidelines, medication instructions and scheduled arrival time were reviewed with patient. Hx of anesthesia complications:  no  Family hx of anesthesia complications:  no                                                                                                                     Anesthesia contacted:   Yes---see below. Medical or cardiac clearance ordered: no    SUKHWINDER Cervantes PA-C  10/19/21  2:26 PM    In reviewing chart, patient had been complaining of intermittent chest pain over the summer, which was being addressed by PCP. He denied this during PAT appointment. Last visit with PCP in August stated that he was to be referred for stress test and cardiology evaluation. Case was discussed with Dr. Bryanna aRndhawa. Cardiac clearance, stress test required prior to surgery.

## 2021-10-19 NOTE — H&P
traZODone (DESYREL) 150 MG tablet Take 1 tablet by mouth nightly 8/18/21  Yes Isha Rodriguez MD   lamoTRIgine (LAMICTAL) 200 MG tablet Take 1 tablet by mouth 2 times daily 8/18/21  Yes Isha Rodriguez MD   rOPINIRole (REQUIP) 1 MG tablet Take 1 tablet by mouth nightly 7/26/21  Yes Le Gutierrez MD   montelukast (SINGULAIR) 10 MG tablet Take 1 tablet by mouth daily 7/20/21  Yes Le Gutierrez MD   loratadine (CLARITIN) 10 MG tablet Take 1 tablet by mouth daily 6/11/21  Yes Jameel Pinedo DO   azelastine (OPTIVAR) 0.05 % ophthalmic solution Place 1 drop into both eyes 2 times daily as needed 4/27/21  Yes Clay Last MD   fluticasone (FLONASE) 50 MCG/ACT nasal spray 2 sprays by Each Nostril route daily Start with one spray daily for one week and increase to two sprays daily as needed 4/21/21  Yes Jameel Pinedo DO   fluticasone-salmeterol (ADVAIR DISKUS) 100-50 MCG/DOSE diskus inhaler Inhale 1 puff into the lungs every 12 hours 4/21/21  Yes Jameel Pinedo DO   albuterol sulfate HFA (VENTOLIN HFA) 108 (90 Base) MCG/ACT inhaler Inhale 2 puffs into the lungs every 6 hours as needed for Wheezing 4/21/21  Yes Nancy Galeazzi, DO   Handicap [address] by Does not apply route Expires March 1st, 2022 9/2/21   LEYDI Lehman CNP     Allergies  has No Known Allergies. Family History  family history includes Depression in his mother; Heart Disease in his father. Social History   reports that he has been smoking cigarettes. He has a 2.50 pack-year smoking history. He has quit using smokeless tobacco.  His smokeless tobacco use included chew. reports previous alcohol use. reports current drug use. Drug: Marijuana.   Marital Status   Occupation unemployed    REVIEW OF SYSTEMS    Constitutional: [] fever  [] chills  [] weight loss  []weakness [x] negative  Eyes:  [] photophobia  [] discharge [] acuity change   [] Diplopia   [x] negative  HENT:  [] sore throat  [] ear pain [] Tinnitus   [x] negative  Respiratory:  [] Cough  [] Shortness of breath   [] Sputum   [x] negative  Cardiac: []Chest pain   []Palpitations []Edema  []PND  [x] negative  GI:  []Abdominal pain   []Nausea  []Vomiting  []Diarrhea  [x] negative  :  [] Dysuria   []Frequency  []Hematuria  []Discharge  [x] negative  Musculoskeletal:  [x]Back pain  []Neck pain  []Recent Injury [] negative  See HPI  Skin:  []Rash  [] Itching  [x] negative  Neurologic:  [] Headache  [] Focal weakness  [] Sensory changes [x]negative  Endocrine:  [] Polyuria  [] Polydipsia  [] Hair Loss  [x] negative  Lymphatic:   [] Swollen glands [x] negative  Psychiatric:  [] Depression  [] Suicidal ideation  [] Homicidal ideation  [] Anxiety [x] negative  All systems negative except as marked. OBJECTIVE:   VITALS:  height is 5' 10\" (1.778 m) and weight is 222 lb (100.7 kg). His temporal temperature is 97.7 °F (36.5 °C). His blood pressure is 117/73 and his pulse is 78. His respiration is 16 and oxygen saturation is 95%. CONSTITUTIONAL:alert & cooperative, no acute distress. Uses cane to help ambulate. SKIN:  Warm and dry, no rashes on exposed areas of skin. Multiple tattoos and piercings. HEAD:  Normocephalic, atraumatic. EYES: EOMs intact. EARS:  Hearing grossly WNL. NOSE:  Nares patent. No rhinorrhea. MOUTH/THROAT:  benign  NECK:supple, no lymphadenopathy  LUNGS: Clear to auscultation bilaterally, no wheezes. CARDIOVASCULAR: Heart sounds are normal.  Regular rate and rhythm without murmur. ABDOMEN: soft, non tender, non distended. EXTREMITIES: no edema bilateral lower extremities.     Testing:   EKG: in epic  Labs pending: drawn 10/19/2021     IMPRESSIONS:   Lumbar disc disease   has a past medical history of Asthma, Bipolar 1 disorder ([facility] Borderline personality disorder ([facility] COPD (chronic obstructive pulmonary disease) ([facility] Depression, GERD (gastroesophageal reflux disease), History of cardiac murmur in childhood, Fqbx-Odvig-Trvvgan disease, left, Lumbar disc disease, Multiple allergies, PTSD (post-traumatic stress disorder), Restless legs syndrome, Teeth missing, Under care of team (05/18/2021), and Wellness examination (05/18/2021).    PLANS:   Lumbar fusion    SUKHWINDER Marcano PA-C  Electronically signed 10/19/2021 at 2:26 PM

## 2021-10-19 NOTE — H&P (VIEW-ONLY)
History and Physical    Pt Name: Hood Mc  MRN: [id number]  YOB: 1990  Date of evaluation: 10/19/2021    SUBJECTIVE:   History of Chief Complaint:    Patient presents for PAT appointment. Patient complains of lumbar disc disease. He has a history of lumbar pain with LLE pain as well. He had underwent pain management procedures, as well as lumbar micodiscectomy in May. He has had continued symptoms since then, says that PT did not help his symptoms either. He says that the surgery failed and he has now been scheduled for lumbar fusion. Past Medical History    has a past medical history of Asthma, Bipolar 1 disorder ([facility] Borderline personality disorder ([facility] COPD (chronic obstructive pulmonary disease) ([facility] Depression, GERD (gastroesophageal reflux disease), History of cardiac murmur in childhood, Fjox-Uygqx-Sxvrmpw disease, left, Lumbar disc disease, Multiple allergies, PTSD (post-traumatic stress disorder), Restless legs syndrome, Teeth missing, Under care of team, and Wellness examination. Past Surgical History   has a past surgical history that includes Total hip arthroplasty (Left, 2011); Tympanostomy tube placement; Injection Procedure For Sacroiliac Joint (Left, 10/23/2020); Pain management procedure (Left, 01/29/2021); Pain management procedure (Left, 02/12/2021); Nerve Block (N/A, 03/12/2021); Pain management procedure (Left, 04/16/2021); Clifford tooth extraction; and Lumbar spine surgery (Left, 06/01/2021). Medications  Prior to Admission medications    Medication Sig Start Date End Date Taking? Authorizing Provider   clonazePAM (KLONOPIN) 0.5 MG tablet Take 1 tablet by mouth nightly as needed (insomnia/nightmares) for up to 30 days.  10/5/21 11/4/21 Yes Jenni Stock MD   pantoprazole (PROTONIX) 20 MG tablet Take 2 tablets by mouth daily 9/7/21 12/6/21 Yes Sabrina Oden MD   escitalopram (LEXAPRO) 20 MG tablet Take 1.5 tablets by mouth daily 8/26/21  Yes Jenni Stock MD traZODone (DESYREL) 150 MG tablet Take 1 tablet by mouth nightly 8/18/21  Yes Jenni Stock MD   lamoTRIgine (LAMICTAL) 200 MG tablet Take 1 tablet by mouth 2 times daily 8/18/21  Yes Jenni Stock MD   rOPINIRole (REQUIP) 1 MG tablet Take 1 tablet by mouth nightly 7/26/21  Yes Sabrina Oden MD   montelukast (SINGULAIR) 10 MG tablet Take 1 tablet by mouth daily 7/20/21  Yes Sabrina Oden MD   loratadine (CLARITIN) 10 MG tablet Take 1 tablet by mouth daily 6/11/21  Yes Felipe Pinedo DO   azelastine (OPTIVAR) 0.05 % ophthalmic solution Place 1 drop into both eyes 2 times daily as needed 4/27/21  Yes Historical Provider, MD   fluticasone (FLONASE) 50 MCG/ACT nasal spray 2 sprays by Each Nostril route daily Start with one spray daily for one week and increase to two sprays daily as needed 4/21/21  Yes Felipe Pinedo DO   fluticasone-salmeterol (ADVAIR DISKUS) 100-50 MCG/DOSE diskus inhaler Inhale 1 puff into the lungs every 12 hours 4/21/21  Yes Felipe Pinedo DO   albuterol sulfate HFA (VENTOLIN HFA) 108 (90 Base) MCG/ACT inhaler Inhale 2 puffs into the lungs every 6 hours as needed for Wheezing 4/21/21  Yes Erinn Aranda,    Handicap [address] by Does not apply route Expires March 1st, 2022 9/2/21   LEYDI Blackmon CNP     Allergies  has No Known Allergies. Family History  family history includes Depression in his mother; Heart Disease in his father. Social History   reports that he has been smoking cigarettes. He has a 2.50 pack-year smoking history. He has quit using smokeless tobacco.  His smokeless tobacco use included chew. reports previous alcohol use. reports current drug use. Drug: Marijuana.   Marital Status   Occupation unemployed    REVIEW OF SYSTEMS    Constitutional: [] fever  [] chills  [] weight loss  []weakness [x] negative  Eyes:  [] photophobia  [] discharge [] acuity change   [] Diplopia   [x] negative  HENT:  [] sore throat  [] ear pain [] Tinnitus   [x] negative  Respiratory:  [] Cough  [] Shortness of breath   [] Sputum   [x] negative  Cardiac: []Chest pain   []Palpitations []Edema  []PND  [x] negative  GI:  []Abdominal pain   []Nausea  []Vomiting  []Diarrhea  [x] negative  :  [] Dysuria   []Frequency  []Hematuria  []Discharge  [x] negative  Musculoskeletal:  [x]Back pain  []Neck pain  []Recent Injury [] negative  See HPI  Skin:  []Rash  [] Itching  [x] negative  Neurologic:  [] Headache  [] Focal weakness  [] Sensory changes [x]negative  Endocrine:  [] Polyuria  [] Polydipsia  [] Hair Loss  [x] negative  Lymphatic:   [] Swollen glands [x] negative  Psychiatric:  [] Depression  [] Suicidal ideation  [] Homicidal ideation  [] Anxiety [x] negative  All systems negative except as marked. OBJECTIVE:   VITALS:  height is 5' 10\" (1.778 m) and weight is 222 lb (100.7 kg). His temporal temperature is 97.7 °F (36.5 °C). His blood pressure is 117/73 and his pulse is 78. His respiration is 16 and oxygen saturation is 95%. CONSTITUTIONAL:alert & cooperative, no acute distress. Uses cane to help ambulate. SKIN:  Warm and dry, no rashes on exposed areas of skin. Multiple tattoos and piercings. HEAD:  Normocephalic, atraumatic. EYES: EOMs intact. EARS:  Hearing grossly WNL. NOSE:  Nares patent. No rhinorrhea. MOUTH/THROAT:  benign  NECK:supple, no lymphadenopathy  LUNGS: Clear to auscultation bilaterally, no wheezes. CARDIOVASCULAR: Heart sounds are normal.  Regular rate and rhythm without murmur. ABDOMEN: soft, non tender, non distended. EXTREMITIES: no edema bilateral lower extremities.     Testing:   EKG: in epic  Labs pending: drawn 10/19/2021     IMPRESSIONS:   Lumbar disc disease   has a past medical history of Asthma, Bipolar 1 disorder ([facility] Borderline personality disorder ([facility] COPD (chronic obstructive pulmonary disease) ([facility] Depression, GERD (gastroesophageal reflux disease), History of cardiac murmur in childhood, Jaqg-Eajog-Ahkeznh

## 2021-10-20 ENCOUNTER — TELEPHONE (OUTPATIENT)
Dept: FAMILY MEDICINE CLINIC | Age: 31
End: 2021-10-20

## 2021-10-20 NOTE — PROGRESS NOTES
Writer spoke with Matthew Espinoza at Dr Preston Pouch office and Kimberli Hobson at Dr Salas Mercury office to inform them pt will need cardiac clearance and stress test prior to surgery per anesthesia.  Writer also faxed request to both offices

## 2021-10-20 NOTE — TELEPHONE ENCOUNTER
Stephanie from [facility] pre admit testing called states that pt is needing Cardiac clearance and stress test prior to surgery that is scheduled 11/2/2021.     Please advise/ place referral.

## 2021-10-21 ENCOUNTER — VIRTUAL VISIT (OUTPATIENT)
Dept: PSYCHIATRY | Age: 31
End: 2021-10-21
Payer: MEDICAID

## 2021-10-21 ENCOUNTER — TELEPHONE (OUTPATIENT)
Dept: FAMILY MEDICINE CLINIC | Age: 31
End: 2021-10-21

## 2021-10-21 DIAGNOSIS — F60.3 BORDERLINE PERSONALITY DISORDER (HCC): ICD-10-CM

## 2021-10-21 DIAGNOSIS — F43.10 PTSD (POST-TRAUMATIC STRESS DISORDER): Primary | ICD-10-CM

## 2021-10-21 DIAGNOSIS — F41.1 GAD (GENERALIZED ANXIETY DISORDER): ICD-10-CM

## 2021-10-21 DIAGNOSIS — F29 PSYCHOSIS, UNSPECIFIED PSYCHOSIS TYPE (HCC): ICD-10-CM

## 2021-10-21 PROCEDURE — G8427 DOCREV CUR MEDS BY ELIG CLIN: HCPCS | Performed by: PSYCHIATRY & NEUROLOGY

## 2021-10-21 PROCEDURE — 99214 OFFICE O/P EST MOD 30 MIN: CPT | Performed by: PSYCHIATRY & NEUROLOGY

## 2021-10-21 RX ORDER — ESCITALOPRAM OXALATE 20 MG/1
30 TABLET ORAL DAILY
Qty: 45 TABLET | Refills: 2 | Status: SHIPPED | OUTPATIENT
Start: 2021-10-21 | End: 2022-01-04 | Stop reason: SDUPTHER

## 2021-10-21 RX ORDER — CLONAZEPAM 1 MG/1
1 TABLET ORAL NIGHTLY PRN
Qty: 30 TABLET | Refills: 1 | Status: SHIPPED | OUTPATIENT
Start: 2021-10-21 | End: 2021-12-16 | Stop reason: SDUPTHER

## 2021-10-21 RX ORDER — LAMOTRIGINE 200 MG/1
200 TABLET ORAL 2 TIMES DAILY
Qty: 60 TABLET | Refills: 2 | Status: SHIPPED | OUTPATIENT
Start: 2021-10-21 | End: 2022-01-04 | Stop reason: SDUPTHER

## 2021-10-21 RX ORDER — TRAZODONE HYDROCHLORIDE 150 MG/1
150 TABLET ORAL NIGHTLY
Qty: 30 TABLET | Refills: 2 | Status: SHIPPED | OUTPATIENT
Start: 2021-10-21 | End: 2022-01-04 | Stop reason: SDUPTHER

## 2021-10-21 NOTE — PROGRESS NOTES
[address] [id number] [phone]    Progress Note    Patient:  Valorie Mc  YOB: 1990  PCP:  Mauricio Deluna MD  Visit Date:  10/21/2021    TELEHEALTH EVALUATION -- Audio/Visual (During IJYSQ-09 public health emergency)    Patient location: home  Physician location: home, [address] [place] [address]  This virtual visit was conducted via interactive, real-time video. Chief Complaint   Patient presents with    Follow-up    Medication Check    Anxiety    Trauma    Insomnia       SUBJECTIVE:      Valorie Mc, a 27 y. o. male, presents for a follow up visit. Patient reports he is stable. Patient is compliant with medication regimen. He presents alone. Still having nightmares. Klonopin has helped \"a tiny bit\". Wife is working for [employer] and he rides with her. He drives. Was having financial stress but the job is helping. Dr. Zainab Lange has started EMDR in therapy. Had preop appt for his spinal fusion surgery on 11/2. They want him to get cardiac clearance first.   Mood \"pretty stable\". Not depressed. More anxiety when leaving the house. Chest pain, tremulousness. Once inside the car feels ok. Trouble with stores. Gets 3-4 hrs broken sleep/night. One day up 36 hrs. No longer having diarrhea or HA. Denies any other side effects. Dreams he sees a faceless man. Hallucinates when awake. Sees the tyler outside his dreams. Notes it's random. Woke up from one of the night terrors one night and saw him standing outside the door. When in a therapy session \"he was sitting beside me\". Not too scary. He's wearing a hoodie and can't see his face at all. Notes it was happening prior to medications. Didn't want to mention it and be put inpatient. Has always heard voices \"that's been forever\". It's, a male and female. The girl voice mumbles, can't tell what's being said. Male voice \"puts me down and attacking me\". Doesn't hear it every day. Maybe 3 times per week. Denies commands to harm himself or others. Endorses paranoia When outside the house \"everybody's starting and out to get me\". Trouble with STM. Wife noting [place] lot different personalities from me in a short time\". Gives example of him letting someone in while in traffic then making an irritable comment about it. Quick mood changes to irritable. Will talk about himself in 3rd person. Tells me he just applied for disability for his back and mental issues. Notes when someone giving him advice he feels like they are attacking. Hard to keep a job because of BPD. Notes he doesn't mind [employer] \"we are our own bosses\". Dissociates \"every day\". He zones out while talking to me at one point. It lasts about 10-20 sec. No SI. Discussed tx options and he opts to try reducing trazodone to see if it's causing NM. If that doesn't help will try Klonopin increase. Med Trials:Chantix (nausea), trazodone, gabapentin, Ritalin, medical cannabis, Lamictal, Lexapro, Klonopin, Seroquel (side effects), doxazosin    OBJECTIVE:  Vitals: There were no vitals taken for this visit. MENTAL STATUS EXAM:    GENERAL  Build: Normal    Hygiene:  Appropriate    SENSORIUM Orientation: Place, Person, Time, & Situation     Consciousness: Alert    ATTENTION   Focused, mostly (zones out at one point)    RELATEDNESS  Cooperative    EYE CONTACT   Good    PSYCHOMOTOR  Normal    SPEECH Volume: Normal     Rate: Normal rate and tone    Amplitude:  Within normal limits   MOOD  \"pretty stable\"    AFFECT Range: Limited, mood congruent, social smile present    THOUGHT Process:  Goal-Directed     Content: no evidence of psychosis, doesn't appear to respond to internal stimuli, endorses AVH, see above and endorses paranoia    COGNITION Insight: Good    Judgement:  Intact    MEMORY  did not test, no gross deficits    INTELLIGENCE  Average Mobility/Gait: Independently     Controlled SubstancesMonitoring: Periodic Controlled Substance Monitoring: No signs of potential drug abuse or diversion identified. , Possible medication side effects, risk of tolerance/dependence & alternative treatments discussed. Lisa Hobson MD)       ASSESSMENT: Will try to reduce trazodone to see if contributing to nightmares. Will increase Klonopin to target sleep and NM also. Mood is stable. Will monitor for psychosis or dissociations. No commands to harm self or others. No SI. Consider SGA going forward. Didn't tolerate Seroquel well. Lamictal and Lexapro both with mood benefit. Diagnosis Orders   1. PTSD (post-traumatic stress disorder)  clonazePAM (KLONOPIN) 1 MG tablet   2. DANTE (generalized anxiety disorder)  clonazePAM (KLONOPIN) 1 MG tablet   3. Borderline personality disorder ([facility]     4. Psychosis, unspecified psychosis type ([facility]     Medical Hx: asthma, heart murmur, Legg-Calve Perthes Disease     PLAN:     · Medications:   · Lexapro 30 mg QD   · Lamictal 200 mg BID   · Decrease Trazodone 150 mg HS to 75 mg HS  · Increase Klonopin to 1 mg HS prn insomnia (from 0.5 mg)  · Therapy: sees Dr. Rachid Cook  · Labs/Tests/Imaging: none   · Records Reviewed: [facility]  · Patient advised to call if patient has any difficulties with treatment  Return in about 4 weeks (around 11/18/2021) for med check, follow up. Electronically signed by Lisa Hobson MD on 10/21/2021 at 11:04 AM    Federico Farias is a 27 y.o. male being evaluated by a Virtual Visit (video visit) to address concerns as mentioned above. A caregiver was present when appropriate. Due to this being a TeleHealth encounter (QZFGE-85 public health emergency), evaluation of the following organ systems was limited: Vitals/Constitutional/EENT/Resp/CV/GI//MS/Neuro/Skin/Heme-Lymph-Imm.   Pursuant to the emergency declaration under the [facility] waiver authority and the [employer] Act, this Virtual Visit was conducted with patient's (and/or legal guardian's) consent, to reduce the patient's risk of exposure to COVID-19 and provide necessary medical care. The patient (and/or legal guardian) has also been advised to contact this office for worsening conditions or problems, and seek emergency medical treatment and/or call 911 if deemed necessary. Patient identification was verified at the start of the visit: Yes     Total time spent for this encounter: not billed by time     Services were provided through a video synchronous discussion virtually to substitute for in-person clinic visit. Patient and provider were located at their individual homes.     Electronically signed by Jesus Manuel Tran MD on 10/21/2021 at 11:04 AM

## 2021-10-21 NOTE — TELEPHONE ENCOUNTER
Pt returned call, informed pt of upcoming apointment with Dr Mary Ellen Rocha on 10/25/2021 pt verbalized understanding.

## 2021-10-21 NOTE — TELEPHONE ENCOUNTER
----- Message from Nina Estrada sent at 10/20/2021  4:21 PM EDT -----  Subject: Message to Provider    QUESTIONS  Information for Provider? patient is getting a procedure for spinal fusion   on November 2nd his blood work came back with concerns for his heart they   want him to get cleared before the surgery so if he can be seen sooner   then the 25th of October please call back   ---------------------------------------------------------------------------  --------------  [address]  What is the best way for the office to contact you? OK to leave message on   voicemail  Preferred Call Back Phone Number? [phone]  ---------------------------------------------------------------------------  --------------  SCRIPT ANSWERS  Relationship to Patient?  Self

## 2021-10-22 ENCOUNTER — TELEPHONE (OUTPATIENT)
Dept: FAMILY MEDICINE CLINIC | Age: 31
End: 2021-10-22

## 2021-10-22 ENCOUNTER — APPOINTMENT (OUTPATIENT)
Dept: GENERAL RADIOLOGY | Age: 31
End: 2021-10-22
Payer: MEDICAID

## 2021-10-22 ENCOUNTER — HOSPITAL ENCOUNTER (EMERGENCY)
Age: 31
Discharge: HOME | End: 2021-10-22
Payer: MEDICAID

## 2021-10-22 VITALS
RESPIRATION RATE: 15 BRPM | WEIGHT: 215 LBS | OXYGEN SATURATION: 97 % | DIASTOLIC BLOOD PRESSURE: 84 MMHG | BODY MASS INDEX: 30.78 KG/M2 | HEIGHT: 70 IN | SYSTOLIC BLOOD PRESSURE: 146 MMHG | TEMPERATURE: 98 F | HEART RATE: 75 BPM

## 2021-10-22 DIAGNOSIS — R10.13 DYSPEPSIA: ICD-10-CM

## 2021-10-22 DIAGNOSIS — R07.9 CHEST PAIN, UNSPECIFIED TYPE: Primary | ICD-10-CM

## 2021-10-22 LAB
ANION GAP SERPL CALCULATED.3IONS-SCNC: 10 MEQ/L (ref 8–16)
BASOPHILS # BLD: 0.4 %
BASOPHILS ABSOLUTE: 0 THOU/MM3 (ref 0–0.1)
BUN BLDV-MCNC: 10 MG/DL (ref 7–22)
CALCIUM SERPL-MCNC: 9.1 MG/DL (ref 8.5–10.5)
CHLORIDE BLD-SCNC: 104 MEQ/L (ref 98–111)
CO2: 26 MEQ/L (ref 23–33)
CREAT SERPL-MCNC: 0.8 MG/DL (ref 0.4–1.2)
EKG ATRIAL RATE: 96 BPM
EKG P AXIS: 59 DEGREES
EKG P-R INTERVAL: 144 MS
EKG Q-T INTERVAL: 352 MS
EKG QRS DURATION: 104 MS
EKG QTC CALCULATION (BAZETT): 444 MS
EKG R AXIS: 48 DEGREES
EKG T AXIS: 30 DEGREES
EKG VENTRICULAR RATE: 96 BPM
EOSINOPHIL # BLD: 2.4 %
EOSINOPHILS ABSOLUTE: 0.2 THOU/MM3 (ref 0–0.4)
ERYTHROCYTE [DISTWIDTH] IN BLOOD BY AUTOMATED COUNT: 13 % (ref 11.5–14.5)
ERYTHROCYTE [DISTWIDTH] IN BLOOD BY AUTOMATED COUNT: 44 FL (ref 35–45)
GFR SERPL CREATININE-BSD FRML MDRD: > 90 ML/MIN/1.73M2
GLUCOSE BLD-MCNC: 124 MG/DL (ref 70–108)
HCT VFR BLD CALC: 48.7 % (ref 42–52)
HEMOGLOBIN: 15.9 GM/DL (ref 14–18)
IMMATURE GRANS (ABS): 0.05 THOU/MM3 (ref 0–0.07)
IMMATURE GRANULOCYTES: 0.6 %
LYMPHOCYTES # BLD: 39.2 %
LYMPHOCYTES ABSOLUTE: 3.3 THOU/MM3 (ref 1–4.8)
MCH RBC QN AUTO: 30.3 PG (ref 26–33)
MCHC RBC AUTO-ENTMCNC: 32.6 GM/DL (ref 32.2–35.5)
MCV RBC AUTO: 92.9 FL (ref 80–94)
MONOCYTES # BLD: 8.7 %
MONOCYTES ABSOLUTE: 0.7 THOU/MM3 (ref 0.4–1.3)
NUCLEATED RED BLOOD CELLS: 0 /100 WBC
OSMOLALITY CALCULATION: 279.9 MOSMOL/KG (ref 275–300)
PLATELET # BLD: 214 THOU/MM3 (ref 130–400)
PMV BLD AUTO: 10.4 FL (ref 9.4–12.4)
POTASSIUM SERPL-SCNC: 4 MEQ/L (ref 3.5–5.2)
RBC # BLD: 5.24 MILL/MM3 (ref 4.7–6.1)
SEG NEUTROPHILS: 48.7 %
SEGMENTED NEUTROPHILS ABSOLUTE COUNT: 4.1 THOU/MM3 (ref 1.8–7.7)
SODIUM BLD-SCNC: 140 MEQ/L (ref 135–145)
TROPONIN T: < 0.01 NG/ML
WBC # BLD: 8.4 THOU/MM3 (ref 4.8–10.8)

## 2021-10-22 PROCEDURE — 80048 BASIC METABOLIC PNL TOTAL CA: CPT

## 2021-10-22 PROCEDURE — 6370000000 HC RX 637 (ALT 250 FOR IP): Performed by: NURSE PRACTITIONER

## 2021-10-22 PROCEDURE — 99285 EMERGENCY DEPT VISIT HI MDM: CPT

## 2021-10-22 PROCEDURE — 71046 X-RAY EXAM CHEST 2 VIEWS: CPT

## 2021-10-22 PROCEDURE — 36415 COLL VENOUS BLD VENIPUNCTURE: CPT

## 2021-10-22 PROCEDURE — 84484 ASSAY OF TROPONIN QUANT: CPT

## 2021-10-22 PROCEDURE — 93010 ELECTROCARDIOGRAM REPORT: CPT | Performed by: NUCLEAR MEDICINE

## 2021-10-22 PROCEDURE — 93005 ELECTROCARDIOGRAM TRACING: CPT | Performed by: NURSE PRACTITIONER

## 2021-10-22 PROCEDURE — 85025 COMPLETE CBC W/AUTO DIFF WBC: CPT

## 2021-10-22 RX ORDER — ASPIRIN 81 MG/1
324 TABLET, CHEWABLE ORAL ONCE
Status: COMPLETED | OUTPATIENT
Start: 2021-10-22 | End: 2021-10-22

## 2021-10-22 RX ADMIN — ASPIRIN 324 MG: 81 TABLET, CHEWABLE ORAL at 11:44

## 2021-10-22 RX ADMIN — LIDOCAINE HYDROCHLORIDE: 20 SOLUTION ORAL; TOPICAL at 11:44

## 2021-10-22 ASSESSMENT — PAIN DESCRIPTION - FREQUENCY: FREQUENCY: CONTINUOUS

## 2021-10-22 ASSESSMENT — ENCOUNTER SYMPTOMS
SHORTNESS OF BREATH: 1
CONSTIPATION: 0
CHEST TIGHTNESS: 1
DIARRHEA: 0
ABDOMINAL PAIN: 0
VOMITING: 0
COUGH: 1
COLOR CHANGE: 0
NAUSEA: 0
BACK PAIN: 0

## 2021-10-22 ASSESSMENT — PAIN SCALES - GENERAL
PAINLEVEL_OUTOF10: 2
PAINLEVEL_OUTOF10: 6
PAINLEVEL_OUTOF10: 4

## 2021-10-22 ASSESSMENT — PAIN DESCRIPTION - LOCATION
LOCATION: CHEST

## 2021-10-22 ASSESSMENT — PAIN DESCRIPTION - ONSET: ONSET: AWAKENED FROM SLEEP

## 2021-10-22 ASSESSMENT — PAIN DESCRIPTION - ORIENTATION: ORIENTATION: MID

## 2021-10-22 ASSESSMENT — PAIN DESCRIPTION - DESCRIPTORS: DESCRIPTORS: SQUEEZING

## 2021-10-22 ASSESSMENT — PAIN DESCRIPTION - PAIN TYPE: TYPE: ACUTE PAIN

## 2021-10-22 NOTE — ED PROVIDER NOTES
[facility] Emergency Department    CHIEF COMPLAINT       Chief Complaint   Patient presents with    Chest Pain       Nurses Notes reviewed and I agree except as noted in the HPI. HISTORY OF PRESENT ILLNESS    Alexandria Collins raquel 27 y.o. male who presents to the ED for evaluation of chest pain x 11 hours. 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 It was a constant 6/10 \"squeezing\" sensation that kept him up so he took one of his Klonopin to sleep but woke up and the pain was getting worse. He denies trying any pain medications to help relieve his symptoms. The pain is mostly on the left side of the chest and radiate to left shoulder. Along with this chest pain he has also been experiencing SOB due to pain worsened with inspiration. Due to his back surgeries patient doesn't do much strenuous exercise and hasn't since the onset of the symptoms. He denies any fever/chills, nausea/vomiting, bowel changes, bladder changes, abdominal pain, head or neck pain, or dizziness. Patient states his father  of a MI at the age of 48. Pain description:  Onset: last night   Location: chest   Duration: 11 hours   Character: squeezing   Aggravating factors: no known   Radiation: left shoulder   Timing: constant   Severity: 6/10     Experienced previously: no    HPI was provided by the patient. REVIEW OF SYSTEMS     Review of Systems   Constitutional: Negative for chills and fever. HENT: Negative for congestion. Respiratory: Positive for cough, chest tightness and shortness of breath. \"smoker cough\"   Cardiovascular: Positive for chest pain. Gastrointestinal: Negative for abdominal pain, constipation, diarrhea, nausea and vomiting. Genitourinary: Negative for difficulty urinating and dysuria. Musculoskeletal: Negative for back pain. Skin: Negative for color change, rash and wound.    Neurological: Positive for weakness. Negative for dizziness and light-headedness. PAST MEDICAL HISTORY     Past Medical History:   Diagnosis Date    Asthma     Bipolar 1 disorder ([facility] [address] [address]     SINGH Vizcaino AM, II.MARIA G    Borderline personality disorder ([facility] [address]     COPD (chronic obstructive pulmonary disease) ([facility] [address]     early stages    Depression     GERD (gastroesophageal reflux disease)     History of cardiac murmur in childhood     Efzi-Emdrw-Rbkknsl disease, left     Lumbar disc disease     Multiple allergies     PTSD (post-traumatic stress disorder)     Restless legs syndrome     Teeth missing     Under care of team 05/18/2021    psych-vidal ethan-lima-last visit april 2021    Wellness examination 05/18/2021    pcp-Celia Baez-last visit april 2021       SURGICALHISTORY      has a past surgical history that includes Total hip arthroplasty (Left, 2011); Tympanostomy tube placement; Injection Procedure For Sacroiliac Joint (Left, 10/23/2020); Pain management procedure (Left, 01/29/2021); Pain management procedure (Left, 02/12/2021); Nerve Block (N/A, 03/12/2021); Pain management procedure (Left, 04/16/2021); [place] tooth extraction; and Lumbar spine surgery (Left, 06/01/2021). CURRENT MEDICATIONS       Discharge Medication List as of 10/22/2021  1:26 PM      CONTINUE these medications which have NOT CHANGED    Details   traZODone (DESYREL) 150 MG tablet Take 1 tablet by mouth nightly, Disp-30 tablet, R-2Normal      lamoTRIgine (LAMICTAL) 200 MG tablet Take 1 tablet by mouth 2 times daily, Disp-60 tablet, R-2Normal      escitalopram (LEXAPRO) 20 MG tablet Take 1.5 tablets by mouth daily, Disp-45 tablet, R-2Normal      clonazePAM (KLONOPIN) 1 MG tablet Take 1 tablet by mouth nightly as needed (insomnia/nightmares) for up to 60 days. , Disp-30 tablet, R-1Normal      pantoprazole (PROTONIX) 20 MG tablet Take 2 tablets by mouth daily, Disp-180 tablet, R-0Normal      rOPINIRole (REQUIP) 1 MG tablet Take 1 tablet by mouth nightly, Disp-90 tablet, R-3Normal      montelukast (SINGULAIR) 10 MG tablet Take 1 tablet by mouth daily, Disp-30 tablet, R-3Normal      loratadine (CLARITIN) 10 MG tablet Take 1 tablet by mouth daily, Disp-30 tablet, R-3Normal      fluticasone (FLONASE) 50 MCG/ACT nasal spray 2 sprays by Each Nostril route daily Start with one spray daily for one week and increase to two sprays daily as needed, Disp-1 Bottle, R-3Normal      fluticasone-salmeterol (ADVAIR DISKUS) 100-50 MCG/DOSE diskus inhaler Inhale 1 puff into the lungs every 12 hours, Disp-60 each, R-3Normal      albuterol sulfate HFA (VENTOLIN HFA) 108 (90 Base) MCG/ACT inhaler Inhale 2 puffs into the lungs every 6 hours as needed for Wheezing, Disp-1 Inhaler, R-0Normal      Handicap Placard [facility] Starting Thu 2021, Disp-1 each, R-0, PrintExpires 2022      azelastine (OPTIVAR) 0.05 % ophthalmic solution Place 1 drop into both eyes 2 times daily as neededHistorical Med             ALLERGIES     has No Known Allergies. FAMILY HISTORY     He indicated that his mother is alive. He indicated that his father is . family history includes Depression in his mother; Heart Disease in his father.     SOCIAL HISTORY       Social History     Socioeconomic History    Marital status:      Spouse name: Not on file    Number of children: Not on file    Years of education: Not on file    Highest education level: Not on file   Occupational History    Not on file   Tobacco Use    Smoking status: Current Every Day Smoker     Packs/day: 0.25     Years: 10.00     Pack years: 2.50     Types: Cigarettes    Smokeless tobacco: Former User     Types: Chew   Vaping Use    Vaping Use: Every day   Substance and Sexual Activity    Alcohol use: Not Currently     Comment: socially    Drug use: Yes     Types: Marijuana     Comment: medical card    Sexual activity: Yes     Partners: Female   Other Topics Concern    Not on file   Social History Narrative    Not on file     Social Determinants of Health     Financial Resource Strain: Low Risk     Difficulty of Paying Living Expenses: Not hard at all   Food Insecurity: No Food Insecurity    Worried About Running Out of Food in the Last Year: Never true    [address] in the Last Year: Never true   Transportation Needs: No Transportation Needs    Lack of Transportation (Medical): No    Lack of Transportation (Non-Medical): No   Physical Activity:     Days of Exercise per Week:     Minutes of Exercise per Session:    Stress:     Feeling of Stress :    Social Connections:     Frequency of Communication with Friends and Family:     Frequency of Social Gatherings with Friends and Family:     Attends [employer] Services:     Active Member of Clubs or Organizations:     Attends Club or Organization Meetings:     Marital Status:    Intimate Partner Violence:     Fear of Current or Ex-Partner:     Emotionally Abused:     Physically Abused:     Sexually Abused:        PHYSICAL EXAM     INITIAL VITALS:  height is 5' 10\" (1.778 m) and weight is 215 lb (97.5 kg). His oral temperature is 98 °F (36.7 °C). His blood pressure is 146/84 (abnormal) and his pulse is 75. His respiration is 15 and oxygen saturation is 97%. Physical Exam  Constitutional:       General: He is awake. Appearance: Normal appearance. He is well-developed and normal weight. HENT:      Head: Normocephalic. Nose: Nose normal.      Mouth/Throat:      Lips: Pink. Mouth: Mucous membranes are moist.      Pharynx: Oropharynx is clear. Cardiovascular:      Rate and Rhythm: Normal rate and regular rhythm. Pulses: Normal pulses. Heart sounds: Normal heart sounds. Pulmonary:      Effort: Pulmonary effort is normal.      Breath sounds: Normal breath sounds and air entry. No decreased breath sounds. Chest:      Chest wall: Tenderness present. Abdominal:      Palpations: Abdomen is soft.       Tenderness: There is abdominal tenderness in the epigastric area and left upper quadrant. Musculoskeletal:      Right wrist: Normal.      Left wrist: Normal.      Right hand: No tenderness. Normal range of motion. Normal sensation. Normal pulse. Left hand: No tenderness. Normal range of motion. Decreased strength. Normal sensation. Normal pulse. Cervical back: Full passive range of motion without pain and normal range of motion. Comments: Left sided weakness seen on  strength    Skin:     General: Skin is warm. Capillary Refill: Capillary refill takes less than 2 seconds. Findings: No rash. Nails: There is no clubbing. Neurological:      General: No focal deficit present. Mental Status: He is alert. Cranial Nerves: Cranial nerves are intact. Sensory: Sensation is intact. Motor: Motor function is intact. Coordination: Coordination is intact. Gait: Gait is intact. Psychiatric:         Attention and Perception: Attention and perception normal.         Mood and Affect: Mood and affect normal.         Speech: Speech normal.         Behavior: Behavior normal. Behavior is cooperative. Thought Content: Thought content normal.         Cognition and Memory: Cognition normal.         Judgment: Judgment normal.         DIFFERENTIAL DIAGNOSIS:   ACS, angina, dyspepsia, esophagitis,  DIAGNOSTIC RESULTS     EKG: All EKG's are interpreted by the Emergency Department Physician who eithersigns or Co-signs this chart in the absence of a cardiologist.    EKG read and interpreted by myself with comparison to June 28, 2014 gives impression of normal sinus rhythm with heart rate of 96; interval 144; ;QTc 444; axis P-59, R-48, T-30. RADIOLOGY: non-plainfilm images(s) such as CT, Ultrasound and MRI are read by the radiologist.  Plain radiographic images are visualized and preliminarily interpreted by the emergency physician unless otherwise stated below.   XR CHEST (2 VW)   Final Result   1. No acute cardiopulmonary process. **This report has been created using voice recognition software. It may contain minor errors which are inherent in voice recognition technology. **      Final report electronically signed by Dr Reyes Panning on 10/22/2021 12:26 PM            LABS:   Labs Reviewed   BASIC METABOLIC PANEL - Abnormal; Notable for the following components:       Result Value    Glucose 124 (*)     All other components within normal limits   CBC WITH AUTO DIFFERENTIAL   TROPONIN   ANION GAP   GLOMERULAR FILTRATION RATE, ESTIMATED   OSMOLALITY       EMERGENCY DEPARTMENT COURSE:   Vitals:    Vitals:    10/22/21 1104 10/22/21 1145 10/22/21 1250 10/22/21 1338   BP: (!) 147/96 (!) 142/84 (!) 140/78 (!) 146/84   Pulse: 92 73 70 75   Resp: 18 16 17 15   Temp: 98 °F (36.7 °C)      TempSrc: Oral      SpO2: 99% 98% 98% 97%   Weight: 215 lb (97.5 kg)      Height: 5' 10\" (1.778 m)        MDM    Patient was seen and evaluated in the emergency department, patient appeared to be in no acute distress, vital signs reviewed, no significant findings noted. Physical exam was completed, notes some epigastric and left pectoral tenderness noted on exam, no murmurs noted, pulses are equal throughout. Minimal weakness in the left upper extremity. Labs were obtained, no significant findings noted. Chest x-ray reveals no significant normality. Lab work was reviewed, negative troponin noted, no other acute findings noted. EKG reveals no significant findings. Discussed my findings and plan of care with the patient and his wife, they are amenable with discharge. He has a follow-up appoint with his primary care provider on Monday for cardiac clearance for a lumbar fusion, I do not think it is necessary for me to schedule an appoint with cardiology as well for that day. He is advised to return the emergency department throughout the weekend if symptoms worsen.   Symptoms are likely GI in nature, he improved significantly with GI cocktail. Discussed further follow-up with GI if further cardiac work-up is negative. Patient verbalized understanding of plan of care. Medications   aspirin chewable tablet 324 mg (324 mg Oral Given 10/22/21 1144)   aluminum & magnesium hydroxide-simethicone (MAALOX) 30 mL, lidocaine viscous hcl (XYLOCAINE) 5 mL (GI COCKTAIL) ( Oral Given 10/22/21 1144)       Patient was seenindependently by myself. The patient's final impression and disposition and plan was determined by myself. CRITICAL CARE:   None    CONSULTS:  None    PROCEDURES:  None    FINAL IMPRESSION     1. Chest pain, unspecified type    2. Dyspepsia          DISPOSITION/PLAN   Patient discharged in stable condition  PATIENT REFERREDTO:  Estela Lino MD  [address] [facility]  [place]   [phone]    Go on 10/25/2021  For follow up and evaluation      DISCHARGE MEDICATIONS:  Discharge Medication List as of 10/22/2021  1:26 PM          (Please note that portions of this note were completed with a voice recognition program.  Efforts were made to edit the dictations but occasionally words are mis-transcribed.)    Provider:  I personally performed the services described in the documentation,reviewed and edited the documentation which was dictated to the scribe in my presence, and it accurately records my words and actions.     Ronnell Conroy CNP 10/22/21 2:28 PM    LEYDI Cool CNP          [employer], APRN - CNP  10/22/21 [id number]

## 2021-10-22 NOTE — TELEPHONE ENCOUNTER
Spoke to patient. He was late for his appointment on 11/09/2021 at 9:40 Pt stated he woke up late. I informed him that he would need to get rescheduled with Gentry Dia CNP. PT stated that he needed this appointment to get cardiac Clearence for his upcoming surgery. PT stated that he would have to find a new doctor to get him in today because it was important. I informed him that I could try to schedule him with Dr. Cherelle Allen his PCP but she is out of office until 10/26/2021. He stated he would have to find a new doctor to do his cardiac clearance for surgery. After the phone call I was informed that Gentry Dia CNP does not do cardiac clearnence he would have to see his PCP.

## 2021-10-22 NOTE — ED TRIAGE NOTES
PT states I started having chest pain last night & I took klonopin & it didn't help. My Dad had MI at age 27. I need a cardiac clearnace for back surgery.   EKG completed, connected to monitor

## 2021-10-22 NOTE — ED NOTES
Patient resting in bed. Respirations easy and unlabored. No distress noted. Call light within reach.        Kaylee Shrestha RN  10/22/21 3957

## 2021-10-25 ENCOUNTER — OFFICE VISIT (OUTPATIENT)
Dept: FAMILY MEDICINE CLINIC | Age: 31
End: 2021-10-25
Payer: MEDICAID

## 2021-10-25 ENCOUNTER — TELEPHONE (OUTPATIENT)
Dept: NEUROSURGERY | Age: 31
End: 2021-10-25

## 2021-10-25 VITALS
HEART RATE: 80 BPM | OXYGEN SATURATION: 98 % | WEIGHT: 222.4 LBS | DIASTOLIC BLOOD PRESSURE: 76 MMHG | TEMPERATURE: 97 F | BODY MASS INDEX: 31.84 KG/M2 | SYSTOLIC BLOOD PRESSURE: 126 MMHG | HEIGHT: 70 IN

## 2021-10-25 DIAGNOSIS — Z01.810 PREOPERATIVE CARDIOVASCULAR EXAMINATION: Primary | ICD-10-CM

## 2021-10-25 DIAGNOSIS — K21.9 GASTROESOPHAGEAL REFLUX DISEASE WITHOUT ESOPHAGITIS: ICD-10-CM

## 2021-10-25 DIAGNOSIS — M51.9 LUMBAR DISC DISEASE: ICD-10-CM

## 2021-10-25 PROCEDURE — G8427 DOCREV CUR MEDS BY ELIG CLIN: HCPCS | Performed by: STUDENT IN AN ORGANIZED HEALTH CARE EDUCATION/TRAINING PROGRAM

## 2021-10-25 PROCEDURE — G8417 CALC BMI ABV UP PARAM F/U: HCPCS | Performed by: STUDENT IN AN ORGANIZED HEALTH CARE EDUCATION/TRAINING PROGRAM

## 2021-10-25 PROCEDURE — G8484 FLU IMMUNIZE NO ADMIN: HCPCS | Performed by: STUDENT IN AN ORGANIZED HEALTH CARE EDUCATION/TRAINING PROGRAM

## 2021-10-25 PROCEDURE — 99213 OFFICE O/P EST LOW 20 MIN: CPT | Performed by: STUDENT IN AN ORGANIZED HEALTH CARE EDUCATION/TRAINING PROGRAM

## 2021-10-25 PROCEDURE — 4004F PT TOBACCO SCREEN RCVD TLK: CPT | Performed by: STUDENT IN AN ORGANIZED HEALTH CARE EDUCATION/TRAINING PROGRAM

## 2021-10-25 RX ORDER — PANTOPRAZOLE SODIUM 40 MG/1
40 TABLET, DELAYED RELEASE ORAL 2 TIMES DAILY WITH MEALS
Qty: 180 TABLET | Refills: 1 | Status: SHIPPED | OUTPATIENT
Start: 2021-10-25 | End: 2022-05-22 | Stop reason: SDUPTHER

## 2021-10-25 ASSESSMENT — ENCOUNTER SYMPTOMS
BACK PAIN: 1
VOMITING: 0
COUGH: 1
CONSTIPATION: 0
ABDOMINAL PAIN: 0
NAUSEA: 0
SORE THROAT: 0
SHORTNESS OF BREATH: 0
DIARRHEA: 0

## 2021-10-25 NOTE — PROGRESS NOTES
S: 27 y.o. male with   Chief Complaint   Patient presents with    Pre-op Exam     cardiac clearance; ER visit, sx-spinal fusionL3-L4 Dr. Loreto Huitron [place]; scheduled 11/02/21       Having lumbar decompression on 11/2/21  Here for cardiac clearance  Has intermittent CP for a year  Referred to cardiology few mos ago but hasn't gone to appt yet. Dad did die young of CAD -- first MI in 35s  Pt also has GERD-- eats spicy foods. Ordering stress test-- Lexiscan as unable to walk due to lumbar fusion in past      BP Readings from Last 3 Encounters:   10/25/21 126/76   10/19/21 117/73   10/22/21 (!) 146/84     Wt Readings from Last 3 Encounters:   10/25/21 222 lb 6.4 oz (100.9 kg)   10/19/21 222 lb (100.7 kg)   10/22/21 215 lb (97.5 kg)           O: VS:   Vitals:    10/25/21 1349   BP: 126/76   Site: Right Upper Arm   Position: Sitting   Cuff Size: Medium Adult   Pulse: 80   Temp: 97 °F (36.1 °C)   TempSrc: Skin   SpO2: 98%   Weight: 222 lb 6.4 oz (100.9 kg)   Height: 5' 10\" (1.778 m)     Body mass index is 31.91 kg/m². Lab Results   Component Value Date    WBC 8.4 10/22/2021    HGB 15.9 10/22/2021    HCT 48.7 10/22/2021     10/22/2021    CHOL 211 (H) 08/04/2020    TRIG 283 (H) 08/04/2020    HDL 29 08/04/2020    LDLCALC 125 08/04/2020    AST 18 08/04/2020     10/22/2021    K 4.0 10/22/2021     10/22/2021    CREATININE 0.8 10/22/2021    BUN 10 10/22/2021    CO2 26 10/22/2021    TSH 1.880 08/04/2020    LABGLOM >90 10/22/2021    MG 2.1 08/04/2020    CALCIUM 9.1 10/22/2021    VITD25 19 (L) 08/04/2020       XR CHEST (2 VW)    Result Date: 10/22/2021  PROCEDURE: XR CHEST (2 VW) CLINICAL INFORMATION: (Chest pain) COMPARISON: Chest radiograph 6/28/2014 TECHNIQUE: PA and lateral views of the chest performed. FINDINGS: No focal pulmonary consolidation. Cardiac silhouette is not enlarged. No pleural effusion. No pneumothorax. No acute bony abnormality. Very mild thoracic osteophytes are seen.      1. No acute cardiopulmonary process. **This report has been created using voice recognition software. It may contain minor errors which are inherent in voice recognition technology. ** Final report electronically signed by Dr Tabatha Crum on 10/22/2021 12:26 PM           Diagnosis Orders   1. Preoperative cardiovascular examination  Stress test, lexiscan   2. Lumbar disc disease  DME Order for [facility] as OP   3. Gastroesophageal reflux disease without esophagitis  pantoprazole (PROTONIX) 40 MG tablet       Plan    Clearance pending stress test results  CP suspicious for GERD--  Protonix       Return in about 3 months (around 1/25/2022).     Orders Placed:  Orders Placed This Encounter   Procedures    Stress test, [facility]    DME Order for [facility] as OP     Medications Prescribed:  Orders Placed This Encounter   Medications    pantoprazole (PROTONIX) 40 MG tablet     Sig: Take 1 tablet by mouth 2 times daily (with meals)     Dispense:  180 tablet     Refill:  1       Future Appointments   Date Time Provider Juan Manuel Fitzpatrick   10/28/2021  7:30 AM STR NUC MED HC  INJECT  RM2 STRZ STRESS [facility]   10/28/2021  8:00 AM [facility] ROOM 1 [facility] [address] [facility]   10/28/2021  8:30 AM STR [address] [facility]   10/28/2021  9:00 AM STR [facility] ROOM [facility]   11/9/2021 11:00 AM MD DILLON [facility]   11/17/2021 11:30 AM [address] [place] [address], APRN - CNP [facility]   11/23/2021  4:00 PM Tavia Estrella PSYD N [id number] [facility]   12/21/2021 10:30 AM Renny Moe MD [facility]   12/29/2021 11:30 AM DO [facility]   1/26/2022  1:00 PM Asha Paniagua MD [facility] Katerina Maintenance Due   Topic Date Due    Hepatitis C screen  Never done    COVID-19 Vaccine (1) Never done    HIV screen  Never done    DTaP/Tdap/Td vaccine (1 - Tdap) Never done    Flu vaccine (1) Never done         Attending Physician Statement  I have discussed the case, including pertinent history and exam findings with the resident. I agree with the documented assessment and plan as documented by the resident.   GE modifier added to this encounter      Makenzie Pradhan MD 10/27/2021 10:42 PM

## 2021-10-25 NOTE — PROGRESS NOTES
[address] [facility]. [address]  [facility] [address]  Dept: [phone]  Loc: [phone]     Julia Courtney is a 27 y.o. male who presents today for:  Chief Complaint   Patient presents with    Pre-op Exam     cardiac clearance; ER visit, sx-spinal fusionL3-L4 Dr. Mary Jo Mcrae [place]; scheduled 11/02/21       Goals    None         HPI:     HPI   34-year-old male with PMH of bipolar disorder, GERD, PTSD, chronic back pain, here for cardiac clearance for upcoming surgery. Anesthesia requires stress test.    He was just seen in the ED for chest pain. Work up was negative. He has complained of intermittent chest pain over the last few months. Referral to cardiology was placed back in July. Denies chest pain except when eating spicy food and acidic foods. It is worse with laying down. He ate ghost pepper nuggets last night and felt sick after. Symptoms worse when he does not take his Protonix. He is going through disability paperwork. Has had a few falls - wife needs to help him a lot. He also needs a cane. Current Outpatient Medications   Medication Sig Dispense Refill    pantoprazole (PROTONIX) 40 MG tablet Take 1 tablet by mouth 2 times daily (with meals) 180 tablet 1    traZODone (DESYREL) 150 MG tablet Take 1 tablet by mouth nightly 30 tablet 2    lamoTRIgine (LAMICTAL) 200 MG tablet Take 1 tablet by mouth 2 times daily 60 tablet 2    escitalopram (LEXAPRO) 20 MG tablet Take 1.5 tablets by mouth daily 45 tablet 2    clonazePAM (KLONOPIN) 1 MG tablet Take 1 tablet by mouth nightly as needed (insomnia/nightmares) for up to 60 days.  30 tablet 1    Handicap Placard MISC by Does not apply route Expires March 1st, 2022 1 each 0    rOPINIRole (REQUIP) 1 MG tablet Take 1 tablet by mouth nightly 90 tablet 3    montelukast (SINGULAIR) 10 MG tablet Take 1 tablet by mouth daily 30 tablet 3    loratadine (CLARITIN) 10 MG tablet Take 1 tablet by mouth daily 30 tablet 3    azelastine (OPTIVAR) 0.05 % ophthalmic solution Place 1 drop into both eyes 2 times daily as needed      fluticasone (FLONASE) 50 MCG/ACT nasal spray 2 sprays by Each Nostril route daily Start with one spray daily for one week and increase to two sprays daily as needed 1 Bottle 3    fluticasone-salmeterol (ADVAIR DISKUS) 100-50 MCG/DOSE diskus inhaler Inhale 1 puff into the lungs every 12 hours 60 each 3    albuterol sulfate HFA (VENTOLIN HFA) 108 (90 Base) MCG/ACT inhaler Inhale 2 puffs into the lungs every 6 hours as needed for Wheezing 1 Inhaler 0     No current facility-administered medications for this visit. Food Insecurity: No Food Insecurity    Worried About Running Out of Food in the Last Year: Never true    Ran Out of Food in the Last Year: Never true       Health Maintenance   Topic Date Due    Hepatitis C screen  Never done    COVID-19 Vaccine (1) Never done    HIV screen  Never done    DTaP/Tdap/Td vaccine (1 - Tdap) Never done    Flu vaccine (1) Never done    Pneumococcal 0-64 years Vaccine (1 of 2 - PPSV23) 02/04/2022 (Originally 5/5/1995)    Hepatitis A vaccine  Aged Out    Hepatitis B vaccine  Aged Out    Hib vaccine  Aged Out    Meningococcal (ACWY) vaccine  Aged Out    Varicella vaccine  Discontinued       ROS:      Review of Systems   Constitutional: Positive for fatigue. Negative for chills and fever. HENT: Negative for congestion and sore throat. Eyes: Negative for visual disturbance. Respiratory: Positive for cough. Negative for shortness of breath. Chronic cough    Cardiovascular: Positive for chest pain. Negative for palpitations and leg swelling. Gastrointestinal: Negative for abdominal pain, constipation, diarrhea, nausea and vomiting. Genitourinary: Negative for dysuria. Musculoskeletal: Positive for arthralgias, back pain and gait problem. Skin: Negative for rash.    Neurological: Negative for dizziness, weakness, light-headedness, numbness and headaches. Psychiatric/Behavioral: Negative for dysphoric mood and sleep disturbance. The patient is not nervous/anxious. Objective:     Vitals:    10/25/21 1349   BP: 126/76   Site: Right Upper Arm   Position: Sitting   Cuff Size: Medium Adult   Pulse: 80   Temp: 97 °F (36.1 °C)   TempSrc: Skin   SpO2: 98%   Weight: 222 lb 6.4 oz (100.9 kg)   Height: 5' 10\" (1.778 m)       Body mass index is 31.91 kg/m². Wt Readings from Last 3 Encounters:   10/25/21 222 lb 6.4 oz (100.9 kg)   10/19/21 222 lb (100.7 kg)   10/22/21 215 lb (97.5 kg)     BP Readings from Last 3 Encounters:   10/25/21 126/76   10/19/21 117/73   10/22/21 (!) 146/84       Physical Exam  Vitals and nursing note reviewed. Constitutional:       General: He is not in acute distress. Appearance: Normal appearance. He is not ill-appearing. HENT:      Head: Normocephalic and atraumatic. Right Ear: External ear normal.      Left Ear: External ear normal.      Nose: Nose normal.      Mouth/Throat:      Mouth: Mucous membranes are moist.      Pharynx: Oropharynx is clear. No oropharyngeal exudate. Eyes:      General:         Right eye: No discharge. Left eye: No discharge. Conjunctiva/sclera: Conjunctivae normal.   Cardiovascular:      Rate and Rhythm: Normal rate and regular rhythm. Pulses: Normal pulses. Heart sounds: Normal heart sounds. Pulmonary:      Effort: Pulmonary effort is normal. No respiratory distress. Breath sounds: Normal breath sounds. No wheezing, rhonchi or rales. Abdominal:      General: Abdomen is flat. Bowel sounds are normal. There is no distension. Palpations: Abdomen is soft. Tenderness: There is no abdominal tenderness. Musculoskeletal:         General: Normal range of motion. Cervical back: Normal range of motion and neck supple. Right lower leg: No edema. Left lower leg: No edema.    Skin: General: Skin is warm and dry. Capillary Refill: Capillary refill takes less than 2 seconds. Findings: No rash. Neurological:      General: No focal deficit present. Mental Status: He is alert and oriented to person, place, and time. Psychiatric:         Mood and Affect: Mood normal.         Behavior: Behavior normal.         Thought Content: Thought content normal.         Judgment: Judgment normal.         Assessment / Plan:     1. Preoperative cardiovascular examination  - Stress test, lexiscan; Future  - Revised cardiac risk index score of 0, which places him at Class I risk - 3.9% 30-day risk of death, MI or cardiac arrest. Will modify pending results of stress test.     2. Lumbar disc disease  - Patient requires cane due to physical immobility.  - DME Order for Cane as OP    3. Gastroesophageal reflux disease without esophagitis  - Increase Protonix to BID. - pantoprazole (PROTONIX) 40 MG tablet; Take 1 tablet by mouth 2 times daily (with meals)  Dispense: 180 tablet; Refill: [address] [facility] Curtis Bowles was reexamined preoperatively today, 10/25/21. There is no substantial change in his physical status since the time of his pre-anesthesia testing, allergies to drugs and biologics were reviewed on chart and are unchanged. He is fit for the proposed surgical procedure. Facundo Mares has no further questions regarding the risks, benefits, nature and alternatives of surgery and wishes to proceed. Health Maintenance Due   Topic Date Due    Hepatitis C screen  Never done    COVID-19 Vaccine (1) Never done    HIV screen  Never done    DTaP/Tdap/Td vaccine (1 - Tdap) Never done    Flu vaccine (1) Never done           Return in about 3 months (around 1/25/2022).       Medications Prescribed:  Orders Placed This Encounter   Medications    pantoprazole (PROTONIX) 40 MG tablet     Sig: Take 1 tablet by mouth 2 times daily (with meals)     Dispense:  180 tablet     Refill:  1 Future Appointments   Date Time Provider Juan Manuel Fitzpatrick   10/28/2021  7:30 AM STR NUC MED HC  INJECT  RM2 STRZ STRESS [facility]   10/28/2021  8:00 AM [facility] ROOM [address]   10/28/2021  8:30 AM STR NM STRESS RM1 STRZ STRESS [facility]   10/28/2021  9:00 AM STR [facility] ROOM [address]   11/9/2021 11:00 AM Darcy Taylor MD N ENT [facility] [place]   11/17/2021 11:30 AM [address], APRN - CNP [facility]   11/23/2021  4:00 PM Faviola Gamboa PSYD N UAQOATHMPD [facility] [place]   12/21/2021 10:30 AM MD Kira Andino [facility] [place]   12/29/2021 11:30 AM Merline Collie, DO [facility]   1/26/2022  1:00 PM Cristina Galindo MD SRPX FM RES [facility] [place]       Patient given educational materials - see patient instructions. Discussed use, benefit, and side effects of prescribed medications. All patient questions answered. Patient voiced understanding. Reviewed health maintenance. Instructed to continue current medications, diet and exercise. Patient agreed with treatment plan. Follow up as directed.      Electronically signed by Cristina Galindo MD on 10/25/2021 at 8:03 PM

## 2021-10-26 RX ORDER — LORATADINE 10 MG/1
10 TABLET ORAL DAILY
Qty: 30 TABLET | Refills: 3 | Status: SHIPPED | OUTPATIENT
Start: 2021-10-26 | End: 2021-11-12 | Stop reason: SDUPTHER

## 2021-10-26 NOTE — TELEPHONE ENCOUNTER
Patient has stress test scheduled for 10/28 for 11/2 surgery - will check back for results and clearance

## 2021-10-26 NOTE — TELEPHONE ENCOUNTER
Patient's last appointment was : 10/25/2021  Patient's next appointment is : 1/26/2022  Last refilled:6/11/21 refills 3

## 2021-10-27 ENCOUNTER — TELEPHONE (OUTPATIENT)
Dept: FAMILY MEDICINE CLINIC | Age: 31
End: 2021-10-27

## 2021-10-27 NOTE — TELEPHONE ENCOUNTER
----- Message from [facility] EVERARDO sent at 10/26/2021 10:57 AM EDT -----  Subject: Message to Provider    QUESTIONS  Information for Provider? Vaishali from [address] [place] [address] was calling to see if   the doctor put in a referral for the patient for a cardiologist. she would   like a call back. Patient surgery is 11/2 and she would need to know who   the cardiologist is if it is scheduled. ---------------------------------------------------------------------------  --------------  Annette CAMARENA  What is the best way for the office to contact you? OK to leave message on   voicemail  Preferred Call Back Phone Number? [phone]  ---------------------------------------------------------------------------  --------------  SCRIPT ANSWERS  Relationship to Patient? Third Party  Representative Name?  Vaishali

## 2021-10-27 NOTE — TELEPHONE ENCOUNTER
I returned Vaishali's call, she verified that she spoke with the patient yesterday, he verified that he is going to be seen by a cardiologist for a stress test. Vaishali states that she faxed a cardiac clearance form, that will need to be completed and signed, if the patient is cleared. Vaishali also requests the stress test results be faxed to them.

## 2021-10-28 ENCOUNTER — HOSPITAL ENCOUNTER (OUTPATIENT)
Dept: NON INVASIVE DIAGNOSTICS | Age: 31
Discharge: HOME | End: 2021-10-28
Payer: MEDICAID

## 2021-10-28 DIAGNOSIS — Z01.810 PREOPERATIVE CARDIOVASCULAR EXAMINATION: ICD-10-CM

## 2021-10-28 PROCEDURE — 93017 CV STRESS TEST TRACING ONLY: CPT | Performed by: INTERNAL MEDICINE

## 2021-10-28 PROCEDURE — A9500 TC99M SESTAMIBI: HCPCS | Performed by: STUDENT IN AN ORGANIZED HEALTH CARE EDUCATION/TRAINING PROGRAM

## 2021-10-28 PROCEDURE — 3430000000 HC RX DIAGNOSTIC RADIOPHARMACEUTICAL: Performed by: STUDENT IN AN ORGANIZED HEALTH CARE EDUCATION/TRAINING PROGRAM

## 2021-10-28 PROCEDURE — 78452 HT MUSCLE IMAGE SPECT MULT: CPT

## 2021-10-28 PROCEDURE — 6360000002 HC RX W HCPCS

## 2021-10-28 RX ADMIN — Medication 9.9 MILLICURIE: at 08:33

## 2021-10-28 RX ADMIN — Medication 31.9 MILLICURIE: at 08:33

## 2021-10-29 ENCOUNTER — HOSPITAL ENCOUNTER (OUTPATIENT)
Age: 31
Discharge: HOME | End: 2021-10-29
Payer: MEDICAID

## 2021-10-29 ENCOUNTER — TELEPHONE (OUTPATIENT)
Dept: CARDIOLOGY CLINIC | Age: 31
End: 2021-10-29

## 2021-10-29 DIAGNOSIS — Z01.818 PREOP TESTING: Primary | ICD-10-CM

## 2021-10-29 LAB
INFLUENZA A: NOT DETECTED
INFLUENZA B: NOT DETECTED
SARS-COV-2 RNA, RT PCR: NOT DETECTED

## 2021-10-29 PROCEDURE — 87636 SARSCOV2 & INF A&B AMP PRB: CPT

## 2021-10-29 NOTE — TELEPHONE ENCOUNTER
I do not think saw this pat in the office  I just did the stress test ordered by other clinician.   That need to be referred to them

## 2021-10-29 NOTE — TELEPHONE ENCOUNTER
Pt had stress test 10-28-21. Is pt cleared for Facetectomy/Foramonotomy on 11-2-21 with Dr. Rhianna Barry?     Fax- [phone]  They also need stress test faxed along with clearance

## 2021-11-01 ENCOUNTER — ANESTHESIA EVENT (OUTPATIENT)
Dept: OPERATING ROOM | Age: 31
DRG: 304 | End: 2021-11-01
Payer: MEDICAID

## 2021-11-01 NOTE — PROGRESS NOTES
Medical and cardiac clearance on chart. dr Bay Kilpatrick cardiologist did stress test ordered .med/cardiac clearance per pcp after stress test.

## 2021-11-02 ENCOUNTER — HOSPITAL ENCOUNTER (INPATIENT)
Age: 31
LOS: 1 days | Discharge: HOME | DRG: 304 | End: 2021-11-03
Attending: NEUROLOGICAL SURGERY | Admitting: NEUROLOGICAL SURGERY
Payer: MEDICAID

## 2021-11-02 ENCOUNTER — APPOINTMENT (OUTPATIENT)
Dept: GENERAL RADIOLOGY | Age: 31
DRG: 304 | End: 2021-11-02
Attending: NEUROLOGICAL SURGERY
Payer: MEDICAID

## 2021-11-02 ENCOUNTER — ANESTHESIA (OUTPATIENT)
Dept: OPERATING ROOM | Age: 31
DRG: 304 | End: 2021-11-02
Payer: MEDICAID

## 2021-11-02 VITALS — TEMPERATURE: 98.7 F | OXYGEN SATURATION: 97 % | SYSTOLIC BLOOD PRESSURE: 140 MMHG | DIASTOLIC BLOOD PRESSURE: 90 MMHG

## 2021-11-02 DIAGNOSIS — M51.16 LUMBAR DISC DISEASE WITH RADICULOPATHY: Primary | ICD-10-CM

## 2021-11-02 PROCEDURE — 2580000003 HC RX 258

## 2021-11-02 PROCEDURE — 2720000010 HC SURG SUPPLY STERILE: Performed by: NEUROLOGICAL SURGERY

## 2021-11-02 PROCEDURE — 6370000000 HC RX 637 (ALT 250 FOR IP): Performed by: NEUROLOGICAL SURGERY

## 2021-11-02 PROCEDURE — 2580000003 HC RX 258: Performed by: ANESTHESIOLOGY

## 2021-11-02 PROCEDURE — 3600000005 HC SURGERY LEVEL 5 BASE: Performed by: NEUROLOGICAL SURGERY

## 2021-11-02 PROCEDURE — 2580000003 HC RX 258: Performed by: NURSE PRACTITIONER

## 2021-11-02 PROCEDURE — 2500000003 HC RX 250 WO HCPCS

## 2021-11-02 PROCEDURE — 2500000003 HC RX 250 WO HCPCS: Performed by: NEUROLOGICAL SURGERY

## 2021-11-02 PROCEDURE — 3700000001 HC ADD 15 MINUTES (ANESTHESIA): Performed by: NEUROLOGICAL SURGERY

## 2021-11-02 PROCEDURE — 6360000002 HC RX W HCPCS: Performed by: NURSE PRACTITIONER

## 2021-11-02 PROCEDURE — 6360000002 HC RX W HCPCS

## 2021-11-02 PROCEDURE — C1713 ANCHOR/SCREW BN/BN,TIS/BN: HCPCS | Performed by: NEUROLOGICAL SURGERY

## 2021-11-02 PROCEDURE — 72100 X-RAY EXAM L-S SPINE 2/3 VWS: CPT

## 2021-11-02 PROCEDURE — 6370000000 HC RX 637 (ALT 250 FOR IP): Performed by: NURSE PRACTITIONER

## 2021-11-02 PROCEDURE — 22558 ARTHRD ANT NTRBD MIN DSC LUM: CPT | Performed by: NEUROLOGICAL SURGERY

## 2021-11-02 PROCEDURE — 2500000003 HC RX 250 WO HCPCS: Performed by: ANESTHESIOLOGY

## 2021-11-02 PROCEDURE — 3209999900 FLUORO FOR SURGICAL PROCEDURES

## 2021-11-02 PROCEDURE — 2580000003 HC RX 258: Performed by: NEUROLOGICAL SURGERY

## 2021-11-02 PROCEDURE — 3600000015 HC SURGERY LEVEL 5 ADDTL 15MIN: Performed by: NEUROLOGICAL SURGERY

## 2021-11-02 PROCEDURE — 7100000000 HC PACU RECOVERY - FIRST 15 MIN: Performed by: NEUROLOGICAL SURGERY

## 2021-11-02 PROCEDURE — 7100000001 HC PACU RECOVERY - ADDTL 15 MIN: Performed by: NEUROLOGICAL SURGERY

## 2021-11-02 PROCEDURE — 22853 INSJ BIOMECHANICAL DEVICE: CPT | Performed by: NEUROLOGICAL SURGERY

## 2021-11-02 PROCEDURE — APPSS15 APP SPLIT SHARED TIME 0-15 MINUTES: Performed by: NURSE PRACTITIONER

## 2021-11-02 PROCEDURE — 0SG00A0 FUSION OF LUMBAR VERTEBRAL JOINT WITH INTERBODY FUSION DEVICE, ANTERIOR APPROACH, ANTERIOR COLUMN, OPEN APPROACH: ICD-10-PCS | Performed by: NEUROLOGICAL SURGERY

## 2021-11-02 PROCEDURE — 2709999900 HC NON-CHARGEABLE SUPPLY: Performed by: NEUROLOGICAL SURGERY

## 2021-11-02 PROCEDURE — 3700000000 HC ANESTHESIA ATTENDED CARE: Performed by: NEUROLOGICAL SURGERY

## 2021-11-02 PROCEDURE — 6360000002 HC RX W HCPCS: Performed by: ANESTHESIOLOGY

## 2021-11-02 PROCEDURE — C1821 INTERSPINOUS IMPLANT: HCPCS | Performed by: NEUROLOGICAL SURGERY

## 2021-11-02 PROCEDURE — 2060000000 HC ICU INTERMEDIATE R&B

## 2021-11-02 DEVICE — PLATE 2140002 OLIF25 2-HOLE PLATE LARGE
Type: IMPLANTABLE DEVICE | Site: SPINE LUMBAR | Status: FUNCTIONAL
Brand: PIVOX™ OBLIQUE LATERAL SPINAL SYSTEM

## 2021-11-02 DEVICE — BONE GRAFT KIT 7510100 INFUSE X SMALL
Type: IMPLANTABLE DEVICE | Site: SPINE LUMBAR | Status: FUNCTIONAL
Brand: INFUSE® BONE GRAFT

## 2021-11-02 DEVICE — DBM T43105 5CC GRAFTON PUTTY
Type: IMPLANTABLE DEVICE | Site: SPINE LUMBAR | Status: FUNCTIONAL
Brand: GRAFTON®AND GRAFTON PLUS®DEMINERALIZED BONE MATRIX (DBM)

## 2021-11-02 DEVICE — SET SCR SPNL DIA4.75MM POST THORLUM SACR TI PERC APPRCH CDH: Type: IMPLANTABLE DEVICE | Site: BACK | Status: FUNCTIONAL

## 2021-11-02 DEVICE — MAS 54850018535 4.75 EXT TAB CANN 8.5X35
Type: IMPLANTABLE DEVICE | Site: BACK | Status: FUNCTIONAL
Brand: CD HORIZON® SOLERA® SPINAL SYSTEM

## 2021-11-02 RX ORDER — ONDANSETRON 2 MG/ML
INJECTION INTRAMUSCULAR; INTRAVENOUS PRN
Status: DISCONTINUED | OUTPATIENT
Start: 2021-11-02 | End: 2021-11-02 | Stop reason: SDUPTHER

## 2021-11-02 RX ORDER — MAGNESIUM SULFATE HEPTAHYDRATE 40 MG/ML
INJECTION, SOLUTION INTRAVENOUS PRN
Status: DISCONTINUED | OUTPATIENT
Start: 2021-11-02 | End: 2021-11-02 | Stop reason: SDUPTHER

## 2021-11-02 RX ORDER — POLYETHYLENE GLYCOL 3350 17 G/17G
17 POWDER, FOR SOLUTION ORAL DAILY
Status: DISCONTINUED | OUTPATIENT
Start: 2021-11-03 | End: 2021-11-03 | Stop reason: HOSPADM

## 2021-11-02 RX ORDER — ROPINIROLE 1 MG/1
1 TABLET, FILM COATED ORAL NIGHTLY
Status: DISCONTINUED | OUTPATIENT
Start: 2021-11-02 | End: 2021-11-03 | Stop reason: HOSPADM

## 2021-11-02 RX ORDER — SODIUM CHLORIDE 0.9 % (FLUSH) 0.9 %
10 SYRINGE (ML) INJECTION PRN
Status: DISCONTINUED | OUTPATIENT
Start: 2021-11-02 | End: 2021-11-03 | Stop reason: HOSPADM

## 2021-11-02 RX ORDER — SODIUM CHLORIDE 0.9 % (FLUSH) 0.9 %
10 SYRINGE (ML) INJECTION EVERY 12 HOURS SCHEDULED
Status: DISCONTINUED | OUTPATIENT
Start: 2021-11-02 | End: 2021-11-03 | Stop reason: HOSPADM

## 2021-11-02 RX ORDER — SODIUM CHLORIDE 9 MG/ML
25 INJECTION, SOLUTION INTRAVENOUS PRN
Status: DISCONTINUED | OUTPATIENT
Start: 2021-11-02 | End: 2021-11-03 | Stop reason: HOSPADM

## 2021-11-02 RX ORDER — MORPHINE SULFATE 2 MG/ML
2 INJECTION, SOLUTION INTRAMUSCULAR; INTRAVENOUS
Status: DISCONTINUED | OUTPATIENT
Start: 2021-11-02 | End: 2021-11-03

## 2021-11-02 RX ORDER — DEXAMETHASONE SODIUM PHOSPHATE 10 MG/ML
INJECTION INTRAMUSCULAR; INTRAVENOUS PRN
Status: DISCONTINUED | OUTPATIENT
Start: 2021-11-02 | End: 2021-11-02 | Stop reason: SDUPTHER

## 2021-11-02 RX ORDER — LIDOCAINE HYDROCHLORIDE AND EPINEPHRINE 10; 10 MG/ML; UG/ML
INJECTION, SOLUTION INFILTRATION; PERINEURAL PRN
Status: DISCONTINUED | OUTPATIENT
Start: 2021-11-02 | End: 2021-11-02 | Stop reason: ALTCHOICE

## 2021-11-02 RX ORDER — CEFAZOLIN SODIUM 2 G/50ML
SOLUTION INTRAVENOUS PRN
Status: DISCONTINUED | OUTPATIENT
Start: 2021-11-02 | End: 2021-11-02 | Stop reason: SDUPTHER

## 2021-11-02 RX ORDER — BUDESONIDE AND FORMOTEROL FUMARATE DIHYDRATE 80; 4.5 UG/1; UG/1
2 AEROSOL RESPIRATORY (INHALATION) 2 TIMES DAILY
Status: DISCONTINUED | OUTPATIENT
Start: 2021-11-02 | End: 2021-11-03 | Stop reason: HOSPADM

## 2021-11-02 RX ORDER — PROPOFOL 10 MG/ML
INJECTION, EMULSION INTRAVENOUS PRN
Status: DISCONTINUED | OUTPATIENT
Start: 2021-11-02 | End: 2021-11-02 | Stop reason: SDUPTHER

## 2021-11-02 RX ORDER — OXYCODONE HYDROCHLORIDE 5 MG/1
10 TABLET ORAL EVERY 4 HOURS PRN
Status: DISCONTINUED | OUTPATIENT
Start: 2021-11-02 | End: 2021-11-03 | Stop reason: HOSPADM

## 2021-11-02 RX ORDER — ALBUTEROL SULFATE 90 UG/1
2 AEROSOL, METERED RESPIRATORY (INHALATION) EVERY 6 HOURS PRN
Status: DISCONTINUED | OUTPATIENT
Start: 2021-11-02 | End: 2021-11-03 | Stop reason: HOSPADM

## 2021-11-02 RX ORDER — SODIUM CHLORIDE 9 MG/ML
INJECTION, SOLUTION INTRAVENOUS CONTINUOUS
Status: DISCONTINUED | OUTPATIENT
Start: 2021-11-02 | End: 2021-11-03 | Stop reason: HOSPADM

## 2021-11-02 RX ORDER — PROMETHAZINE HYDROCHLORIDE 25 MG/ML
6.25 INJECTION, SOLUTION INTRAMUSCULAR; INTRAVENOUS
Status: COMPLETED | OUTPATIENT
Start: 2021-11-02 | End: 2021-11-02

## 2021-11-02 RX ORDER — MONTELUKAST SODIUM 10 MG/1
10 TABLET ORAL NIGHTLY
Status: DISCONTINUED | OUTPATIENT
Start: 2021-11-02 | End: 2021-11-03 | Stop reason: HOSPADM

## 2021-11-02 RX ORDER — ESMOLOL HYDROCHLORIDE 10 MG/ML
INJECTION INTRAVENOUS PRN
Status: DISCONTINUED | OUTPATIENT
Start: 2021-11-02 | End: 2021-11-02 | Stop reason: SDUPTHER

## 2021-11-02 RX ORDER — LIDOCAINE HYDROCHLORIDE 10 MG/ML
INJECTION, SOLUTION EPIDURAL; INFILTRATION; INTRACAUDAL; PERINEURAL PRN
Status: DISCONTINUED | OUTPATIENT
Start: 2021-11-02 | End: 2021-11-02 | Stop reason: SDUPTHER

## 2021-11-02 RX ORDER — SODIUM CHLORIDE, SODIUM LACTATE, POTASSIUM CHLORIDE, CALCIUM CHLORIDE 600; 310; 30; 20 MG/100ML; MG/100ML; MG/100ML; MG/100ML
INJECTION, SOLUTION INTRAVENOUS CONTINUOUS PRN
Status: DISCONTINUED | OUTPATIENT
Start: 2021-11-02 | End: 2021-11-02 | Stop reason: SDUPTHER

## 2021-11-02 RX ORDER — FLUTICASONE PROPIONATE 50 MCG
2 SPRAY, SUSPENSION (ML) NASAL DAILY
Status: DISCONTINUED | OUTPATIENT
Start: 2021-11-03 | End: 2021-11-03 | Stop reason: HOSPADM

## 2021-11-02 RX ORDER — METHOCARBAMOL 750 MG/1
750 TABLET, FILM COATED ORAL EVERY 8 HOURS
Status: DISCONTINUED | OUTPATIENT
Start: 2021-11-02 | End: 2021-11-03 | Stop reason: HOSPADM

## 2021-11-02 RX ORDER — GINSENG 100 MG
CAPSULE ORAL PRN
Status: DISCONTINUED | OUTPATIENT
Start: 2021-11-02 | End: 2021-11-02 | Stop reason: ALTCHOICE

## 2021-11-02 RX ORDER — MIDAZOLAM HYDROCHLORIDE 1 MG/ML
INJECTION INTRAMUSCULAR; INTRAVENOUS PRN
Status: DISCONTINUED | OUTPATIENT
Start: 2021-11-02 | End: 2021-11-02 | Stop reason: SDUPTHER

## 2021-11-02 RX ORDER — MAGNESIUM HYDROXIDE 1200 MG/15ML
LIQUID ORAL CONTINUOUS PRN
Status: DISCONTINUED | OUTPATIENT
Start: 2021-11-02 | End: 2021-11-02 | Stop reason: ALTCHOICE

## 2021-11-02 RX ORDER — ESCITALOPRAM OXALATE 10 MG/1
30 TABLET ORAL DAILY
Status: DISCONTINUED | OUTPATIENT
Start: 2021-11-02 | End: 2021-11-03 | Stop reason: HOSPADM

## 2021-11-02 RX ORDER — FENTANYL CITRATE 50 UG/ML
INJECTION, SOLUTION INTRAMUSCULAR; INTRAVENOUS PRN
Status: DISCONTINUED | OUTPATIENT
Start: 2021-11-02 | End: 2021-11-02 | Stop reason: SDUPTHER

## 2021-11-02 RX ORDER — PROPOFOL 10 MG/ML
INJECTION, EMULSION INTRAVENOUS CONTINUOUS PRN
Status: DISCONTINUED | OUTPATIENT
Start: 2021-11-02 | End: 2021-11-02 | Stop reason: SDUPTHER

## 2021-11-02 RX ORDER — OXYCODONE HYDROCHLORIDE 5 MG/1
5 TABLET ORAL EVERY 4 HOURS PRN
Status: DISCONTINUED | OUTPATIENT
Start: 2021-11-02 | End: 2021-11-03 | Stop reason: HOSPADM

## 2021-11-02 RX ORDER — PANTOPRAZOLE SODIUM 40 MG/1
40 TABLET, DELAYED RELEASE ORAL 2 TIMES DAILY WITH MEALS
Status: DISCONTINUED | OUTPATIENT
Start: 2021-11-03 | End: 2021-11-03 | Stop reason: HOSPADM

## 2021-11-02 RX ORDER — LAMOTRIGINE 100 MG/1
200 TABLET ORAL 2 TIMES DAILY
Status: DISCONTINUED | OUTPATIENT
Start: 2021-11-02 | End: 2021-11-03 | Stop reason: HOSPADM

## 2021-11-02 RX ORDER — LORATADINE 10 MG/1
10 TABLET ORAL DAILY
Status: DISCONTINUED | OUTPATIENT
Start: 2021-11-03 | End: 2021-11-03 | Stop reason: HOSPADM

## 2021-11-02 RX ORDER — SODIUM CHLORIDE, SODIUM LACTATE, POTASSIUM CHLORIDE, CALCIUM CHLORIDE 600; 310; 30; 20 MG/100ML; MG/100ML; MG/100ML; MG/100ML
1000 INJECTION, SOLUTION INTRAVENOUS CONTINUOUS
Status: DISCONTINUED | OUTPATIENT
Start: 2021-11-02 | End: 2021-11-02

## 2021-11-02 RX ORDER — SENNA PLUS 8.6 MG/1
1 TABLET ORAL DAILY PRN
Status: DISCONTINUED | OUTPATIENT
Start: 2021-11-02 | End: 2021-11-03 | Stop reason: HOSPADM

## 2021-11-02 RX ORDER — GLYCOPYRROLATE 1 MG/5 ML
SYRINGE (ML) INTRAVENOUS PRN
Status: DISCONTINUED | OUTPATIENT
Start: 2021-11-02 | End: 2021-11-02 | Stop reason: SDUPTHER

## 2021-11-02 RX ORDER — ACETAMINOPHEN 325 MG/1
650 TABLET ORAL EVERY 6 HOURS
Status: DISCONTINUED | OUTPATIENT
Start: 2021-11-02 | End: 2021-11-03 | Stop reason: HOSPADM

## 2021-11-02 RX ORDER — ONDANSETRON 2 MG/ML
4 INJECTION INTRAMUSCULAR; INTRAVENOUS EVERY 6 HOURS PRN
Status: DISCONTINUED | OUTPATIENT
Start: 2021-11-02 | End: 2021-11-03 | Stop reason: HOSPADM

## 2021-11-02 RX ORDER — MORPHINE SULFATE 4 MG/ML
4 INJECTION, SOLUTION INTRAMUSCULAR; INTRAVENOUS
Status: DISCONTINUED | OUTPATIENT
Start: 2021-11-02 | End: 2021-11-03

## 2021-11-02 RX ORDER — ROCURONIUM BROMIDE 10 MG/ML
INJECTION, SOLUTION INTRAVENOUS PRN
Status: DISCONTINUED | OUTPATIENT
Start: 2021-11-02 | End: 2021-11-02 | Stop reason: SDUPTHER

## 2021-11-02 RX ADMIN — CEFAZOLIN SODIUM 2000 MG: 2 SOLUTION INTRAVENOUS at 08:17

## 2021-11-02 RX ADMIN — ESMOLOL HYDROCHLORIDE 10 MG: 10 INJECTION, SOLUTION INTRAVENOUS at 14:24

## 2021-11-02 RX ADMIN — FENTANYL CITRATE 100 MCG: 50 INJECTION, SOLUTION INTRAMUSCULAR; INTRAVENOUS at 08:08

## 2021-11-02 RX ADMIN — PROPOFOL 100 MCG/KG/MIN: 10 INJECTION, EMULSION INTRAVENOUS at 09:53

## 2021-11-02 RX ADMIN — SODIUM CHLORIDE, POTASSIUM CHLORIDE, SODIUM LACTATE AND CALCIUM CHLORIDE: 600; 310; 30; 20 INJECTION, SOLUTION INTRAVENOUS at 08:11

## 2021-11-02 RX ADMIN — ESCITALOPRAM OXALATE 30 MG: 10 TABLET ORAL at 17:38

## 2021-11-02 RX ADMIN — HYDROMORPHONE HYDROCHLORIDE 0.5 MG: 1 INJECTION, SOLUTION INTRAMUSCULAR; INTRAVENOUS; SUBCUTANEOUS at 20:16

## 2021-11-02 RX ADMIN — LAMOTRIGINE 200 MG: 100 TABLET ORAL at 22:17

## 2021-11-02 RX ADMIN — ACETAMINOPHEN 650 MG: 325 TABLET ORAL at 21:48

## 2021-11-02 RX ADMIN — DEXAMETHASONE SODIUM PHOSPHATE 10 MG: 10 INJECTION INTRAMUSCULAR; INTRAVENOUS at 08:24

## 2021-11-02 RX ADMIN — HYDROMORPHONE HYDROCHLORIDE 0.5 MG: 1 INJECTION, SOLUTION INTRAMUSCULAR; INTRAVENOUS; SUBCUTANEOUS at 19:17

## 2021-11-02 RX ADMIN — SODIUM CHLORIDE, PRESERVATIVE FREE 10 ML: 5 INJECTION INTRAVENOUS at 21:57

## 2021-11-02 RX ADMIN — OXYCODONE 10 MG: 5 TABLET ORAL at 17:07

## 2021-11-02 RX ADMIN — Medication 0.2 MG: at 08:53

## 2021-11-02 RX ADMIN — PROPOFOL 100 MCG/KG/MIN: 10 INJECTION, EMULSION INTRAVENOUS at 13:19

## 2021-11-02 RX ADMIN — PROPOFOL 100 MG: 10 INJECTION, EMULSION INTRAVENOUS at 08:14

## 2021-11-02 RX ADMIN — CEFAZOLIN SODIUM 2000 MG: 2 SOLUTION INTRAVENOUS at 12:09

## 2021-11-02 RX ADMIN — OXYCODONE 5 MG: 5 TABLET ORAL at 22:20

## 2021-11-02 RX ADMIN — PROPOFOL 200 MG: 10 INJECTION, EMULSION INTRAVENOUS at 08:08

## 2021-11-02 RX ADMIN — HYDROMORPHONE HYDROCHLORIDE 0.5 MG: 1 INJECTION, SOLUTION INTRAMUSCULAR; INTRAVENOUS; SUBCUTANEOUS at 19:38

## 2021-11-02 RX ADMIN — MIDAZOLAM HYDROCHLORIDE 2 MG: 1 INJECTION, SOLUTION INTRAMUSCULAR; INTRAVENOUS at 08:05

## 2021-11-02 RX ADMIN — ONDANSETRON 4 MG: 2 INJECTION, SOLUTION INTRAMUSCULAR; INTRAVENOUS at 13:46

## 2021-11-02 RX ADMIN — PROMETHAZINE HYDROCHLORIDE 6.25 MG: 25 INJECTION INTRAMUSCULAR; INTRAVENOUS at 16:45

## 2021-11-02 RX ADMIN — PROPOFOL 100 MCG/KG/MIN: 10 INJECTION, EMULSION INTRAVENOUS at 08:19

## 2021-11-02 RX ADMIN — PROPOFOL 75 MCG/KG/MIN: 10 INJECTION, EMULSION INTRAVENOUS at 11:26

## 2021-11-02 RX ADMIN — DEXTROSE MONOHYDRATE 2000 MG: 50 INJECTION, SOLUTION INTRAVENOUS at 21:48

## 2021-11-02 RX ADMIN — PROPOFOL 100 MG: 10 INJECTION, EMULSION INTRAVENOUS at 08:10

## 2021-11-02 RX ADMIN — PHENYLEPHRINE HYDROCHLORIDE 20 MCG/MIN: 10 INJECTION INTRAVENOUS at 08:47

## 2021-11-02 RX ADMIN — SUFENTANIL CITRATE 0.5 MCG/KG/HR: 50 INJECTION EPIDURAL; INTRAVENOUS at 08:17

## 2021-11-02 RX ADMIN — TRAZODONE HYDROCHLORIDE 150 MG: 100 TABLET ORAL at 21:48

## 2021-11-02 RX ADMIN — MORPHINE SULFATE 4 MG: 4 INJECTION INTRAVENOUS at 23:06

## 2021-11-02 RX ADMIN — Medication 0.2 MG: at 10:47

## 2021-11-02 RX ADMIN — SODIUM CHLORIDE: 9 INJECTION, SOLUTION INTRAVENOUS at 21:20

## 2021-11-02 RX ADMIN — PROPOFOL 70 MG: 10 INJECTION, EMULSION INTRAVENOUS at 11:12

## 2021-11-02 RX ADMIN — MORPHINE SULFATE 4 MG: 4 INJECTION INTRAVENOUS at 21:18

## 2021-11-02 RX ADMIN — MAGNESIUM SULFATE 2000 MG: 2 INJECTION INTRAVENOUS at 11:32

## 2021-11-02 RX ADMIN — LIDOCAINE HYDROCHLORIDE 50 MG: 10 INJECTION, SOLUTION EPIDURAL; INFILTRATION; INTRACAUDAL; PERINEURAL at 08:08

## 2021-11-02 RX ADMIN — METHOCARBAMOL TABLETS 750 MG: 750 TABLET, COATED ORAL at 17:30

## 2021-11-02 RX ADMIN — SODIUM CHLORIDE, POTASSIUM CHLORIDE, SODIUM LACTATE AND CALCIUM CHLORIDE: 600; 310; 30; 20 INJECTION, SOLUTION INTRAVENOUS at 09:28

## 2021-11-02 RX ADMIN — Medication 0.2 MG: at 09:44

## 2021-11-02 RX ADMIN — SODIUM CHLORIDE, POTASSIUM CHLORIDE, SODIUM LACTATE AND CALCIUM CHLORIDE 1000 ML: 600; 310; 30; 20 INJECTION, SOLUTION INTRAVENOUS at 07:32

## 2021-11-02 RX ADMIN — ROPINIROLE HYDROCHLORIDE 1 MG: 1 TABLET, FILM COATED ORAL at 21:48

## 2021-11-02 RX ADMIN — ROCURONIUM BROMIDE 50 MG: 10 INJECTION INTRAVENOUS at 08:09

## 2021-11-02 RX ADMIN — HYDROMORPHONE HYDROCHLORIDE 0.5 MG: 1 INJECTION, SOLUTION INTRAMUSCULAR; INTRAVENOUS; SUBCUTANEOUS at 16:18

## 2021-11-02 RX ADMIN — HYDROMORPHONE HYDROCHLORIDE 0.5 MG: 1 INJECTION, SOLUTION INTRAMUSCULAR; INTRAVENOUS; SUBCUTANEOUS at 16:44

## 2021-11-02 ASSESSMENT — PULMONARY FUNCTION TESTS
PIF_VALUE: 19
PIF_VALUE: 21
PIF_VALUE: 19
PIF_VALUE: 13
PIF_VALUE: 22
PIF_VALUE: 21
PIF_VALUE: 19
PIF_VALUE: 20
PIF_VALUE: 19
PIF_VALUE: 21
PIF_VALUE: 20
PIF_VALUE: 22
PIF_VALUE: 20
PIF_VALUE: 19
PIF_VALUE: 21
PIF_VALUE: 18
PIF_VALUE: 20
PIF_VALUE: 22
PIF_VALUE: 21
PIF_VALUE: 19
PIF_VALUE: 1
PIF_VALUE: 19
PIF_VALUE: 21
PIF_VALUE: 19
PIF_VALUE: 2
PIF_VALUE: 21
PIF_VALUE: 21
PIF_VALUE: 2
PIF_VALUE: 21
PIF_VALUE: 20
PIF_VALUE: 1
PIF_VALUE: 19
PIF_VALUE: 19
PIF_VALUE: 20
PIF_VALUE: 19
PIF_VALUE: 20
PIF_VALUE: 21
PIF_VALUE: 20
PIF_VALUE: 20
PIF_VALUE: 19
PIF_VALUE: 20
PIF_VALUE: 20
PIF_VALUE: 21
PIF_VALUE: 22
PIF_VALUE: 22
PIF_VALUE: 21
PIF_VALUE: 19
PIF_VALUE: 19
PIF_VALUE: 20
PIF_VALUE: 19
PIF_VALUE: 21
PIF_VALUE: 21
PIF_VALUE: 22
PIF_VALUE: 19
PIF_VALUE: 21
PIF_VALUE: 19
PIF_VALUE: 18
PIF_VALUE: 21
PIF_VALUE: 20
PIF_VALUE: 19
PIF_VALUE: 20
PIF_VALUE: 19
PIF_VALUE: 20
PIF_VALUE: 19
PIF_VALUE: 19
PIF_VALUE: 23
PIF_VALUE: 21
PIF_VALUE: 19
PIF_VALUE: 19
PIF_VALUE: 21
PIF_VALUE: 19
PIF_VALUE: 19
PIF_VALUE: 21
PIF_VALUE: 20
PIF_VALUE: 20
PIF_VALUE: 19
PIF_VALUE: 18
PIF_VALUE: 24
PIF_VALUE: 18
PIF_VALUE: 21
PIF_VALUE: 22
PIF_VALUE: 19
PIF_VALUE: 21
PIF_VALUE: 3
PIF_VALUE: 24
PIF_VALUE: 19
PIF_VALUE: 21
PIF_VALUE: 19
PIF_VALUE: 21
PIF_VALUE: 21
PIF_VALUE: 18
PIF_VALUE: 20
PIF_VALUE: 21
PIF_VALUE: 19
PIF_VALUE: 14
PIF_VALUE: 4
PIF_VALUE: 20
PIF_VALUE: 19
PIF_VALUE: 21
PIF_VALUE: 28
PIF_VALUE: 19
PIF_VALUE: 1
PIF_VALUE: 0
PIF_VALUE: 22
PIF_VALUE: 22
PIF_VALUE: 18
PIF_VALUE: 21
PIF_VALUE: 22
PIF_VALUE: 19
PIF_VALUE: 21
PIF_VALUE: 5
PIF_VALUE: 19
PIF_VALUE: 23
PIF_VALUE: 20
PIF_VALUE: 19
PIF_VALUE: 22
PIF_VALUE: 22
PIF_VALUE: 19
PIF_VALUE: 20
PIF_VALUE: 19
PIF_VALUE: 9
PIF_VALUE: 19
PIF_VALUE: 21
PIF_VALUE: 18
PIF_VALUE: 19
PIF_VALUE: 18
PIF_VALUE: 19
PIF_VALUE: 20
PIF_VALUE: 19
PIF_VALUE: 19
PIF_VALUE: 22
PIF_VALUE: 18
PIF_VALUE: 22
PIF_VALUE: 19
PIF_VALUE: 21
PIF_VALUE: 19
PIF_VALUE: 19
PIF_VALUE: 21
PIF_VALUE: 21
PIF_VALUE: 16
PIF_VALUE: 21
PIF_VALUE: 20
PIF_VALUE: 19
PIF_VALUE: 20
PIF_VALUE: 7
PIF_VALUE: 18
PIF_VALUE: 19
PIF_VALUE: 18
PIF_VALUE: 19
PIF_VALUE: 21
PIF_VALUE: 20
PIF_VALUE: 19
PIF_VALUE: 19
PIF_VALUE: 5
PIF_VALUE: 21
PIF_VALUE: 21
PIF_VALUE: 22
PIF_VALUE: 18
PIF_VALUE: 21
PIF_VALUE: 19
PIF_VALUE: 21
PIF_VALUE: 20
PIF_VALUE: 19
PIF_VALUE: 22
PIF_VALUE: 22
PIF_VALUE: 42
PIF_VALUE: 19
PIF_VALUE: 22
PIF_VALUE: 21
PIF_VALUE: 19
PIF_VALUE: 7
PIF_VALUE: 0
PIF_VALUE: 19
PIF_VALUE: 19
PIF_VALUE: 20
PIF_VALUE: 20
PIF_VALUE: 21
PIF_VALUE: 18
PIF_VALUE: 20
PIF_VALUE: 22
PIF_VALUE: 20
PIF_VALUE: 19
PIF_VALUE: 22
PIF_VALUE: 19
PIF_VALUE: 19
PIF_VALUE: 2
PIF_VALUE: 21
PIF_VALUE: 19
PIF_VALUE: 20
PIF_VALUE: 18
PIF_VALUE: 19
PIF_VALUE: 20
PIF_VALUE: 19
PIF_VALUE: 21
PIF_VALUE: 21
PIF_VALUE: 20
PIF_VALUE: 22
PIF_VALUE: 19
PIF_VALUE: 20
PIF_VALUE: 19
PIF_VALUE: 21
PIF_VALUE: 19
PIF_VALUE: 20
PIF_VALUE: 18
PIF_VALUE: 19
PIF_VALUE: 19
PIF_VALUE: 20
PIF_VALUE: 19
PIF_VALUE: 21
PIF_VALUE: 19
PIF_VALUE: 21
PIF_VALUE: 19
PIF_VALUE: 21
PIF_VALUE: 21
PIF_VALUE: 22
PIF_VALUE: 19
PIF_VALUE: 18
PIF_VALUE: 2
PIF_VALUE: 18
PIF_VALUE: 19
PIF_VALUE: 18
PIF_VALUE: 2
PIF_VALUE: 22
PIF_VALUE: 20
PIF_VALUE: 21
PIF_VALUE: 18
PIF_VALUE: 19
PIF_VALUE: 20
PIF_VALUE: 19
PIF_VALUE: 19
PIF_VALUE: 20
PIF_VALUE: 20
PIF_VALUE: 19
PIF_VALUE: 2
PIF_VALUE: 21
PIF_VALUE: 22
PIF_VALUE: 19
PIF_VALUE: 21
PIF_VALUE: 19
PIF_VALUE: 19
PIF_VALUE: 22
PIF_VALUE: 21
PIF_VALUE: 20
PIF_VALUE: 5
PIF_VALUE: 19
PIF_VALUE: 21
PIF_VALUE: 20
PIF_VALUE: 19
PIF_VALUE: 21
PIF_VALUE: 20
PIF_VALUE: 18
PIF_VALUE: 19
PIF_VALUE: 19
PIF_VALUE: 21
PIF_VALUE: 21
PIF_VALUE: 20
PIF_VALUE: 22
PIF_VALUE: 2
PIF_VALUE: 19
PIF_VALUE: 19
PIF_VALUE: 21
PIF_VALUE: 2
PIF_VALUE: 20
PIF_VALUE: 20
PIF_VALUE: 21
PIF_VALUE: 3
PIF_VALUE: 21
PIF_VALUE: 20
PIF_VALUE: 19
PIF_VALUE: 28
PIF_VALUE: 18
PIF_VALUE: 21
PIF_VALUE: 18
PIF_VALUE: 20
PIF_VALUE: 19
PIF_VALUE: 20
PIF_VALUE: 22
PIF_VALUE: 16
PIF_VALUE: 20
PIF_VALUE: 21
PIF_VALUE: 18
PIF_VALUE: 20
PIF_VALUE: 18
PIF_VALUE: 22
PIF_VALUE: 21
PIF_VALUE: 19
PIF_VALUE: 22
PIF_VALUE: 20
PIF_VALUE: 22
PIF_VALUE: 5
PIF_VALUE: 18
PIF_VALUE: 19
PIF_VALUE: 22
PIF_VALUE: 19
PIF_VALUE: 20
PIF_VALUE: 19
PIF_VALUE: 18
PIF_VALUE: 19
PIF_VALUE: 21
PIF_VALUE: 20
PIF_VALUE: 20
PIF_VALUE: 21
PIF_VALUE: 20
PIF_VALUE: 21
PIF_VALUE: 22
PIF_VALUE: 4
PIF_VALUE: 22
PIF_VALUE: 20
PIF_VALUE: 19
PIF_VALUE: 22
PIF_VALUE: 19
PIF_VALUE: 19
PIF_VALUE: 21
PIF_VALUE: 19
PIF_VALUE: 19
PIF_VALUE: 22
PIF_VALUE: 19
PIF_VALUE: 19
PIF_VALUE: 21
PIF_VALUE: 19
PIF_VALUE: 21
PIF_VALUE: 19
PIF_VALUE: 26
PIF_VALUE: 19
PIF_VALUE: 20
PIF_VALUE: 20
PIF_VALUE: 19
PIF_VALUE: 20
PIF_VALUE: 20
PIF_VALUE: 0
PIF_VALUE: 19
PIF_VALUE: 19
PIF_VALUE: 20
PIF_VALUE: 20
PIF_VALUE: 7
PIF_VALUE: 21
PIF_VALUE: 20
PIF_VALUE: 20
PIF_VALUE: 28
PIF_VALUE: 19
PIF_VALUE: 19
PIF_VALUE: 20
PIF_VALUE: 22
PIF_VALUE: 22
PIF_VALUE: 21
PIF_VALUE: 19

## 2021-11-02 ASSESSMENT — PAIN - FUNCTIONAL ASSESSMENT: PAIN_FUNCTIONAL_ASSESSMENT: 0-10

## 2021-11-02 ASSESSMENT — LIFESTYLE VARIABLES: SMOKING_STATUS: 1

## 2021-11-02 ASSESSMENT — PAIN SCALES - GENERAL
PAINLEVEL_OUTOF10: 4
PAINLEVEL_OUTOF10: 7
PAINLEVEL_OUTOF10: 5
PAINLEVEL_OUTOF10: 7
PAINLEVEL_OUTOF10: 7
PAINLEVEL_OUTOF10: 5
PAINLEVEL_OUTOF10: 7
PAINLEVEL_OUTOF10: 8

## 2021-11-02 ASSESSMENT — ENCOUNTER SYMPTOMS: SHORTNESS OF BREATH: 0

## 2021-11-02 ASSESSMENT — PAIN DESCRIPTION - DESCRIPTORS: DESCRIPTORS: ACHING;CONSTANT

## 2021-11-02 NOTE — PROGRESS NOTES
Neurosurgery Post op Progress Note      POD# 0    s/p OLIF L3-4, posterior fixation, facetectomy/foraminotoy L3-4     Patient presents with left L3 dermatomal pain that has become debilitating. He has tried 6 weeks of therapy, NSAIDS, steroids  And left L3 foraminotomy.       SUBJECTIVE:  Denies paresthesias at this time in PACU    OBJECTIVE      Physical exam   VITALS:    Vitals:    11/02/21 1515   BP: 117/79   Pulse: 73   Resp: 10   Temp:    SpO2: 99%     INTAKE:      Intake/Output Summary (Last 24 hours) at 11/2/2021 1537  Last data filed at 11/2/2021 1351  Gross per 24 hour   Intake 1850 ml   Output 1725 ml   Net 125 ml            Neurological exam   alert, oriented x3, affect appropriate, no focal neurological deficits, moves all extremities well and no involuntary movements      Wound   Post op wound:  Clean dry intact both sites covered with post op dressing     ASSESSMENT AND PLAN    27 y.o. male status post OLIF L3-4, posterior fixation, facetectomy/foraminotoy L3-4  post op day # 0    - Analgesia: Morphine 2-4mg q2hrs PRN and Roxicodone 5-10mg every 4 hours PRN pain  - Periop Antibiotics: Ancef 2g q8hrs for 3 doses   - Activity: As tolerated  - DVT prophylaxis: SCDs-lovenox to be started 11/3  - Diet:  Advance as tolerates  - encourage I.S  - measure post void residual and record  - PT and OT for mobilization  - keep off surgical site to prevent wound breakdown  - bowel regimen    Electronically signed by LEYDI Wilcox - NP on 11/2/2021 at 3:37 PM

## 2021-11-02 NOTE — OP NOTE
Operative Note      Patient: Vidhi Espitia  YOB: 1990  MRN: [id number]    Date of Procedure: 11/2/2021    Pre-Op Diagnosis: Left L3 radiculopathy and lumbar spondylolisthesis with disc degeneration    Post-Op Diagnosis: Same     Indications for procedure. Patient with recurrent left-sided L3 radiculopathy that was intractable and refractory to foraminal injection along with greater than 2 months of physical therapy along with multimodal pain regimen. Patient symptoms were debilitating with positive discogram for axial pain at L3-4 along with anterolisthesis that was unstable on x-rays along with disc herniation evident etiology for left-sided L3 radiculopathy. Procedure  Part 1    Oblique anterior lateral approach to L3-4 with discectomy anterior arthrodesis  Implantation of peek Medtronic interbody cage  Use of fluoroscopy  Use of neural monitoring  Use of morcellized allograft  Anterior fixation with plate and screws    Part 2    Segmental fixation with pedicle screws and rods at L3-4  Percutaneous left-sided L3 complete facetectomy and foraminotomy  Use of spinal neuro navigation  Use of fluoroscopy  Use of operative microscope  22 modifier  Epidural neurolysis of L3. Surgeon(s):  [facility], DO  [facility], DO    Assistant:   First Assistant: Zulay Fernandez RN; Troy Yeung RN    Anesthesia: General    Estimated Blood Loss (mL): 012     Complications: None    Specimens:   * No specimens in log *    Implants:  Implant Name Type Inv.  Item Serial No.  Lot No. LRB No. Used Action   KIT BNE GRFT XSM 1.4CC RHBMP-2 ABSRB CLLGN SPNG INFUSE - [id number]  KIT BNE GRFT XSM 1.4CC RHBMP-2 ABSRB CLLGN SPNG INFUSE [id number] [employer] [facility]- [id number] N/A 1 Implanted   GRAFT BNE SUB 5CC DEMIN BNE MTRX PTTY GRFTON - [id number]  GRAFT BNE SUB 5CC DEMIN BNE MTRX PTTY GRFTON [id number] [employer] SPINALGRAFT TECH-  N/A 1 Implanted   SPACER SPNL [id number] 6DEG LORD OBLQ LAT LUM INTBDY FUS  SPACER SPNL [id number] 6DEG LORD OBLQ LAT LUM INTBDY FUS  [employer]WD [id number] N/A 1 Implanted   PLATE SPNL L 2 H FOR OBLQ LAT LUM INTBDY FUS OLIF25 PIVOX  PLATE SPNL L 2 H FOR OBLQ LAT LUM INTBDY FUS OLIF25 PIVOX  [employer] SOFAMOR DANEK-WD [id number] N/A 1 Implanted   SCREW SPNL L35MM DIA5. 5MM FOR OBLQ LAT LUM INTBDY FUS PIVOX  SCREW SPNL L35MM DIA5. 5MM FOR OBLQ LAT LUM INTBDY FUS PIVOX  [employer]wil[employer]EK[employer]WD [id number] N/A 1 Implanted   SCREW SPNL L35MM DIA5. 5MM FOR OBLQ LAT LUM INTBDY FUS PIVOX  SCREW SPNL L35MM DIA5. 5MM FOR OBLQ LAT LUM INTBDY FUS PIVOX  [employer] SOFAMOR DANEK-WD [id number] N/A 1 Implanted   SCREW SPNL L50MM [id number] POST THORACOLUMBOSACRAL MULTIAXIAL  SCREW SPNL L50MM [id number] POST THORACOLUMBOSACRAL MULTIAXIAL  [employer] [place] [employer]-WD  N/A 3 Implanted   SCREW SPNL L35MM OD85MM POST THORACOLUMBOSACRAL MIREYA  SCREW SPNL L35MM OD85MM POST THORACOLUMBOSACRAL MIREYA  [employer] [place] [employer]-WD  N/A 1 Implanted   JEANNIE SPNL L45MM [id number] THORACOLUMBOSACRAL CHROMALOY PERC  JEANNIE SPNL L45MM [id number] THORACOLUMBOSACRAL CHROMALOY PERC  [employer] [place] [employer]-WD  N/A 2 Implanted   SET SCR SPNL DIA4. 75MM POST THORLUM SACR TI PERC APPRCH [address]  SET SCR SPNL DIA4. 75MM POST THORLUM SACR TI PERC APPRCH [address]  [employer] SOFAMOR DANEK-WD  N/A 4 Implanted         Drains:   [REMOVED] Urethral Catheter Non-latex 16 fr (Removed)       Findings: Significant scarring over the left L3 nerve root and foramen    Detailed Description of Procedure: The patient was consented preoperatively. He was brought to the operative room and placed under general esthesia. He was placed right lateral decubitus with all pressure points padded axillary roll placed in both arms secured to the right arm on the armboard. Legs were bent and secured with tape. Left lateral region was sterile prepped and draped.   Fluoroscopy was used to oscar out the L3-4 disc space and the vertical line proximally 2 fingerbreadths anterior to the anterior margin of the L3-4 disc was marked out at a length of approximately 1.5 inches. Incision was infiltrated onto lidocaine with epinephrine. 10 blade is used to perform the incision followed by Bovie to take down the subcutaneous tissue followed by advancement of a self-retaining retractor. The provisional fascia was incised with a Metzenbaums. Blunt dissection between the muscle fibers of the obliques was performed sequentially all the way to the posterior rectus sheath which was incised sharply with Metzenbaums. Peritoneum was identified and swept using blunt dissection using finger dissection along with a Kitner. Psoas muscle was identified along the posterior margin. The retractor was tracked along the body of the psoas until the anterior portion was identified. The assistant used a retractor to gently retract the medial portion of the psoas. I then used a another retractor retract the peritoneum anteriorly along with blunt dissection using a Kitner. Retroperitoneal fat was identified in the small quarter between the great vessels and the anterior ports the psoas was identified. Presumed L3-4 disc space was confirmed under lateral fluoroscopy with an instrument. Retractor was then placed along with a anterior blade. This was affixed to the back retractor frame. 15 blade was used then used to perform an annulotomy followed by combination of Caldwell and large curettes to perform the bulk of the discectomy and arthrodesis the endplates. Entire discectomy was performed removing disc both anteriorly as well as posteriorly with special attention to the posterior margin foramina. 40 mm height trial was placed and noted to be appropriate. Kiki Friendship was used to ensure that the contralateral annulus was broken. The cage was then filled with BMP as well and cellular graft. This was implanted under direct fluoroscopic guidance at the middle third of the L3-4 body. microscope was brought into the field. Suction Bovie were used to remove the muscle pocket and to find the inferior portion of the L3 lamina. High-speed bur was used to drill the superior portion of the L4 lamina. Upgoing curette was used to penetrate the flavum and scar was identified. I then used a 4 3 punch to resect the remaining portion of the L3 lamina as well as the medial facet. Identified the L3 pedicle and followed this laterally. There was a significant scarring and fibrosis over the nerve root requiring additional 30 minutes of dissection using an upgoing curette. After frenotomy was completed hemostasis was obtained with FloSeal and Gelfoam.  Wound was thoroughly irrigated and packed with a Ray-Yves. Midline incision was placed over L5 followed by cautery and subperiosteal dissection. Spinous process clamp was placed in the norm spine was completed. Using spinal neuro navigation I placed pedicle screws bilaterally at L3 and L4. This was done using a turkey foot to identify the skin incision site followed by 10 blade followed by Bovie taken down to the subfascial layer followed by finger dissection followed by navigated Midas. After starting point surgical with the Midas navigated tap was used to drill screw trajectories. Screws were placed bilaterally at L3 and L4 at appropriate length with based on navigation. I did attempt to slightly reduce the L3-4 minimal listhesis using varying of the L3 screws and slight superficial placement of the L4 heads. Rods were appropriate measured placed and secured with caps. Second O-arm spin showed all hardware to be in good position as well as the cage. All caps were then final tightened and the tabs were broken off. Wounds were thoroughly irrigated with Betadine and multiple rounds of saline. Fascia was closed with multiple 0 Vicryl's followed by inverted 2-0 Vicryl for subcutaneous tissue followed by staples for skin.   Bacitracin nylons were placed over the wounds.   Patient was then flipped back supine onto the regular bed extubated in stable condition returned to the PACU    Electronically signed by Eugenio Donis DO on 11/2/2021 at 4:48 PM

## 2021-11-03 VITALS
TEMPERATURE: 98.1 F | OXYGEN SATURATION: 93 % | BODY MASS INDEX: 31.78 KG/M2 | HEIGHT: 70 IN | RESPIRATION RATE: 18 BRPM | HEART RATE: 78 BPM | SYSTOLIC BLOOD PRESSURE: 144 MMHG | WEIGHT: 222 LBS | DIASTOLIC BLOOD PRESSURE: 8 MMHG

## 2021-11-03 LAB
HCT VFR BLD CALC: 41 % (ref 40.7–50.3)
HEMOGLOBIN: 13.5 G/DL (ref 13–17)
MCH RBC QN AUTO: 30.3 PG (ref 25.2–33.5)
MCHC RBC AUTO-ENTMCNC: 32.9 G/DL (ref 28.4–34.8)
MCV RBC AUTO: 92.1 FL (ref 82.6–102.9)
NRBC AUTOMATED: 0 PER 100 WBC
PDW BLD-RTO: 12.7 % (ref 11.8–14.4)
PLATELET # BLD: 222 K/UL (ref 138–453)
PMV BLD AUTO: 10.4 FL (ref 8.1–13.5)
RBC # BLD: 4.45 M/UL (ref 4.21–5.77)
WBC # BLD: 14.3 K/UL (ref 3.5–11.3)

## 2021-11-03 PROCEDURE — APPSS15 APP SPLIT SHARED TIME 0-15 MINUTES: Performed by: NURSE PRACTITIONER

## 2021-11-03 PROCEDURE — 85027 COMPLETE CBC AUTOMATED: CPT

## 2021-11-03 PROCEDURE — 36415 COLL VENOUS BLD VENIPUNCTURE: CPT

## 2021-11-03 PROCEDURE — 97165 OT EVAL LOW COMPLEX 30 MIN: CPT

## 2021-11-03 PROCEDURE — 6370000000 HC RX 637 (ALT 250 FOR IP): Performed by: NURSE PRACTITIONER

## 2021-11-03 PROCEDURE — APPSS30 APP SPLIT SHARED TIME 16-30 MINUTES: Performed by: NURSE PRACTITIONER

## 2021-11-03 PROCEDURE — 6360000002 HC RX W HCPCS: Performed by: NURSE PRACTITIONER

## 2021-11-03 PROCEDURE — 94640 AIRWAY INHALATION TREATMENT: CPT

## 2021-11-03 PROCEDURE — 2580000003 HC RX 258: Performed by: NURSE PRACTITIONER

## 2021-11-03 PROCEDURE — 97535 SELF CARE MNGMENT TRAINING: CPT

## 2021-11-03 PROCEDURE — 51798 US URINE CAPACITY MEASURE: CPT

## 2021-11-03 RX ORDER — CYCLOBENZAPRINE HCL 5 MG
5 TABLET ORAL 3 TIMES DAILY PRN
Qty: 30 TABLET | Refills: 0 | Status: SHIPPED | OUTPATIENT
Start: 2021-11-03 | End: 2021-11-13

## 2021-11-03 RX ORDER — ACETAMINOPHEN 650 MG
TABLET, EXTENDED RELEASE ORAL PRN
Status: DISCONTINUED | OUTPATIENT
Start: 2021-11-02 | End: 2021-11-03 | Stop reason: ALTCHOICE

## 2021-11-03 RX ORDER — OXYCODONE HYDROCHLORIDE AND ACETAMINOPHEN 5; 325 MG/1; MG/1
TABLET ORAL
Qty: 56 TABLET | Refills: 0 | Status: SHIPPED | OUTPATIENT
Start: 2021-11-03 | End: 2021-11-10 | Stop reason: SDUPTHER

## 2021-11-03 RX ADMIN — ACETAMINOPHEN 650 MG: 325 TABLET ORAL at 02:06

## 2021-11-03 RX ADMIN — MORPHINE SULFATE 4 MG: 4 INJECTION INTRAVENOUS at 11:32

## 2021-11-03 RX ADMIN — METHOCARBAMOL TABLETS 750 MG: 750 TABLET, COATED ORAL at 09:17

## 2021-11-03 RX ADMIN — FLUTICASONE PROPIONATE 2 SPRAY: 50 SPRAY, METERED NASAL at 09:22

## 2021-11-03 RX ADMIN — BUDESONIDE AND FORMOTEROL FUMARATE DIHYDRATE 2 PUFF: 80; 4.5 AEROSOL RESPIRATORY (INHALATION) at 08:49

## 2021-11-03 RX ADMIN — ENOXAPARIN SODIUM 40 MG: 40 INJECTION SUBCUTANEOUS at 09:19

## 2021-11-03 RX ADMIN — OXYCODONE 5 MG: 5 TABLET ORAL at 13:32

## 2021-11-03 RX ADMIN — METHOCARBAMOL TABLETS 750 MG: 750 TABLET, COATED ORAL at 02:06

## 2021-11-03 RX ADMIN — PANTOPRAZOLE SODIUM 40 MG: 40 TABLET, DELAYED RELEASE ORAL at 09:17

## 2021-11-03 RX ADMIN — MORPHINE SULFATE 2 MG: 2 INJECTION, SOLUTION INTRAMUSCULAR; INTRAVENOUS at 04:49

## 2021-11-03 RX ADMIN — ESCITALOPRAM OXALATE 30 MG: 10 TABLET ORAL at 09:17

## 2021-11-03 RX ADMIN — POLYETHYLENE GLYCOL 3350 17 G: 17 POWDER, FOR SOLUTION ORAL at 09:28

## 2021-11-03 RX ADMIN — LORATADINE 10 MG: 10 TABLET ORAL at 11:38

## 2021-11-03 RX ADMIN — OXYCODONE 10 MG: 5 TABLET ORAL at 09:28

## 2021-11-03 RX ADMIN — ACETAMINOPHEN 650 MG: 325 TABLET ORAL at 09:28

## 2021-11-03 RX ADMIN — MORPHINE SULFATE 4 MG: 4 INJECTION INTRAVENOUS at 09:11

## 2021-11-03 RX ADMIN — LAMOTRIGINE 200 MG: 100 TABLET ORAL at 09:20

## 2021-11-03 RX ADMIN — DEXTROSE MONOHYDRATE 2000 MG: 50 INJECTION, SOLUTION INTRAVENOUS at 04:46

## 2021-11-03 RX ADMIN — MORPHINE SULFATE 2 MG: 2 INJECTION, SOLUTION INTRAMUSCULAR; INTRAVENOUS at 12:50

## 2021-11-03 RX ADMIN — OXYCODONE 5 MG: 5 TABLET ORAL at 02:06

## 2021-11-03 ASSESSMENT — PAIN DESCRIPTION - DESCRIPTORS
DESCRIPTORS: ACHING
DESCRIPTORS: ACHING
DESCRIPTORS: ACHING;CONSTANT

## 2021-11-03 ASSESSMENT — PAIN DESCRIPTION - LOCATION
LOCATION: BACK;HIP

## 2021-11-03 ASSESSMENT — PAIN SCALES - GENERAL
PAINLEVEL_OUTOF10: 6
PAINLEVEL_OUTOF10: 4
PAINLEVEL_OUTOF10: 3
PAINLEVEL_OUTOF10: 7
PAINLEVEL_OUTOF10: 5
PAINLEVEL_OUTOF10: 6
PAINLEVEL_OUTOF10: 7
PAINLEVEL_OUTOF10: 7
PAINLEVEL_OUTOF10: 2
PAINLEVEL_OUTOF10: 0

## 2021-11-03 ASSESSMENT — PAIN DESCRIPTION - ORIENTATION
ORIENTATION: LOWER;LEFT
ORIENTATION: LEFT;LOWER
ORIENTATION: LOWER;LEFT
ORIENTATION: LOWER;LEFT

## 2021-11-03 ASSESSMENT — PAIN DESCRIPTION - FREQUENCY
FREQUENCY: CONTINUOUS

## 2021-11-03 NOTE — DISCHARGE SUMMARY
Department of Neurosurgery                                            Discharge Summary       PATIENT NAME: Miguel A Alatorre  YOB: 1990  MEDICAL RECORD NO. [id number]  DATE: 11/3/2021  PRIMARY CARE PHYSICIAN: Bertin Huynh MD  DISCHARGE DATE:  11/3/2021  2:03 PM  DISCHARGE DIAGNOSIS:   Patient Active Problem List   Diagnosis Code    Left hip pain M25.552    Depression F32. A    Asthma J45.909    Restless leg G25.81    Gastroesophageal reflux disease K21.9    Vitamin D deficiency E55.9    Other insomnia G47.09    Anxiety F41.9    Recurrent major depressive disorder, in remission ([facility] [address] F33.40    Bipolar affective disorder, current episode depressed (HCC) F31.30    PTSD (post-traumatic stress disorder) F43.10    DANTE (generalized anxiety disorder) F41.1    Allergic rhinitis due to pollen J30.1    Lumbar disc disease M51.9    Borderline personality disorder (HCC) F60.3    Mixed hyperlipidemia E78.2    Lumbar disc disease with radiculopathy M51.16     DISPOSITION: Home    PROCEDURES:    Oblique anterior lateral approach to L3-4 with discectomy anterior arthrodesis  Segmental fixation with pedicle screws and rods at L3-4  Percutaneous left-sided L3 complete facetectomy and foraminotomy  [facility] originally presented to the hospital on 11/2/2021  6:17 AM with Left L3 radiculopathy and lumbar spondylolisthesis with disc degeneration. He was admitted and taken to the OR for neurosurgery for procedure listed above. Labs and imaging were followed daily. At time of discharge, Miguel A Alatorre was tolerating a ADULT DIET; Regular, passing flatus,  ambulating on his own accord and had adequate analgesia on oral pain medications, and had no signs of symptoms of complications. He is medically stable to be discharged. PHYSICAL EXAMINATION        Discharge Vitals:  height is 5' 10\" (1.778 m) and weight is 222 lb (100.7 kg).  His oral temperature is 98.1 °F (36.7 °C). His blood pressure is 144/8 (abnormal) and his pulse is 78. His respiration is 18 and oxygen saturation is 93%. AOx3   CNII-XII intact   PERRL, EOMI      Motor   L deltoid 5/5; R deltoid 5/5  L biceps 5/5; R biceps 5/5  L triceps 5/5; R triceps 5/5  L wrist extension 5/5; R wrist extension 5/5  L intrinsics 5/5; R intrinsics 5/5      L iliopsoas 5/5 , R iliopsoas 5/5  L quadriceps 5/5; R quadriceps 5/5  L Dorsiflexion 5/5; R dorsiflexion 5/5  L Plantarflexion 5/5; R plantarflexion 5/5  L EHL 5/5; R EHL 5/5     Sensation intact  Incisions intact closed with staples no drainage noted from site dressings changed prior to patient being discharged   LABS     Recent Labs     11/03/21  0346   WBC 14.3*   HGB 13.5   HCT 41.0          DISCHARGE INSTRUCTIONS     Discharge Medications:        Medication List      START taking these medications    cyclobenzaprine 5 MG tablet  Commonly known as: FLEXERIL  Take 1 tablet by mouth 3 times daily as needed for Muscle spasms     oxyCODONE-acetaminophen 5-325 MG per tablet  Commonly known as: Percocet  May take 1 tablet by mouth every 6 hours as needed for Pain. May also take 2 tablets every 6 hours as needed for Pain. Do all this for 7 days. Intended supply: 7 days. Take lowest dose possible to manage pain. CONTINUE taking these medications    albuterol sulfate  (90 Base) MCG/ACT inhaler  Commonly known as: Ventolin HFA  Inhale 2 puffs into the lungs every 6 hours as needed for Wheezing     azelastine 0.05 % ophthalmic solution  Commonly known as: OPTIVAR     clonazePAM 1 MG tablet  Commonly known as: KlonoPIN  Take 1 tablet by mouth nightly as needed (insomnia/nightmares) for up to 60 days.      escitalopram 20 MG tablet  Commonly known as: LEXAPRO  Take 1.5 tablets by mouth daily     fluticasone 50 MCG/ACT nasal spray  Commonly known as: FLONASE  2 sprays by Each Nostril route daily Start with one spray daily for one week and increase to two sprays daily as needed     fluticasone-salmeterol 100-50 MCG/DOSE diskus inhaler  Commonly known as: Advair Diskus  Inhale 1 puff into the lungs every 12 hours     Handicap Placard Misc  by Does not apply route Expires March 1st, 2022     lamoTRIgine 200 MG tablet  Commonly known as: LaMICtal  Take 1 tablet by mouth 2 times daily     loratadine 10 MG tablet  Commonly known as: Claritin  Take 1 tablet by mouth daily     montelukast 10 MG tablet  Commonly known as: SINGULAIR  Take 1 tablet by mouth daily     pantoprazole 40 MG tablet  Commonly known as: Protonix  Take 1 tablet by mouth 2 times daily (with meals)     rOPINIRole 1 MG tablet  Commonly known as: Requip  Take 1 tablet by mouth nightly     traZODone 150 MG tablet  Commonly known as: DESYREL  Take 1 tablet by mouth nightly           Where to Get Your Medications      These medications were sent to [address] [facility], [address] [phone] -  [phone]  [address] [place] [address]    Phone: [phone]   cyclobenzaprine 5 MG tablet  oxyCODONE-acetaminophen 5-325 MG per tablet       Diet: ADULT DIET;  Regular diet as tolerated  Activity: Provided in AVS  Wound Care: Daily and as needed  Follow-up:  in the [facility] clinic as shown in AVS   Time Spent for discharge: 30 minutes    LEYDI Lentz NP  11/3/2021, 3:29 PM

## 2021-11-03 NOTE — PLAN OF CARE
Problem: Pain:  Description: Pain management should include both nonpharmacologic and pharmacologic interventions.   Goal: Pain level will decrease  Description: Pain level will decrease  11/3/2021 1256 by Nirali Holloway RN  Outcome: Completed  11/3/2021 0516 by Dwayne Mares RN  Outcome: Ongoing  Goal: Control of acute pain  Description: Control of acute pain  11/3/2021 1256 by Nirali Holloway RN  Outcome: Completed  11/3/2021 0516 by Dwayne Mares RN  Outcome: Ongoing  Goal: Control of chronic pain  Description: Control of chronic pain  11/3/2021 1256 by Nirali Holloway RN  Outcome: Completed  11/3/2021 0516 by Dwayne Mares RN  Outcome: Ongoing     Problem: Infection - Surgical Site:  Goal: Will show no infection signs and symptoms  Description: Will show no infection signs and symptoms  11/3/2021 1256 by Nirali Holloway RN  Outcome: Completed  11/3/2021 0516 by Dwayne Mares RN  Outcome: Ongoing     Problem: Safety:  Goal: Free from accidental physical injury  Description: Free from accidental physical injury  11/3/2021 1256 by Nirali Holloway RN  Outcome: Completed  11/3/2021 0516 by Dwayne Mares RN  Outcome: Ongoing  Goal: Free from intentional harm  Description: Free from intentional harm  11/3/2021 1256 by Nirali Holloway RN  Outcome: Completed  11/3/2021 0516 by Dwayne Mares RN  Outcome: Ongoing     Problem: Daily Care:  Goal: Daily care needs are met  Description: Daily care needs are met  11/3/2021 1256 by Nirali Holloway RN  Outcome: Completed  11/3/2021 0516 by Dwayne Mares RN  Outcome: Ongoing     Problem: Skin Integrity:  Goal: Skin integrity will stabilize  Description: Skin integrity will stabilize  11/3/2021 1256 by Nirali Holloway RN  Outcome: Completed  11/3/2021 0516 by Dwayne Mares RN  Outcome: Ongoing     Problem: Discharge Planning:  Goal: Patients continuum of care needs are met  Description: Patients continuum of care needs are met  11/3/2021 1256 by Lucas Nguyen RN  Outcome: Completed  11/3/2021 0516 by Alba Oviedo RN  Outcome: Ongoing

## 2021-11-03 NOTE — DISCHARGE INSTR - COC
Continuity of Care Form    Patient Name: Cornelius Frazier   :  1990  MRN:  [id number]    [facility] date:  2021  Discharge date:  2021    Code Status Order: Full Code   Advance Directives:   [facility] Documentation     Date/Time Healthcare Directive Type of Healthcare Directive Copy in [address] Agent's Name Healthcare Agent's Phone Number    21 0703  No, patient does not have an advance directive for healthcare treatment  --  --  --  --  --          Admitting Physician:  Robert Alvarado DO  PCP: Elkin Crocker MD    Discharging Nurse:  TOM RUIZ [facility] Unit/Room#: 0160/9630-71  Discharging Unit Phone Number: [phone]    Emergency Contact:   Extended Emergency Contact Information  Primary Emergency Contact: Iris Mention  Address: [address] [place] [address],[address]           [address] [place] [address] Phone: [phone]  Mobile Phone: [phone]  Relation: Spouse    Past Surgical History:  Past Surgical History:   Procedure Laterality Date    [facility] INJECTION PROCEDURE FOR SACROILIAC JOINT Left 10/23/2020    SA[facility]ILIAC JOINT [facility]JE[facility]ox [facility]    POSTERIOR LUMBAR MICRODISCECTOMY L3-4 performed by Robert Alvarado DO at [address] [place] [address]  2021    OLIF L3-4, PRONE, C-ARM, MICROSCOPE, MEDTRONICS,  EVOKES- #[id number] SHEA (Left Flank) ,    NERVE BLOCK N/A 2021    NERVE BLOCK #1 L3-4, L4-5 performed by April Palafox MD at [address] [place] [address] Left 2021    LUMBAR TRANSFORAMINAL L3-4 performed by April Palafox MD at [address] [place] [address] Left 2021    EPIDURAL STEROID INJECTION caudal performed by April Palafox MD at [address] [place] [address] Left 2021    LUMBAR TRANSFORAMINAL L3 performed by April Palafox MD at [facility] OR    TOTAL HIP ARTHROPLASTY Left 2011    Left hip replacement    TYMPANOSTOMY TUBE PLACEMENT      WISDOM TOOTH EXTRACTION         Immunization History: There is no immunization history on file for this patient. Active Problems:  Patient Active Problem List   Diagnosis Code    Left hip pain M25.552    Depression F32. A    Asthma J45.909    Restless leg G25.81    Gastroesophageal reflux disease K21.9    Vitamin D deficiency E55.9    Other insomnia G47.09    Anxiety F41.9    Recurrent major depressive disorder, in remission ([facility] [address] F33.40    Bipolar affective disorder, current episode depressed (HCC) F31.30    PTSD (post-traumatic stress disorder) F43.10    DANTE (generalized anxiety disorder) F41.1    Allergic rhinitis due to pollen J30.1    Lumbar disc disease M51.9    Borderline personality disorder ([facility] [address] F60.3    Mixed hyperlipidemia E78.2    Lumbar disc disease with radiculopathy M51.16       Isolation/Infection:   Isolation          No Isolation        Patient Infection Status     Infection Onset Added Last Indicated Last Indicated By Review Planned Expiration Resolved Resolved By    None active    Resolved    COVID-19 Rule Out 10/29/21 10/29/21 10/29/21 COVID-19 & Influenza Combo (Ordered)   10/29/21 Rule-Out Test Resulted    COVID-19 Rule Out 05/28/21 05/28/21 05/28/21 COVID-19 (Ordered)   05/30/21 Rule-Out Test Resulted    COVID-19 Rule Out 04/12/21 04/12/21 04/12/21 COVID-19 (Ordered)   04/14/21 Rule-Out Test Resulted    COVID-19 Rule Out 03/08/21 03/08/21 03/08/21 Covid-19 Ambulatory (Ordered)   03/10/21 Rule-Out Test Resulted    COVID-19 Rule Out 02/26/21 02/26/21 02/26/21 Covid-19 Ambulatory (Ordered)   02/27/21 Rule-Out Test Resulted    COVID-19 Rule Out 02/08/21 02/08/21 02/08/21 COVID-19 (Ordered)   02/09/21 Rule-Out Test Resulted    COVID-19 Rule Out 01/25/21 01/25/21 01/25/21 COVID-19 (Ordered)   01/26/21 Rule-Out Test Resulted    COVID-19 Rule Out 11/10/20 11/10/20 11/10/20 Covid-19 Ambulatory (Ordered)   11/12/20 Rule-Out Test Resulted    COVID-19 Rule Out 10/19/20 10/19/20 10/19/20 COVID-19 Ambulatory (Ordered)   10/20/20 Rule-Out Test Resulted    COVID-19 Rule Out 07/24/20 07/24/20 07/24/20 COVID-19 Ambulatory (Ordered)   07/27/20 Rule-Out Test Resulted    COVID-19 Rule Out 06/01/20 06/01/20 06/01/20 Covid-19 Ambulatory (Ordered)   06/04/20 Rule-Out Test Resulted          Nurse Assessment:  Last Vital Signs: BP (!) 144/8   Pulse 78   Temp 98.1 °F (36.7 °C) (Oral)   Resp 18   Ht 5' 10\" (1.778 m)   Wt 222 lb (100.7 kg)   SpO2 93%   BMI 31.85 kg/m²     Last documented pain score (0-10 scale): Pain Level: 0  Last Weight:   Wt Readings from Last 1 Encounters:   11/02/21 222 lb (100.7 kg)     Mental Status:  {IP PT MENTAL STATUS:20030}    IV Access:  { SARAH IV ACCESS:825969991}    Nursing Mobility/ADLs:  Walking   {CHP DME OTKJ:084787464}  Transfer  {CHP DME UGZU:823226501}  Bathing  {CHP DME IUQX:994775442}  Dressing  {CHP DME HPWP:320547203}  Toileting  {CHP DME CXBJ:478305211}  Feeding  {CHP DME RDDS:670057168}  Med Admin  {CHP DME DSQK:612206692}  Med Delivery   { SARAH MED Delivery:010622842}    Wound Care Documentation and Therapy:        Elimination:  Continence:   · Bowel: {YES / FN:52194}  · Bladder: {YES / OZ:30696}  Urinary Catheter: {Urinary Catheter:860229908}   Colostomy/Ileostomy/Ileal Conduit: {YES / VB:62616}       Date of Last BM: 11/01/2021    Intake/Output Summary (Last 24 hours) at 11/3/2021 1239  Last data filed at 11/3/2021 0600  Gross per 24 hour   Intake 1046 ml   Output 1250 ml   Net -204 ml     I/O last 3 completed shifts:   In: 2496 [I.V.:2346; IV Piggyback:150]  Out: 7581 [Urine:2150; Blood:100]    Safety Concerns:     [address] [employer] Safety Concerns:655997355}    Impairments/Disabilities:      [address] [employer] Impairments/Disabilities:707867297}    Nutrition Therapy:  Current Nutrition Therapy:   [address] [employer] Diet List:109965419}    Routes of Feeding: {CHP DME Other Feedings:655913059}  Liquids: {Slp liquid thickness:82754}  Daily Fluid Restriction: {CHP DME Yes amt example:571659201}  Last Modified Barium Swallow with Video (Video Swallowing Test): {Done Not Done DSEM:957568314}    Treatments at the Time of Hospital Discharge:   Respiratory Treatments: ***  Oxygen Therapy:  {Therapy; copd oxygen:26219}  Ventilator:    {MH CC Vent UKZQ:290068909}    Rehab Therapies: {THERAPEUTIC INTERVENTION:5955797314}  Weight Bearing Status/Restrictions: [facility] [place] Weight Bearin}  Other Medical Equipment (for information only, NOT a DME order):  {EQUIPMENT:199313710}  Other Treatments: ***    Patient's personal belongings (please select all that are sent with patient):  Phone    RN SIGNATURE:  Electronically signed by Ronaldo Nickerson RN on 11/3/21 at 12:43 PM EDT    CASE MANAGEMENT/SOCIAL WORK SECTION    Inpatient Status Date: ***    Readmission Risk Assessment Score:  Readmission Risk              Risk of Unplanned Readmission:  11           Discharging to Facility/ Agency   · Name:   · Address:  · Phone:  · Fax:    Dialysis Facility (if applicable)   · Name:  · Address:  · Dialysis Schedule:  · Phone:  · Fax:    / signature: {Esignature:507554600}    PHYSICIAN SECTION    Prognosis: {Prognosis:0222053427}    Condition at Discharge: [address] [place] Patient Condition:256499587}    Rehab Potential (if transferring to Rehab): {Prognosis:6902220848}    Recommended Labs or Other Treatments After Discharge: ***    Physician Certification: I certify the above information and transfer of Maria Eugenia Phillips  is necessary for the continuing treatment of the diagnosis listed and that he requires {Admit to Appropriate Level of Care:65447} for {GREATER/LESS:769287670} 30 days.      Update Admission H&P: {CHP DME Changes in GZJOT:193009235}    PHYSICIAN SIGNATURE:  {Esignature:540709835}

## 2021-11-03 NOTE — PROGRESS NOTES
Neurosurgery [place]/Resident    Daily Progress Note   POD#1 OLIF L3-4, posterior fixation, facetectomy/foraminotomy L3-4    11/3/2021  7:55 AM    Chart reviewed. No acute events overnight. No new complaints. Has been ambulating without difficulty. Urinating without issues. +Flatus, denies nausea/vomiting. Tolerating PO. Pain controlled. Some improvement to LE radiculopathy. Vitals:    11/02/21 1815 11/02/21 2101 11/02/21 2308 11/03/21 0449   BP:  (!) 151/99 131/75    Pulse:  59 60    Resp:  12 17 18   Temp:  97.7 °F (36.5 °C) 97.6 °F (36.4 °C) 97.6 °F (36.4 °C)   TempSrc:  Oral Oral Oral   SpO2: 96%      Weight:       Height:             PE: AOx3   CNII-XII intact   PERRL, EOMI     Motor   L deltoid 5/5; R deltoid 5/5  L biceps 5/5; R biceps 5/5  L triceps 5/5; R triceps 5/5  L wrist extension 5/5; R wrist extension 5/5  L intrinsics 5/5; R intrinsics 5/5      L iliopsoas 5/5 , R iliopsoas 5/5  L quadriceps 5/5; R quadriceps 5/5  L Dorsiflexion 5/5; R dorsiflexion 5/5  L Plantarflexion 5/5; R plantarflexion 5/5  L EHL 5/5; R EHL 5/5    Sensation intact    Drain output: n/a  Incision: dressing intact; dried sanguinous drainage visible      Lab Results   Component Value Date    WBC 14.3 (H) 11/03/2021    HGB 13.5 11/03/2021    HCT 41.0 11/03/2021     11/03/2021    CHOL 211 (H) 08/04/2020    TRIG 283 (H) 08/04/2020    HDL 29 08/04/2020    ALT 17 08/04/2020    AST 18 08/04/2020     10/22/2021    K 4.0 10/22/2021     10/22/2021    CREATININE 0.8 10/22/2021    BUN 10 10/22/2021    CO2 26 10/22/2021    TSH 1.880 08/04/2020       Radiology   XR LUMBAR SPINE (2-3 VIEWS)    Result Date: 11/2/2021  EXAMINATION: THREE XRAY VIEWS OF THE LUMBAR SPINE 11/2/2021 10:59 pm COMPARISON: None.  HISTORY: ORDERING SYSTEM PROVIDED HISTORY: OLIF nad posterior fixation lumbar, AP and lateral upright TECHNOLOGIST PROVIDED HISTORY: OLIF nad posterior fixation lumbar, AP and lateral upright Reason for Exam: standing,post op FINDINGS: Persisting minimal L4 on L5 2 mm anterolisthesis is present. L3-L4 posterior and left lateral interbody fusion is noted without evidence of hardware complication identified. Vertebral body heights are normal.  No evidence of acute fracture, dislocation or subluxation is identified. Intervertebral joint spaces are preserved. Facet joints are unremarkable. Bone mineralization is within normal limits. Paravertebral soft tissues are unremarkable. 1. Interval L3-L4 posterior and left lateral interbody fusion without evidence of hardware complication. 2. Persisting L3 on L4 anterolisthesis measuring 2 mm. 3. No significant lumbar spine spondylosis. A/P  27 y.o. male POD#1 OLIF L3-4, posterior fixation, facetectomy/foraminotomy L3-4.       - Post-op x-rays complete   - Encourage ambulation    - Pain control: oxycodone/morphine PRN, tylenol   - DVT prophylaxis: lovenox/SCD  - Regular diet-monitor for any nausea  - Encourage incentive spirometer  - Continue bowl regimen/monitor for any urinary retention   - Likely discharge home this afternoon      Please contact neurosurgery with any changes in patients neurologic status.        LEYDI Wiseman-CNP  11/3/21  7:55 AM

## 2021-11-03 NOTE — PROGRESS NOTES
Occupational Therapy   Occupational Therapy Initial Assessment  Date: 11/3/2021   Patient Name: Les Hernández  MRN: [id number]     : 1990    Date of Service: 11/3/2021  No chief complaint on file. History of Chief Complaint:    Patient presents for PAT appointment. Patient complains of lumbar disc disease. He has a history of lumbar pain with LLE pain as well. He had underwent pain management procedures, as well as lumbar micodiscectomy in May. He has had continued symptoms since then, says that PT did not help his symptoms either. He says that the surgery failed and he has now been scheduled for lumbar fusion. Above information obtained from  General Surgery note     Discharge Recommendations:    Further therapy recommended at discharge. OT Equipment Recommendations  Equipment Needed: Yes  Mobility Devices: ADL Assistive Devices  ADL Assistive Devices: Toileting - Raised Toilet Seat with arms;Grab Bars - toilet;Grab Bars - shower;Transfer Tub Bench;Sock-Aid Hard    Assessment   Performance deficits / Impairments: Decreased ADL status; Decreased high-level IADLs  Assessment: Pt would benefit from acute OT and upon discharge to address the above deficits to increase independence with daily tasks. Prognosis: Good  Decision Making: Low Complexity  Patient Education: Pt ed on POC, reason for eval, log roll tech - good return  REQUIRES OT FOLLOW UP: Yes  Activity Tolerance  Activity Tolerance: Patient Tolerated treatment well;Patient limited by pain  Safety Devices  Safety Devices in place: Yes  Type of devices: Left in bed;Call light within reach;Gait belt  Restraints  Initially in place: No           Patient Diagnosis(es): The encounter diagnosis was Lumbar disc disease with radiculopathy.      has a past medical history of Asthma, Bipolar 1 disorder ([facility] [address] Borderline personality disorder ([facility] [address] COPD (chronic obstructive pulmonary disease) ([facility] [address] Depression, Dissociative identity disorder ([facility] [address] GERD (gastroesophageal reflux disease), History of cardiac murmur in childhood, Suyp-Wexus-Ciahqsh disease, left, Lumbar disc disease, Multiple allergies, PTSD (post-traumatic stress disorder), Restless legs syndrome, Teeth missing, Under care of team, and Wellness examination. has a past surgical history that includes Total hip arthroplasty (Left, 2011); Tympanostomy tube placement; Injection Procedure For Sacroiliac Joint (Left, 10/23/2020); Pain management procedure (Left, 01/29/2021); Pain management procedure (Left, 02/12/2021); Nerve Block (N/A, 03/12/2021); Pain management procedure (Left, 04/16/2021); [place] tooth extraction; Lumbar spine surgery (Left, 06/01/2021); and Lumbar spine surgery (11/02/2021). Restrictions  Restrictions/Precautions  Required Braces or Orthoses?: No  Position Activity Restriction  Other position/activity restrictions: Ambulate pt, activity as tolerated, Lumbar fusion L3-4 post fixation 11/2    Subjective   General  Patient assessed for rehabilitation services?: Yes  Family / Caregiver Present: No  Diagnosis: Lumbar disc disease  General Comment  Comments: RN ok'd OT this date. Pt agreed to participate and was cooperative during session. Patient Currently in Pain: Yes  Pain Assessment  Pain Assessment: 0-10  Pain Level: 6  Pain Location: Back; Hip  Pain Orientation: Lower; Left  Non-Pharmaceutical Pain Intervention(s): Ambulation/Increased Activity; Distraction  Response to Pain Intervention: Patient Satisfied  Vital Signs  Patient Currently in Pain: Yes  Social/Functional History  Social/Functional History  Lives With: Spouse, Daughter (daughter (12 months old))  Type of Home: House  Home Layout: One level  Home Access: Stairs to enter with rails  Entrance Stairs - Number of Steps: 2  Entrance Stairs - Rails: Right  Bathroom Shower/Tub: Tub/Shower unit  Bathroom Toilet: Standard  Home Equipment: Cane (Pt reported use of cane at all times at baseline)  ADL Assistance: Needs assistance (Pt reported difficulty with LB ADLs and getting out of tub at baseline. Wife assists PRN.)  Homemaking Assistance: Needs assistance  Homemaking Responsibilities: No (Pt reported wife completes homemaking tasks.)  Meal Prep Responsibility: Secondary  Laundry Responsibility: Secondary  Cleaning Responsibility: Secondary  Ambulation Assistance: Independent  Transfer Assistance: Independent  Active : No (Pt reported stays home due to PTSD.)  Occupation: Other(comment) (Pt reported is currently waiting on disability)  Leisure & Hobbies: video games, reading, playing with daughter  Additional Comments: Wife available 24/7 for assist.       Objective   Vision: Impaired  Vision Exceptions: Wears glasses for reading  Hearing: Within functional limits    Orientation  Overall Orientation Status: Within Functional Limits     Balance  Sitting Balance: Supervision (~25 minutes EOB)  Standing Balance: Supervision (IV pull)  Standing Balance  Time: ~5 minutes  Activity: Pt completed static/dynamic standing at sink in bathroom for oral care. Pt completed functional mobility to/from bathroom using IV pull. Comment: Pt demo'd no LOB or unsteadiness. Functional Mobility  Functional - Mobility Device: Other  Activity: To/from bathroom  Assist Level: Stand by assistance  ADL  Feeding: Modified independent ;Setup; Increased time to complete  Grooming: Modified independent ;Setup; Increased time to complete  UE Bathing: Modified independent ;Setup  LE Bathing: Moderate assistance;Setup; Increased time to complete  UE Dressing: Setup; Increased time to complete;Modified independent   LE Dressing: Moderate assistance;Setup; Increased time to complete  Toileting: Stand by assistance; Increased time to complete  Additional Comments: OT facilitated pt completion of oral care at sink in bathroom.   Tone RUE  RUE Tone: Normotonic  Tone LUE  LUE Tone: Normotonic  Coordination  Movements Are Fluid And Coordinated: Yes     Bed mobility  Supine to Sit: Stand by assistance  Sit to Supine: Stand by assistance  Scooting: Stand by assistance  Comment: Pt demo'd log roll tech for bed mobility. Transfers  Sit to stand: Stand by assistance  Stand to sit: Stand by assistance     Cognition  Overall Cognitive Status: WFL        Sensation  Overall Sensation Status: WFL        LUE AROM (degrees)  LUE AROM : WFL  Left Hand AROM (degrees)  Left Hand AROM: WFL  RUE AROM (degrees)  RUE AROM : WFL  Right Hand AROM (degrees)  Right Hand AROM: WFL  LUE Strength  Gross LUE Strength: WFL  L Shoulder Flex: 4+/5  L Shoulder Ext: 4+/5  L Elbow Flex: 4+/5  L Elbow Ext: 4+/5  L Hand General: 4+/5  RUE Strength  Gross RUE Strength: WFL  R Shoulder Flex: 4+/5  R Shoulder Ext: 4+/5  R Elbow Flex: 4+/5  R Elbow Ext: 4+/5  R Hand General: 4+/5                   Plan   Plan  Times per week: 1-2 sessions      AM-PAC Score        AM-[facility] Inpatient Daily Activity Raw Score: 19 (11/03/21 1211)  AM-PAC Inpatient ADL T-Scale Score : 40.22 (11/03/21 1211)  ADL Inpatient CMS 0-100% Score: 42.8 (11/03/21 1211)  ADL Inpatient CMS G-Code Modifier : CK (11/03/21 1211)    Goals  Short term goals  Time Frame for Short term goals: Pt will, by discharge  Short term goal 1: complete LB ADLs with Min A using AE and modified tech PRN. Short term goal 2: increase activity tolerance to 15+ minutes to increase independence in daily tasks. Short term goal 3: demo proper body mechanics during dynamic reaching tasks.        Therapy Time   Individual Concurrent Group Co-treatment   Time In 4335         Time Out 0900         Minutes 22         Timed Code Treatment Minutes: 18 Minutes       Evonne Moran, S/OT

## 2021-11-03 NOTE — CARE COORDINATION
Case Management Initial Discharge Plan  Valorie Mc,             Met with:patient to discuss discharge plans. Information verified: address, contacts, phone number, , insurance Yes  Insurance Provider: [facility]    Emergency Contact/Next of Kin name & number: Wife Kolby Martino  [phone]  Who are involved in patient's support system? family    PCP: Mauricio Deluna MD  Date of last visit: past 3 motnths      Discharge Planning    Living Arrangements:  Spouse/Significant Other, Children     Home has 1 stories  0 stairs to climb to get into front door, 0stairs to climb to reach second floor  Location of bedroom/bathroom in home main    Patient able to perform ADL's:Independent    Current Services (outpatient & in home) none cane  DME equipment:  cane  DME provider: n/a    Is patient receiving oral anticoagulation therapy? No    If indicated:   Physician managing anticoagulation treatment:   Where does patient obtain lab work for ATC treatment? Potential Assistance Needed:       Patient agreeable to home care: No  [place] of choice provided:  n/a    Prior SNF/Rehab Placement and Facility:   Agreeable to SNF/Rehab: No  [place] of choice provided: n/a     Evaluation: no    Expected Discharge date:       Patient expects to be discharged to: If home: is the family and/or caregiver wiling & able to provide support at home? Yes   Who will be providing this support? wife*    Follow Up Appointment: Best Day/ Time:      Transportation provider: wife  Transportation arrangements needed for discharge: Yes    Readmission Risk              Risk of Unplanned Readmission:  11             Does patient have a readmission risk score greater than 14?: No  If yes, follow-up appointment must be made within 7 days of discharge.      Goals of Care: pain control and safety      Educated patient  on transitional options, provided freedom of choice and are agreeable with plan      Discharge Plan: home independently          Electronically signed by Bravo RN on 11/3/21 at 12:39 PM EDT

## 2021-11-03 NOTE — PLAN OF CARE
Problem: Pain:  Goal: Pain level will decrease  Description: Pain level will decrease  Outcome: Ongoing  Goal: Control of acute pain  Description: Control of acute pain  Outcome: Ongoing  Goal: Control of chronic pain  Description: Control of chronic pain  Outcome: Ongoing     Problem: Infection - Surgical Site:  Goal: Will show no infection signs and symptoms  Description: Will show no infection signs and symptoms  Outcome: Ongoing     Problem: Safety:  Goal: Free from accidental physical injury  Description: Free from accidental physical injury  Outcome: Ongoing  Goal: Free from intentional harm  Description: Free from intentional harm  Outcome: Ongoing     Problem: Daily Care:  Goal: Daily care needs are met  Description: Daily care needs are met  Outcome: Ongoing     Problem: Skin Integrity:  Goal: Skin integrity will stabilize  Description: Skin integrity will stabilize  Outcome: Ongoing     Problem: Discharge Planning:  Goal: Patients continuum of care needs are met  Description: Patients continuum of care needs are met  Outcome: Ongoing

## 2021-11-03 NOTE — PROGRESS NOTES
Physical Therapy        Physical Therapy Cancel Note      DATE: 11/3/2021    NAME: Andres Aviles  MRN: [id number]   : 1990      Patient not seen this date for Physical Therapy due to:    Patient independent with functional mobility. Will defer PT evaluation at this time. Please reorder PT if future needs arise. Pt ambulating independently in the hallway upon PT arrival; I asked to see him ambulate without holding onto the IV pole and he said \"I use a cane normally, so I still would have something to hold onto\". Pt denies PT needs and hopes to go home today.         Electronically signed by Luana Giron PT on 11/3/2021 at 10:10 AM

## 2021-11-04 ENCOUNTER — TELEPHONE (OUTPATIENT)
Dept: FAMILY MEDICINE CLINIC | Age: 31
End: 2021-11-04

## 2021-11-04 NOTE — TELEPHONE ENCOUNTER
[facility] Trans[facility] Initial Follow Up Call    Outreach made within 2 business days of discharge: Yes    Patient: Maryanna Severance Patient : 1990   MRN: [id number]  Reason for Admission: There are no discharge diagnoses documented for the most recent discharge. Discharge Date: 11/3/21       Spoke with: Attempted to contact pt. VM is not set up.      Discharge department/facility: [facility]        Scheduled appointment with PCP within 7-14 days    Follow Up  Future Appointments   Date Time Provider Juan Manuel Fitzpatrick   2021 11:00 AM MD DILLON [facility]gs [facility] [place]   2021 11:30 AM LEYDI Fernandez - CNP [facility]   2021  4:00 PM Tianna Nicolas PSYD N [id number] [place]   2021 10:30 AM [facility] [place]   2021 11:30 AM Kristen Yarbrough DO [facility]   2022  1:00 PM Sara Spaulding MD [facility] [address] [facility] ([facility])

## 2021-11-04 NOTE — TELEPHONE ENCOUNTER
[facility] Transitions Initial Follow Up Call    Outreach made within 2 business days of discharge: Yes    Patient: Hood Mc Patient : 1990   MRN: [id number]  Reason for Admission: There are no discharge diagnoses documented for the most recent discharge. Discharge Date: 11/3/21       Spoke with: Pt's wife (ON HIPAA)    Discharge department/facility: [facility]    TCM Interactive Patient Contact:  Was patient able to fill all prescriptions: Yes  Was patient instructed to bring all medications to the follow-up visit: Yes  Is patient taking all medications as directed in the discharge summary? Yes  Does patient understand their discharge instructions: Yes  Does patient have questions or concerns that need addressed prior to 7-14 day follow up office visit: yes - Still in a lot of pain.  F/u sched for tomorrow per pt request.     Scheduled appointment with PCP within 7-14 days    Follow Up  Future Appointments   Date Time Provider Juan Manuel Fitzpatrick   2021 11:00 AM Sanjay Ferrari MD [facility] [place]   2021 11:00 AM MD DILLON Welch [facility] [place]   2021 11:30 AM LEYDI Blackmon - CNP [facility]   2021  4:00 PM CORIN Young [id number] [place]   2021 10:30 AM Jenni Stock MD [facility] [place]   2021 11:30 AM Eugenio Donis DO [facility]   2022  1:00 PM Sabrina Oden MD [facility] - [address] [place] [address] ([address] [place] [address])

## 2021-11-05 ENCOUNTER — OFFICE VISIT (OUTPATIENT)
Dept: FAMILY MEDICINE CLINIC | Age: 31
End: 2021-11-05
Payer: MEDICAID

## 2021-11-05 VITALS
BODY MASS INDEX: 31.81 KG/M2 | WEIGHT: 222.2 LBS | RESPIRATION RATE: 16 BRPM | OXYGEN SATURATION: 97 % | SYSTOLIC BLOOD PRESSURE: 142 MMHG | HEIGHT: 70 IN | TEMPERATURE: 97.7 F | DIASTOLIC BLOOD PRESSURE: 74 MMHG | HEART RATE: 117 BPM

## 2021-11-05 DIAGNOSIS — J45.909 ASTHMA, UNSPECIFIED ASTHMA SEVERITY, UNSPECIFIED WHETHER COMPLICATED, UNSPECIFIED WHETHER PERSISTENT: ICD-10-CM

## 2021-11-05 DIAGNOSIS — M51.16 LUMBAR DISC DISEASE WITH RADICULOPATHY: Primary | ICD-10-CM

## 2021-11-05 DIAGNOSIS — G47.09 OTHER INSOMNIA: ICD-10-CM

## 2021-11-05 DIAGNOSIS — G25.81 RESTLESS LEG: ICD-10-CM

## 2021-11-05 DIAGNOSIS — K21.9 GASTROESOPHAGEAL REFLUX DISEASE, UNSPECIFIED WHETHER ESOPHAGITIS PRESENT: ICD-10-CM

## 2021-11-05 DIAGNOSIS — M51.9 LUMBAR DISC DISEASE: ICD-10-CM

## 2021-11-05 DIAGNOSIS — Z11.59 NEED FOR HEPATITIS C SCREENING TEST: ICD-10-CM

## 2021-11-05 DIAGNOSIS — F41.1 GAD (GENERALIZED ANXIETY DISORDER): ICD-10-CM

## 2021-11-05 DIAGNOSIS — F33.40 RECURRENT MAJOR DEPRESSIVE DISORDER, IN REMISSION (HCC): ICD-10-CM

## 2021-11-05 DIAGNOSIS — Z11.4 SCREENING FOR HIV (HUMAN IMMUNODEFICIENCY VIRUS): ICD-10-CM

## 2021-11-05 PROCEDURE — 99215 OFFICE O/P EST HI 40 MIN: CPT

## 2021-11-05 PROCEDURE — 1111F DSCHRG MED/CURRENT MED MERGE: CPT

## 2021-11-05 RX ORDER — BACLOFEN 10 MG/1
10 TABLET ORAL 3 TIMES DAILY
Qty: 45 TABLET | Refills: 0 | Status: SHIPPED | OUTPATIENT
Start: 2021-11-05 | End: 2021-11-17 | Stop reason: SDUPTHER

## 2021-11-05 ASSESSMENT — ENCOUNTER SYMPTOMS
ABDOMINAL PAIN: 0
CHEST TIGHTNESS: 0
SORE THROAT: 0
CONSTIPATION: 1
RHINORRHEA: 0
PHOTOPHOBIA: 0
COUGH: 0
ANAL BLEEDING: 0
BACK PAIN: 1
CHOKING: 0
VOICE CHANGE: 0
DIARRHEA: 0
SHORTNESS OF BREATH: 0
NAUSEA: 0
TROUBLE SWALLOWING: 0
WHEEZING: 0
COLOR CHANGE: 0
BLOOD IN STOOL: 0

## 2021-11-05 NOTE — PROGRESS NOTES
Post-Discharge Transitional Care Management Services or Hospital Follow Up      Maryanna Severance   YOB: 1990    Date of Office Visit:  11/5/2021  Date of Hospital Admission: 11/2/21  Date of Hospital Discharge: 11/3/21  Readmission Risk Score(high >=14%. Medium >=10%):Readmission Risk Score: 7.9      Care management risk score Rising risk (score 2-5) and Complex Care (Scores >=6): 1     Non face to face  following discharge, date last encounter closed (first attempt may have been earlier): 11/4/2021  9:23 AM 11/4/2021  9:23 AM    Call initiated 2 business days of discharge: Yes     Patient Active Problem List   Diagnosis    Left hip pain    Depression    Asthma    Restless leg    Gastroesophageal reflux disease    Vitamin D deficiency    Other insomnia    Anxiety    Recurrent major depressive disorder, in remission ([address]    Bipolar affective disorder, current episode depressed ([address]    PTSD (post-traumatic stress disorder)    DANTE (generalized anxiety disorder)    Allergic rhinitis due to pollen    Lumbar disc disease    Borderline personality disorder ([address]    Mixed hyperlipidemia    Lumbar disc disease with radiculopathy       No Known Allergies    Medications listed as ordered at the time of discharge from [facility] Medication Instructions ELVIA:    Printed on:11/05/21 2050   Medication Information                      albuterol sulfate HFA (VENTOLIN HFA) 108 (90 Base) MCG/ACT inhaler  Inhale 2 puffs into the lungs every 6 hours as needed for Wheezing             azelastine (OPTIVAR) 0.05 % ophthalmic solution  Place 1 drop into both eyes 2 times daily as needed             baclofen (LIORESAL) 10 MG tablet  Take 1 tablet by mouth 3 times daily for 15 days             clonazePAM (KLONOPIN) 1 MG tablet  Take 1 tablet by mouth nightly as needed (insomnia/nightmares) for up to 60 days.              cyclobenzaprine (FLEXERIL) 5 MG tablet  Take 1 tablet by mouth 3 times daily as needed for Muscle spasms             escitalopram (LEXAPRO) 20 MG tablet  Take 1.5 tablets by mouth daily             fluticasone (FLONASE) 50 MCG/ACT nasal spray  2 sprays by Each Nostril route daily Start with one spray daily for one week and increase to two sprays daily as needed             fluticasone-salmeterol (ADVAIR DISKUS) 100-50 MCG/DOSE diskus inhaler  Inhale 1 puff into the lungs every 12 hours             Handicap Placard MISC  by Does not apply route Expires March 1st, 2022             lamoTRIgine (LAMICTAL) 200 MG tablet  Take 1 tablet by mouth 2 times daily             loratadine (CLARITIN) 10 MG tablet  Take 1 tablet by mouth daily             montelukast (SINGULAIR) 10 MG tablet  Take 1 tablet by mouth daily             oxyCODONE-acetaminophen (PERCOCET) 5-325 MG per tablet  May take 1 tablet by mouth every 6 hours as needed for Pain. May also take 2 tablets every 6 hours as needed for Pain. Do all this for 7 days. Intended supply: 7 days. Take lowest dose possible to manage pain. pantoprazole (PROTONIX) 40 MG tablet  Take 1 tablet by mouth 2 times daily (with meals)             rOPINIRole (REQUIP) 1 MG tablet  Take 1 tablet by mouth nightly             traZODone (DESYREL) 150 MG tablet  Take 1 tablet by mouth nightly                   Medications marked \"taking\" at this time  Outpatient Medications Marked as Taking for the 11/5/21 encounter (Office Visit) with Zi Dale MD   Medication Sig Dispense Refill    baclofen (LIORESAL) 10 MG tablet Take 1 tablet by mouth 3 times daily for 15 days 45 tablet 0    oxyCODONE-acetaminophen (PERCOCET) 5-325 MG per tablet May take 1 tablet by mouth every 6 hours as needed for Pain. May also take 2 tablets every 6 hours as needed for Pain. Do all this for 7 days. Intended supply: 7 days. Take lowest dose possible to manage pain.  56 tablet 0    cyclobenzaprine (FLEXERIL) 5 MG tablet Take 1 tablet by mouth 3 times daily as needed for Muscle spasms 30 tablet 0    loratadine (CLARITIN) 10 MG tablet Take 1 tablet by mouth daily 30 tablet 3    pantoprazole (PROTONIX) 40 MG tablet Take 1 tablet by mouth 2 times daily (with meals) 180 tablet 1    traZODone (DESYREL) 150 MG tablet Take 1 tablet by mouth nightly 30 tablet 2    lamoTRIgine (LAMICTAL) 200 MG tablet Take 1 tablet by mouth 2 times daily 60 tablet 2    escitalopram (LEXAPRO) 20 MG tablet Take 1.5 tablets by mouth daily 45 tablet 2    clonazePAM (KLONOPIN) 1 MG tablet Take 1 tablet by mouth nightly as needed (insomnia/nightmares) for up to 60 days. 30 tablet 1    Handicap Placard MISC by Does not apply route Expires March 1st, 2022 1 each 0    rOPINIRole (REQUIP) 1 MG tablet Take 1 tablet by mouth nightly 90 tablet 3    montelukast (SINGULAIR) 10 MG tablet Take 1 tablet by mouth daily 30 tablet 3    azelastine (OPTIVAR) 0.05 % ophthalmic solution Place 1 drop into both eyes 2 times daily as needed      fluticasone (FLONASE) 50 MCG/ACT nasal spray 2 sprays by Each Nostril route daily Start with one spray daily for one week and increase to two sprays daily as needed 1 Bottle 3    fluticasone-salmeterol (ADVAIR DISKUS) 100-50 MCG/DOSE diskus inhaler Inhale 1 puff into the lungs every 12 hours 60 each 3    albuterol sulfate HFA (VENTOLIN HFA) 108 (90 Base) MCG/ACT inhaler Inhale 2 puffs into the lungs every 6 hours as needed for Wheezing 1 Inhaler 0        Medications patient taking as of now reconciled against medications ordered at time of hospital discharge: Yes. Pt advised against concurrent use of Percocet and Clonopin at this time. If Pt is off Percocet, Pt can continue Clonopin as Risk of CNS depression and respiratory depression discussed with Pt. Chief Complaint   Patient presents with    Follow-up    Numbness     left leg        HPI    Inpatient course: Discharge summary reviewed- see chart.     Interval history/Current status:     Pt is a 28 y/o male with PMH of lumbar spondylolisthesis with disc degeneration and radiculopathy S/P NeuroSx on Nov/02/2021, PTSD, MDD, DANTE, BPD, asthma, GERD presenting for [facility] F/U s/p NeuroSx. Pt states he is doing well, noted some left leg weakness and thigh tingling after Sx. Denies ROS below. Intra-Sx and Post Sx Imaging reviewed with Pt. Pt has follow up with Surgeon in 2 weeks for staple removal and check. Pt stable on current meds for chronic conditions. endorses pain control on Percocet however states he would like to try something other than flexeril which is not working for him. Pt advised against concurrent use of Percocet and Clonopin at this time. If Pt is off Percocet, Pt can continue Clonopin as Risk of CNS depression and respiratory depression discussed with Pt. Pt to follow up on BP reading, elevated possible 2/2 Sx and Chronic back pain. Review of Systems   Constitutional: Positive for activity change (2/2 Sx recovery) and chills (2/2 pain meds). Negative for appetite change, diaphoresis, fatigue and fever. HENT: Negative for congestion, postnasal drip, rhinorrhea, sore throat, trouble swallowing and voice change. Eyes: Negative for photophobia and visual disturbance. Respiratory: Negative for cough, choking, chest tightness, shortness of breath and wheezing. Cardiovascular: Negative for chest pain, palpitations and leg swelling. Gastrointestinal: Positive for constipation (passing flatus only). Negative for abdominal pain, anal bleeding, blood in stool, diarrhea and nausea. Genitourinary: Negative for decreased urine volume, difficulty urinating, dysuria, frequency, hematuria and urgency. Musculoskeletal: Positive for arthralgias, back pain, gait problem (2/2 Wheelchair use) and myalgias. Negative for joint swelling, neck pain and neck stiffness. Skin: Negative for color change, pallor, rash and wound. Neurological: Positive for weakness (left leg).  Negative for dizziness, tremors, seizures, syncope, facial asymmetry, light-headedness, numbness and headaches. Psychiatric/Behavioral: Negative for agitation, behavioral problems, confusion, decreased concentration, dysphoric mood, hallucinations, self-injury, sleep disturbance and suicidal ideas. The patient is not nervous/anxious. Vitals:    11/05/21 1108 11/05/21 1111   BP: (!) 144/76 (!) 142/74   Site: Left Upper Arm Right Upper Arm   Position: Sitting Sitting   Cuff Size: Medium Adult Medium Adult   Pulse: 117    Resp: 16    Temp: 97.7 °F (36.5 °C)    SpO2: 97%    Weight: 222 lb 3.2 oz (100.8 kg)    Height: 5' 10\" (1.778 m)      Body mass index is 31.88 kg/m². Wt Readings from Last 3 Encounters:   11/05/21 222 lb 3.2 oz (100.8 kg)   11/02/21 222 lb (100.7 kg)   10/25/21 222 lb 6.4 oz (100.9 kg)     BP Readings from Last 3 Encounters:   11/05/21 (!) 142/74   11/03/21 (!) 144/8   11/02/21 (!) 140/90       Physical Exam  Constitutional:       General: He is not in acute distress. Appearance: Normal appearance. He is not ill-appearing or diaphoretic. HENT:      Head: Normocephalic and atraumatic. Eyes:      General: No scleral icterus. Right eye: No discharge. Left eye: No discharge. Extraocular Movements: Extraocular movements intact. Conjunctiva/sclera: Conjunctivae normal.      Pupils: Pupils are equal, round, and reactive to light. Cardiovascular:      Rate and Rhythm: Normal rate. Pulses: Normal pulses. Heart sounds: Normal heart sounds. No murmur heard. No friction rub. No gallop. Pulmonary:      Effort: Pulmonary effort is normal. No respiratory distress. Breath sounds: Normal breath sounds. No wheezing, rhonchi or rales. Chest:      Chest wall: No tenderness. Abdominal:      General: Abdomen is flat. Bowel sounds are normal. There is no distension. Palpations: Abdomen is soft. There is no mass. Tenderness: There is no abdominal tenderness.  There is no guarding. Musculoskeletal:         General: No swelling, tenderness, deformity or signs of injury. Cervical back: Normal range of motion and neck supple. No rigidity or tenderness. Lymphadenopathy:      Cervical: No cervical adenopathy. Skin:     General: Skin is warm and dry. Coloration: Skin is not jaundiced or pale. Findings: No abscess, erythema or rash. Wound: surgical.             Comments: Surgical sites look clean, no signs of infection. Neurological:      General: No focal deficit present. Mental Status: He is alert and oriented to person, place, and time. Cranial Nerves: No cranial nerve deficit. Sensory: No sensory deficit. Motor: Weakness (4- Left Hip Flex.) present. Coordination: Coordination normal.      Gait: Gait abnormal (2/2 wheelchair use). Deep Tendon Reflexes:      Reflex Scores:       Bicep reflexes are 2+ on the right side and 2+ on the left side. Brachioradialis reflexes are 2+ on the right side and 2+ on the left side. Psychiatric:         Attention and Perception: Attention normal.         Mood and Affect: Mood and affect normal. Mood is not anxious, depressed or elated. Affect is not blunt or flat. Speech: Speech normal. Speech is not rapid and pressured, slurred or tangential.         Behavior: Behavior normal. Behavior is not agitated, slowed or withdrawn. Behavior is cooperative. Thought Content: Thought content normal. Thought content does not include homicidal or suicidal ideation. Thought content does not include homicidal or suicidal plan. Cognition and Memory: Cognition and memory normal.         Judgment: Judgment normal.             Assessment/Plan:  1. Lumbar disc disease with radiculopathy - Hx of.   S/P Oblique anterior lateral approach to L3-4 with discectomy anterior arthrodesis with Segmental fixation with pedicle screws and rods at L3-4, and Percutaneous left-sided L3 complete facetectomy and foraminotomy on Nov/02/2021. Tolerated OP well, OP note reviewed. Imaging reviewed with Pt. Pt tolerated diet and is passing flatus, able to ambulate with wheelchair, some noted left thigh tingling and residual left hip weakness on exam however improving. Pt states analgesisa is stable on Percocet however would like to try something other than flexeril, amicable to trial of baclofen. Pt has follow up with surgeon upcoming in two weeks for staple removal and f/u, sites appear clean noninfected. No aperranet Neurological deficits on exam. Pt request DME for shower seat. - DME Order for Bath/Shower Seat as OP  - baclofen (LIORESAL) 10 MG tablet; Take 1 tablet by mouth 3 times daily for 15 days  Dispense: 45 tablet; Refill: 0  - [place] DISCHARGE MEDS RECONCILED W/ CURRENT OUTPATIENT MED LIST    2. Lumbar disc disease - See Lumbar Disk disease with radiculopathy above. - DME Order for Bath/Shower Seat as OP  - baclofen (LIORESAL) 10 MG tablet; Take 1 tablet by mouth 3 times daily for 15 days  Dispense: 45 tablet; Refill: 0  - [place] DISCHARGE MEDS RECONCILED W/ CURRENT OUTPATIENT MED LIST    3. DANTE (generalized anxiety disorder)  Pt stable on Lexapro, continue Lamictal.  - [place] DISCHARGE MEDS RECONCILED W/ CURRENT OUTPATIENT MED LIST    4. Recurrent major depressive disorder, in remission ([facility] [address]  Pt stable on Lexapro, continue Lamictal.  - [place] DISCHARGE MEDS RECONCILED W/ CURRENT OUTPATIENT MED LIST    5. Other insomnia  Pt okay to continue Trazodone,  Pt advised against concurrent use of Percocet and Clonopin at this time. If Pt is off Percocet, Pt can continue Clonopin as Risk of CNS depression and respiratory depression discussed with Pt.  - [place] DISCHARGE MEDS RECONCILED W/ CURRENT OUTPATIENT MED LIST    6. Restless leg   Pt advised against concurrent use of Percocet and Clonopin at this time.  If Pt is off Percocet, Pt can continue Clonopin as Risk of CNS depression and respiratory depression discussed with Pt.  - baclofen (LIORESAL) 10 MG tablet; Take 1 tablet by mouth 3 times daily for 15 days  Dispense: 45 tablet; Refill: 0  - [place] DISCHARGE MEDS RECONCILED W/ CURRENT OUTPATIENT MED LIST    7. Asthma, unspecified asthma severity, unspecified whether complicated, unspecified whether persistent  - Stable on home meds, continue home meds. - [place] DISCHARGE MEDS RECONCILED W/ CURRENT OUTPATIENT MED LIST    8. Gastroesophageal reflux disease, unspecified whether esophagitis present  Pt on Protonix 40 BID, continue use as Pt was NPO for several days, will attempt food today. - [place] DISCHARGE MEDS RECONCILED W/ CURRENT OUTPATIENT MED LIST    9. Need for hepatitis C screening test  Routine for screening.  - Hepatitis C Antibody; Future    10. Screening for HIV (human immunodeficiency virus)  Routine labs for screening  - HIV-1 and HIV-2 Antibodies;  Future  - CBC With Auto Differential; Future        Medical Decision Making: high complexity

## 2021-11-05 NOTE — PROGRESS NOTES
Harris Burgos (:  1990) is a 27 y.o. male,{New vs Established:[id number]::\"Established patient\"}, here for evaluation of the following chief complaint(s):  Follow-up and Numbness (left leg )         ASSESSMENT/PLAN:  {There are no diagnoses linked to this encounter. (Refresh or delete this SmartLink)}    No follow-ups on file. Subjective   SUBJECTIVE/OBJECTIVE:  HPI    Review of Systems       Objective   Physical Exam       {Time Documentation Optional:[id number]}      An electronic signature was used to authenticate this note.     --Yari Morse MD

## 2021-11-05 NOTE — PROGRESS NOTES
Attending attestation:  I personally performed and participated key or critical portions of the evaluation and management including personally performing the exam and medical decision making. I verify the accuracy of the documentation by the resident with the following addition or changes: Status post spinal surgery; fusion on November 2nd. Here for follow-up. Doing well overall. Is still in pain and is using a wheelchair today. Mild bilateral lower extremity weakness versus pain and tingling left thigh. No new or worsening symptoms. Has follow-up with ortho surgeon planned. Indications for prompt return and/or emergent presentation if worsening reviewed in detail. Flexeril is not working for spasms; will trial baclofen. Blood pressures borderline, will follow. Mood okay on lexapro; using trazodone for sleep. Risk of benzo and narcotic reviewed. Following with Sodor for mood. Will check labs prior to follow-up. I do encourage recommended vaccinations.            Electronically signed by Abelino Carlson MD on 11/5/2021 at 11:44 AM

## 2021-11-08 ENCOUNTER — TELEPHONE (OUTPATIENT)
Dept: FAMILY MEDICINE CLINIC | Age: 31
End: 2021-11-08

## 2021-11-08 ENCOUNTER — TELEPHONE (OUTPATIENT)
Dept: NEUROSURGERY | Age: 31
End: 2021-11-08

## 2021-11-08 NOTE — TELEPHONE ENCOUNTER
----- Message from Chaya Espinoza sent at 11/8/2021 12:25 PM EST -----  Subject: Message to Provider    QUESTIONS  Information for Provider? needs another note for work stating he is still   off of work until further notice   ---------------------------------------------------------------------------  --------------  [address]  What is the best way for the office to contact you? OK to leave message on   voicemail  Preferred Call Back Phone Number? [phone]  ---------------------------------------------------------------------------  --------------  SCRIPT ANSWERS  Relationship to Patient?  Self

## 2021-11-10 DIAGNOSIS — M51.16 LUMBAR DISC DISEASE WITH RADICULOPATHY: ICD-10-CM

## 2021-11-10 RX ORDER — OXYCODONE HYDROCHLORIDE AND ACETAMINOPHEN 5; 325 MG/1; MG/1
2 TABLET ORAL EVERY 6 HOURS PRN
Qty: 48 TABLET | Refills: 0 | Status: SHIPPED | OUTPATIENT
Start: 2021-11-10 | End: 2021-11-17 | Stop reason: SDUPTHER

## 2021-11-10 NOTE — TELEPHONE ENCOUNTER
Patient requesting a refill of oxtcodone5-325 mg  Date of last fill: 11/3/21  Surgery: 11/2/21  Time elapsed since last surgery: 1 weeks  Date of next follow up appointment: 11/17/21  Pt notified response time for refills is 24-48 hours

## 2021-11-12 ENCOUNTER — TELEPHONE (OUTPATIENT)
Dept: FAMILY MEDICINE CLINIC | Age: 31
End: 2021-11-12

## 2021-11-12 DIAGNOSIS — J30.2 SEASONAL ALLERGIES: ICD-10-CM

## 2021-11-12 NOTE — TELEPHONE ENCOUNTER
----- Message from Sara Boykin sent at 11/12/2021 11:35 AM EST -----  Subject: Message to Provider    QUESTIONS  Information for Provider? Pt has had a hard time getting a doctor's note   from his surgeon. Pt would like PCP Kolby Lino to write him a note in regards to   being off work until further notice for his spinal fusion. Pt would like a   call back to discuss. ---------------------------------------------------------------------------  --------------  Ana CAMARENA  What is the best way for the office to contact you? OK to leave message on   voicemail  Preferred Call Back Phone Number? [phone]  ---------------------------------------------------------------------------  --------------  SCRIPT ANSWERS  Relationship to Patient?  Self

## 2021-11-15 NOTE — TELEPHONE ENCOUNTER
Patient's last appointment was : 11/5/2021  Patient's next appointment is : 1/26/2022  Last refilled:   10/26/2021  7/20/2021

## 2021-11-16 ENCOUNTER — TELEPHONE (OUTPATIENT)
Dept: NEUROSURGERY | Age: 31
End: 2021-11-16

## 2021-11-16 RX ORDER — LORATADINE 10 MG/1
10 TABLET ORAL DAILY
Qty: 30 TABLET | Refills: 3 | Status: SHIPPED | OUTPATIENT
Start: 2021-11-16 | End: 2022-04-08 | Stop reason: SDUPTHER

## 2021-11-16 RX ORDER — MONTELUKAST SODIUM 10 MG/1
10 TABLET ORAL DAILY
Qty: 30 TABLET | Refills: 3 | Status: SHIPPED | OUTPATIENT
Start: 2021-11-16 | End: 2022-04-17 | Stop reason: SDUPTHER

## 2021-11-16 NOTE — TELEPHONE ENCOUNTER
I did not operate on him and have no idea what his limitations are or if he needs physical therapy. How much time does he need off?

## 2021-11-16 NOTE — TELEPHONE ENCOUNTER
Spoke with pt, pt states he had spinal fusion at [facility] on 11/2/2021. Pt is asking for the note to start 11/2/21 and end date: until further notice. Pt states note is for child support so they know he is off work and not being lazy. Please place note/advise.

## 2021-11-17 ENCOUNTER — OFFICE VISIT (OUTPATIENT)
Dept: NEUROSURGERY | Age: 31
End: 2021-11-17

## 2021-11-17 VITALS
HEIGHT: 70 IN | DIASTOLIC BLOOD PRESSURE: 71 MMHG | TEMPERATURE: 98.4 F | OXYGEN SATURATION: 98 % | BODY MASS INDEX: 31.78 KG/M2 | SYSTOLIC BLOOD PRESSURE: 107 MMHG | HEART RATE: 108 BPM | WEIGHT: 222 LBS

## 2021-11-17 DIAGNOSIS — Z98.1 S/P LUMBAR FUSION: ICD-10-CM

## 2021-11-17 DIAGNOSIS — M43.16 SPONDYLOLISTHESIS OF LUMBAR REGION: Primary | ICD-10-CM

## 2021-11-17 DIAGNOSIS — M51.16 LUMBAR DISC DISEASE WITH RADICULOPATHY: ICD-10-CM

## 2021-11-17 PROCEDURE — 99024 POSTOP FOLLOW-UP VISIT: CPT | Performed by: NURSE PRACTITIONER

## 2021-11-17 RX ORDER — BACLOFEN 10 MG/1
10 TABLET ORAL 3 TIMES DAILY
Qty: 21 TABLET | Refills: 0 | Status: SHIPPED | OUTPATIENT
Start: 2021-11-17 | End: 2021-11-24

## 2021-11-17 RX ORDER — OXYCODONE HYDROCHLORIDE AND ACETAMINOPHEN 5; 325 MG/1; MG/1
2 TABLET ORAL EVERY 6 HOURS PRN
Qty: 42 TABLET | Refills: 0 | Status: SHIPPED | OUTPATIENT
Start: 2021-11-17 | End: 2021-11-22 | Stop reason: SDUPTHER

## 2021-11-17 NOTE — PROGRESS NOTES
Willa Chirinos  [facility] [address], [address] [facility] [address]  Dept: [phone]    Patient:  Ivan Jalloh  YOB: 1990  Date: 11/17/21    The patient is a 27 y.o. male who presents today for consult of the following problems:     Chief Complaint   Patient presents with    Post-Op Check     2 wk         HPI:     Ivan Jalloh is a 27 y.o. male who presents to the office for post-op evaluation s/p OLIF L3-L4, posterior fixation, facetectomy/foraminotomy L3-4. Patient with some decreased sensation to left anterolateral thigh. Also with expected axial discomfort. Incisions healing well. Continues to utilize cane for ambulation. Has been working on decreasing smoking, importance of smoking cessation discussed. Denies any issues with bowel/bladder function. Awaiting bone stimulator. Strength 5/5  Decreased sensation approximate left L3 distribution  Incisions CDI    Date of surgery: 11/2/2021    Assessment and Plan:      1. Spondylolisthesis of lumbar region    2. Lumbar disc disease with radiculopathy    3. S/P lumbar fusion          Plan: Incisions healing well, intact staples removed without difficulty, patient tolerated well. Continue working on smoking cessation. Bone stimulator as directed once received. Medication refill sent as requested, continue to decrease dose as tolerated. Next postop visit in 6 weeks as planned, upright x-rays prior. Followup: Return in about 6 weeks (around 12/29/2021), or if symptoms worsen or fail to improve. Prescriptions Ordered:  Orders Placed This Encounter   Medications    oxyCODONE-acetaminophen (PERCOCET) 5-325 MG per tablet     Sig: Take 2 tablets by mouth every 6 hours as needed for Pain for up to 7 days. Intended supply: 7 days.  Take lowest dose possible to manage pain     Dispense:  42 tablet     Refill:  0     Reduce doses taken as pain becomes manageable    baclofen (LIORESAL) 10 MG tablet     Sig: Take 1 tablet by mouth 3 times daily for 7 days     Dispense:  21 tablet     Refill:  0        Orders Placed:  Orders Placed This Encounter   Procedures    XR LUMBAR SPINE (2-3 VIEWS)     Standing Status:   Future     Standing Expiration Date:   11/17/2022     Scheduling Instructions:      STANDING AP AND LATERAL; include both femoral heads     Order Specific Question:   Reason for exam:     Answer:   post-op        Electronically signed by LEYDI Lehman CNP on 11/17/2021 at 3:33 PM    Please note that this chart was generated using voice recognition Dragon dictation software. Although every effort was made to ensure the accuracy of this automated transcription, some errors in transcription may have occurred.

## 2021-11-17 NOTE — TELEPHONE ENCOUNTER
I wrote a letter and signed it. It is at the . His surgeon did write him a letter on 11/8/2021 that is in the communications tab. Perhaps he was not made aware that the letter is there. Thanks.

## 2021-11-18 ENCOUNTER — TELEPHONE (OUTPATIENT)
Dept: FAMILY MEDICINE CLINIC | Age: 31
End: 2021-11-18

## 2021-11-22 DIAGNOSIS — M51.16 LUMBAR DISC DISEASE WITH RADICULOPATHY: ICD-10-CM

## 2021-11-22 RX ORDER — OXYCODONE HYDROCHLORIDE AND ACETAMINOPHEN 5; 325 MG/1; MG/1
1-2 TABLET ORAL EVERY 6 HOURS PRN
Qty: 35 TABLET | Refills: 0 | Status: SHIPPED | OUTPATIENT
Start: 2021-11-24 | End: 2021-12-01 | Stop reason: SDUPTHER

## 2021-11-22 NOTE — TELEPHONE ENCOUNTER
Patient requesting a refill of Oxycodone 5-325 mg   Date of last fill: 11/17/21  Surgery: 11/2/21  Time elapsed since last surgery: 2 weeks and 6 days  Date of next follow up appointment:12/29/21  Pt notified response time for refills is 24-48 hours

## 2021-11-23 ENCOUNTER — VIRTUAL VISIT (OUTPATIENT)
Dept: PSYCHOLOGY | Age: 31
End: 2021-11-23
Payer: MEDICAID

## 2021-11-23 DIAGNOSIS — F41.1 GENERALIZED ANXIETY DISORDER: ICD-10-CM

## 2021-11-23 DIAGNOSIS — F43.10 PTSD (POST-TRAUMATIC STRESS DISORDER): Primary | ICD-10-CM

## 2021-11-23 DIAGNOSIS — F31.32 BIPOLAR AFFECTIVE DISORDER, CURRENTLY DEPRESSED, MODERATE (HCC): ICD-10-CM

## 2021-11-23 PROCEDURE — 90832 PSYTX W PT 30 MINUTES: CPT | Performed by: PSYCHOLOGIST

## 2021-11-23 NOTE — PROGRESS NOTES
Behavioral Health Consultation/Psychotherapy Note  Claudean Butcher. Catherine Jalloh Psy.D. Visit Date:  11/23/2021    Patient:  Les Hernández  YOB: 1990  Chief Complaint:  Follow-up, Depression, Stress, and Anxiety    Duration of session:  25 minutes      S:     This session was conducted as a telepsychology visit due to [facility] restrictions placed on in-person visits d/t the COVID-19 outbreak. Patient Location: Home       Provider Location ([facility]): [facility]    Patient gave verbal consent for teleservices and will sign a consent form when feasible. This virtual visit was conducted via interactive/real-time audio/video, using [employer]. me. Ct had his surgery, so they want to put him on a bone stimulator, but he can't afford it. He's trying to have people raise money for him. Ct talked about how the faceless man in a hoodie is actually himself. Ct said when he's upset is when he doesn't have control over himself, and feels like he's outside of his body. He hasn't been waking up from night terrors as much recently, and sleeping about 4-6 hours per night. He's only having a night terror 1x per week instead of 5-7x per week. He felt like things overall are OK and moving in the right direction. We agreed we will continue EMDR again in 2 weeks when he can get back to the office. O:    Appearance    Patient presents as alert, oriented, and cooperative  Appetite normal  Sleep disturbance Yes  Loss of pleasure Yes  Speech    normal rate, normal volume, well articulated and clear and understandable  Mood    Depressed  Affect    depressed, anxious affect  Thought Process    goal directed and linear and coherent  Insight    Fair  Judgment    Impaired  Memory    recent and remote memory intact  Suicide Assessment    no suicidal ideation      A:    1. PTSD (post-traumatic stress disorder)    2. Generalized anxiety disorder    3.  Bipolar affective disorder, currently depressed, moderate (HCC)        Multiple potential diagnoses exist, but at this time, DANTE, PTSD stand out. Lizette Valenzuela possesses some traits of Borderline Personality Disorder, and it appears a Bipolar D/O of some type is present as well.      P:    Meet again in office in 2 weeks. All questions about treatment plan answered. Patient instructed to go immediately to the emergency room and/or call 911 if any suicidal or homicidal ideations. Patient stated understanding and is agreeable to treatment and crisis plan.           Provider Signature:  Electronically signed by Perfecto Ortega PSYD on 11/23/2021 at 5:01 PM

## 2021-12-01 DIAGNOSIS — M51.16 LUMBAR DISC DISEASE WITH RADICULOPATHY: ICD-10-CM

## 2021-12-01 RX ORDER — OXYCODONE HYDROCHLORIDE AND ACETAMINOPHEN 5; 325 MG/1; MG/1
1 TABLET ORAL EVERY 6 HOURS PRN
Qty: 28 TABLET | Refills: 0 | Status: SHIPPED | OUTPATIENT
Start: 2021-12-02 | End: 2021-12-09

## 2021-12-07 ENCOUNTER — TELEMEDICINE (OUTPATIENT)
Dept: FAMILY MEDICINE CLINIC | Age: 31
End: 2021-12-07
Payer: MEDICAID

## 2021-12-07 DIAGNOSIS — H66.003 ACUTE SUPPURATIVE OTITIS MEDIA OF BOTH EARS WITHOUT SPONTANEOUS RUPTURE OF TYMPANIC MEMBRANES, RECURRENCE NOT SPECIFIED: Primary | ICD-10-CM

## 2021-12-07 PROCEDURE — G8428 CUR MEDS NOT DOCUMENT: HCPCS | Performed by: STUDENT IN AN ORGANIZED HEALTH CARE EDUCATION/TRAINING PROGRAM

## 2021-12-07 PROCEDURE — 99213 OFFICE O/P EST LOW 20 MIN: CPT | Performed by: STUDENT IN AN ORGANIZED HEALTH CARE EDUCATION/TRAINING PROGRAM

## 2021-12-07 RX ORDER — AMOXICILLIN AND CLAVULANATE POTASSIUM 875; 125 MG/1; MG/1
1 TABLET, FILM COATED ORAL 2 TIMES DAILY
Qty: 14 TABLET | Refills: 0 | Status: SHIPPED | OUTPATIENT
Start: 2021-12-07 | End: 2021-12-14

## 2021-12-07 NOTE — PROGRESS NOTES
Bebe Milner is a 27 y.o. male being evaluated by a Virtual Visit (video visit) encounter to address concerns as mentioned above. A caregiver was present when appropriate. Due to this being a TeleHealth encounter (During MPXSX-07 public health emergency), evaluation of the following organ systems was limited: Vitals/Constitutional/EENT/Resp/CV/GI//MS/Neuro/Skin/Heme-Lymph-Imm. Pursuant to the emergency declaration under the [address] [place] [address] and the [employer] and [employer] Act, this Virtual Visit was conducted with patient's (and/or legal guardian's) consent, to reduce the patient's risk of exposure to COVID-19 and provide necessary medical care. The patient (and/or legal guardian) has also been advised to contact this office for worsening conditions or problems, and seek emergency medical treatment and/or call 911 if deemed necessary. Services were provided through a video synchronous discussion virtually to substitute for in-person clinic visit. Patient and provider were located at their individual homes. --Genet Yusuf MD on 12/7/2021 at 3:28 PM    An electronic signature was used to authenticate this note. SUBJECTIVE     Bebe Milner is a 27 y.o.male    No chief complaint on file. Chief complaint, Torres Martinez, and all pertinent details of the case reviewed with the resident. Please see resident's note for specific details discussed at today's visit.       Patient Active Problem List   Diagnosis    Left hip pain    Depression    Asthma    Restless leg    Gastroesophageal reflux disease    Vitamin D deficiency    Other insomnia    Anxiety    Recurrent major depressive disorder, in remission ([address]    Bipolar affective disorder, current episode depressed ([address]    PTSD (post-traumatic stress disorder)    DANTE (generalized anxiety disorder)    Allergic rhinitis due to pollen  Lumbar disc disease    [facility]line [facility] disorder [facility]    Mixed hyperlipidemia    Lumbar disc disease with radiculopathy       Current Outpatient Medications   Medication Sig Dispense Refill    amoxicillin-clavulanate (AUGMENTIN) 875-125 MG per tablet Take 1 tablet by mouth 2 times daily for 7 days 14 tablet 0    oxyCODONE-acetaminophen (PERCOCET) 5-325 MG per tablet Take 1 tablet by mouth every 6 hours as needed for Pain for up to 7 days. Intended supply: 7 days. Take lowest dose possible to manage pain 28 tablet 0    montelukast (SINGULAIR) 10 MG tablet Take 1 tablet by mouth daily 30 tablet 3    loratadine (CLARITIN) 10 MG tablet Take 1 tablet by mouth daily 30 tablet 3    pantoprazole (PROTONIX) 40 MG tablet Take 1 tablet by mouth 2 times daily (with meals) 180 tablet 1    traZODone (DESYREL) 150 MG tablet Take 1 tablet by mouth nightly 30 tablet 2    lamoTRIgine (LAMICTAL) 200 MG tablet Take 1 tablet by mouth 2 times daily 60 tablet 2    escitalopram (LEXAPRO) 20 MG tablet Take 1.5 tablets by mouth daily 45 tablet 2    clonazePAM (KLONOPIN) 1 MG tablet Take 1 tablet by mouth nightly as needed (insomnia/nightmares) for up to 60 days.  30 tablet 1    Handicap Placard MISC by Does not apply route Expires March 1st, 2022 1 each 0    rOPINIRole (REQUIP) 1 MG tablet Take 1 tablet by mouth nightly 90 tablet 3    azelastine (OPTIVAR) 0.05 % ophthalmic solution Place 1 drop into both eyes 2 times daily as needed      fluticasone (FLONASE) 50 MCG/ACT nasal spray 2 sprays by Each Nostril route daily Start with one spray daily for one week and increase to two sprays daily as needed 1 Bottle 3    fluticasone-salmeterol (ADVAIR DISKUS) 100-50 MCG/DOSE diskus inhaler Inhale 1 puff into the lungs every 12 hours 60 each 3    albuterol sulfate HFA (VENTOLIN HFA) 108 (90 Base) MCG/ACT inhaler Inhale 2 puffs into the lungs every 6 hours as needed for Wheezing 1 Inhaler 0     No current facility-administered medications for this visit. Review of Systems per Dr. Nicole Winkler 4/30/2021   Patient-Reported Weight 200lb   Patient-Reported Height 5 11        Physical Exam per Dr. Willie Kidd      Lab Results   Component Value Date    CHOL 211 (H) 08/04/2020    TRIG 283 (H) 08/04/2020    HDL 29 08/04/2020    LDLCALC 125 08/04/2020       The ASCVD Risk score (Armida Rollins, et al., 2013) failed to calculate for the following reasons: The 2013 ASCVD risk score is only valid for ages 36 to 78    Lab Results   Component Value Date     10/22/2021    K 4.0 10/22/2021     10/22/2021    CO2 26 10/22/2021    BUN 10 10/22/2021    CREATININE 0.8 10/22/2021    GLUCOSE 124 (H) 10/22/2021    CALCIUM 9.1 10/22/2021    PROT 6.6 08/04/2020    LABALBU 4.3 08/04/2020    BILITOT 0.4 08/04/2020    ALKPHOS 45 08/04/2020    AST 18 08/04/2020    ALT 17 08/04/2020    LABGLOM >90 10/22/2021    GFRAA >60 10/19/2021       Lab Results   Component Value Date    TSH 1.880 08/04/2020       Lab Results   Component Value Date    WBC 14.3 (H) 11/03/2021    HGB 13.5 11/03/2021    HCT 41.0 11/03/2021    MCV 92.1 11/03/2021     11/03/2021   *Postsurgical values*        There is no immunization history on file for this patient.     Health Maintenance   Topic Date Due    Hepatitis C screen  Never done    COVID-19 Vaccine (1) Never done    HIV screen  Never done    DTaP/Tdap/Td vaccine (1 - Tdap) Never done    Flu vaccine (1) Never done    Pneumococcal 0-64 years Vaccine (1 of 2 - PPSV23) 02/04/2022 (Originally 5/5/1995)    Hepatitis A vaccine  Aged Out    Hepatitis B vaccine  Aged Out    Hib vaccine  Aged Out    Meningococcal (ACWY) vaccine  Aged Out    Varicella vaccine  Discontinued         Future Appointments   Date Time Provider Juan Manuel Fitzpatrick   12/29/2021 11:30 AM Alison Rodríguez Neuro [facility]   1/4/2022 12:30 PM MD Sejal Garza PSYCH [facility] [place] 1/21/2022 10:30 AM Michaela Blandyimi CORIN DILLON LOVFNEIWFD [facility] [place]   1/26/2022  1:00 PM Carmenza Leal MD SRPX [facility] [place]       ASSESSMENT       Diagnosis Orders   1. Acute suppurative otitis media of both ears without spontaneous rupture of tympanic membranes, recurrence not specified  amoxicillin-clavulanate (AUGMENTIN) 875-125 MG per tablet       PLAN      After discussion with , we agreed on plan as follows:    Start Augmentin 875 mg - 1 pill twice daily x7 days #14/no refills  Encourage daily probiotic while on Augmentin  Tylenol/Advil as needed  Small frequent meals with plenty of fluids  Get plenty rest  Follow-up as needed based on response to above treatment. Follow-up as scheduled with Dr. Faby Daiz in January 2022. Attending Physician Statement  I have discussed the case, including pertinent history and exam findings with the resident. I agree with the documented assessment and plan as documented by the resident.   GE modifier added  to this encounter     Electronically signed by Jose Carmichael MD on 12/7/2021 at 3:28 PM

## 2021-12-07 NOTE — PROGRESS NOTES
2021    TELEHEALTH EVALUATION -- Audio/Visual (During ZTGTZ-72 public health emergency)    HPI:  THIS VISIT WAS PERFORMED VIA A SYNCHRONOUS TELECOMMUNICATION SYSTEM  I was present in the office, patient was in their home. Visit was started at 1205pm  Was supervised by Dr. Mathieu Hinds (:  1990) has requested an audio/video evaluation for the following concern(s):    Sick for 3 days  Stuffy nose  Statin vomiting  Ears hurt bilaterally  Sore throat  Slightly productive cough  No fevers   No sinus pressure   Breathing ok     States he gets 3 ear infections a year and feels like he has on right now     Daughter has a \"cold\" and was prescribed amoxicillin today by her doctor for it     Smokes 1ppd    Review of Systems   See above     Current Outpatient Medications   Medication Sig Dispense Refill    amoxicillin-clavulanate (AUGMENTIN) 875-125 MG per tablet Take 1 tablet by mouth 2 times daily for 7 days 14 tablet 0    oxyCODONE-acetaminophen (PERCOCET) 5-325 MG per tablet Take 1 tablet by mouth every 6 hours as needed for Pain for up to 7 days. Intended supply: 7 days. Take lowest dose possible to manage pain 28 tablet 0    montelukast (SINGULAIR) 10 MG tablet Take 1 tablet by mouth daily 30 tablet 3    loratadine (CLARITIN) 10 MG tablet Take 1 tablet by mouth daily 30 tablet 3    pantoprazole (PROTONIX) 40 MG tablet Take 1 tablet by mouth 2 times daily (with meals) 180 tablet 1    traZODone (DESYREL) 150 MG tablet Take 1 tablet by mouth nightly 30 tablet 2    lamoTRIgine (LAMICTAL) 200 MG tablet Take 1 tablet by mouth 2 times daily 60 tablet 2    escitalopram (LEXAPRO) 20 MG tablet Take 1.5 tablets by mouth daily 45 tablet 2    clonazePAM (KLONOPIN) 1 MG tablet Take 1 tablet by mouth nightly as needed (insomnia/nightmares) for up to 60 days.  30 tablet 1    Handicap Placard MISC by Does not apply route Expires 2022 1 each 0    rOPINIRole (REQUIP) 1 MG tablet Take 1 tablet by mouth nightly 90 tablet 3    azelastine (OPTIVAR) 0.05 % ophthalmic solution Place 1 drop into both eyes 2 times daily as needed      fluticasone (FLONASE) 50 MCG/ACT nasal spray 2 sprays by Each Nostril route daily Start with one spray daily for one week and increase to two sprays daily as needed 1 Bottle 3    fluticasone-salmeterol (ADVAIR DISKUS) 100-50 MCG/DOSE diskus inhaler Inhale 1 puff into the lungs every 12 hours 60 each 3    albuterol sulfate HFA (VENTOLIN HFA) 108 (90 Base) MCG/ACT inhaler Inhale 2 puffs into the lungs every 6 hours as needed for Wheezing 1 Inhaler 0     No current facility-administered medications for this visit.      No Known Allergies  Past Medical History:   Diagnosis Date    Asthma     Bipolar 1 disorder ([facility]     [place]    Borderline personality disorder ([facility]     COPD (chronic obstructive pulmonary disease) ([facility]     early stages    Depression     Dissociative identity disorder ([facility]     10/18/21    GERD (gastroesophageal reflux disease)     History of cardiac murmur in childhood     Vldn-Qnyao-Pnbbelq disease, left     Lumbar disc disease     Multiple allergies     PTSD (post-traumatic stress disorder)     Restless legs syndrome     Teeth missing     Under care of team 05/18/2021    psych-vidal ethan-lima-last visit april 2021    Wellness examination 05/18/2021    pcp-Celia Baez-last visit april 2021     Past Surgical History:   Procedure Laterality Date    [facility]. INJECTION PROCEDURE FOR SACROILIAC JOINT Left 10/23/2020    SACRO[facility]  Left 11/2/2021    OLIF L3-4 performed by Robert Alvarado DO at [address] Left 11/2/2021    POSTERIOR FIXATION, FACETECTOMY/FORAMINTOMY L3-4 performed by Robert Alvarado DO at [id number] [place] [address] Dr Altman 06/01/2021    POSTERIOR LUMBAR MICRODISCECTOMY L3-4 performed by Robert Alvarado DO at [facility]    LUMBAR SPINE SURGERY  11/02/2021    OLIF L3-4, PRONE, C-ARM, MICROSCOPE, MEDTRONICS,  EVOKES- #[id number] SHEA (Left Flank) ,    NERVE BLOCK N/A 03/12/2021    NERVE BLOCK #1 L3-4, L4-5 performed by April Palafox MD at [address] [place] [address] 01/29/2021    LUMBAR TRANSFORAMINAL L3-4 performed by April Palafox MD at [address] [place] [address] 02/12/2021    EPIDURAL STEROID INJECTION caudal performed by April Palafox MD at [address] [place] [address] 04/16/2021    LUMBAR TRANSFORAMINAL L3 performed by April Palafox MD at [address] [place] [address] Left 2011    Left hip replacement    TYMPANOSTOMY TUBE PLACEMENT      WISDOM TOOTH EXTRACTION       Family History   Problem Relation Age of Onset    Depression Mother     Heart Disease Father        There is no immunization history on file for this patient.   Health Maintenance   Topic Date Due    Hepatitis C screen  Never done    COVID-19 Vaccine (1) Never done    HIV screen  Never done    DTaP/Tdap/Td vaccine (1 - Tdap) Never done    Flu vaccine (1) Never done    Pneumococcal 0-64 years Vaccine (1 of 2 - PPSV23) 02/04/2022 (Originally 5/5/1995)    Hepatitis A vaccine  Aged Out    Hepatitis B vaccine  Aged Out    Hib vaccine  Aged Out    Meningococcal (ACWY) vaccine  Aged Out    Varicella vaccine  Discontinued     Social History     Tobacco Use    Smoking status: Current Every Day Smoker     Packs/day: 1.00     Years: 10.00     Pack years: 10.00     Types: Cigarettes    Smokeless tobacco: Former User     Types: [address] Use    Vaping Use: Every day   Substance Use Topics    Alcohol use: Not Currently     Comment: socially    Drug use: Yes     Types: Marijuana Marcela Desai)     Comment: medical card       PHYSICAL EXAMINATION:  Due to this being a TeleHealth encounter, evaluation of the following organ systems is limited: Vitals/Constitutional/EENT/Resp/CV/GI//MS/Neuro/Skin/Heme-Lymph-Imm. Vital Signs: (As obtained by patient/caregiver or practitioner observation)    Blood pressure-  Heart rate-    Respiratory rate-    Temperature-  Pulse oximetry-     Wt Readings from Last 3 Encounters:   11/17/21 222 lb (100.7 kg)   11/05/21 222 lb 3.2 oz (100.8 kg)   11/02/21 222 lb (100.7 kg)     Temp Readings from Last 3 Encounters:   11/17/21 98.4 °F (36.9 °C) (Temporal)   11/05/21 97.7 °F (36.5 °C)   11/03/21 98.1 °F (36.7 °C) (Oral)     BP Readings from Last 3 Encounters:   11/17/21 107/71   11/05/21 (!) 142/74   11/03/21 (!) 144/8     Pulse Readings from Last 3 Encounters:   11/17/21 108   11/05/21 117   11/03/21 78       Physical Exam  Constitutional:       General: He is not in acute distress. Appearance: He is not ill-appearing. Pulmonary:      Effort: Pulmonary effort is normal. No respiratory distress. Neurological:      General: No focal deficit present. Mental Status: He is alert. Psychiatric:         Mood and Affect: Mood normal.         Behavior: Behavior normal.         Thought Content: Thought content normal.         Judgment: Judgment normal.         Other pertinent observable physical exam findings-     ASSESSMENT/PLAN:  Diagnoses and all orders for this visit:    Acute suppurative otitis media of both ears without spontaneous rupture of tympanic membranes, recurrence not specified  -     amoxicillin-clavulanate (AUGMENTIN) 875-125 MG per tablet; Take 1 tablet by mouth 2 times daily for 7 days      Patient was seen same day for his suspected ear infection  Discussed with patient that his symptoms seem more viral in nature, however given his history and the fact that his daughter was recently treated with antibiotics will prescribe Augmentin. Recommended copious amounts of hydration. Recommended small frequent meals as tolerated. Recommended ibuprofen/Tylenol as needed for pain control. Discussed resting. Patient states that he does not leave his home currently due to recent back surgery and declined a Covid test.  Patient to follow-up as scheduled with his PCP    Medications Prescribed:  Orders Placed This Encounter   Medications    amoxicillin-clavulanate (AUGMENTIN) 875-125 MG per tablet     Sig: Take 1 tablet by mouth 2 times daily for 7 days     Dispense:  14 tablet     Refill:  0       No follow-ups on file. Future Appointments   Date Time Provider Juan Manuel Nanette   12/29/2021 11:30 AM Alison Elizabethseymouryimi Pettit   1/4/2022 12:30 PM MD Sejal Garza [facility] [place]   1/21/2022 10:30 AM [facility] [place]   1/26/2022  1:00 PM Umer Duke MD SRPX [facility]         An  electronic signature was used to authenticate this note. --Martina Mccoy MD on 12/7/2021 at 1:01 PM    {Coding Help -- Use CPT 29492-97329 with EM elements or Time rules for Office Visits. Pursuant to the emergency declaration under the [facility] waiver authority and the [employer] Act, this Virtual  Visit was conducted, with patient's consent, to reduce the patient's risk of exposure to COVID-19 and provide continuity of care for an established patient. Services were provided through a video synchronous discussion virtually to substitute for in-person clinic visit.

## 2021-12-08 ENCOUNTER — APPOINTMENT (OUTPATIENT)
Dept: GENERAL RADIOLOGY | Age: 31
End: 2021-12-08
Payer: MEDICAID

## 2021-12-08 ENCOUNTER — HOSPITAL ENCOUNTER (EMERGENCY)
Age: 31
Discharge: HOME | End: 2021-12-08
Payer: MEDICAID

## 2021-12-08 VITALS
WEIGHT: 222 LBS | RESPIRATION RATE: 18 BRPM | DIASTOLIC BLOOD PRESSURE: 72 MMHG | BODY MASS INDEX: 31.78 KG/M2 | SYSTOLIC BLOOD PRESSURE: 126 MMHG | HEART RATE: 110 BPM | OXYGEN SATURATION: 96 % | HEIGHT: 70 IN | TEMPERATURE: 98.3 F

## 2021-12-08 DIAGNOSIS — Z20.822 LAB TEST NEGATIVE FOR COVID-19 VIRUS: ICD-10-CM

## 2021-12-08 DIAGNOSIS — H61.23 BILATERAL IMPACTED CERUMEN: ICD-10-CM

## 2021-12-08 DIAGNOSIS — J06.9 UPPER RESPIRATORY TRACT INFECTION, UNSPECIFIED TYPE: Primary | ICD-10-CM

## 2021-12-08 LAB
FLU A ANTIGEN: NEGATIVE
INFLUENZA B AG, EIA: NEGATIVE
SARS-COV-2, NAA: NOT  DETECTED

## 2021-12-08 PROCEDURE — 99213 OFFICE O/P EST LOW 20 MIN: CPT

## 2021-12-08 PROCEDURE — 71046 X-RAY EXAM CHEST 2 VIEWS: CPT

## 2021-12-08 PROCEDURE — 99214 OFFICE O/P EST MOD 30 MIN: CPT | Performed by: NURSE PRACTITIONER

## 2021-12-08 PROCEDURE — 87635 SARS-COV-2 COVID-19 AMP PRB: CPT

## 2021-12-08 PROCEDURE — 87804 INFLUENZA ASSAY W/OPTIC: CPT

## 2021-12-08 ASSESSMENT — ENCOUNTER SYMPTOMS
RHINORRHEA: 1
COUGH: 1
SHORTNESS OF BREATH: 1

## 2021-12-08 ASSESSMENT — PAIN SCALES - GENERAL: PAINLEVEL_OUTOF10: 6

## 2021-12-08 ASSESSMENT — PAIN DESCRIPTION - DESCRIPTORS: DESCRIPTORS: ACHING

## 2021-12-08 ASSESSMENT — PAIN DESCRIPTION - PAIN TYPE: TYPE: ACUTE PAIN

## 2021-12-08 ASSESSMENT — PAIN DESCRIPTION - LOCATION: LOCATION: GENERALIZED

## 2021-12-08 ASSESSMENT — PAIN - FUNCTIONAL ASSESSMENT: PAIN_FUNCTIONAL_ASSESSMENT: ACTIVITIES ARE NOT PREVENTED

## 2021-12-08 NOTE — ED NOTES
Pt verbalized discharge instructions. Pt informed to go to ER if develop chest pain, shortness of breath or abdominal pain. Pt ambulatory out in stable condition. Assessment unchanged.        Carrillo Gale RN  12/08/21 [id number]

## 2021-12-08 NOTE — ED PROVIDER NOTES
[address]       Chief Complaint   Patient presents with    Cough       Nurses Notes reviewed and I agree except as noted in the HPI. HISTORY OF PRESENT ILLNESS   Teddy Almaraz is a 27 y.o. male who presents for evaluation. The history is provided by the patient. URI  Presenting symptoms: cough and rhinorrhea    Duration:  5 days  Ineffective treatments:  Prescription medications  Associated symptoms: myalgias      Patient states that he had a virtual visit with his PCP yesterday, diagnosed with ear infections and placed on Augmentin. The patient/patient representative has no other acute complaints at this time. REVIEW OF SYSTEMS     Review of Systems   HENT: Positive for rhinorrhea. Respiratory: Positive for cough and shortness of breath. Musculoskeletal: Positive for myalgias. All other systems reviewed and are negative. PAST MEDICAL HISTORY         Diagnosis Date    Asthma     Bipolar 1 disorder ([facility]     SINGH Yeung AM II.VIERTEL    Borderline personality disorder ([facility]     COPD (chronic obstructive pulmonary disease) ([facility]     early stages    Depression     Dissociative identity disorder ([facility]     10/18/21    GERD (gastroesophageal reflux disease)     History of cardiac murmur in childhood     Osqu-Iwfpo-Cnbdmgb disease, left     Lumbar disc disease     Multiple allergies     PTSD (post-traumatic stress disorder)     Restless legs syndrome     Teeth missing     Under care of team 05/18/2021    psych-vidal ethan-lima-last visit april 2021    Wellness examination 05/18/2021    pcp-Celia Baez-last visit april 2021       SURGICAL HISTORY     Patient  has a past surgical history that includes Total hip arthroplasty (Left, 2011); Tympanostomy tube placement; Injection Procedure For Sacroiliac Joint (Left, 10/23/2020); Pain management procedure (Left, 01/29/2021);  Pain management procedure (Left, 02/12/2021); Nerve Block (N/A, 03/12/2021); Pain management procedure (Left, 04/16/2021); [place] tooth extraction; Lumbar spine surgery (Left, 06/01/2021); Lumbar spine surgery (11/02/2021); lumbar fusion (Left, 11/2/2021); and lumbar fusion (Left, 11/2/2021). CURRENT MEDICATIONS       Discharge Medication List as of 12/8/2021  9:46 AM      CONTINUE these medications which have NOT CHANGED    Details   amoxicillin-clavulanate (AUGMENTIN) 875-125 MG per tablet Take 1 tablet by mouth 2 times daily for 7 days, Disp-14 tablet, R-0Normal      oxyCODONE-acetaminophen (PERCOCET) 5-325 MG per tablet Take 1 tablet by mouth every 6 hours as needed for Pain for up to 7 days. Intended supply: 7 days. Take lowest dose possible to manage pain, Disp-28 tablet, R-0Normal      montelukast (SINGULAIR) 10 MG tablet Take 1 tablet by mouth daily, Disp-30 tablet, R-3Normal      loratadine (CLARITIN) 10 MG tablet Take 1 tablet by mouth daily, Disp-30 tablet, R-3Normal      pantoprazole (PROTONIX) 40 MG tablet Take 1 tablet by mouth 2 times daily (with meals), Disp-180 tablet, R-1Normal      traZODone (DESYREL) 150 MG tablet Take 1 tablet by mouth nightly, Disp-30 tablet, R-2Normal      lamoTRIgine (LAMICTAL) 200 MG tablet Take 1 tablet by mouth 2 times daily, Disp-60 tablet, R-2Normal      escitalopram (LEXAPRO) 20 MG tablet Take 1.5 tablets by mouth daily, Disp-45 tablet, R-2Normal      clonazePAM (KLONOPIN) 1 MG tablet Take 1 tablet by mouth nightly as needed (insomnia/nightmares) for up to 60 days. , Disp-30 tablet, R-1Normal      rOPINIRole (REQUIP) 1 MG tablet Take 1 tablet by mouth nightly, Disp-90 tablet, R-3Normal      azelastine (OPTIVAR) 0.05 % ophthalmic solution Place 1 drop into both eyes 2 times daily as neededHistorical Med      fluticasone (FLONASE) 50 MCG/ACT nasal spray 2 sprays by Each Nostril route daily Start with one spray daily for one week and increase to two sprays daily as needed, Disp-1 Bottle, R-3Normal Handicap Placard [facility] Starting u 9/2/2021, Disp-1 each, R-0, PrintExpires March 1st, 2022      fluticasone-salmeterol (ADVAIR DISKUS) 100-50 MCG/DOSE diskus inhaler Inhale 1 puff into the lungs every 12 hours, Disp-60 each, R-3Normal      albuterol sulfate HFA (VENTOLIN HFA) 108 (90 Base) MCG/ACT inhaler Inhale 2 puffs into the lungs every 6 hours as needed for Wheezing, Disp-1 Inhaler, R-0Normal             ALLERGIES     Patient is has No Known Allergies. FAMILY HISTORY     Patient'sfamily history includes Depression in his mother; Heart Disease in his father. SOCIAL HISTORY     Patient  reports that he has been smoking cigarettes. He has a 10.00 pack-year smoking history. He has quit using smokeless tobacco.  His smokeless tobacco use included chew. He reports previous alcohol use. He reports current drug use. Drug: Marijuana Candiss Littler). PHYSICAL EXAM     ED TRIAGE VITALS  BP: 126/72, Temp: 98.3 °F (36.8 °C), Pulse: 110, Resp: 18, SpO2: 96 %  Physical Exam  Vitals and nursing note reviewed. Constitutional:       General: He is not in acute distress. Appearance: Normal appearance. He is well-developed and well-groomed. He is ill-appearing. HENT:      Head: Normocephalic and atraumatic. Right Ear: External ear normal. There is impacted cerumen. Left Ear: External ear normal. There is impacted cerumen. Nose: Mucosal edema, congestion and rhinorrhea present. Rhinorrhea is purulent. Mouth/Throat:      Lips: Pink. Mouth: Mucous membranes are moist.      Pharynx: Oropharynx is clear. Posterior oropharyngeal erythema present. Comments: PND  Eyes:      Conjunctiva/sclera:      Right eye: Right conjunctiva is not injected. Left eye: Left conjunctiva is not injected. Pupils: Pupils are equal.   Cardiovascular:      Rate and Rhythm: Normal rate. Heart sounds: Normal heart sounds. Pulmonary:      Effort: Pulmonary effort is normal. No respiratory distress. Breath sounds: Normal air entry. Decreased breath sounds present. Comments: Coughing during exam  Musculoskeletal:      Cervical back: Normal range of motion. Lymphadenopathy:      Cervical: No cervical adenopathy. Skin:     General: Skin is warm and dry. Findings: No rash (on exposed surfaces). Neurological:      Mental Status: He is alert and oriented to person, place, and time. Psychiatric:         Mood and Affect: Mood normal.         Speech: Speech normal.         Behavior: Behavior is cooperative. DIAGNOSTIC RESULTS   Labs:  Abnormal Labs Reviewed - No abnormal labs to display     IMAGING:  XR CHEST (2 VW)   Final Result   Normal chest series. **This report has been created using voice recognition software. It may contain minor errors which are inherent in voice recognition technology. **      Final report electronically signed by Dr. Nakia Yan MD on 12/8/2021 9:36 AM        URGENT CARE COURSE:     Vitals:    12/08/21 0838   BP: 126/72   Pulse: 110   Resp: 18   Temp: 98.3 °F (36.8 °C)   TempSrc: Temporal   SpO2: 96%   Weight: 222 lb (100.7 kg)   Height: 5' 10\" (1.778 m)       Medications - No data to display  PROCEDURES:  FINALIMPRESSION      1. Upper respiratory tract infection, unspecified type    2. Bilateral impacted cerumen    3. Lab test negative for COVID-19 virus        DISPOSITION/PLAN   DISPOSITION    Discharge   Continue antibiotic as prescribed by PCP. ED Course as of 12/08/21 0952   Wed Dec 08, 2021   0915 SARS-CoV-2, MATTHEW: NOT  DETECTED [HA]   0915 Flu A Antigen: Negative [HA]   0915 Influenza B Ag, EIA: Negative [HA]   0951 XR CHEST (2 VW) [HA]      ED Course User Index  [QUARLES] LEYDI Almaguer - WARREN     Physical assessment findings, diagnostic testing(s) if applicable, and vital signs reviewed with patient/patient representative.   If applicable, patient/patient representative will be contacted upon receipt of final culture and sensitivity or other testing results when available. Any additions or changes to medications or changes the plan of care will be made at that time. Differential diagnosis(s) discussed with patient/patient representative. Prescription medications and/or over-the-counter medications for symptom management discussed. Patient is to follow-up with family care provider in 2-3 days if no improvement. Patient is to go to the emergency department if symptoms change/worsen. Patient/patient representative is aware of care plan, questions answered, verbalizes understanding and is in agreement. Printed instructions attached to after visit summary. Problem List Items Addressed This Visit     None      Visit Diagnoses     Upper respiratory tract infection, unspecified type    -  Primary    Bilateral impacted cerumen        Relevant Medications    carbamide peroxide (DEBROX) 6.5 % otic solution    Lab test negative for COVID-19 virus              PATIENT REFERRED TO:  Cristina Galindo MD  [address] [facility]  [facility]  [phone]    Schedule an appointment as soon as possible for a visit in 3 days  For further evaluation. , If symptoms change/worsen, go to the [id number] [facility], APRN - CNP    Please note that some or all of this chart was generated using [employer] voice recognition software. Although every effort was made to ensure the accuracy of this automated transcription, some errors in transcription may have occurred.         LEYDI Hernandez CNP  12/08/21 0046

## 2021-12-08 NOTE — RESULT ENCOUNTER NOTE
Influenza a/b and covid testing were all negative. Let me know if you need anything or are not getting better. Thanks.

## 2021-12-08 NOTE — ED TRIAGE NOTES
Patient ambulated to room with complaint of cough, shortness of breath and body ache that started Saturday.

## 2021-12-12 ENCOUNTER — PATIENT MESSAGE (OUTPATIENT)
Dept: NEUROSURGERY | Age: 31
End: 2021-12-12

## 2021-12-12 DIAGNOSIS — M43.16 SPONDYLOLISTHESIS OF LUMBAR REGION: Primary | ICD-10-CM

## 2021-12-13 RX ORDER — OXYCODONE HYDROCHLORIDE AND ACETAMINOPHEN 5; 325 MG/1; MG/1
1 TABLET ORAL EVERY 8 HOURS PRN
Qty: 21 TABLET | Refills: 0 | Status: SHIPPED | OUTPATIENT
Start: 2021-12-13 | End: 2021-12-20 | Stop reason: SDUPTHER

## 2021-12-13 NOTE — TELEPHONE ENCOUNTER
From: Harris Burgos  To: Dr. Davey Kawasaki: 12/12/2021 11:21 PM EST  Subject: Pain in back and hip still    I wasn't able to request a refill online for any pain medicine so I was writing to see if I can get something prescribed for the pain that I am still experiencing

## 2021-12-16 ENCOUNTER — TELEPHONE (OUTPATIENT)
Dept: FAMILY MEDICINE CLINIC | Age: 31
End: 2021-12-16

## 2021-12-16 DIAGNOSIS — J06.9 VIRAL URI: Primary | ICD-10-CM

## 2021-12-16 DIAGNOSIS — F43.10 PTSD (POST-TRAUMATIC STRESS DISORDER): ICD-10-CM

## 2021-12-16 DIAGNOSIS — F41.1 GAD (GENERALIZED ANXIETY DISORDER): ICD-10-CM

## 2021-12-16 RX ORDER — GUAIFENESIN 100 MG/5ML
10 SYRUP ORAL EVERY 4 HOURS PRN
Qty: 120 ML | Refills: 0 | Status: SHIPPED | OUTPATIENT
Start: 2021-12-16 | End: 2022-01-28 | Stop reason: ALTCHOICE

## 2021-12-16 RX ORDER — CLONAZEPAM 1 MG/1
1 TABLET ORAL NIGHTLY PRN
Qty: 30 TABLET | Refills: 0 | Status: SHIPPED | OUTPATIENT
Start: 2021-12-16 | End: 2022-01-04 | Stop reason: SDUPTHER

## 2021-12-16 RX ORDER — BENZONATATE 100 MG/1
100 CAPSULE ORAL 2 TIMES DAILY PRN
Qty: 14 CAPSULE | Refills: 0 | Status: SHIPPED | OUTPATIENT
Start: 2021-12-16 | End: 2021-12-23

## 2021-12-16 NOTE — TELEPHONE ENCOUNTER
----- Message from Kaylene Munoz MD sent at 12/8/2021 12:21 PM EST -----  Influenza a/b and covid testing were all negative. Let me know if you need anything or are not getting better. Thanks.

## 2021-12-16 NOTE — TELEPHONE ENCOUNTER
Patient is requesting refills via [employer]. Scheduled to follow up 01/04/2022.      Prescription pending approval for  Clonazepam 1 mg, nightly prn, #30, 0 refills

## 2021-12-20 DIAGNOSIS — M43.16 SPONDYLOLISTHESIS OF LUMBAR REGION: ICD-10-CM

## 2021-12-22 RX ORDER — OXYCODONE HYDROCHLORIDE AND ACETAMINOPHEN 5; 325 MG/1; MG/1
1 TABLET ORAL 2 TIMES DAILY PRN
Qty: 14 TABLET | Refills: 0 | Status: SHIPPED | OUTPATIENT
Start: 2021-12-22 | End: 2021-12-29

## 2021-12-27 ENCOUNTER — NURSE TRIAGE (OUTPATIENT)
Dept: OTHER | Facility: CLINIC | Age: 31
End: 2021-12-27

## 2021-12-27 NOTE — TELEPHONE ENCOUNTER
Received call from [facility] at [facility], caller not on line. Complaint: Abdominal pain    Market: SINGH VIDALES II.[place]ERT[place]    Practice Name: Jin Alonso telephone number verified as [phone]    Unsuccessful attempt to re-connect with caller via phone, left message for return call to office          Reason for Disposition   Message left on unidentified voice mail. Phone number verified.     Protocols used: NO CONTACT OR DUPLICATE CONTACT CALL-ADULT-[place]

## 2022-01-04 ENCOUNTER — VIRTUAL VISIT (OUTPATIENT)
Dept: PSYCHIATRY | Age: 32
End: 2022-01-04
Payer: MEDICAID

## 2022-01-04 DIAGNOSIS — F43.10 PTSD (POST-TRAUMATIC STRESS DISORDER): Primary | ICD-10-CM

## 2022-01-04 DIAGNOSIS — F60.3 BORDERLINE PERSONALITY DISORDER (HCC): ICD-10-CM

## 2022-01-04 DIAGNOSIS — F41.1 GAD (GENERALIZED ANXIETY DISORDER): ICD-10-CM

## 2022-01-04 DIAGNOSIS — F29 PSYCHOSIS, UNSPECIFIED PSYCHOSIS TYPE (HCC): ICD-10-CM

## 2022-01-04 PROCEDURE — 99214 OFFICE O/P EST MOD 30 MIN: CPT | Performed by: PSYCHIATRY & NEUROLOGY

## 2022-01-04 PROCEDURE — G8427 DOCREV CUR MEDS BY ELIG CLIN: HCPCS | Performed by: PSYCHIATRY & NEUROLOGY

## 2022-01-04 RX ORDER — TRAZODONE HYDROCHLORIDE 150 MG/1
75 TABLET ORAL NIGHTLY
Qty: 15 TABLET | Refills: 2 | Status: SHIPPED | OUTPATIENT
Start: 2022-01-04 | End: 2022-03-09 | Stop reason: SDUPTHER

## 2022-01-04 RX ORDER — CLONAZEPAM 1 MG/1
1 TABLET ORAL NIGHTLY PRN
Qty: 30 TABLET | Refills: 2 | Status: SHIPPED | OUTPATIENT
Start: 2022-01-04 | End: 2022-01-26

## 2022-01-04 RX ORDER — LAMOTRIGINE 200 MG/1
200 TABLET ORAL 2 TIMES DAILY
Qty: 60 TABLET | Refills: 2 | Status: SHIPPED | OUTPATIENT
Start: 2022-01-04 | End: 2022-03-09 | Stop reason: SDUPTHER

## 2022-01-04 RX ORDER — ESCITALOPRAM OXALATE 20 MG/1
30 TABLET ORAL DAILY
Qty: 45 TABLET | Refills: 2 | Status: SHIPPED | OUTPATIENT
Start: 2022-01-04 | End: 2022-03-09 | Stop reason: SDUPTHER

## 2022-01-04 NOTE — PROGRESS NOTES
[address] [facility]  [facility] [id number] [phone]    Progress Note    Patient:  Jens Waters  YOB: 1990  PCP:  Noe Abdullahi MD  Visit Date:  1/4/2022    TELEHEALTH EVALUATION -- Audio/Visual (During VMNIO-23 public health emergency)    Patient location: home  Physician location: home, [facility]  This virtual visit was conducted via interactive, real-time video. Chief Complaint   Patient presents with    Follow-up    Medication Check    Anxiety    Mood Swings    Trauma    Hallucinations       SUBJECTIVE:      Jens Waters, a 27 y. o. male, presents for a follow up visit. Patient reports he is stable. Patient is compliant with medication regimen. He presents alone. Doing \"good\". Holidays were harder being first year without his dad. Still staying home most of the time noting he has anxiety/panic if he leaves. Working on that in therapy. Discusses psychosis, AVH. \"I talk to another me. \" Notes his wife hears him talking at night. Describes feeling \"trapped inside my head\". Gives example of arguing with his wife. \"it's not me. I can't control what I'm saying. I see it and hear it. \"   \"something else is taking over\". Has been going on awhile. Realizing it more the last couple mos. Still has nightmares but they no longer wake him up. He can still wake up and recall the NM. Describes AVH feels someone Amanda Retort me a lot of the time\". Describes it as himself. Notes he would see a hooded figure in his NM, a black faceless figure. Once revealed itself. \"it's another me\". Was on Abilify when going to [facility] with no benefit. Has tried Latuda, Risperdal, Seroquel with no benefit. Mood is \"back and forth\" Notes he was a little better but when mood changes \"more intense\". Not happening as often maybe once a week now. Sleeping ok despite last trazodone reduction. Healing up ok from recent back surgery in Nov.   He filed for disability for both mental and physical issues. It was denied. He got a . Discussed tx options and we agree to try Parvin Jeffries if insurance will cover. Med Trials:Chantix (nausea), trazodone, gabapentin, Ritalin, medical cannabis, Lamictal, Lexapro, Klonopin, Seroquel (side effects), doxazosin, Risperdal    OBJECTIVE:  Vitals: There were no vitals taken for this visit. MENTAL STATUS EXAM:    GENERAL  Build: Normal    Hygiene:  Appropriate , multiple tattoos and piercings   SENSORIUM Orientation: Place, Person, Time, & Situation     Consciousness: Alert    ATTENTION   Focused   RELATEDNESS  Cooperative    EYE CONTACT   Good    PSYCHOMOTOR  Normal    SPEECH Volume: Normal     Rate: Normal rate and tone    Amplitude: Within normal limits   MOOD  \"back and forth\"    AFFECT Range: Limited, mood congruent, social smile present    THOUGHT Process:  Goal-Directed     Content: no evidence of psychosis, doesn't appear to respond to internal stimuli, endorses AVH, see above and endorses paranoia    COGNITION Insight: Good    Judgement:  Intact    MEMORY  did not test, no gross deficits    INTELLIGENCE  Average     Mobility/Gait: Independently     Controlled SubstancesMonitoring:   not done today      ASSESSMENT: Will start Parvin Jeffries for psychosis and mood. Not much benefit from multiple SGAs in the past. Sleep is ok with last trazodone decrease. Will monitor for any psychosis, dissociations, or suicidal thoughts. No SI. Lamictal and Lexapro both with mood benefit. Diagnosis Orders   1. PTSD (post-traumatic stress disorder)  clonazePAM (KLONOPIN) 1 MG tablet   2. DANTE (generalized anxiety disorder)  clonazePAM (KLONOPIN) 1 MG tablet   3. Borderline personality disorder ([address]     4.  Psychosis, unspecified psychosis type ([address]     Medical Hx: asthma, heart murmur, Legg-Calve Perthes Disease     PLAN:     · Medications:   · Lexapro 30 mg QD · Lamictal 200 mg BID   · Trazodone 75 mg HS  · Klonopin to 1 mg HS prn insomnia  · Start Vraylar 1.5 mg QD- discussed r/b/se/a including but not limited to risk of EPS  · Therapy: sees Dr. Kimmy Kelly  · Labs/Tests/Imaging: none   · Records Reviewed: [facility]  · Patient advised to call if patient has any difficulties with treatment  Return in about 4 weeks (around 2/1/2022) for med check, follow up. Electronically signed by Consuelo Stock MD on 1/4/2022 at 1:04 PM    Adela Brooks is a 27 y.o. male being evaluated by a Virtual Visit (video visit) to address concerns as mentioned above. A caregiver was present when appropriate. Due to this being a TeleHealth encounter ([id number]-93 public health emergency), evaluation of the following organ systems was limited: Vitals/Constitutional/EENT/Resp/CV/GI//MS/Neuro/Skin/Heme-Lymph-Imm. Pursuant to the emergency declaration under the [address] [place] [address], [address] [place] [address] authority and the [employer] and [employer] Act, this Virtual Visit was conducted with patient's (and/or legal guardian's) consent, to reduce the patient's risk of exposure to COVID-19 and provide necessary medical care. The patient (and/or legal guardian) has also been advised to contact this office for worsening conditions or problems, and seek emergency medical treatment and/or call 911 if deemed necessary. Patient identification was verified at the start of the visit: Yes     Total time spent for this encounter: not billed by time     Services were provided through a video synchronous discussion virtually to substitute for in-person clinic visit. Patient and provider were located at their individual homes.     Electronically signed by Consuelo Stock MD on 1/4/2022 at 1:04 PM

## 2022-01-09 ENCOUNTER — PATIENT MESSAGE (OUTPATIENT)
Dept: NEUROSURGERY | Age: 32
End: 2022-01-09

## 2022-01-09 DIAGNOSIS — M43.16 SPONDYLOLISTHESIS OF LUMBAR REGION: Primary | ICD-10-CM

## 2022-01-10 RX ORDER — OXYCODONE HYDROCHLORIDE AND ACETAMINOPHEN 5; 325 MG/1; MG/1
1 TABLET ORAL 2 TIMES DAILY PRN
Qty: 10 TABLET | Refills: 0 | Status: SHIPPED | OUTPATIENT
Start: 2022-01-10 | End: 2022-01-17

## 2022-01-10 NOTE — TELEPHONE ENCOUNTER
From: Lacy Ortega  To: Dr. Clancy Willams: 1/9/2022 4:09 PM EST  Subject: Medication    Is there any way I'd be able to get pain pills for my back it's still giving me alot of issues and I missed my appointment this past time.  I've been trying to get through the day without pills but I am having a hard time getting around due to the pain

## 2022-01-17 ENCOUNTER — TELEPHONE (OUTPATIENT)
Dept: PSYCHIATRY | Age: 32
End: 2022-01-17

## 2022-01-17 DIAGNOSIS — F41.1 GAD (GENERALIZED ANXIETY DISORDER): Primary | ICD-10-CM

## 2022-01-17 RX ORDER — DIAZEPAM 5 MG/1
5 TABLET ORAL NIGHTLY PRN
Qty: 30 TABLET | Refills: 1 | Status: SHIPPED | OUTPATIENT
Start: 2022-01-17 | End: 2022-01-26 | Stop reason: SDUPTHER

## 2022-01-17 NOTE — TELEPHONE ENCOUNTER
Lashanda Lacy wrote into the office via [employer]):    Since I've started my klonopin I've been having the night terrors every now and then I stopped taking it the other night and the night terrors were worse I know you said there was another option I think it was volume or something along them lines and I was wanting to know if we can try that route    --Please advise; he is scheduled to return on 03/09/22. He also follows with Dr. Doyle Alcantar.

## 2022-01-17 NOTE — TELEPHONE ENCOUNTER
I have wrote back to Brandi with this information via [employer] along with if he had any additional questions/concerns to contact our office.

## 2022-01-17 NOTE — TELEPHONE ENCOUNTER
Rx has been sent for Valium 5 mg at night as needed for sleep and nightmares. I have cancelled any remaining Klonopin refills.    Electronically signed by Ed Dorman MD on 1/17/2022 at 8:40 AM

## 2022-01-20 ENCOUNTER — TELEPHONE (OUTPATIENT)
Dept: PSYCHOLOGY | Age: 32
End: 2022-01-20

## 2022-01-20 NOTE — TELEPHONE ENCOUNTER
Called pt to confirm appt. Pt asked to cancel and asked if he could switch to a different psychologist.Pt just states \" I just dont feel comfortable\" No major complaints -just doesn't feel comfortable and wants to switch providers. Please advise.

## 2022-01-21 NOTE — TELEPHONE ENCOUNTER
Yes, that's fine. He's free to do as he wishes. Please provide referral options if he needs them.   Thank you, CELSO

## 2022-01-24 ENCOUNTER — TELEPHONE (OUTPATIENT)
Dept: PSYCHIATRY | Age: 32
End: 2022-01-24

## 2022-01-24 NOTE — TELEPHONE ENCOUNTER
Jose F Jules wrote into [facility]:    \"I forgot to mention and ask about my panic attacks they have gotten worse I can't breathe and I start shaking really bad is there any med I can take to calm me back down in these situations\"

## 2022-01-24 NOTE — TELEPHONE ENCOUNTER
Liam Reyes wrote into the office via [employer]:    Since we've last talked about me seeing and talking to the other me that's always with me I've started talking to another one they are both me but just dress differently and I feel as they sometimes are controlling my actions and there's nothing I can do about it. I wasn't sure if it's something I needed to address with you. --Please advise; he is scheduled to return on 03/09/22. Last seen on 01/04/22.

## 2022-01-25 NOTE — TELEPHONE ENCOUNTER
I have wrote back to Matthew Morales with this information via [employer]; awaiting response for a possible opening tomorrow.

## 2022-01-25 NOTE — TELEPHONE ENCOUNTER
I would like to offer Etta Maya any sooner openings in my schedule to further discuss symptoms and treatment.    Electronically signed by Nirali Gamez MD on 1/25/2022 at 8:03 AM

## 2022-01-26 ENCOUNTER — VIRTUAL VISIT (OUTPATIENT)
Dept: PSYCHIATRY | Age: 32
End: 2022-01-26
Payer: MEDICAID

## 2022-01-26 DIAGNOSIS — F43.10 PTSD (POST-TRAUMATIC STRESS DISORDER): Primary | ICD-10-CM

## 2022-01-26 DIAGNOSIS — F41.1 GAD (GENERALIZED ANXIETY DISORDER): ICD-10-CM

## 2022-01-26 DIAGNOSIS — F60.3 BORDERLINE PERSONALITY DISORDER (HCC): ICD-10-CM

## 2022-01-26 DIAGNOSIS — F29 PSYCHOSIS, UNSPECIFIED PSYCHOSIS TYPE (HCC): ICD-10-CM

## 2022-01-26 PROCEDURE — 99214 OFFICE O/P EST MOD 30 MIN: CPT | Performed by: PSYCHIATRY & NEUROLOGY

## 2022-01-26 PROCEDURE — G8427 DOCREV CUR MEDS BY ELIG CLIN: HCPCS | Performed by: PSYCHIATRY & NEUROLOGY

## 2022-01-26 RX ORDER — DIAZEPAM 5 MG/1
5 TABLET ORAL 2 TIMES DAILY PRN
Qty: 60 TABLET | Refills: 1 | Status: SHIPPED | OUTPATIENT
Start: 2022-01-26 | End: 2022-03-09 | Stop reason: ALTCHOICE

## 2022-01-26 NOTE — PROGRESS NOTES
[address] [facility] [place] [id number] [phone]    Progress Note    Patient:  Ulices Jorgensen  YOB: 1990  PCP:  J Carlos Goldstein MD  Visit Date:  1/26/2022    TELEHEALTH EVALUATION -- Audio/Visual (During JLOHT-39 public health emergency)    Patient location: home  Physician location: [place], [facility]  This virtual visit was conducted via interactive, real-time video. Chief Complaint   Patient presents with    Follow-up    Medication Check    Anxiety    Depression    Trauma       SUBJECTIVE:    Time in: 3:35pm  Time out: 4:01pm  Documentation time: 5 min    Ulices Jorgensen, a 27 y. o. male, presents for a follow up visit. Patient reports he is stable. Patient is compliant with medication regimen. He presents alone. Only taking Valium as prn noting it helps \"a lot\". Improves nightmares. If he misses a night dose might use it indaytime for anxiety. Helps him with panic on leaving the house. Kimani Awkward He started Vraylar and tolerating it fairly well. Notes mood \"a little bit better\". Had called in to report some panic which happens on leaving the house. Chest tightness, SOB, tremulousness. Easily overwhelmed noting various situations happen to trigger it. Notes he changed therapists and sees Dr. Julio César Newman in August. Notes prior therapist wasn't the right fit. He's ok with waiting that long. Regarding AVH sees \"another me\". Notes \"there are two of them that I talk to\". Has evolved. Initially was only seeing one person. Wonders if it's DID. Notes it happened \"when I was younger\". The last few years wasn't happened until more recently. No improvement of that with Vraylar which we discussed is used to treat hallucinations. Discussed at length source of hallucinations which I suspect is more from his PTSD and not a thought disorder like schizophrenia.    Symptoms began around age 8 after encounter with anita. Was hearing voices which went away for a period of time and now having audiovisual hallucinations. No suicidal thinking noting he did deal with that in the recent past.   Notes he's in disability process. Wanted to make sure his diagnosis is correct for them. Discussed tx options and we agree to Rx Valium as BID for sleep and panic. Med Trials:Chantix (nausea), trazodone, gabapentin, Ritalin, medical cannabis, Lamictal, Lexapro, Klonopin, Seroquel (no benefit, side effects), doxazosin, Risperdal (no benefit), Latuda (no benefit)    OBJECTIVE:  Vitals: There were no vitals taken for this visit. MENTAL STATUS EXAM:    GENERAL  Build: Normal    Hygiene:  Appropriate , in casual dress, multiple tattoos and piercings   SENSORIUM Orientation: Place, Person, Time, & Situation     Consciousness: Alert    ATTENTION   Focused   RELATEDNESS  Cooperative    EYE CONTACT   Good    InPSYCHOMOTOR  Normal    SPEECH Volume: Normal     Rate: Normal rate and tone    Amplitude: Within normal limits   MOOD  \"a little bit better\"    AFFECT Range: Limited, mood congruent, social smile present    THOUGHT Process:  Goal-Directed     Content: no evidence of psychosis, doesn't appear to respond to internal stimuli, endorses AVH, see above    COGNITION Insight: Good    Judgement:  Intact    MEMORY  did not test, no gross deficits    INTELLIGENCE  Average     Mobility/Gait: Independently     Controlled SubstancesMonitoring:   not done today      ASSESSMENT: Will increase Valium to BID for sleep/NM and panic. Some mood benefit from [address]. Will monitor for any psychosis, dissociations, or suicidal thoughts. No SI. In disability process. Lamictal and Lexapro both with mood benefit. Diagnosis Orders   1. PTSD (post-traumatic stress disorder)     2. DANTE (generalized anxiety disorder)  diazePAM (VALIUM) 5 MG tablet   3. Borderline personality disorder ([facility] [address]     4.  Psychosis, unspecified psychosis type [facility])     Medical Hx: asthma, heart murmur, Legg-Calve Perthes Disease     PLAN:     · Medications:   · Lexapro 30 mg QD   · Lamictal 200 mg BID   · Trazodone 75 mg HS  · Increase Valium 5 mg HS prn insomnia to 5 mg BID prn panic or insomnia  · Vraylar 1.5 mg QD  · Therapy: previously saw Dr. Mahad Moss and will see Dr. Hanna Mantilla in August  · Labs/Tests/Imaging: none   · Records Reviewed: [facility]  · Patient advised to call if patient has any difficulties with treatment  Return in about 4 weeks (around 2/23/2022) for med check, follow up. Electronically signed by Roselyn Hyde MD on 1/27/2022 at 1:07 PM    Eli Rahman is a 27 y.o. male being evaluated by a Virtual Visit (video visit) to address concerns as mentioned above. A caregiver was present when appropriate. Due to this being a TeleHealth encounter (CJBEY-48 public health emergency), evaluation of the following organ systems was limited: Vitals/Constitutional/EENT/Resp/CV/GI//MS/Neuro/Skin/Heme-Lymph-Imm. Pursuant to the emergency declaration under the [address] [place] [address] authority and the [employer] Act, this Virtual Visit was conducted with patient's (and/or legal guardian's) consent, to reduce the patient's risk of exposure to COVID-19 and provide necessary medical care. The patient (and/or legal guardian) has also been advised to contact this office for worsening conditions or problems, and seek emergency medical treatment and/or call 911 if deemed necessary. Patient identification was verified at the start of the visit: Yes     Total time spent for this encounter: 31 minutes     Services were provided through a video synchronous discussion virtually to substitute for in-person clinic visit. Patient and provider were located at their individual homes.     Electronically signed by Roselyn Hyde MD on 1/27/2022 at 1:07 PM

## 2022-01-27 ENCOUNTER — TELEPHONE (OUTPATIENT)
Dept: PSYCHOLOGY | Age: 32
End: 2022-01-27

## 2022-01-27 NOTE — TELEPHONE ENCOUNTER
Pts spouse dropped off a Residual Functional Capacity Questionnaire. She is asking if this provider would complete this and fax back to [phone] @ [phone]. Form in provider's mailbox for review.  Please advise

## 2022-01-27 NOTE — TELEPHONE ENCOUNTER
I will look at the form on Monday when I'm in the office and will determine at that time whether we can fill it out.    Electronically signed by Olu Martinez MD on 1/27/2022 at 12:38 PM

## 2022-01-28 ENCOUNTER — OFFICE VISIT (OUTPATIENT)
Dept: FAMILY MEDICINE CLINIC | Age: 32
End: 2022-01-28
Payer: MEDICAID

## 2022-01-28 ENCOUNTER — TELEPHONE (OUTPATIENT)
Dept: FAMILY MEDICINE CLINIC | Age: 32
End: 2022-01-28

## 2022-01-28 VITALS
HEART RATE: 100 BPM | RESPIRATION RATE: 20 BRPM | BODY MASS INDEX: 32.3 KG/M2 | HEIGHT: 70 IN | OXYGEN SATURATION: 98 % | TEMPERATURE: 96.9 F | DIASTOLIC BLOOD PRESSURE: 74 MMHG | SYSTOLIC BLOOD PRESSURE: 136 MMHG | WEIGHT: 225.6 LBS

## 2022-01-28 DIAGNOSIS — M51.9 LUMBAR DISC DISEASE: Primary | ICD-10-CM

## 2022-01-28 PROCEDURE — G8427 DOCREV CUR MEDS BY ELIG CLIN: HCPCS | Performed by: STUDENT IN AN ORGANIZED HEALTH CARE EDUCATION/TRAINING PROGRAM

## 2022-01-28 PROCEDURE — 4004F PT TOBACCO SCREEN RCVD TLK: CPT | Performed by: STUDENT IN AN ORGANIZED HEALTH CARE EDUCATION/TRAINING PROGRAM

## 2022-01-28 PROCEDURE — G8484 FLU IMMUNIZE NO ADMIN: HCPCS | Performed by: STUDENT IN AN ORGANIZED HEALTH CARE EDUCATION/TRAINING PROGRAM

## 2022-01-28 PROCEDURE — 99213 OFFICE O/P EST LOW 20 MIN: CPT | Performed by: STUDENT IN AN ORGANIZED HEALTH CARE EDUCATION/TRAINING PROGRAM

## 2022-01-28 PROCEDURE — G8417 CALC BMI ABV UP PARAM F/U: HCPCS | Performed by: STUDENT IN AN ORGANIZED HEALTH CARE EDUCATION/TRAINING PROGRAM

## 2022-01-28 RX ORDER — ACETAMINOPHEN AND CODEINE PHOSPHATE 300; 30 MG/1; MG/1
1 TABLET ORAL EVERY 6 HOURS PRN
Qty: 20 TABLET | Refills: 0 | Status: SHIPPED | OUTPATIENT
Start: 2022-01-28 | End: 2022-02-02

## 2022-01-28 ASSESSMENT — ENCOUNTER SYMPTOMS
BACK PAIN: 1
NAUSEA: 0
SHORTNESS OF BREATH: 0
VOMITING: 0
COUGH: 0
DIARRHEA: 0
CONSTIPATION: 0
ABDOMINAL PAIN: 0
SORE THROAT: 0

## 2022-01-28 ASSESSMENT — PATIENT HEALTH QUESTIONNAIRE - PHQ9
2. FEELING DOWN, DEPRESSED OR HOPELESS: 2
9. THOUGHTS THAT YOU WOULD BE BETTER OFF DEAD, OR OF HURTING YOURSELF: 0
SUM OF ALL RESPONSES TO PHQ QUESTIONS 1-9: 21
SUM OF ALL RESPONSES TO PHQ QUESTIONS 1-9: 21
7. TROUBLE CONCENTRATING ON THINGS, SUCH AS READING THE NEWSPAPER OR WATCHING TELEVISION: 3
6. FEELING BAD ABOUT YOURSELF - OR THAT YOU ARE A FAILURE OR HAVE LET YOURSELF OR YOUR FAMILY DOWN: 3
8. MOVING OR SPEAKING SO SLOWLY THAT OTHER PEOPLE COULD HAVE NOTICED. OR THE OPPOSITE, BEING SO FIGETY OR RESTLESS THAT YOU HAVE BEEN MOVING AROUND A LOT MORE THAN USUAL: 3
1. LITTLE INTEREST OR PLEASURE IN DOING THINGS: 3
5. POOR APPETITE OR OVEREATING: 3
SUM OF ALL RESPONSES TO PHQ QUESTIONS 1-9: 21
SUM OF ALL RESPONSES TO PHQ QUESTIONS 1-9: 21
4. FEELING TIRED OR HAVING LITTLE ENERGY: 1
3. TROUBLE FALLING OR STAYING ASLEEP: 3
SUM OF ALL RESPONSES TO PHQ9 QUESTIONS 1 & 2: 5
10. IF YOU CHECKED OFF ANY PROBLEMS, HOW DIFFICULT HAVE THESE PROBLEMS MADE IT FOR YOU TO DO YOUR WORK, TAKE CARE OF THINGS AT HOME, OR GET ALONG WITH OTHER PEOPLE: 3

## 2022-01-28 ASSESSMENT — COLUMBIA-SUICIDE SEVERITY RATING SCALE - C-SSRS
2. HAVE YOU ACTUALLY HAD ANY THOUGHTS OF KILLING YOURSELF?: NO
6. HAVE YOU EVER DONE ANYTHING, STARTED TO DO ANYTHING, OR PREPARED TO DO ANYTHING TO END YOUR LIFE?: NO
1. WITHIN THE PAST MONTH, HAVE YOU WISHED YOU WERE DEAD OR WISHED YOU COULD GO TO SLEEP AND NOT WAKE UP?: NO

## 2022-01-28 NOTE — PROGRESS NOTES
[address] [facility]. [address]  [facility] [address]  Dept: [phone]  Loc: [phone]     Jalyn Klein is a 27 y.o. male who presents today for:  Chief Complaint   Patient presents with    3 Month Follow-Up     Form needing filled out for Social Security also having back pain       Goals    None         HPI:     HPI   69-year-old male with history of multiple psychiatric disorders, lumbar disc disease with radiculopathy and chronic pain here for 3-month follow up and to fill out disability paperwork. Back: Still having back issues - now right side is bad. Sees neurosurgery next week - had surgery in Nov. He was previously addicted to percocet and no longer wants to take it - would like something else to help manage pain until he can see neurosurgery. He is taking ibuprofen and tylenol for pain - not having any issues with reflux at this time as long as he is taking the protonix    Psych: He is having a lot of panic attacks - taking valium, which also helps with the back. Feels like he needs it more at nighttime. Having panic attacks twice every other day. Very stressed out with daughter getting older and being more mobile and getting into things. Switching from Dr. Mcihael Mosqueda to Dr. Joceline Mathew for psychology. He is having visual hallucinations - talked to Dr. Evelyne Zhou - thinks it might be DID. He is seeing himself and talking to him. Current Outpatient Medications   Medication Sig Dispense Refill    acetaminophen-codeine (TYLENOL/CODEINE #3) 300-30 MG per tablet Take 1 tablet by mouth every 6 hours as needed for Pain for up to 5 days. Intended supply: 5 days. Take lowest dose possible to manage pain 20 tablet 0    diazePAM (VALIUM) 5 MG tablet Take 1 tablet by mouth 2 times daily as needed for Anxiety or Sleep for up to 60 days.  60 tablet 1    traZODone (DESYREL) 150 MG tablet Take 0.5 tablets by mouth nightly 15 tablet 2  lamoTRIgine (LAMICTAL) 200 MG tablet Take 1 tablet by mouth 2 times daily 60 tablet 2    escitalopram (LEXAPRO) 20 MG tablet Take 1.5 tablets by mouth daily 45 tablet 2    cariprazine hcl (VRAYLAR) 1.5 MG capsule Take 1 capsule by mouth daily 30 capsule 2    carbamide peroxide (DEBROX) 6.5 % otic solution Use as directed. 0    montelukast (SINGULAIR) 10 MG tablet Take 1 tablet by mouth daily 30 tablet 3    loratadine (CLARITIN) 10 MG tablet Take 1 tablet by mouth daily 30 tablet 3    pantoprazole (PROTONIX) 40 MG tablet Take 1 tablet by mouth 2 times daily (with meals) 180 tablet 1    Handicap Placard MISC by Does not apply route Expires March 1st, 2022 1 each 0    rOPINIRole (REQUIP) 1 MG tablet Take 1 tablet by mouth nightly 90 tablet 3    azelastine (OPTIVAR) 0.05 % ophthalmic solution Place 1 drop into both eyes 2 times daily as needed      fluticasone (FLONASE) 50 MCG/ACT nasal spray 2 sprays by Each Nostril route daily Start with one spray daily for one week and increase to two sprays daily as needed 1 Bottle 3    fluticasone-salmeterol (ADVAIR DISKUS) 100-50 MCG/DOSE diskus inhaler Inhale 1 puff into the lungs every 12 hours 60 each 3    albuterol sulfate HFA (VENTOLIN HFA) 108 (90 Base) MCG/ACT inhaler Inhale 2 puffs into the lungs every 6 hours as needed for Wheezing 1 Inhaler 0     No current facility-administered medications for this visit.           Food Insecurity: No Food Insecurity    Worried About Running Out of Food in the Last Year: Never true    Ran Out of Food in the Last Year: Never true       Health Maintenance   Topic Date Due    Hepatitis C screen  Never done    COVID-19 Vaccine (1) Never done    Depression Monitoring  Never done    HIV screen  Never done    DTaP/Tdap/Td vaccine (1 - Tdap) Never done    Flu vaccine (1) Never done    Pneumococcal 0-64 years Vaccine (1 of 2 - PPSV23) 02/04/2022 (Originally 5/5/1995)    Hepatitis A vaccine  Aged Out    Hepatitis B vaccine  Aged Out    Hib vaccine  Aged Out    Meningococcal (ACWY) vaccine  Aged Out    Varicella vaccine  Discontinued       ROS:      Review of Systems   Constitutional: Negative for chills, fatigue and fever. HENT: Negative for congestion and sore throat. Eyes: Negative for visual disturbance. Respiratory: Negative for cough and shortness of breath. Cardiovascular: Negative for chest pain and palpitations. Gastrointestinal: Negative for abdominal pain, constipation, diarrhea, nausea and vomiting. Genitourinary: Negative for dysuria. Musculoskeletal: Positive for back pain. Negative for arthralgias. Skin: Negative for rash. Neurological: Positive for numbness. Negative for dizziness, weakness, light-headedness and headaches. Radiculopathy   Psychiatric/Behavioral: Positive for agitation, decreased concentration, hallucinations and sleep disturbance. Negative for dysphoric mood and suicidal ideas. The patient is nervous/anxious. Objective:     Vitals:    01/28/22 0954   BP: 136/74   Site: Right Upper Arm   Position: Sitting   Cuff Size: Large Adult   Pulse: 100   Resp: 20   Temp: 96.9 °F (36.1 °C)   TempSrc: Temporal   SpO2: 98%   Weight: 225 lb 9.6 oz (102.3 kg)   Height: 5' 10\" (1.778 m)       Body mass index is 32.37 kg/m². Wt Readings from Last 3 Encounters:   01/28/22 225 lb 9.6 oz (102.3 kg)   12/08/21 222 lb (100.7 kg)   11/17/21 222 lb (100.7 kg)     BP Readings from Last 3 Encounters:   01/28/22 136/74   12/08/21 126/72   11/17/21 107/71       Physical Exam  Vitals reviewed. Constitutional:       General: He is not in acute distress. Appearance: Normal appearance. He is obese. He is not ill-appearing. Comments: Has cane   HENT:      Head: Normocephalic and atraumatic.       Right Ear: External ear normal.      Left Ear: External ear normal.      Nose: Nose normal.      Mouth/Throat:      Mouth: Mucous membranes are moist.   Eyes:      General: Right eye: No discharge. Left eye: No discharge. Conjunctiva/sclera: Conjunctivae normal.   Cardiovascular:      Rate and Rhythm: Normal rate and regular rhythm. Pulses: Normal pulses. Heart sounds: Normal heart sounds. No murmur heard. Pulmonary:      Effort: Pulmonary effort is normal. No respiratory distress. Breath sounds: Normal breath sounds. Abdominal:      General: Abdomen is flat. Bowel sounds are normal. There is no distension. Palpations: Abdomen is soft. Musculoskeletal:         General: Normal range of motion. Cervical back: Normal range of motion and neck supple. Skin:     General: Skin is warm and dry. Capillary Refill: Capillary refill takes less than 2 seconds. Neurological:      General: No focal deficit present. Mental Status: He is alert. Gait: Gait abnormal.      Comments: Ambulate with cane   Psychiatric:         Mood and Affect: Mood normal.         Behavior: Behavior normal.         Assessment / Plan:     1. Lumbar disc disease  - reviewed PDMP. Pain contract signed with patient - will upload. Will treat with T3 Q6H PRN x 5 days until he can see his neurosurgeon. Disability paperwork filled out and needs to be faxed to . - acetaminophen-codeine (TYLENOL/CODEINE #3) 300-30 MG per tablet; Take 1 tablet by mouth every 6 hours as needed for Pain for up to 5 days. Intended supply: 5 days. Take lowest dose possible to manage pain  Dispense: 20 tablet; Refill: 0      Health Maintenance Due   Topic Date Due    Hepatitis C screen  Never done    COVID-19 Vaccine (1) Never done    Depression Monitoring  Never done    HIV screen  Never done    DTaP/Tdap/Td vaccine (1 - Tdap) Never done    Flu vaccine (1) Never done           Return in about 3 months (around 4/28/2022).       Medications Prescribed:  Orders Placed This Encounter   Medications    acetaminophen-codeine (TYLENOL/CODEINE #3) 300-30 MG per tablet     Sig: Take 1 tablet by mouth every 6 hours as needed for Pain for up to 5 days. Intended supply: 5 days. Take lowest dose possible to manage pain     Dispense:  20 tablet     Refill:  0     Reduce doses taken as pain becomes manageable       Future Appointments   Date Time Provider Juan Manuel Fitzpatrick   2/2/2022  2:30 PM Yamilka Brandon [facility]   3/9/2022 10:00 AM MD Jan Stephenson [facility]   5/2/2022  1:00 PM Sunil Rizzo MD [facility]   8/30/2022 10:00 AM Aleksandra Taylor, PhD N [id number] [address] [facility]       Patient given educational materials - see patient instructions. Discussed use, benefit, and side effects of prescribed medications. All patient questions answered. Patient voiced understanding. Reviewed health maintenance. Instructed to continue current medications, diet and exercise. Patient agreed with treatment plan. Follow up as directed.      Electronically signed by Sunil Rizzo MD on 1/28/2022 at 3:35 PM

## 2022-01-28 NOTE — PROGRESS NOTES
S: 27 y.o. male with   Chief Complaint   Patient presents with    3 Month Follow-Up     Form needing filled out for Social Security also having back pain       HPI: please see resident note for HPI and ROS. BP Readings from Last 3 Encounters:   01/28/22 136/74   12/08/21 126/72   11/17/21 107/71     Wt Readings from Last 3 Encounters:   01/28/22 225 lb 9.6 oz (102.3 kg)   12/08/21 222 lb (100.7 kg)   11/17/21 222 lb (100.7 kg)       O: VS:  height is 5' 10\" (1.778 m) and weight is 225 lb 9.6 oz (102.3 kg). His temporal temperature is 96.9 °F (36.1 °C). His blood pressure is 136/74 and his pulse is 100. His respiration is 20 and oxygen saturation is 98%. Diagnosis Orders   1. Lumbar disc disease         Plan:  Tylenol 3 as prescribed for prn use #20. Health Maintenance Due   Topic Date Due    Hepatitis C screen  Never done    COVID-19 Vaccine (1) Never done    Depression Monitoring  Never done    HIV screen  Never done    DTaP/Tdap/Td vaccine (1 - Tdap) Never done    Flu vaccine (1) Never done       Attending Physician Statement  I have discussed the case, including pertinent history and exam findings with the resident. I agree with the documented assessment and plan as documented by the resident.         Tran Villa DO 1/28/2022 10:31 AM

## 2022-01-28 NOTE — PROGRESS NOTES
Health Maintenance Due   Topic Date Due    Hepatitis C screen  Never done    COVID-19 Vaccine (1) Never done    Depression Monitoring  Never done    HIV screen  Never done    DTaP/Tdap/Td vaccine (1 - Tdap) Never done    Flu vaccine (1) Never done

## 2022-02-01 ENCOUNTER — HOSPITAL ENCOUNTER (OUTPATIENT)
Age: 32
Discharge: HOME | End: 2022-02-01
Payer: MEDICAID

## 2022-02-01 ENCOUNTER — HOSPITAL ENCOUNTER (OUTPATIENT)
Dept: GENERAL RADIOLOGY | Age: 32
Discharge: HOME | End: 2022-02-01
Payer: MEDICAID

## 2022-02-01 DIAGNOSIS — Z98.1 S/P LUMBAR FUSION: ICD-10-CM

## 2022-02-01 DIAGNOSIS — M43.16 SPONDYLOLISTHESIS OF LUMBAR REGION: ICD-10-CM

## 2022-02-01 DIAGNOSIS — M51.16 LUMBAR DISC DISEASE WITH RADICULOPATHY: ICD-10-CM

## 2022-02-01 PROCEDURE — 72100 X-RAY EXAM L-S SPINE 2/3 VWS: CPT

## 2022-02-02 ENCOUNTER — VIRTUAL VISIT (OUTPATIENT)
Dept: NEUROSURGERY | Age: 32
End: 2022-02-02

## 2022-02-02 DIAGNOSIS — M51.16 LUMBAR DISC DISEASE WITH RADICULOPATHY: Primary | ICD-10-CM

## 2022-02-02 RX ORDER — GABAPENTIN 600 MG/1
600 TABLET ORAL 3 TIMES DAILY
Qty: 90 TABLET | Refills: 0 | Status: SHIPPED | OUTPATIENT
Start: 2022-02-02 | End: 2022-03-03 | Stop reason: DRUGHIGH

## 2022-02-02 NOTE — LETTER
[address] [facility], [address] [place] [address], [address] [place] [address]  Phone: [phone]  Fax: [phone]    Yolis Lott DO        February 2, 2022     Patient: Ricardo Nunez   YOB: 1990   Date of Visit: 2/2/2022       To Whom It May Concern: It is my medical opinion that Judythe Plan should remain out of work until 4/2/22. If you have any questions or concerns, please don't hesitate to call.     Sincerely,        Yolis Lott DO

## 2022-02-02 NOTE — PROGRESS NOTES
Phone call:     Unable to get his Doxy working. The patient states that he does have some axial discomfort still as well as some numbness along the outside lateral aspect of the thigh along with pain and discomfort with some numbness along the anterior thigh and in very infrequent numbness along the left foot. Still having discomfort in the back and the leg unable to ambulate unassisted device with exception of a cane from time to time. Denies any right leg symptoms. Describes pain is approximate 6 out of 10 has not been through any physical therapy as of yet. We will initiate physical therapy to work on core strengthening range of motion postural training as well as start gabapentin 600 mg 3 times daily. Follow-up 3 months to reevaluate. We will offer 2 months work off while he is going through.

## 2022-02-18 ENCOUNTER — TELEPHONE (OUTPATIENT)
Dept: FAMILY MEDICINE CLINIC | Age: 32
End: 2022-02-18

## 2022-02-18 NOTE — LETTER
[address] [address],[address] [facility] [address]  Phone: [phone]  Fax: [phone]    Estrella Ng MD        February 18, 2022     Patient: Kirk Lloyd   YOB: 1990   Date of Visit: 2/18/2022       To Whom It May Concern: It is my medical opinion that Irma Liang should refrain from work until May 31, 2022 due to a medical condition.         If you have any questions or concerns, please don't hesitate to call.     Sincerely,        Estrella Ng MD NOTIFICATION RETURN TO WORK  
 
6/24/2020 11:37 AM 
 
Ms. Yosef Sutton [address] [facility] [address] To Whom It May Concern: 
 
Yosef Sutton is currently under the care of [facility]. She will tentatively return to work on Monday, June 29th. She has been advised to be tested for COVID and her return to work will also be pending that result If there are questions or concerns please have the patient contact our office.  
 
 
 
Sincerely, 
 
Je Gould MD

## 2022-03-02 ENCOUNTER — PATIENT MESSAGE (OUTPATIENT)
Dept: NEUROSURGERY | Age: 32
End: 2022-03-02

## 2022-03-02 NOTE — TELEPHONE ENCOUNTER
From: Lacy Ortega  To: Dr. Clancy Willams: 3/2/2022 4:14 PM EST  Subject: Prescription     The last prescription you wrote me is still not helping I am stil numb and tracey from the incision site on my stomach down the front of my leg.  The outer thigh/hip area is still in major pain and Im still having numbness on the top and bottom of my left foot frequently

## 2022-03-03 RX ORDER — GABAPENTIN 800 MG/1
800 TABLET ORAL 3 TIMES DAILY
Qty: 90 TABLET | Refills: 0 | Status: SHIPPED | OUTPATIENT
Start: 2022-03-03 | End: 2022-04-02

## 2022-03-07 ENCOUNTER — TELEPHONE (OUTPATIENT)
Dept: PSYCHIATRY | Age: 32
End: 2022-03-07

## 2022-03-07 NOTE — TELEPHONE ENCOUNTER
Hussain Ramírez wrote into the office via [employer]:    The vailum doesn't seem to be working as good as I had hoped I was wondering If we could try the other one you had mentioned  which I believe was Xanax for night terrors and panic attacks    --Please advise; he does not return until 03/09/22.

## 2022-03-07 NOTE — TELEPHONE ENCOUNTER
Will discuss any potential changes at his appointment on 3/9.    Electronically signed by Brown Lima MD on 3/7/2022 at 8:42 AM

## 2022-03-09 ENCOUNTER — TELEMEDICINE (OUTPATIENT)
Dept: PSYCHIATRY | Age: 32
End: 2022-03-09
Payer: MEDICAID

## 2022-03-09 DIAGNOSIS — F43.10 PTSD (POST-TRAUMATIC STRESS DISORDER): Primary | ICD-10-CM

## 2022-03-09 DIAGNOSIS — F29 PSYCHOSIS, UNSPECIFIED PSYCHOSIS TYPE (HCC): ICD-10-CM

## 2022-03-09 DIAGNOSIS — F41.1 GAD (GENERALIZED ANXIETY DISORDER): ICD-10-CM

## 2022-03-09 DIAGNOSIS — F41.0 PANIC ATTACKS: ICD-10-CM

## 2022-03-09 DIAGNOSIS — F60.3 BORDERLINE PERSONALITY DISORDER (HCC): ICD-10-CM

## 2022-03-09 PROCEDURE — 99214 OFFICE O/P EST MOD 30 MIN: CPT | Performed by: PSYCHIATRY & NEUROLOGY

## 2022-03-09 PROCEDURE — G8427 DOCREV CUR MEDS BY ELIG CLIN: HCPCS | Performed by: PSYCHIATRY & NEUROLOGY

## 2022-03-09 RX ORDER — LAMOTRIGINE 200 MG/1
200 TABLET ORAL 2 TIMES DAILY
Qty: 60 TABLET | Refills: 2 | Status: SHIPPED | OUTPATIENT
Start: 2022-03-09 | End: 2022-05-11 | Stop reason: SDUPTHER

## 2022-03-09 RX ORDER — ESCITALOPRAM OXALATE 20 MG/1
30 TABLET ORAL DAILY
Qty: 45 TABLET | Refills: 2 | Status: SHIPPED | OUTPATIENT
Start: 2022-03-09 | End: 2022-05-11 | Stop reason: SDUPTHER

## 2022-03-09 RX ORDER — TRAZODONE HYDROCHLORIDE 150 MG/1
75 TABLET ORAL NIGHTLY
Qty: 15 TABLET | Refills: 2 | Status: SHIPPED | OUTPATIENT
Start: 2022-03-09 | End: 2022-05-11 | Stop reason: SDUPTHER

## 2022-03-09 RX ORDER — ALPRAZOLAM 0.5 MG/1
0.5 TABLET ORAL 2 TIMES DAILY PRN
Qty: 60 TABLET | Refills: 1 | Status: SHIPPED | OUTPATIENT
Start: 2022-03-09 | End: 2022-05-08

## 2022-03-09 NOTE — PROGRESS NOTES
[address] [facility] [id number] [phone]    Progress Note    Patient:  Sharath Red  YOB: 1990  PCP:  Carlos Enrique Funez MD  Visit Date:  3/9/2022    TELEHEALTH EVALUATION -- Audio/Visual (During DZEVL-44 public health emergency)    Patient location: home  Physician location: home, [facility]  This virtual visit was conducted via interactive, real-time video. Chief Complaint   Patient presents with    Follow-up    Medication Check    Anxiety    Trauma    Mood Swings       SUBJECTIVE:      Sharath Red, a 27 y. o. male, presents for a follow up visit. Patient reports he is stable. Patient is compliant with medication regimen. He presents with his 3 dogs. He had reached out to inform that Valium isn't helping with nightmares, panic. Can't leave the house without panic being triggered. Would like to try Xanax. Already tried Klonopin. Still has nightmares every night. Sleep is ok with trazodone. Can sleep through the night. Used Valium at night, as needed. Takes Valium 0 to 2 times per day. Some days not at all. Valium takes too long to kick in. Mood is \"alright\" with meds. Calmer and more stable. A couple mornings forgot to take meds and mood was  \"downer\". Was feeling \"high\" for a couple days with more interest and felt more interactive. Notes his dad's truck as been there since he passed. Was able to get it running. That boosted his mood. Walks on his tip toes since he was a kid. \"when I feel like I can't control my body\". Tends to happen when mood changes. His daughter started doing the same thing. Is going to start PT after having recent back surgery. May require more back surgery. Still talks to himself. \"I see myself and then I see another person. They don't look familiar. \"No suicidal thoughts. We agree to change Valium to Xanax.  We discussed at length the addictive nature of benzodiazepines. He cannot drink alcohol with it. Should not be taken at the same time as narcotic pain meds due to risk of overdose. On Neurontin which sometimes makes him tired. Advised spacing them out by several hours. Med Trials:Chantix (nausea), trazodone, gabapentin, Ritalin, medical cannabis, Lamictal, Lexapro, Klonopin, Seroquel (no benefit, side effects), doxazosin, Risperdal (no benefit), Latuda (no benefit)    OBJECTIVE:  Vitals: There were no vitals taken for this visit. MENTAL STATUS EXAM:    GENERAL  Build: Normal    Hygiene:  Appropriate   SENSORIUM Orientation: Place, Person, Time, & Situation     Consciousness: Alert    ATTENTION   Focused   RELATEDNESS  Cooperative    EYE CONTACT   Good    InPSYCHOMOTOR  Normal    SPEECH Volume: Normal     Rate: Normal rate and tone    Amplitude: Within normal limits   MOOD  \"alright\"    AFFECT Range: Full    THOUGHT Process:  Goal-Directed     Content: no evidence of psychosis, doesn't appear to respond to internal stimuli, endorses AVH, see above    COGNITION Insight: Good    Judgement:  Intact    MEMORY  did not test, no gross deficits    INTELLIGENCE  Average     Mobility/Gait: Independently     Controlled SubstancesMonitoring: Periodic Controlled Substance Monitoring: No signs of potential drug abuse or diversion identified. ,Possible medication side effects, risk of tolerance/dependence & alternative treatments discussed. Ranulfo Espinoza MD)       ASSESSMENT: Will change Valium to Xanax for panic. Mood is stable. No SI. Will monitor for any psychosis, dissociations, or suicidal thoughts. No SI. Lamictal, Lexapro, and Vraylar all with mood benefit. Diagnosis Orders   1. PTSD (post-traumatic stress disorder)     2. Panic attacks  ALPRAZolam (XANAX) 0.5 MG tablet   3. DANTE (generalized anxiety disorder)     4. Borderline personality disorder ([facility] [address]     5.  Psychosis, unspecified psychosis type [facility])     Medical Hx: asthma, heart murmur, Legg-Calve Perthes Disease     PLAN:     · Medications:   · Lexapro 30 mg QD   · Lamictal 200 mg BID   · Trazodone 75 mg HS  · Stop Valium 5 mg BID prn panic or insomnia  · Vraylar 1.5 mg QD  · Start Xanax 0.5 mg BID prn panic  · Therapy: previously saw Dr. Carli Mejía and will see Dr. Sanket Quinones in August  · Labs/Tests/Imaging: none   · Records Reviewed: [facility]  · Patient advised to call if patient has any difficulties with treatment  Return in about 4 weeks (around 4/6/2022) for med check, follow up. Juanis Galarzairmer, was evaluated through a synchronous (real-time) audio-video encounter. The patient (or guardian if applicable) is aware that this is a billable service, which includes applicable copays. This virtual visit was conducted with patient's (and/or legal guardian's) consent. The visit was conducted pursuant to the emergency declaration under the [address] [place] [address], [address] [place] [address] authority and the [employer] [employer] and [employer] General Act. Patient identification was verified, and a caregiver was present when appropriate. The patient was located in a state where the provider was licensed to provide care.       Total time spent for this encounter: not billed by time    Electronically signed by Zack Mendoza MD on 3/9/2022 at 10:33 AM

## 2022-03-13 ENCOUNTER — HOSPITAL ENCOUNTER (EMERGENCY)
Age: 32
Discharge: HOME | End: 2022-03-14
Payer: MEDICAID

## 2022-03-13 VITALS
WEIGHT: 220 LBS | DIASTOLIC BLOOD PRESSURE: 86 MMHG | SYSTOLIC BLOOD PRESSURE: 147 MMHG | RESPIRATION RATE: 18 BRPM | HEIGHT: 71 IN | BODY MASS INDEX: 30.8 KG/M2 | TEMPERATURE: 98.4 F | HEART RATE: 87 BPM | OXYGEN SATURATION: 100 %

## 2022-03-13 DIAGNOSIS — S29.011A MUSCLE STRAIN OF CHEST WALL, INITIAL ENCOUNTER: ICD-10-CM

## 2022-03-13 DIAGNOSIS — R07.81 RIB PAIN ON LEFT SIDE: Primary | ICD-10-CM

## 2022-03-13 PROCEDURE — 99283 EMERGENCY DEPT VISIT LOW MDM: CPT

## 2022-03-13 ASSESSMENT — PAIN DESCRIPTION - LOCATION: LOCATION: RIB CAGE

## 2022-03-13 ASSESSMENT — PAIN DESCRIPTION - ORIENTATION: ORIENTATION: LEFT

## 2022-03-13 ASSESSMENT — PAIN SCALES - GENERAL: PAINLEVEL_OUTOF10: 5

## 2022-03-13 ASSESSMENT — PAIN DESCRIPTION - FREQUENCY: FREQUENCY: CONTINUOUS

## 2022-03-13 ASSESSMENT — PAIN - FUNCTIONAL ASSESSMENT: PAIN_FUNCTIONAL_ASSESSMENT: 0-10

## 2022-03-14 ENCOUNTER — APPOINTMENT (OUTPATIENT)
Dept: GENERAL RADIOLOGY | Age: 32
End: 2022-03-14
Payer: MEDICAID

## 2022-03-14 PROCEDURE — 71101 X-RAY EXAM UNILAT RIBS/CHEST: CPT

## 2022-03-14 PROCEDURE — 6370000000 HC RX 637 (ALT 250 FOR IP): Performed by: NURSE PRACTITIONER

## 2022-03-14 RX ORDER — LIDOCAINE 4 G/G
1 PATCH TOPICAL ONCE
Status: DISCONTINUED | OUTPATIENT
Start: 2022-03-14 | End: 2022-03-14 | Stop reason: HOSPADM

## 2022-03-14 RX ORDER — CYCLOBENZAPRINE HCL 10 MG
10 TABLET ORAL 3 TIMES DAILY PRN
Qty: 21 TABLET | Refills: 0 | Status: SHIPPED | OUTPATIENT
Start: 2022-03-14 | End: 2022-03-24

## 2022-03-14 RX ORDER — CYCLOBENZAPRINE HCL 10 MG
10 TABLET ORAL ONCE
Status: COMPLETED | OUTPATIENT
Start: 2022-03-14 | End: 2022-03-14

## 2022-03-14 RX ADMIN — CYCLOBENZAPRINE 10 MG: 10 TABLET, FILM COATED ORAL at 01:23

## 2022-03-14 NOTE — ED NOTES
Pt presents to the ED with complaints of left sided rib pain. Pt states he coughed after smoking and felt a pop and since his ribs have hurt. He states that it now hurts when he coughs and deep breaths. Pain is worse with exertion.       Julianna Cordon  03/13/22 0863

## 2022-03-20 ASSESSMENT — ENCOUNTER SYMPTOMS
ABDOMINAL PAIN: 0
EYE REDNESS: 0
BACK PAIN: 0
RHINORRHEA: 0
COUGH: 0
VOMITING: 0
NAUSEA: 0
CHEST TIGHTNESS: 0

## 2022-03-20 NOTE — ED PROVIDER NOTES
[facility] Emergency Department    CHIEF COMPLAINT       Chief Complaint   Patient presents with    Rib Pain       Nurses Notes reviewed and I agree except as noted in the HPI. HISTORY OF PRESENT ILLNESS    Jared Junior raquel 27 y.o. male who presents to the ED for evaluation of left sided lower rib pain. He felt a pop while coughing after smoking a couple of days ago. Has had continued pain since then. Movement, coughing and smoking makes the pain worse. HPI was provided by the patient    REVIEW OF SYSTEMS     Review of Systems   Constitutional: Negative for chills, fatigue and fever. HENT: Negative for congestion, ear discharge, ear pain, postnasal drip and rhinorrhea. Eyes: Negative for redness. Respiratory: Negative for cough and chest tightness. Cardiovascular: Negative for chest pain and leg swelling. Gastrointestinal: Negative for abdominal pain, nausea and vomiting. Genitourinary: Negative for difficulty urinating, dysuria, enuresis, flank pain and hematuria. Musculoskeletal: Positive for arthralgias and myalgias. Negative for back pain and joint swelling. Skin: Negative for rash. Neurological: Negative for dizziness, light-headedness, numbness and headaches. Psychiatric/Behavioral: Negative for agitation, behavioral problems and confusion. All other systems negative except as noted.       PAST MEDICAL HISTORY     Past Medical History:   Diagnosis Date    Asthma     Bipolar 1 disorder ([facility] [address]     Richardson Lugo, [facility]    Borderline personality disorder ([facility] [address]     COPD (chronic obstructive pulmonary disease) ([facility] [address]     early stages    Depression     Dissociative identity disorder ([facility] [address]     10/18/21    GERD (gastroesophageal reflux disease)     History of cardiac murmur in childhood     Liol-Cxwbq-Igefklr disease, left     Lumbar disc disease     Multiple allergies     PTSD (post-traumatic stress disorder)     Restless legs syndrome     Teeth missing  Under care of team 05/18/2021    psych-vidal ethan-lima-last visit april 2021    Wellness examination 05/18/2021    pcp-Celia Baez-last visit april 2021       Linden New      has a past surgical history that includes Total hip arthroplasty (Left, 2011); Tympanostomy tube placement; Injection Procedure For Sacroiliac Joint (Left, 10/23/2020); Pain management procedure (Left, 01/29/2021); Pain management procedure (Left, 02/12/2021); Nerve Block (N/A, 03/12/2021); Pain management procedure (Left, 04/16/2021); [place] tooth extraction; Lumbar spine surgery (Left, 06/01/2021); Lumbar spine surgery (11/02/2021); lumbar fusion (Left, 11/2/2021); and lumbar fusion (Left, 11/2/2021). CURRENT MEDICATIONS       Discharge Medication List as of 3/14/2022  1:20 AM      CONTINUE these medications which have NOT CHANGED    Details   traZODone (DESYREL) 150 MG tablet Take 0.5 tablets by mouth nightly, Disp-15 tablet, R-2Normal      ALPRAZolam (XANAX) 0.5 MG tablet Take 1 tablet by mouth 2 times daily as needed for Anxiety (panic) for up to 60 days. , Disp-60 tablet, R-1Normal      lamoTRIgine (LAMICTAL) 200 MG tablet Take 1 tablet by mouth 2 times daily, Disp-60 tablet, R-2Normal      escitalopram (LEXAPRO) 20 MG tablet Take 1.5 tablets by mouth daily, Disp-45 tablet, R-2Normal      cariprazine hcl (VRAYLAR) 1.5 MG capsule Take 1 capsule by mouth daily, Disp-30 capsule, R-2Normal      gabapentin (NEURONTIN) 800 MG tablet Take 1 tablet by mouth 3 times daily for 30 days. , Disp-90 tablet, R-0Normal      carbamide peroxide (DEBROX) 6.5 % otic solution Use as directed., R-0OTC      montelukast (SINGULAIR) 10 MG tablet Take 1 tablet by mouth daily, Disp-30 tablet, R-3Normal      loratadine (CLARITIN) 10 MG tablet Take 1 tablet by mouth daily, Disp-30 tablet, R-3Normal      pantoprazole (PROTONIX) 40 MG tablet Take 1 tablet by mouth 2 times daily (with meals), Disp-180 tablet, R-1Normal      Handicap Placard [facility] Starting Thu 2021, Disp-1 each, R-0, PrintExpires 2022      rOPINIRole (REQUIP) 1 MG tablet Take 1 tablet by mouth nightly, Disp-90 tablet, R-3Normal      azelastine (OPTIVAR) 0.05 % ophthalmic solution Place 1 drop into both eyes 2 times daily as neededHistorical Med      fluticasone (FLONASE) 50 MCG/ACT nasal spray 2 sprays by Each Nostril route daily Start with one spray daily for one week and increase to two sprays daily as needed, Disp-1 Bottle, R-3Normal      fluticasone-salmeterol (ADVAIR DISKUS) 100-50 MCG/DOSE diskus inhaler Inhale 1 puff into the lungs every 12 hours, Disp-60 each, R-3Normal      albuterol sulfate HFA (VENTOLIN HFA) 108 (90 Base) MCG/ACT inhaler Inhale 2 puffs into the lungs every 6 hours as needed for Wheezing, Disp-1 Inhaler, R-0Normal             ALLERGIES     has No Known Allergies. FAMILY HISTORY     He indicated that his mother is alive. He indicated that his father is . family history includes Depression in his mother; Heart Disease in his father.     SOCIAL HISTORY       Social History     Socioeconomic History    Marital status:      Spouse name: Not on file    Number of children: Not on file    Years of education: Not on file    Highest education level: Not on file   Occupational History    Not on file   Tobacco Use    Smoking status: Current Every Day Smoker     Packs/day: 1.00     Years: 10.00     Pack years: 10.00     Types: Cigarettes    Smokeless tobacco: Former User     Types: Chew   Vaping Use    Vaping Use: Former   Substance and Sexual Activity    Alcohol use: Not Currently     Comment: socially    Drug use: Yes     Types: Marijuana Les Hyatt     Comment: medical card    Sexual activity: Yes     Partners: Female   Other Topics Concern    Not on file   Social History Narrative    Not on file     Social Determinants of Health     Financial Resource Strain:     Difficulty of Paying Living Expenses: Not on file   Food Insecurity:  Worried About Running Out of Food in the Last Year: Not on file    Roman of Food in the Last Year: Not on file   Transportation Needs:     Lack of Transportation (Medical): Not on file    Lack of Transportation (Non-Medical): Not on file   Physical Activity:     Days of Exercise per Week: Not on file    Minutes of Exercise per Session: Not on file   Stress:     Feeling of Stress : Not on file   Social Connections:     Frequency of Communication with Friends and Family: Not on file    Frequency of Social Gatherings with Friends and Family: Not on file    Attends Gnosticist Services: Not on file    Active Member of [address] [place] [address] [employer] or Organizations: Not on file    Attends Club or Organization Meetings: Not on file    Marital Status: Not on file   Intimate Partner Violence:     Fear of Current or Ex-Partner: Not on file    Emotionally Abused: Not on file    Physically Abused: Not on file    Sexually Abused: Not on file   Housing Stability:     Unable to Pay for Housing in the Last Year: Not on file    Number of [place] in the Last Year: Not on file    Unstable Housing in the Last Year: Not on file       PHYSICAL EXAM     INITIAL VITALS:  height is 5' 11\" (1.803 m) and weight is 220 lb (99.8 kg). His oral temperature is 98.4 °F (36.9 °C). His blood pressure is 147/86 (abnormal) and his pulse is 87. His respiration is 18 and oxygen saturation is 100%. Physical Exam  Vitals and nursing note reviewed. Constitutional:       General: He is not in acute distress. Appearance: Normal appearance. He is well-developed. He is not ill-appearing or diaphoretic. HENT:      Head: Normocephalic and atraumatic. Nose: Nose normal.      Mouth/Throat:      Mouth: Mucous membranes are moist.      Pharynx: Oropharynx is clear. Eyes:      General:         Right eye: No discharge. Left eye: No discharge. Conjunctiva/sclera: Conjunctivae normal.   Neck:      Trachea: No tracheal deviation. Cardiovascular:      Rate and Rhythm: Normal rate and regular rhythm. Pulses: Normal pulses. Heart sounds: Normal heart sounds. No murmur heard. No gallop. Comments: Normal capillary refill  Pulmonary:      Effort: Pulmonary effort is normal. No respiratory distress. Breath sounds: Normal breath sounds. No stridor. Chest:      Chest wall: Tenderness present. No mass, lacerations, deformity, swelling, crepitus or edema. There is no dullness to percussion. Abdominal:      General: Bowel sounds are normal.      Palpations: Abdomen is soft. Musculoskeletal:         General: No tenderness or deformity. Normal range of motion. Cervical back: Normal range of motion. Skin:     General: Skin is warm and dry. Capillary Refill: Capillary refill takes less than 2 seconds. Coloration: Skin is not pale. Findings: No erythema or rash. Neurological:      General: No focal deficit present. Mental Status: He is alert and oriented to person, place, and time. Cranial Nerves: No cranial nerve deficit. Psychiatric:         Behavior: Behavior normal.         DIFFERENTIAL DIAGNOSIS:   Contusion, strain, fracture, dislocation    DIAGNOSTIC RESULTS     EKG: All EKG's are interpreted by the Emergency Department Physician who eithersigns or Co-signs this chart in the absence of a cardiologist.        RADIOLOGY: non-plainfilm images(s) such as CT, Ultrasound and MRI are read by the radiologist.  Plain radiographic images are visualized and preliminarily interpreted by the emergency physician unless otherwise stated below. XR RIBS LEFT INCLUDE CHEST (MIN 3 VIEWS)   Final Result   No acute findings.       This document has been electronically signed by: Yaneth Ruby MD on    03/14/2022 12:51 AM            LABS:   Labs Reviewed - No data to display    EMERGENCY DEPARTMENT COURSE:   Vitals:    Vitals:    03/13/22 2342   BP: (!) 147/86   Pulse: 87   Resp: 18   Temp: 98.4 °F (36.9 °C)   TempSrc: Oral   SpO2: 100%   Weight: 220 lb (99.8 kg)   Height: 5' 11\" (1.803 m)                                   Internal Administration   First Dose      Second Dose           Last COVID Lab SARS-CoV-2 (no units)   Date Value   05/28/2021 Not Detected     SARS-CoV-2 RNA, RT PCR (no units)   Date Value   10/29/2021 NOT DETECTED     SARS-CoV-2, MATTHEW (no units)   Date Value   12/08/2021 NOT  DETECTED            MDM    Patient was seen in the ER for left sided rib pain. Appropriate imaging is ordered and reviewed. Patient is reproducible tenderness without crepitus on the left side. Pain is worse with movement and inspiration. Improved with lidoderm and flexeril. Xrays negative for fx or dislocation. Will dc home with medications as indicated. Patient is agreeable and comfortable with POC. Medications   cyclobenzaprine (FLEXERIL) tablet 10 mg (10 mg Oral Given 3/14/22 0123)       Please note that the patient was evaluated during a pandemic. All efforts were made for HIPPA compliance as well as provision of appropriate care. Patient was seen independently by myself. The patient's final impression and disposition and plan was determined by myself. Strict return precautions and follow up instructions were discussed with the patient prior to discharge, with which the patient agrees. Physical assessment findings, diagnostic testing(s) if applicable, and vital signs reviewed with patient/patient representative. Questions answered. Medications asdirected, including OTC medications for supportive care. Education provided on medications. Differential diagnosis(s) discussed with patient/patient representative. Home care/self care instructions reviewed withpatient/patient representative. Patient is to follow-up with family care provider in 2-3 days if no improvement. Patient is to go to the emergency department if symptoms worsen.   Patient/patient representative isaware of care plan, questions answered, verbalizes understanding and is in agreement. CRITICAL CARE:   None    CONSULTS:  None    PROCEDURES:  None    FINAL IMPRESSION     1. Rib pain on left side    2.  Muscle strain of chest wall, initial encounter          DISPOSITION/PLAN   DISPOSITION Decision To Discharge 03/14/2022 01:18:55 AM      PATIENT REFERREDTO:  Estrella Ng MD  [address] [facility]  [address]  [phone]    Schedule an appointment as soon as possible for a visit in 2 days  For follow up      DISCHARGE MEDICATIONS:  Discharge Medication List as of 3/14/2022  1:20 AM      START taking these medications    Details   cyclobenzaprine (FLEXERIL) 10 MG tablet Take 1 tablet by mouth 3 times daily as needed for Muscle spasms, Disp-21 tablet, R-0Normal             (Please note that portions of this note were completed with a voice recognition program.  Efforts were made to edit the dictations but occasionally words are mis-transcribed.)         LEYDI Emerson CNP, APRN - CNP  03/20/22 6969

## 2022-03-28 ENCOUNTER — TELEPHONE (OUTPATIENT)
Dept: FAMILY MEDICINE CLINIC | Age: 32
End: 2022-03-28

## 2022-04-07 ENCOUNTER — TELEMEDICINE (OUTPATIENT)
Dept: FAMILY MEDICINE CLINIC | Age: 32
End: 2022-04-07
Payer: MEDICAID

## 2022-04-07 DIAGNOSIS — H10.31 ACUTE CONJUNCTIVITIS OF RIGHT EYE, UNSPECIFIED ACUTE CONJUNCTIVITIS TYPE: Primary | ICD-10-CM

## 2022-04-07 PROCEDURE — 99213 OFFICE O/P EST LOW 20 MIN: CPT | Performed by: STUDENT IN AN ORGANIZED HEALTH CARE EDUCATION/TRAINING PROGRAM

## 2022-04-07 PROCEDURE — G8428 CUR MEDS NOT DOCUMENT: HCPCS | Performed by: STUDENT IN AN ORGANIZED HEALTH CARE EDUCATION/TRAINING PROGRAM

## 2022-04-07 RX ORDER — POLYMYXIN B SULFATE AND TRIMETHOPRIM 1; 10000 MG/ML; [USP'U]/ML
1 SOLUTION OPHTHALMIC EVERY 4 HOURS
Qty: 20 ML | Refills: 0 | Status: SHIPPED | OUTPATIENT
Start: 2022-04-07 | End: 2022-04-14

## 2022-04-07 ASSESSMENT — ENCOUNTER SYMPTOMS
SHORTNESS OF BREATH: 0
ABDOMINAL PAIN: 0
CONSTIPATION: 0
VOMITING: 0
EYE DISCHARGE: 1
BLOOD IN STOOL: 0
COUGH: 0
NAUSEA: 0
TROUBLE SWALLOWING: 0
DIARRHEA: 0
EYE REDNESS: 1
EYE PAIN: 0

## 2022-04-07 NOTE — PROGRESS NOTES
Davide Santos (:  1990) is a Established patient, here for evaluation of the following:    Assessment & Plan   Below is the assessment and plan developed based on review of pertinent history, physical exam, labs, studies, and medications. 1. Acute conjunctivitis of right eye, unspecified acute conjunctivitis type  -     trimethoprim-polymyxin b (POLYTRIM) 32745-5.1 UNIT/ML-% ophthalmic solution; Place 1 drop into both eyes every 4 hours for 7 days, Disp-20 mL, R-0Normal    Treat conjunctivitis with Polytrim 1 drop in both eyes every 4 hours for 7 days. Follow-up with eye doctor if symptoms fail to improve or if symptoms worsen or he notices any other changes in vision. Follow-up in our office as needed or as previously scheduled. No follow-ups on file. Subjective   Patient is a 25-year-old male who presents via virtual visit for evaluation of right eye irritation, redness, green drainage. Patient states symptoms have been present for around 2 to 3 days. He states that he has been having issues with a significant amount of green drainage from his right eye in the morning. He states that this improves throughout the day however he will have continued green drainage throughout the day. He states that he has had eye irritation and mild blurry vision. He denies any other significant changes in vision including floaters. He denies any eye pain. Review of Systems   Constitutional: Negative for chills, fatigue and fever. HENT: Negative for ear pain, postnasal drip and trouble swallowing. Eyes: Positive for discharge and redness. Negative for pain and visual disturbance. Respiratory: Negative for cough and shortness of breath. Cardiovascular: Negative for chest pain and palpitations. Gastrointestinal: Negative for abdominal pain, blood in stool, constipation, diarrhea, nausea and vomiting. Genitourinary: Negative for dysuria and urgency. Skin: Negative for rash and wound. Neurological: Negative for dizziness and headaches. Psychiatric/Behavioral: Negative for dysphoric mood. The patient is not nervous/anxious. Objective   Patient-Reported Vitals  No data recorded     Physical Exam  [INSTRUCTIONS:  \"[x]\" Indicates a positive item  \"[]\" Indicates a negative item  -- DELETE ALL ITEMS NOT EXAMINED]    Constitutional: [x] Appears well-developed and well-nourished [x] No apparent distress      [] Abnormal -     Mental status: [x] Alert and awake  [x] Oriented to person/place/time [x] Able to follow commands    [] Abnormal -     Eyes:   EOM    [x]  Normal    [] Abnormal -   Sclera  [x]  Normal    [] Abnormal -          Discharge [x]  None visible   [] Abnormal -     HENT: [x] Normocephalic, atraumatic  [] Abnormal -   [x] Mouth/Throat: Mucous membranes are moist    External Ears [x] Normal  [] Abnormal -    Neck: [x] No visualized mass [] Abnormal -     Pulmonary/Chest: [x] Respiratory effort normal   [x] No visualized signs of difficulty breathing or respiratory distress        [] Abnormal -      Musculoskeletal:   [x] Normal gait with no signs of ataxia         [x] Normal range of motion of neck        [] Abnormal -     Neurological:        [x] No Facial Asymmetry (Cranial nerve 7 motor function) (limited exam due to video visit)          [x] No gaze palsy        [] Abnormal -          Skin:        [x] No significant exanthematous lesions or discoloration noted on facial skin         [] Abnormal -            Psychiatric:       [x] Normal Affect [] Abnormal -        [x] No Hallucinations    Other pertinent observable physical exam findings:-         Raymundo Turcios, was evaluated through a synchronous (real-time) audio-video encounter. The patient (or guardian if applicable) is aware that this is a billable service, which includes applicable co-pays. This Virtual Visit was conducted with patient's (and/or legal guardian's) consent.  The visit was conducted pursuant to the emergency declaration under the [facility], [address] [place] [address] authority and the [email or web address] and [employer] General Act. Patient identification was verified, and a caregiver was present when appropriate. The patient was located at home in a state where the provider was licensed to provide care.        --Flex Dickey DO

## 2022-04-07 NOTE — PROGRESS NOTES
Micheline Landeros, was evaluated through a synchronous (real-time) audio-video encounter. The patient (or guardian if applicable) is aware that this is a billable service, which includes applicable co-pays. This Virtual Visit was conducted with patient's (and/or legal guardian's) consent. The visit was conducted pursuant to the emergency declaration under the [facility], [address] [place] [address] authority and the [employer] General Act. Patient identification was verified, and a caregiver was present when appropriate. The patient was located at home in a state where the provider was licensed to provide care. --Jovon Coffey MD on 4/7/2022 at 6:03 PM    An electronic signature was used to authenticate this note. SUBJECTIVE     Micheline Landeros is a 27 y.o.male    No chief complaint on file. Chief complaint, [place], and all pertinent details of the case reviewed with the resident. Please see resident's note for specific details discussed at today's visit.     Patient Active Problem List   Diagnosis    Left hip pain    Depression    Asthma    Restless leg    Gastroesophageal reflux disease    Vitamin D deficiency    Other insomnia    Anxiety    Recurrent major depressive disorder, in remission ([address]    Bipolar affective disorder, current episode depressed ([address]    PTSD (post-traumatic stress disorder)    DANTE (generalized anxiety disorder)    Allergic rhinitis due to pollen    Lumbar disc disease    Borderline personality disorder ([address]    Mixed hyperlipidemia    Lumbar disc disease with radiculopathy       Current Outpatient Medications   Medication Sig Dispense Refill    trimethoprim-polymyxin b (POLYTRIM) 03120-2.1 UNIT/ML-% ophthalmic solution Place 1 drop into both eyes every 4 hours for 7 days 20 mL 0    traZODone (DESYREL) 150 MG tablet Take 0.5 tablets by mouth nightly 15 tablet 2    ALPRAZolam Lamount Salon) 0.5 MG tablet Take 1 tablet by mouth 2 times daily as needed for Anxiety (panic) for up to 60 days. 60 tablet 1    lamoTRIgine (LAMICTAL) 200 MG tablet Take 1 tablet by mouth 2 times daily 60 tablet 2    escitalopram (LEXAPRO) 20 MG tablet Take 1.5 tablets by mouth daily 45 tablet 2    cariprazine hcl (VRAYLAR) 1.5 MG capsule Take 1 capsule by mouth daily 30 capsule 2    gabapentin (NEURONTIN) 800 MG tablet Take 1 tablet by mouth 3 times daily for 30 days. 90 tablet 0    carbamide peroxide (DEBROX) 6.5 % otic solution Use as directed. 0    montelukast (SINGULAIR) 10 MG tablet Take 1 tablet by mouth daily 30 tablet 3    loratadine (CLARITIN) 10 MG tablet Take 1 tablet by mouth daily 30 tablet 3    pantoprazole (PROTONIX) 40 MG tablet Take 1 tablet by mouth 2 times daily (with meals) 180 tablet 1    Handicap Placard MISC by Does not apply route Expires March 1st, 2022 1 each 0    rOPINIRole (REQUIP) 1 MG tablet Take 1 tablet by mouth nightly 90 tablet 3    azelastine (OPTIVAR) 0.05 % ophthalmic solution Place 1 drop into both eyes 2 times daily as needed      fluticasone (FLONASE) 50 MCG/ACT nasal spray 2 sprays by Each Nostril route daily Start with one spray daily for one week and increase to two sprays daily as needed 1 Bottle 3    fluticasone-salmeterol (ADVAIR DISKUS) 100-50 MCG/DOSE diskus inhaler Inhale 1 puff into the lungs every 12 hours 60 each 3    albuterol sulfate HFA (VENTOLIN HFA) 108 (90 Base) MCG/ACT inhaler Inhale 2 puffs into the lungs every 6 hours as needed for Wheezing 1 Inhaler 0     No current facility-administered medications for this visit. Review of Systems per Dr. Carl Contreras    OBJECTIVE     There were no vitals taken for this visit.   BP Readings from Last 3 Encounters:   03/13/22 (!) 147/86   01/28/22 136/74   12/08/21 126/72       Wt Readings from Last 3 Encounters:   03/13/22 220 lb (99.8 kg)   01/28/22 225 lb 9.6 oz (102.3 kg)   12/08/21 222 lb (100.7 kg) There is no height or weight on file to calculate BMI. Physical Exam per Dr. Ashkan Cantrell      There is no immunization history on file for this patient. Health Maintenance   Topic Date Due    Hepatitis C screen  Never done    COVID-19 Vaccine (1) Never done    Pneumococcal 0-64 years Vaccine (1 of 2 - PPSV23) Never done    HIV screen  Never done    DTaP/Tdap/Td vaccine (1 - Tdap) Never done    Flu vaccine (Season Ended) 09/01/2022    Depression Monitoring  01/28/2023    Hepatitis A vaccine  Aged Out    Hepatitis B vaccine  Aged Out    Hib vaccine  Aged Out    Meningococcal (ACWY) vaccine  Aged Out    Varicella vaccine  Discontinued            Future Appointments   Date Time Provider [place]   4/15/2022  7:00 AM Judie Daily, PT [facility] PT [facility]   5/2/2022  1:00 PM Clemencia Baez MD SRP[facility] RES [address]   5/9/2022  1:00 PM Darlyn Fernández, DO [facility]   5/11/2022 10:00 AM Rhonda Connolly MD [facility]   8/30/2022 10:00 AM Kamran Chow, PhD N MOQIPAIDAV [address]       ASSESSMENT       Diagnosis Orders   1. Acute conjunctivitis of right eye, unspecified acute conjunctivitis type  trimethoprim-polymyxin b (POLYTRIM) 46120-3.1 UNIT/ML-% ophthalmic solution       PLAN      After discussion with Dr. Ashkan Cantrell , we agreed on plan as follows:    Start Polytrim-1 drop every 4 hours OU x7 days  Continue current meds otherwise  Okay for work slip  Follow-up as needed          Attending Physician Statement  I have discussed the case, including pertinent history and exam findings with the resident. I agree with the documented assessment and plan as documented by the resident.   GE modifier added  to this encounter     Electronically signed by Rita Cee MD on 4/7/2022 at 6:03 PM

## 2022-04-08 ENCOUNTER — PATIENT MESSAGE (OUTPATIENT)
Dept: FAMILY MEDICINE CLINIC | Age: 32
End: 2022-04-08

## 2022-04-08 ENCOUNTER — TELEPHONE (OUTPATIENT)
Dept: FAMILY MEDICINE CLINIC | Age: 32
End: 2022-04-08

## 2022-04-08 RX ORDER — LORATADINE 10 MG/1
10 TABLET ORAL DAILY
Qty: 30 TABLET | Refills: 3 | Status: SHIPPED | OUTPATIENT
Start: 2022-04-08 | End: 2022-08-31 | Stop reason: SDUPTHER

## 2022-04-08 NOTE — TELEPHONE ENCOUNTER
Wife calling for letter stating she had to miss work  On 4/6-10 while  was off for his eye. Had VV with Dr Ginny Gambino 4/7.  Wants to come  today

## 2022-04-08 NOTE — TELEPHONE ENCOUNTER
From: Ok Nolen  To: Tyler Isaacs MD  Sent: 4/8/2022 10:15 AM EDT  Subject: Loratadine    I was messaging to get my loratadine refilled I have no refills left.

## 2022-04-08 NOTE — LETTER
[address] [place] [address],[address] [facility] [address] [place] [address]  Phone: [phone]  Fax: [phone]    Musa Aggarwal DO         April 8, 2022 [address] [id number]      To Whom it may concern,     Goldie Lopez was seen in my office on 4/7/22 for medical issues. Please excuse his wife from work on 4/6/22-4/10/22 as they were unable to provide  during his illness. If you have any questions or concerns, please don't hesitate to call.     Sincerely,  Electronically signed by Musa Aggarwal DO on 4/8/2022 at 10:52 AM

## 2022-04-17 DIAGNOSIS — J30.2 SEASONAL ALLERGIES: ICD-10-CM

## 2022-04-18 NOTE — TELEPHONE ENCOUNTER
Patient's last appointment was : 4/7/2022  Patient's next appointment is : 5/2/2022  Last refilled: 11/16/21

## 2022-04-19 ENCOUNTER — TELEPHONE (OUTPATIENT)
Dept: PSYCHIATRY | Age: 32
End: 2022-04-19

## 2022-04-19 RX ORDER — MONTELUKAST SODIUM 10 MG/1
10 TABLET ORAL DAILY
Qty: 30 TABLET | Refills: 3 | Status: SHIPPED | OUTPATIENT
Start: 2022-04-19 | End: 2022-08-31 | Stop reason: SDUPTHER

## 2022-04-19 NOTE — TELEPHONE ENCOUNTER
Ricco Edmond sent a message via kajeet: \"The Xanax you prescribed me has been working somewhat I wasnt sure if it was safe to take 1 mg twice daily. I know we wanted to see if it helped before we made any other decisions or changes. \"    Previous Rx sent 03/09/2022 0.5 mg Xanax bid prn with 1 refill. Patient is scheduled to follow up 05/11/2022.

## 2022-04-19 NOTE — TELEPHONE ENCOUNTER
He can try 1 mg twice daily as needed if the 0.5 mg isn't enough. This medication is to be used only as needed because it's very addictive.    Electronically signed by Ricarda Raymundo MD on 4/19/2022 at 8:18 AM

## 2022-04-22 NOTE — TELEPHONE ENCOUNTER
Dr Hiram Oates please sign form and have forward to Dr Dexter Gravely to [facility]

## 2022-04-27 ENCOUNTER — PATIENT MESSAGE (OUTPATIENT)
Dept: NEUROSURGERY | Age: 32
End: 2022-04-27

## 2022-04-27 DIAGNOSIS — M51.16 LUMBAR DISC DISEASE WITH RADICULOPATHY: Primary | ICD-10-CM

## 2022-04-27 NOTE — TELEPHONE ENCOUNTER
[facility] called regarding New orders for PT they state pt was scheduled and NO-Showed They are willing to see him again and will needs a new referral since they closed out the old one.

## 2022-04-27 NOTE — TELEPHONE ENCOUNTER
Has he had no PT at all? F/u was supposed to be 3mo from mid feb --- please move appt out to late jun/July if has not even started.    Was going to reeval symptoms at followup with neurontin AND PT but if he hasnt even started PT then need to start 3mo clock from now

## 2022-05-02 ENCOUNTER — TELEMEDICINE (OUTPATIENT)
Dept: FAMILY MEDICINE CLINIC | Age: 32
End: 2022-05-02
Payer: MEDICAID

## 2022-05-02 DIAGNOSIS — F31.32 BIPOLAR AFFECTIVE DISORDER, CURRENTLY DEPRESSED, MODERATE (HCC): ICD-10-CM

## 2022-05-02 DIAGNOSIS — M51.16 LUMBAR DISC DISEASE WITH RADICULOPATHY: Primary | ICD-10-CM

## 2022-05-02 DIAGNOSIS — J45.909 ASTHMA, UNSPECIFIED ASTHMA SEVERITY, UNSPECIFIED WHETHER COMPLICATED, UNSPECIFIED WHETHER PERSISTENT: ICD-10-CM

## 2022-05-02 PROCEDURE — G8427 DOCREV CUR MEDS BY ELIG CLIN: HCPCS | Performed by: STUDENT IN AN ORGANIZED HEALTH CARE EDUCATION/TRAINING PROGRAM

## 2022-05-02 PROCEDURE — 99212 OFFICE O/P EST SF 10 MIN: CPT | Performed by: STUDENT IN AN ORGANIZED HEALTH CARE EDUCATION/TRAINING PROGRAM

## 2022-05-02 ASSESSMENT — ENCOUNTER SYMPTOMS
DIARRHEA: 0
NAUSEA: 0
COUGH: 0
BACK PAIN: 1
CONSTIPATION: 0
VOMITING: 0
ABDOMINAL PAIN: 0
SHORTNESS OF BREATH: 0

## 2022-05-02 NOTE — PROGRESS NOTES
This was a video visit with the patient at their home and the resident and myself in the continuity clinic. Patient informed it is a billable service which may include a copay. Virtual visit was     S: 27 y.o. male with   Chief Complaint   Patient presents with    Back Pain    Manic Behavior    Asthma       Please refer to resident note for full HPI. BP Readings from Last 3 Encounters:   03/13/22 (!) 147/86   01/28/22 136/74   12/08/21 126/72     Wt Readings from Last 3 Encounters:   03/13/22 220 lb (99.8 kg)   01/28/22 225 lb 9.6 oz (102.3 kg)   12/08/21 222 lb (100.7 kg)           O: VS: There were no vitals filed for this visit. There is no height or weight on file to calculate BMI. Physical exam performed by resident physician via audio video teleconference. Lab Results   Component Value Date    WBC 14.3 (H) 11/03/2021    HGB 13.5 11/03/2021    HCT 41.0 11/03/2021     11/03/2021    CHOL 211 (H) 08/04/2020    TRIG 283 (H) 08/04/2020    HDL 29 08/04/2020    LDLCALC 125 08/04/2020    AST 18 08/04/2020     10/22/2021    K 4.0 10/22/2021     10/22/2021    CREATININE 0.8 10/22/2021    BUN 10 10/22/2021    CO2 26 10/22/2021    TSH 1.880 08/04/2020    LABGLOM >90 10/22/2021    MG 2.1 08/04/2020    CALCIUM 9.1 10/22/2021    VITD25 19 (L) 08/04/2020       No results found. Diagnosis Orders   1. Lumbar disc disease with radiculopathy     2. Bipolar affective disorder, currently depressed, moderate ([address]     3. Asthma, unspecified asthma severity, unspecified whether complicated, unspecified whether persistent         Plan  25-year-old male presents the office via virtual visit secondary to the COVID-19 pandemic for review of multiple conditions and discuss getting paperwork filled out for disability/child support. Lumbar disc disease with radiculopathy: Chronic, stable. No acute concerns at this time. Continue current chronic medication regimen and follow-up with neurosurgery. Paperwork filled out and submitted to patient. Bipolar disorder: Chronic, stable. No concerns this time. Continue to follow with psychiatry as scheduled. Asthma: Chronic, stable. Asymptomatic. Patient no longer is taking his chronic controller inhalers. Patient not interested in taking this. No follow-ups on file. Orders Placed:  No orders of the defined types were placed in this encounter. Medications Prescribed:  No orders of the defined types were placed in this encounter. Future Appointments   Date Time Provider [place]   5/9/2022  1:00 PM Lyn Marino Neuro [facility]it Jason   5/10/2022  9:30 AM Michael Le, PT [facility] [place] [facility]   5/11/2022 10:00 AM Micah Santiago MD [facility]   8/30/2022 10:00 AM Galo oLpez, PhD N [facility] Maintenance Due   Topic Date Due    COVID-19 Vaccine (1) Never done    Pneumococcal 0-64 years Vaccine (1 - PCV) Never done    HIV screen  Never done    Hepatitis C screen  Never done    DTaP/Tdap/Td vaccine (1 - Tdap) Never done         Attending Physician Statement  I have discussed the case, including pertinent history and exam findings with the resident. I agree with the documented assessment and plan as documented by the resident. GE modifier added to this encounter. GT  5/2/2022    TELEHEALTH EVALUATION -- Audio/Visual (During XWFAS-03 public health emergency)      An electronic signature was used to authenticate this note.      Donnie Kimble DO 5/3/2022 8:02 AM

## 2022-05-02 NOTE — PROGRESS NOTES
César Argueta (:  1990) is a Established patient, here for evaluation of the following:    Assessment & Plan   Below is the assessment and plan developed based on review of pertinent history, physical exam, labs, studies, and medications. 1. Lumbar disc disease with radiculopathy  2. Bipolar affective disorder, currently depressed, moderate ([facility]  3. Asthma, unspecified asthma severity, unspecified whether complicated, unspecified whether persistent  Chronic medical conditions stable. Following with neurosurgery and psychiatry. No issues with asthma - not taking meds. Doctors note provided. No follow-ups on file. Subjective   HPI  Review of Systems   Constitutional: Negative for chills, fatigue and fever. HENT: Negative for congestion. Eyes: Negative for visual disturbance. Respiratory: Negative for cough and shortness of breath. Cardiovascular: Negative for chest pain and palpitations. Gastrointestinal: Negative for abdominal pain, constipation, diarrhea, nausea and vomiting. Genitourinary: Negative for dysuria. Musculoskeletal: Positive for back pain and gait problem. Negative for arthralgias. Chronic lower back pain with radiculopathy, uses cane to walk   Skin: Negative for rash. Neurological: Positive for weakness and numbness. Negative for dizziness and headaches. RLE numbness   Psychiatric/Behavioral: Negative for dysphoric mood. The patient is not nervous/anxious. 76-year-old male here for follow-up. He needs doctor's note for child support. He is still having a lot of lumbar back pain with radiculopathy and numbness in the right leg and groin. He uses a cane to ambulate. He is starting PT on Tuesday. He follows up with neurosurgeon. Bipolar affective disorder/mood: stable. Follows with Dr. Mame Kellogg. No recent changes per psychiatry. Asthma: feels like he grew out of it. Denies issues at this time.  States he quit smoking and is now vaping, but is smoking less and hoping to quit altogether soon. Objective   Patient-Reported Vitals  No data recorded     Physical Exam  Constitutional: [x] Appears well-developed and well-nourished [x] No apparent distress      [] Abnormal -     Mental status: [x] Alert and awake  [x] Oriented to person/place/time [x] Able to follow commands    [] Abnormal -     Eyes:   EOM    [x]  Normal    [] Abnormal -   Sclera  [x]  Normal    [] Abnormal -          Discharge [x]  None visible   [] Abnormal -     HENT: [x] Normocephalic, atraumatic  [] Abnormal -   [x] Mouth/Throat: Mucous membranes are moist    External Ears [x] Normal  [] Abnormal -    Neck: [x] No visualized mass [] Abnormal -     Pulmonary/Chest: [x] Respiratory effort normal   [x] No visualized signs of difficulty breathing or respiratory distress        [] Abnormal -      Musculoskeletal:   [x] Normal gait with no signs of ataxia         [x] Normal range of motion of neck        [] Abnormal -     Neurological:        [x] No Facial Asymmetry (Cranial nerve 7 motor function) (limited exam due to video visit)          [x] No gaze palsy        [] Abnormal -          Skin:        [x] No significant exanthematous lesions or discoloration noted on facial skin         [] Abnormal -            Psychiatric:       [x] Normal Affect [] Abnormal -        [x] No Hallucinations         On this date 5/2/2022 I have spent 15 minutes reviewing previous notes, test results and face to face (virtual) with the patient discussing the diagnosis and importance of compliance with the treatment plan as well as documenting on the day of the visit. Lucia Carranza, was evaluated through a synchronous (real-time) audio-video encounter. The patient (or guardian if applicable) is aware that this is a billable service, which includes applicable co-pays. This Virtual Visit was conducted with patient's (and/or legal guardian's) consent.  The visit was conducted pursuant to the emergency declaration under the [facility], [address] [place] [address] authority and the [employer] General Act. Patient identification was verified, and a caregiver was present when appropriate. The patient was located at home in a state where the provider was licensed to provide care.        --Tracy Owen MD

## 2022-05-02 NOTE — PROGRESS NOTES
S: 27 y.o. male with No chief complaint on file. HPI: please see resident note for HPI and ROS. Virtual visit   Needs note for child support  Is disabled     Sees psych for his mental health    Not taking any of his asthma meds     BP Readings from Last 3 Encounters:   03/13/22 (!) 147/86   01/28/22 136/74   12/08/21 126/72     Wt Readings from Last 3 Encounters:   03/13/22 220 lb (99.8 kg)   01/28/22 225 lb 9.6 oz (102.3 kg)   12/08/21 222 lb (100.7 kg)       O: VS:  vitals were not taken for this visit. Diagnosis Orders   1. Lumbar disc disease with radiculopathy     2. Bipolar affective disorder, currently depressed, moderate ([facility]     3. Asthma, unspecified asthma severity, unspecified whether complicated, unspecified whether persistent         Plan:  Please refer to resident note for full plan.  Lumbar disease- chronic, hx of surgery. Note provided   Bipolar- chronic, well controlled. Continue to follow with psych who writes his meds   Asthma- chronic. Currently not using his meds and doing well     No follow-ups on file. Health Maintenance Due   Topic Date Due    COVID-19 Vaccine (1) Never done    Pneumococcal 0-64 years Vaccine (1 - PCV) Never done    HIV screen  Never done    Hepatitis C screen  Never done    DTaP/Tdap/Td vaccine (1 - Tdap) Never done     I have discussed the case, including pertinent history and exam findings with the resident and attending physician. I agree with the documented assessment and plan as documented by the resident.       Danielle Jose MD 5/2/2022 5:13 PM

## 2022-05-02 NOTE — LETTER
[address] [address],[address] [facility] [address]  Phone: [phone]  Fax: [phone]    Kerry Ramírez MD        May 2, 2022     Patient: Gracia Herrera   YOB: 1990   Date of Visit: 5/2/2022       To Whom It May Concern: It is my medical opinion that Kei Gallo should remain out of work until 8/2/2022 due to a medical condition that he is currently undergoing treatment for. If you have any questions or concerns, please don't hesitate to call.     Sincerely,        Kerry Ramírez MD

## 2022-05-11 ENCOUNTER — TELEMEDICINE (OUTPATIENT)
Dept: PSYCHIATRY | Age: 32
End: 2022-05-11
Payer: MEDICAID

## 2022-05-11 DIAGNOSIS — F41.0 PANIC ATTACKS: ICD-10-CM

## 2022-05-11 DIAGNOSIS — F41.1 GAD (GENERALIZED ANXIETY DISORDER): ICD-10-CM

## 2022-05-11 DIAGNOSIS — F43.10 PTSD (POST-TRAUMATIC STRESS DISORDER): Primary | ICD-10-CM

## 2022-05-11 DIAGNOSIS — F29 PSYCHOSIS, UNSPECIFIED PSYCHOSIS TYPE (HCC): ICD-10-CM

## 2022-05-11 DIAGNOSIS — F60.3 BORDERLINE PERSONALITY DISORDER (HCC): ICD-10-CM

## 2022-05-11 PROCEDURE — 99214 OFFICE O/P EST MOD 30 MIN: CPT | Performed by: PSYCHIATRY & NEUROLOGY

## 2022-05-11 PROCEDURE — G8427 DOCREV CUR MEDS BY ELIG CLIN: HCPCS | Performed by: PSYCHIATRY & NEUROLOGY

## 2022-05-11 RX ORDER — ALPRAZOLAM 0.5 MG/1
0.5 TABLET ORAL 2 TIMES DAILY PRN
Qty: 60 TABLET | Refills: 1 | Status: SHIPPED | OUTPATIENT
Start: 2022-05-11 | End: 2022-07-07 | Stop reason: SDUPTHER

## 2022-05-11 RX ORDER — TRAZODONE HYDROCHLORIDE 150 MG/1
75 TABLET ORAL NIGHTLY
Qty: 15 TABLET | Refills: 2 | Status: SHIPPED | OUTPATIENT
Start: 2022-05-11 | End: 2022-07-07 | Stop reason: SDUPTHER

## 2022-05-11 RX ORDER — ESCITALOPRAM OXALATE 20 MG/1
40 TABLET ORAL DAILY
Qty: 60 TABLET | Refills: 2 | Status: SHIPPED | OUTPATIENT
Start: 2022-05-11 | End: 2022-07-07 | Stop reason: SDUPTHER

## 2022-05-11 RX ORDER — LAMOTRIGINE 200 MG/1
200 TABLET ORAL 2 TIMES DAILY
Qty: 60 TABLET | Refills: 2 | Status: SHIPPED | OUTPATIENT
Start: 2022-05-11 | End: 2022-07-07 | Stop reason: SDUPTHER
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
## 2022-05-14 DIAGNOSIS — G25.81 RESTLESS LEG: ICD-10-CM

## 2022-05-16 RX ORDER — ROPINIROLE 1 MG/1
1 TABLET, FILM COATED ORAL NIGHTLY
Qty: 90 TABLET | Refills: 3 | Status: SHIPPED | OUTPATIENT
Start: 2022-05-16

## 2022-05-16 NOTE — TELEPHONE ENCOUNTER
Patient's last appointment was : 5/2/2022  Patient's next appointment is :   Future Appointments   Date Time Provider Juan Manuel Nanette   6/29/2022  3:30 PM Tray Stuart Neuro TOL[facility]   7/7/2022  2:30 PM MD Liliya Martinez [facility] [place]   8/30/2022 10:00 AM Dimas Koehler, PhD N SRPXPsychl [facility] - [address] [facility]     Last refilled:    No results found for: LABA1C  Lab Results   Component Value Date    CHOL 211 (H) 08/04/2020    TRIG 283 (H) 08/04/2020    HDL 29 08/04/2020    LDLCALC 125 08/04/2020     Lab Results   Component Value Date     10/22/2021    K 4.0 10/22/2021     10/22/2021    CO2 26 10/22/2021    BUN 10 10/22/2021    CREATININE 0.8 10/22/2021    GLUCOSE 124 (H) 10/22/2021    CALCIUM 9.1 10/22/2021    PROT 6.6 08/04/2020    LABALBU 4.3 08/04/2020    BILITOT 0.4 08/04/2020    ALKPHOS 45 08/04/2020    AST 18 08/04/2020    ALT 17 08/04/2020    LABGLOM >90 10/22/2021    GFRAA >60 10/19/2021     Lab Results   Component Value Date    TSH 1.880 08/04/2020     Lab Results   Component Value Date    WBC 14.3 (H) 11/03/2021    HGB 13.5 11/03/2021    HCT 41.0 11/03/2021    MCV 92.1 11/03/2021     11/03/2021

## 2022-05-22 DIAGNOSIS — K21.9 GASTROESOPHAGEAL REFLUX DISEASE WITHOUT ESOPHAGITIS: ICD-10-CM

## 2022-05-23 RX ORDER — PANTOPRAZOLE SODIUM 40 MG/1
40 TABLET, DELAYED RELEASE ORAL 2 TIMES DAILY WITH MEALS
Qty: 180 TABLET | Refills: 1 | Status: SHIPPED | OUTPATIENT
Start: 2022-05-23 | End: 2022-08-21

## 2022-05-23 NOTE — TELEPHONE ENCOUNTER
Patient's last appointment was : 5/2/2022  Patient's next appointment is :   Future Appointments   Date Time Provider Juan Manuel Fitzpatrick   5/27/2022  8:00 AM Irma Bravo PT STRZ PT [facility]OD   6/29/2022  3:30 PM DO Jacklyn Wall Neuro TOLP   7/7/2022  2:30 PM MD Nba Rogers [facility] PSYCH [facility] - [address] [facility]   8/30/2022 10:00 AM Vargas Ibarra, PhD N [id number] [facility] [place]     Last refilled:10/25/21    No results found for: LABA1C  Lab Results   Component Value Date    CHOL 211 (H) 08/04/2020    TRIG 283 (H) 08/04/2020    HDL 29 08/04/2020    LDLCALC 125 08/04/2020     Lab Results   Component Value Date     10/22/2021    K 4.0 10/22/2021     10/22/2021    CO2 26 10/22/2021    BUN 10 10/22/2021    CREATININE 0.8 10/22/2021    GLUCOSE 124 (H) 10/22/2021    CALCIUM 9.1 10/22/2021    PROT 6.6 08/04/2020    LABALBU 4.3 08/04/2020    BILITOT 0.4 08/04/2020    ALKPHOS 45 08/04/2020    AST 18 08/04/2020    ALT 17 08/04/2020    LABGLOM >90 10/22/2021    GFRAA >60 10/19/2021     Lab Results   Component Value Date    TSH 1.880 08/04/2020     Lab Results   Component Value Date    WBC 14.3 (H) 11/03/2021    HGB 13.5 11/03/2021    HCT 41.0 11/03/2021    MCV 92.1 11/03/2021     11/03/2021

## 2022-05-27 ENCOUNTER — HOSPITAL ENCOUNTER (OUTPATIENT)
Dept: PHYSICAL THERAPY | Age: 32
Setting detail: THERAPIES SERIES
Discharge: HOME | End: 2022-05-27
Payer: MEDICAID

## 2022-05-27 PROCEDURE — 97161 PT EVAL LOW COMPLEX 20 MIN: CPT

## 2022-05-27 PROCEDURE — 97110 THERAPEUTIC EXERCISES: CPT

## 2022-05-27 NOTE — PROGRESS NOTES
** PLEASE SIGN, DATE AND TIME CERTIFICATION BELOW AND RETURN TO [facility] OUTPATIENT REHABILITATION (FAX #: [phone]). ATTEST/CO-SIGN IF ACCESSING VIA [employer]. THANK YOU.**    I certify that I have examined the patient below and determined that Physical Medicine and Rehabilitation service is necessary and that I approve the established plan of care for up to 90 days or as specifically noted. Attestation, signature or co-signature of physician indicates approval of certification requirements.    ________________________ ____________ __________  Physician Signature   Date   Time  [address] [facility]  PHYSICAL THERAPY  [x] EVALUATION  [] DAILY NOTE (LAND) [] DAILY NOTE (AQUATIC ) [] PROGRESS NOTE [] DISCHARGE NOTE    [x] [address] [facility]   [] [place] [address]    [] [facility]   [] Yunior Alberto    Date: 2022  Patient Name:  Sylvester Bethea  : 1990  MRN: [id number]  CSN: [id number]    Referring Practitioner LEYDI Agee - *   Diagnosis Intervertebral disc disorders with radiculopathy, lumbar region [M51.16]    Treatment Diagnosis Pain in back, decreased trunk ROM, decreased hip ROM, decreased core strength, decreased hip strength, abnormal gait     Date of Evaluation 22    Additional Pertinent History Bipolar, anxiety, arthritis, memory issues       Functional Outcome Measure Used Modified Oswestry    Functional Outcome Score 39/50 (22)       Insurance: Primary: Payor: Evelyne Flores /  /  / ,   Secondary:    Authorization Information: INSURANCE THERAPY BENEFIT: No precert until after 48IO visit. Visit Limit Based on Precert  AQUATIC THERAPY COVERED:   Yes  MODALITIES COVERED:  Yes except for Iontophoresis and Hot/Cold Packs.   TELEHEALTH COVERED: Yes   Visit # 1, 1/10 for progress note   Visits Allowed: 30 visits    Recertification Date: 3/27/53   Physician Follow-Up:    Physician Orders:    History of Present Illness: Patient reports that he has had back pain for several years now. Patient is known to this clinic. Patient reports that he had a spinal fusion L3-4 11/2021. Patient reports that the surgery helped initially but now he is having pain and radicular symptoms again. Patient reports that he has numbness in L lateral thigh and B feet. Patient reports that pain travels into L lateral thigh also. Patient did not find much relief from PT in the past. Patient has not had any recent imaging since before his surgery. Patient has to try PT prior to another surgery. SUBJECTIVE: Patient reports that laying down helps his pain. Patient uses a straight cane sometimes for mobility. Social/Functional History and Current Status:  Medications and Allergies have been reviewed and are listed on Medical History Questionnaire. Dawson Graham lives with family in a single story home with stairs and no handrail to enter. Task Previous Current   ADLs  Independent Independent   IADL's Independent Independent   Ambulation Independent Modified Independent   Transfers Independent Independent   Recreation Independent Dependent/Unable   Community Integration Independent Independent   Driving Active  Active    Work applying for disability  applying for disability      Objective:    OBSERVATION   Pain 8/10 pain in back and L leg   Palpation Tenderness over lower lumbar spinous process, B PSIS, L piriformis    Sensation Light touch intact B LEs   Edema No edema noted    Bed Mobility Mod I    Transfers Mod I    Ambulation Patient ambulates with straight cane with decreased dony, decreased trunk rotation, forward trunk flexion, decreased step height L.        POSTURE    No Deficit Deficit Comments   Forward Head  x    Rounded Shoulders  x    Kyphosis x     Lordosis  x    Lateral Shift x     Scoliosis x     Iliac Crest x     PSIS x     ASIS x     Leg Length Discrp x     Slumped Sitting  x        TRUNK Activity/Treatment Tolerance:  [x]  Patient tolerated treatment well  []  Patient limited by fatigue  []  Patient limited by pain   []  Patient limited by medical complications  []  Other:     Assessment: 34year old male presents with lumbar radiculopathy. Patient has pain in low back, radicular pain/symptoms, decreased trunk ROM, decreased hip ROM, weakness in core, weakness in hips, gait deviations, poor posture, and tightness throughout LEs and low back that limits his ability to perform daily tasks. Patient would benefit from skilled PT to improve pain, ROM, tissue extensibility, strength, gait and posture to allow improved functional mobility. Patient educated on benefit of PT and PT POC with patient in agreement. Body Structures/Functions/Activity Limitations: impaired ROM, impaired strength, pain, abnormal gait and abnormal posture  Prognosis: good      Goals    Patient Goal: to go through the steps to get additional imaging     Short Term Goals: 4 weeks  1. Patient will report decrease in pain to 5-6/10 at most to allow ease of daily tasks. 2. Patient will improve trunk AROM to [facility] to allow ease of bending and lifting tasks. 3. Patient will improve B hip IR/ER to 35 degrees to allow ease of getting into car. 4. Patient will improve B hip strength to 5/5 to allow ease of walking. 5. Patient will perform B SLR for 20 seconds to improve core strength needed for ease of standing tasks. 6. Patient will improve 90/90 hamstring length to 20 degrees B to allow decreased pain with standing/walking. Long Term Goals: 8 weeks  1. Patient will improve Modified Oswestry score from 39/50 to 29/50 to allow decrease in disability and improved functional mobility. 2. Patient will be independent with HEP in order to prevent re-injury and improve functional abilities.     Patient Education:   [x]  HEP/Education Completed: Plan of Care, Goals, benefit of PT, attendance policy, HEP    [employer] Interventional Access Code: [email or web address]  []  No new Education completed  []  Reviewed Prior HEP      [x]  Patient verbalized and/or demonstrated understanding of education provided. []  Patient unable to verbalize and/or demonstrate understanding of education provided. Will continue education. []  Barriers to learning:     PLAN:  Treatment Recommendations: Strengthening, Range of Motion, Gait Training, Manual Therapy - Soft Tissue Mobilization, Manual Therapy - Joint Manipulation, Pain Management, Home Exercise Program, Patient Education, Integrative Dry Needling, Aquatics and Modalities    [x]  Plan of care initiated. Plan to see patient 2 times per week for 8 weeks to address the treatment planned outlined above.   []  Continue with current plan of care  []  Modify plan of care as follows:    []  Hold pending physician visit  []  Discharge    Time In 0802   Time Out 0835   Timed Code Minutes: 8 min   Total Treatment Time: 33 min     Electronically Signed by: Brady Fonseca PT

## 2022-05-31 ENCOUNTER — TELEPHONE (OUTPATIENT)
Dept: PSYCHIATRY | Age: 32
End: 2022-05-31

## 2022-05-31 RX ORDER — CYPROHEPTADINE HYDROCHLORIDE 4 MG/1
4-8 TABLET ORAL NIGHTLY
Qty: 60 TABLET | Refills: 1 | Status: SHIPPED | OUTPATIENT
Start: 2022-05-31 | End: 2022-09-08

## 2022-05-31 NOTE — TELEPHONE ENCOUNTER
Rx sent for Periactin 4 mg to be taken at night. He can try 8 mg if 4 mg ineffective.    Electronically signed by Scott Vera MD on 5/31/2022 at 9:49 AM

## 2022-05-31 NOTE — TELEPHONE ENCOUNTER
I have wrote back to Brandi with this information via [employer] Private [employer] App; awaiting response.

## 2022-05-31 NOTE — TELEPHONE ENCOUNTER
Kranthi Sapp wrote into the office via [employer]:    I was wondering if there were any other med changes we could do to help with all the flashbacks and hallucinations they seem to be getting worse and literally feels like my mental health is deteriorating. --Please advise. He does not return until 07/07/22

## 2022-05-31 NOTE — TELEPHONE ENCOUNTER
He has already tried prazosin and doxazosin. Another option he might try at least for nightmares at night would be Periactin (cyproheptadine) that can help his sleep and nightmares.    Electronically signed by Scott Vera MD on 5/31/2022 at 8:12 AM

## 2022-06-03 ENCOUNTER — HOSPITAL ENCOUNTER (OUTPATIENT)
Dept: PHYSICAL THERAPY | Age: 32
Setting detail: THERAPIES SERIES
Discharge: HOME | End: 2022-06-03
Payer: MEDICAID

## 2022-06-03 PROCEDURE — 97110 THERAPEUTIC EXERCISES: CPT

## 2022-06-03 NOTE — PROGRESS NOTES
[address] [facility]  PHYSICAL THERAPY  [] EVALUATION  [x] DAILY NOTE (LAND) [] DAILY NOTE (AQUATIC ) [] PROGRESS NOTE [] DISCHARGE NOTE    [x] OUTPATIENT REHABILITATION CENTER [facility]   [] [place] [address]    [] [facility]   [] Mandy Leyva    Date: 6/3/2022  Patient Name:  Jennifer Lorenzo  : 1990  MRN: [id number]  CSN: [id number]    Referring Practitioner LEYDI Bustamante - *   Diagnosis Intervertebral disc disorders with radiculopathy, lumbar region [M51.16]    Treatment Diagnosis Pain in back, decreased trunk ROM, decreased hip ROM, decreased core strength, decreased hip strength, abnormal gait     Date of Evaluation 22    Additional Pertinent History Bipolar, anxiety, arthritis, memory issues       Functional Outcome Measure Used Modified Oswestry    Functional Outcome Score 39/50 (22)       Insurance: Primary: Payor: Ulysses Bhandari /  /  / ,   Secondary:    Authorization Information: INSURANCE THERAPY BENEFIT: No precert until after 41DM visit. Visit Limit Based on Precert  AQUATIC THERAPY COVERED:   Yes  MODALITIES COVERED:  Yes except for Iontophoresis and Hot/Cold Packs. TELEHEALTH COVERED: Yes   Visit # 2, 2/10 for progress note   Visits Allowed: 30 visits    Recertification Date:    Physician Follow-Up:    Physician Orders:    History of Present Illness: Patient reports that he has had back pain for several years now. Patient is known to this clinic. Patient reports that he had a spinal fusion L3-4 2021. Patient reports that the surgery helped initially but now he is having pain and radicular symptoms again. Patient reports that he has numbness in L lateral thigh and B feet. Patient reports that pain travels into L lateral thigh also. Patient did not find much relief from PT in the past. Patient has not had any recent imaging since before his surgery. Patient has to try PT prior to another surgery.       SUBJECTIVE: Patient reports 7/10 pain in his back and L leg upon arrival. Patient reports that he left his cane at home and his pain is higher when he does not use his cane. TREATMENT   Precautions: Bipolar   Pain: 7/10 pain in back and L leg    X in shaded column indicates activity completed today   Modalities Parameters/  Location  Notes                     Manual Therapy Time/Technique  Notes                     Exercise/Intervention   Notes   Nu step 5 minutes  L3 x    Supine piriformis stretch IR and ER B 3 x 30 sec  x     Supine cross body LE stretch B 3 x 30 sec  x    Supine LTR 10 x 5 sec  x    Supine L IT band stretch with strap  3 x 30 sec  x    Supine L quad stretch at edge of bed  3 x 30 sec  x    Supine hamstring stretch with towel B  3 x 30 sec  x    Supine ab brace  10 x 5 sec   x    Supine ab brace with march 10 x   x    Supine ab brace with knee fall out  10 x  x    Supine ab brace with SLR 10 x  x    Supine ab brace with hip adduction  10 x 5 sec  x    Seated HS stretch B 3 x 30 sec  x                                                       Specific Interventions Next Treatment: piriformis/HS/L IT band, L quad stretching, core strength, hip strength,  modalities as needed-no traction spinal fusion. Activity/Treatment Tolerance:  [x]  Patient tolerated treatment well  []  Patient limited by fatigue  []  Patient limited by pain   []  Patient limited by medical complications  []  Other:     Assessment: Patient was initiated on LE stretching and core strengthening exercises. Patient does require instruction for exercise technique. Patient reports mild increase in pain after the session. Will progress patient as able to improve functional mobility. Body Structures/Functions/Activity Limitations: impaired ROM, impaired strength, pain, abnormal gait and abnormal posture  Prognosis: good      Goals    Patient Goal: to go through the steps to get additional imaging     Short Term Goals: 4 weeks  1.  Patient

## 2022-06-08 ENCOUNTER — APPOINTMENT (OUTPATIENT)
Dept: PHYSICAL THERAPY | Age: 32
End: 2022-06-08
Payer: MEDICAID

## 2022-06-10 ENCOUNTER — HOSPITAL ENCOUNTER (OUTPATIENT)
Dept: PHYSICAL THERAPY | Age: 32
Setting detail: THERAPIES SERIES
Discharge: HOME | End: 2022-06-10
Payer: MEDICAID

## 2022-06-10 PROCEDURE — 97110 THERAPEUTIC EXERCISES: CPT

## 2022-06-10 NOTE — PROGRESS NOTES
SUBJECTIVE: Patient reports 7/10 pain in his back and L leg at inner thigh with numbness also present. Feels he may have aggravated his back by laying on his left side last night. Back not feeling good today. Noted no changes from last session in PT. Patient uses straight cane 90% of the time for walking. Ambulated into clinic using cane today.      TREATMENT   Precautions: Bipolar   Pain: 7/10 pain in back and L leg    X in shaded column indicates activity completed today   Modalities Parameters/  Location  Notes   HP in conjuction with exercises today  x                Manual Therapy Time/Technique  Notes                     Exercise/Intervention   Notes   Nu step 5 minutes  L3 x    Supine piriformis stretch IR and ER B 3 x 30 sec       Supine cross body LE stretch B 3 x 30 sec      Supine LTR 10 x 5 sec  x    Supine L IT band stretch with strap  3 x 30 sec      Supine L quad stretch at edge of bed  3 x 30 sec      Supine hamstring stretch with towel B 90/90 position 3 x 10 sec  x    Supine ab brace  10 x 5 sec   x With below exercises   Supine ab brace with march 10 x   x    Supine ab brace with knee fall out with blue band* 10 x  x    Supine ab brace with SLR modified through mid range*  10 x Right only today due to LLE bothering his left side of back more x    Femur arcs  10x  x           Supine ab brace with hip adduction squeezing ball with knees 10 x 5 sec  x    Seated HS stretch B 3 x 10 sec  x    Seated marches* x10  x    Seated reciprocal LAQs* x10  x    Seated horizontal abduction/adduction with UE* x10  x    Seated diagnoal with UE right/left while stabilizing x10  x                         Standing:        Back against wall partial squat* 10x 5second x    Hydrostick  Fwd/back and diagonals right/left* 10x  x                                  Specific Interventions Next Treatment: piriformis/HS/L IT band, L quad stretching, core strength, hip strength,  modalities as needed-no traction spinal fusion. Activity/Treatment Tolerance:  [x]  Patient tolerated treatment well  []  Patient limited by fatigue  []  Patient limited by pain   []  Patient limited by medical complications  []  Other:     Assessment: Patient still having significant pain 7/10 left lateral lumbar and radiculopathy LLE. Used Moist heat on low back in concurrent with ex today. Progressed with ex added by * in note. Able to progress core work with femur arcs, seated ex to engage posterior and anterior slings and standing ex with core work. Patient had good awareness with few cues to engage abdominals in session. Pain level reduced to 5/10 and reporting of feeling \"looser\"     Body Structures/Functions/Activity Limitations: impaired ROM, impaired strength, pain, abnormal gait and abnormal posture  Prognosis: good      Goals    Patient Goal: to go through the steps to get additional imaging     Short Term Goals: 4 weeks  1. Patient will report decrease in pain to 5-6/10 at most to allow ease of daily tasks. 2. Patient will improve trunk AROM to [facility] to allow ease of bending and lifting tasks. 3. Patient will improve B hip IR/ER to 35 degrees to allow ease of getting into car. 4. Patient will improve B hip strength to 5/5 to allow ease of walking. 5. Patient will perform B SLR for 20 seconds to improve core strength needed for ease of standing tasks. 6. Patient will improve 90/90 hamstring length to 20 degrees B to allow decreased pain with standing/walking. Long Term Goals: 8 weeks  1. Patient will improve Modified Oswestry score from 39/50 to 29/50 to allow decrease in disability and improved functional mobility. 2. Patient will be independent with HEP in order to prevent re-injury and improve functional abilities.     Patient Education:   [x]  HEP/Education Completed: exercise technique, continue HEP     [employer] Access Code: [email or web address]  []  No new Education completed  []  Reviewed Prior HEP      [x]  Patient verbalized and/or demonstrated understanding of education provided. []  Patient unable to verbalize and/or demonstrate understanding of education provided. Will continue education. []  Barriers to learning:     PLAN:  Treatment Recommendations: Strengthening, Range of Motion, Gait Training, Manual Therapy - Soft Tissue Mobilization, Manual Therapy - Joint Manipulation, Pain Management, Home Exercise Program, Patient Education, Integrative Dry Needling, Aquatics and Modalities    []  Plan of care initiated. Plan to see patient 2 times per week for 8 weeks to address the treatment planned outlined above.   [x]  Continue with current plan of care  []  Modify plan of care as follows:    []  Hold pending physician visit  []  Discharge    Time In 0759   Time Out 0839   Timed Code Minutes: 40   Total Treatment Time: 40     Electronically Signed by: Lady Qamar PT

## 2022-06-14 ENCOUNTER — HOSPITAL ENCOUNTER (OUTPATIENT)
Dept: PHYSICAL THERAPY | Age: 32
Setting detail: THERAPIES SERIES
End: 2022-06-14
Payer: MEDICAID

## 2022-06-16 ENCOUNTER — HOSPITAL ENCOUNTER (OUTPATIENT)
Dept: PHYSICAL THERAPY | Age: 32
Setting detail: THERAPIES SERIES
End: 2022-06-16
Payer: MEDICAID

## 2022-06-21 ENCOUNTER — HOSPITAL ENCOUNTER (OUTPATIENT)
Dept: PHYSICAL THERAPY | Age: 32
Setting detail: THERAPIES SERIES
Discharge: HOME | End: 2022-06-21
Payer: MEDICAID

## 2022-06-21 PROCEDURE — 97110 THERAPEUTIC EXERCISES: CPT

## 2022-06-21 NOTE — PROGRESS NOTES
[facility]  PHYSICAL THERAPY  [] EVALUATION  [x] DAILY NOTE (LAND) [] DAILY NOTE (AQUATIC ) [] PROGRESS NOTE [] DISCHARGE NOTE    [x] [facility] [place]   [] [place] [address]    [] [facility]   [] Stephanie Linnworth    Date: 2022  Patient Name:  Gerson Lares  : 1990  MRN: [id number]  CSN: [id number]    Referring Practitioner LEYDI Lopez - *   Diagnosis Intervertebral disc disorders with radiculopathy, lumbar region [M51.16]    Treatment Diagnosis Pain in back, decreased trunk ROM, decreased hip ROM, decreased core strength, decreased hip strength, abnormal gait     Date of Evaluation 22    Additional Pertinent History Bipolar, anxiety, arthritis, memory issues       Functional Outcome Measure Used Modified Oswestry    Functional Outcome Score 39/50 (22)       Insurance: Primary: Payor: Dorcas Rock /  /  / ,   Secondary:    Authorization Information: INSURANCE THERAPY BENEFIT: No precert until after 96WG visit. Visit Limit Based on Precert  AQUATIC THERAPY COVERED:   Yes  MODALITIES COVERED:  Yes except for Iontophoresis and Hot/Cold Packs. TELEHEALTH COVERED: Yes   Visit # 4, 4/10 for progress note   Visits Allowed: 30 visits    Recertification Date:    Physician Follow-Up:    Physician Orders:    History of Present Illness: Patient reports that he has had back pain for several years now. Patient is known to this clinic. Patient reports that he had a spinal fusion L3-4 2021. Patient reports that the surgery helped initially but now he is having pain and radicular symptoms again. Patient reports that he has numbness in L lateral thigh and B feet. Patient reports that pain travels into L lateral thigh also. Patient did not find much relief from PT in the past. Patient has not had any recent imaging since before his surgery. Patient has to try PT prior to another surgery. SUBJECTIVE: Patient states he is working on exercises at home but some cause a little discomfort. Patient ambulating with straight cane into clinic. Patient voiced he was more sore after last therapy session. TREATMENT   Precautions: Bipolar   Pain: 6/10 Lower back, Lateral Left leg;  \"numbness\" inner Left thigh    X in shaded column indicates activity completed today   Modalities Parameters/  Location  Notes   HP in conjuction with exercises today  x                Manual Therapy Time/Technique  Notes                     Exercise/Intervention   Notes   Nu step 6 minutes  L3 X    Supine piriformis stretch IR and ER B 3x 30 sec  X     Supine cross body LE stretch B 3x 30 sec      Supine LTR 10x 5 sec  X    Supine L IT band stretch with strap  3x 30 sec      Supine L quad stretch at edge of bed  3x 30 sec      Supine hamstring stretch with towel B 90/90 position 3x 10 sec  X    Supine ab brace  10x 5 sec   X With below exercises   Supine ab brace with march 10x   X    Supine ab brace with resisted knee fall out (bilateral/unilateral)  10x Blue X    Supine ab brace with SLR modified through mid range 10x  X    Femur arcs  10x  X    Supine ab brace with hip adduction squeezing ball with knees 10x 5 sec  X           Seated HS stretch B 3x 10 sec  X    Seated marches x10  X    Seated reciprocal LAQs x10  X    Seated horizontal abduction/adduction with UE x10  X    Seated diagnoal with UE right/left while stabilizing x10  X           Standing:        Back against wall partial squat 10x 5 second X    Hydrostick  Fwd/back and diagonals right/left 10x  X                    Specific Interventions Next Treatment: piriformis/HS/L IT band, L quad stretching, core strength, hip strength,  modalities as needed-no traction spinal fusion.      Activity/Treatment Tolerance:  [x]  Patient tolerated treatment well []  Patient limited by fatigue  []  Patient limited by pain   []  Patient limited by medical complications  [] Other:     Assessment: Improved tolerance of Left LE exercises today. Good core control awareness during activities. Continued use of moist heat during supine exercises for decreased pain and tightness. Minimal progressions made today due to patient having increased pain after last therapy session. Goals    Patient Goal: to go through the steps to get additional imaging     Short Term Goals: 4 weeks  1. Patient will report decrease in pain to 5-6/10 at most to allow ease of daily tasks. 2. Patient will improve trunk AROM to [facility] to allow ease of bending and lifting tasks. 3. Patient will improve B hip IR/ER to 35 degrees to allow ease of getting into car. 4. Patient will improve B hip strength to 5/5 to allow ease of walking. 5. Patient will perform B SLR for 20 seconds to improve core strength needed for ease of standing tasks. 6. Patient will improve 90/90 hamstring length to 20 degrees B to allow decreased pain with standing/walking. Long Term Goals: 8 weeks  1. Patient will improve Modified Oswestry score from 39/50 to 29/50 to allow decrease in disability and improved functional mobility. 2. Patient will be independent with HEP in order to prevent re-injury and improve functional abilities. Patient Education:   [x]  HEP/Education Completed: exercise technique, continue HEP     Pens[employer] Access Code: [email or web address]  []  No new Education completed  []  Reviewed Prior HEP      [x]  Patient verbalized and/or demonstrated understanding of education provided. []  Patient unable to verbalize and/or demonstrate understanding of education provided. Will continue education. []  Barriers to learning:     PLAN:  Treatment Recommendations: Strengthening, Range of Motion, Gait Training, Manual Therapy - Soft Tissue Mobilization, Manual Therapy - Joint Manipulation, Pain Management, Home Exercise Program, Patient Education, Integrative Dry Needling, Aquatics and Modalities    []  Plan of care initiated.   Plan to see patient 2 times per week for 8 weeks to address the treatment planned outlined above.   [x]  Continue with current plan of care  []  Modify plan of care as follows:    []  Hold pending physician visit  []  Discharge    Time In 0845   Time Out 0930   Timed Code Minutes: 45 min   Total Treatment Time: 45 min     Electronically Signed by: Andreas Deleon PTA

## 2022-06-23 ENCOUNTER — HOSPITAL ENCOUNTER (OUTPATIENT)
Dept: PHYSICAL THERAPY | Age: 32
Setting detail: THERAPIES SERIES
Discharge: HOME | End: 2022-06-23
Payer: MEDICAID

## 2022-06-23 PROCEDURE — 97110 THERAPEUTIC EXERCISES: CPT

## 2022-06-23 NOTE — PROGRESS NOTES
surgery. SUBJECTIVE: Patient reports that therapy has been a little helpful. Patient reports 5-6/10 pain upon arrival. Patient reports 8-9/10 pain at most in the past week. Patient does follow up with MD on 6/29/22. TREATMENT   Precautions: Bipolar   Pain: 5-6/10 Lower back, Lateral Left leg;  \"numbness\" inner Left thigh    X in shaded column indicates activity completed today   Modalities Parameters/  Location  Notes   HP in conjuction with exercises today  x                Manual Therapy Time/Technique  Notes                     Exercise/Intervention   Notes   Nu step 6 minutes  L5 * X    Supine piriformis stretch IR and ER B 3x 30 sec  X     Supine cross body LE stretch B 3x 30 sec      Supine LTR 10x 5 sec  X    Supine L IT band stretch with strap  3x 30 sec      Supine L quad stretch at edge of bed  3x 30 sec      Supine hamstring stretch with towel B 90/90 position 3x 15 sec *  X    Supine ab brace  10x 5 sec    With below exercises   Supine ab brace with march 10x       Supine ab brace with resisted knee fall out (bilateral/unilateral)  10x Blue     Supine ab brace with SLR modified through mid range 10x      Femur arcs  10x      Supine ab brace with hip adduction squeezing ball with knees 10x 5 sec             Seated HS stretch B 3x 15 sec *  X    Seated marches x15 *  X    Seated reciprocal LAQs x15 *  X    Seated horizontal abduction/adduction with UE x10      Seated diagnoal with UE right/left while stabilizing x10             Standing:        Back against wall partial squat 10x 5 second X    Hydrostick  Fwd/back and diagonals right/left 15x *  X                    Specific Interventions Next Treatment: piriformis/HS/L IT band, L quad stretching, core strength, hip strength,  modalities as needed-no traction spinal fusion.      Activity/Treatment Tolerance:  [x]  Patient tolerated treatment well  []  Patient limited by fatigue  []  Patient limited by pain   []  Patient limited by medical complications  []  Other:     Assessment: Patient seen for progress report today. Patient has been seen for 5 visits at this time. Patient has made progress toward goals. Patient improved modified oswestry from 39/50 to 36/50. Patient improved trunk flexion and extension ROM. Patient improved B hip strength as seen below. Patient improved B SLR test from 8 to 14 seconds demonstrating improved core strength. Patient improved B 90/90 hamstring length from 50 degrees to 34-40 degrees. Patients hip IR/ER ROM is about the same. Patient does continue to have pain in back, decreased trunk and hip ROM, weakness in hip and core, and tightness in B hamstrings that limit his ability to perform daily tasks. Patient would benefit from continued skilled PT to further improve deficits mentioned above to allow improved functional mobility. Patient would like to be placed on hold at this time until he follows up with MD on 6/29/22. Goals    Patient Goal: to go through the steps to get additional imaging     Short Term Goals: 4 weeks  1. Patient will report decrease in pain to 5-6/10 at most to allow ease of daily tasks. GOAL NOT MET: 8-9/10 pain at most in the past week, 5-6/10 pain currently. Continue Goal  2. Patient will improve trunk AROM to [facility] to allow ease of bending and lifting tasks. GOAL NOT MET: Trunk flexion WFL, extension 25% limited, B lateral flexion 50% limited, B rotation WFLs. Continue Goal  3. Patient will improve B hip IR/ER to 35 degrees to allow ease of getting into car. GOAL NOT MET: R IR 30 ER 25 deg; L IR 31 and ER 20 degrees . Continue Goal  4. Patient will improve B hip strength to 5/5 to allow ease of walking. GOAL NOT MET: R hip flexion 4/5, L hip flexion 5/5, B hip abduction 5/5, L hip extension 4/5, R hip extension 5/5 . Continue Goal  5. Patient will perform B SLR for 20 seconds to improve core strength needed for ease of standing tasks. GOAL NOT MET: B SLR 14 seconds .   Continue Goal  6. Patient will improve 90/90 hamstring length to 20 degrees B to allow decreased pain with standing/walking. GOAL NOT MET: L 40 deg and R 34 degrees 90/90 hamstring length. Continue Goal       Long Term Goals: 8 weeks  1. Patient will improve Modified Oswestry score from 39/50 to 29/50 to allow decrease in disability and improved functional mobility. GOAL NOT MET: JUAN 36/50. Continue Goal  2. Patient will be independent with HEP in order to prevent re-injury and improve functional abilities. GOAL MET:  Patient is I with HEP . Continue Goal    Patient Education:   [x]  HEP/Education Completed: continue HEP      [address] Access Code: [email or web address]  []  No new Education completed  [x]  Reviewed Prior HEP      [x]  Patient verbalized and/or demonstrated understanding of education provided. []  Patient unable to verbalize and/or demonstrate understanding of education provided. Will continue education. []  Barriers to learning:     PLAN:  Treatment Recommendations: Strengthening, Range of Motion, Gait Training, Manual Therapy - Soft Tissue Mobilization, Manual Therapy - Joint Manipulation, Pain Management, Home Exercise Program, Patient Education, Integrative Dry Needling, Aquatics and Modalities    []  Plan of care initiated. Plan to see patient 2 times per week for 8 weeks to address the treatment planned outlined above.   []  Continue with current plan of care  []  Modify plan of care as follows:    [x]  Hold pending physician visit  []  Discharge    Time In 0846   Time Out 0925   Timed Code Minutes: 40 min   Total Treatment Time: 40 min     Electronically Signed by: Catalina Bianchi, PT

## 2022-06-29 ENCOUNTER — TELEMEDICINE (OUTPATIENT)
Dept: NEUROSURGERY | Age: 32
End: 2022-06-29
Payer: MEDICAID

## 2022-06-29 DIAGNOSIS — M51.16 LUMBAR DISC DISEASE WITH RADICULOPATHY: Primary | ICD-10-CM

## 2022-06-29 PROCEDURE — 99213 OFFICE O/P EST LOW 20 MIN: CPT | Performed by: NEUROLOGICAL SURGERY

## 2022-06-29 PROCEDURE — 4004F PT TOBACCO SCREEN RCVD TLK: CPT | Performed by: NEUROLOGICAL SURGERY

## 2022-06-29 PROCEDURE — G8428 CUR MEDS NOT DOCUMENT: HCPCS | Performed by: NEUROLOGICAL SURGERY

## 2022-06-29 PROCEDURE — G8417 CALC BMI ABV UP PARAM F/U: HCPCS | Performed by: NEUROLOGICAL SURGERY

## 2022-06-29 RX ORDER — PREDNISONE 20 MG/1
TABLET ORAL
Qty: 30 TABLET | Refills: 0 | Status: SHIPPED | OUTPATIENT
Start: 2022-06-29 | End: 2022-07-14

## 2022-06-29 NOTE — PROGRESS NOTES
2022    TELEHEALTH EVALUATION -- Audio/Visual (During VKXHN-20 public health emergency)    HPI:    Dawson Gladys (:  1990) has requested an audio/video evaluation for the following concern(s):    Just finished PT with 5-6 sessions. Pain in low back is approximately 6-7/10 daily worsened with standing and bending forward. Some numbness lateral L thigh. Currently on flexeril and gabapentin. Still having pain in tailbone to buttock down outer left of thigh. Review of Systems    Prior to Visit Medications    Medication Sig Taking? Authorizing Provider   cyproheptadine (PERIACTIN) 4 MG tablet Take 1-2 tablets by mouth at bedtime  Scott Vera MD   pantoprazole (PROTONIX) 40 MG tablet Take 1 tablet by mouth 2 times daily (with meals)  Yung Byrne MD   rOPINIRole (REQUIP) 1 MG tablet Take 1 tablet by mouth nightly  Yung Byrne MD   traZODone (DESYREL) 150 MG tablet Take 0.5 tablets by mouth nightly  Scott Vera MD   lamoTRIgine (LAMICTAL) 200 MG tablet Take 1 tablet by mouth 2 times daily  Scott Vera MD   escitalopram (LEXAPRO) 20 MG tablet Take 2 tablets by mouth daily  Scott Vera MD   cariprazine hcl (VRAYLAR) 1.5 MG capsule Take 1 capsule by mouth daily  Scott Vrea MD   ALPRAZolam Cari Gather) 0.5 MG tablet Take 1 tablet by mouth 2 times daily as needed for Anxiety for up to 60 days. Scott Vera MD   montelukast (SINGULAIR) 10 MG tablet Take 1 tablet by mouth daily  Ananya Marshall MD   loratadine (CLARITIN) 10 MG tablet Take 1 tablet by mouth daily  Evangelist Urbina DO   gabapentin (NEURONTIN) 800 MG tablet Take 1 tablet by mouth 3 times daily for 30 days. Earl Mcghee DO   carbamide peroxide (DEBROX) 6.5 % otic solution Use as directed.   LEYDI Tee - WARREN   Handicap Placard MISC by Does not apply route Expires 2022  LEYDI Lopez - CNP   azelastine (OPTIVAR) 0.05 % ophthalmic solution Place 1 drop into both eyes 2 times daily as needed Historical Provider, MD   fluticasone (FLONASE) 50 MCG/ACT nasal spray 2 sprays by Each Nostril route daily Start with one spray daily for one week and increase to two sprays daily as needed  Tamy Hermosillo DO   fluticasone-salmeterol (ADVAIR DISKUS) 100-50 MCG/DOSE diskus inhaler Inhale 1 puff into the lungs every 12 hours  Arnaud Pinedo DO   albuterol sulfate HFA (VENTOLIN HFA) 108 (90 Base) MCG/ACT inhaler Inhale 2 puffs into the lungs every 6 hours as needed for Wheezing  Tamy Hermosillo DO       Social History     Tobacco Use    Smoking status: Current Every Day Smoker     Packs/day: 1.00     Years: 10.00     Pack years: 10.00     Types: Cigarettes    Smokeless tobacco: Former User     Types: Chew   Vaping Use    Vaping Use: Former   Substance Use Topics    Alcohol use: Not Currently     Comment: socially    Drug use: Yes     Types: Marijuana (Weed)     Comment: medical card            PHYSICAL EXAMINATION:  [ INSTRUCTIONS:  \"[x]\" Indicates a positive item  \"[]\" Indicates a negative item  -- DELETE ALL ITEMS NOT EXAMINED]  Vital Signs: (As obtained by patient/caregiver or practitioner observation)    Blood pressure-  Heart rate-    Respiratory rate-    Temperature-  Pulse oximetry-     Constitutional: [] Appears well-developed and well-nourished [] No apparent distress      [] Abnormal-   Mental status  [x] Alert and awake  [x] Oriented to person/place/time [x]Able to follow commands      Eyes:  EOM    [x]  Normal  [] Abnormal-  Sclera  [x]  Normal  [] Abnormal -         Discharge [x]  None visible  [] Abnormal -    HENT:   [x] Normocephalic, atraumatic.   [] Abnormal   [x] Mouth/Throat: Mucous membranes are moist.     External Ears [x] Normal  [] Abnormal-     Neck: [x] No visualized mass     Pulmonary/Chest: [x] Respiratory effort normal.  [x] No visualized signs of difficulty breathing or respiratory distress        [] Abnormal-      Musculoskeletal:   [x] Normal gait with no signs of ataxia [x] Normal range of motion of neck        [] Abnormal-       Neurological:        [x] No Facial Asymmetry (Cranial nerve 7 motor function) (limited exam to video visit)          [x] No gaze palsy        [] Abnormal-         Skin:        [x] No significant exanthematous lesions or discoloration noted on facial skin         [] Abnormal-            Psychiatric:       [x] Normal Affect [x] No Hallucinations        [] Abnormal-     Other pertinent observable physical exam findings-     ASSESSMENT/PLAN:  Lumbar disc disease with radiculopathy  Lumbar xray  Mri lumbar  pred taper    F/u 4-6wks    Janey Dom, was evaluated through a synchronous (real-time) audio-video encounter. The patient (or guardian if applicable) is aware that this is a billable service, which includes applicable co-pays. This Virtual Visit was conducted with patient's (and/or legal guardian's) consent. The visit was conducted pursuant to the emergency declaration under the [address] [place] [address], [address] [place] [address] authority and the [employer] General Act. Patient identification was verified, and a caregiver was present when appropriate. The patient was located at Home: [address] [place] [address]. Provider was located at [facility] (Appt Dept): [address] [place] [address], [facility] [address]  [facility], Ho[place] Zapien . Total time spent on this encounter: 20min  --Sarkis William DO on 6/29/2022 at 3:16 PM    An electronic signature was used to authenticate this note.

## 2022-07-07 ENCOUNTER — TELEMEDICINE (OUTPATIENT)
Dept: PSYCHIATRY | Age: 32
End: 2022-07-07
Payer: MEDICAID

## 2022-07-07 DIAGNOSIS — F43.10 PTSD (POST-TRAUMATIC STRESS DISORDER): Primary | ICD-10-CM

## 2022-07-07 DIAGNOSIS — F29 PSYCHOSIS, UNSPECIFIED PSYCHOSIS TYPE (HCC): ICD-10-CM

## 2022-07-07 DIAGNOSIS — F41.1 GAD (GENERALIZED ANXIETY DISORDER): ICD-10-CM

## 2022-07-07 DIAGNOSIS — F41.0 PANIC ATTACKS: ICD-10-CM

## 2022-07-07 DIAGNOSIS — F60.3 BORDERLINE PERSONALITY DISORDER (HCC): ICD-10-CM

## 2022-07-07 PROCEDURE — G8427 DOCREV CUR MEDS BY ELIG CLIN: HCPCS | Performed by: PSYCHIATRY & NEUROLOGY

## 2022-07-07 PROCEDURE — 99214 OFFICE O/P EST MOD 30 MIN: CPT | Performed by: PSYCHIATRY & NEUROLOGY

## 2022-07-07 RX ORDER — LAMOTRIGINE 200 MG/1
200 TABLET ORAL 2 TIMES DAILY
Qty: 60 TABLET | Refills: 2 | Status: SHIPPED | OUTPATIENT
Start: 2022-07-07 | End: 2022-09-08 | Stop reason: SDUPTHER

## 2022-07-07 RX ORDER — ESCITALOPRAM OXALATE 20 MG/1
40 TABLET ORAL DAILY
Qty: 60 TABLET | Refills: 2 | Status: SHIPPED | OUTPATIENT
Start: 2022-07-07 | End: 2022-09-08 | Stop reason: SDUPTHER

## 2022-07-07 RX ORDER — TRAZODONE HYDROCHLORIDE 150 MG/1
75 TABLET ORAL NIGHTLY
Qty: 15 TABLET | Refills: 2 | Status: SHIPPED | OUTPATIENT
Start: 2022-07-07 | End: 2022-09-01 | Stop reason: SDUPTHER

## 2022-07-07 RX ORDER — ALPRAZOLAM 0.5 MG/1
0.5 TABLET ORAL 2 TIMES DAILY PRN
Qty: 60 TABLET | Refills: 1 | Status: SHIPPED | OUTPATIENT
Start: 2022-07-07 | End: 2022-09-05

## 2022-07-07 NOTE — PROGRESS NOTES
[address] [id number] [phone]    Progress Note    Patient:  Fabiola Putnam  YOB: 1990  PCP:  Samina Garg MD  Visit Date:  7/7/2022    TELEHEALTH EVALUATION -- Audio/Visual (During QDZOK-34 public health emergency)    Patient location: home  Physician location: [place], [facility]  This virtual visit was conducted via interactive, real-time video. Chief Complaint   Patient presents with    Follow-up    Medication Check    Anxiety    Panic Attack    Trauma       SUBJECTIVE:      Fabiola Putnam, a 34 y. o. male, presents for a follow up visit. Patient reports he is doing well. Patient is compliant with medication regimen. He presents with his daughter. His wife is pregnant. Their youngest is 21 mos. Tried Periactin once and felt like it made NM worse. Didn't take it again. We agree to d/c it. Takes Xanax around bedtime and won't have any NM. If he doesn't take it will have them frequently, every few hours. Uses Xanax for sleep 2-3 times per week. Mood is good\" with meds. If he misses meds a few days in a row feels worse. Quit smoking with last cigarette over a month ago. Saw his spine doc in [place] and planning to do MRI planned d/t ongoing pain. DDD. Sleep is good with Xnax getting 9 hours. With just trazodone NM waking him up. Not too much stress lately \"it's getting better\". Disability got approved which was a big relief. Took 2 years to get on that. Doing ok with last Lexapro increase mood \"a lot better\" and wife \"says I been a lot better\". Denies any side effects. Regarding any possible dissociates he describes a few days \"stayed to myself\" without any emotions or feelings \"just numb\". We agree to stop Periactin and make no further changes.        Med Trials:Chantix (nausea), trazodone, gabapentin, Ritalin, medical cannabis, Lamictal, Lexapro, Klonopin, Seroquel (no benefit, side effects), doxazosin, Risperdal (no benefit), Latuda (no benefit)    OBJECTIVE:  Vitals: There were no vitals taken for this visit. MENTAL STATUS EXAM:    GENERAL  Build: Normal    Hygiene:  Appropriate    SENSORIUM Orientation: Place, Person, Time, & Situation     Consciousness: Alert    ATTENTION   Focused   RELATEDNESS  Cooperative    EYE CONTACT   Good    InPSYCHOMOTOR  Normal    SPEECH Volume: Normal     Rate: Normal rate and tone    Amplitude: Within normal limits   MOOD  \"a lot better\"    AFFECT Range: Full, mood congruent    THOUGHT Process:  Goal-Directed     Content: no evidence of psychosis, doesn't appear to respond to internal stimuli   COGNITION Insight: Good    Judgement:  Intact    MEMORY  did not test, no gross deficits    INTELLIGENCE  Average     Mobility/Gait: Independently     Controlled SubstancesMonitoring: Periodic Controlled Substance Monitoring: Possible medication side effects, risk of tolerance/dependence & alternative treatments discussed. ,No signs of potential drug abuse or diversion identified. Aram Hatchet, MD)       ASSESSMENT: Will stop Periactin d/t lack of benefit for nightmares. Mood improved with last Lexapro increase. Will monitor for SI, psychosis, dissociations. Consider EKG to monitor QTc. Lamictal, Lexapro, and Vraylar all with mood benefit. Diagnosis Orders   1. PTSD (post-traumatic stress disorder)     2. Panic attacks  ALPRAZolam (XANAX) 0.5 MG tablet   3. DANTE (generalized anxiety disorder)     4. Borderline personality disorder ([facility] [address]     5.  Psychosis, unspecified psychosis type ([facility] [address]     Medical Hx: asthma, heart murmur, Legg-Calve Perthes Disease     PLAN:     · Medications:   · Lexapro 40 QD - dose beyond FDA max, he feels benefit outweighs risk  · Lamictal 200 mg BID   · Trazodone 75 mg HS  · Vraylar 1.5 mg QD  · Xanax 0.5 mg BID prn panic  · Stop Periactin 4 to 8 mg nightly prn nightmares  · Therapy: previously saw Dr. Mertha Lennox and will see Dr. Andrew Wright in August  · Labs/Tests/Imaging: none   · Records Reviewed: [facility]  · Patient advised to call if patient has any difficulties with treatment  Return in about 8 weeks (around 9/1/2022) for med check, follow up. Fiona Sender, was evaluated through a synchronous (real-time) audio-video encounter. The patient (or guardian if applicable) is aware that this is a billable service, which includes applicable copays. This virtual visit was conducted with patient's (and/or legal guardian's) consent. The visit was conducted pursuant to the emergency declaration under the [address] [place] [address] authority and the [employer] General Act. Patient identification was verified, and a caregiver was present when appropriate. The patient was located in a state where the provider was licensed to provide care.       Total time spent for this encounter: not billed by time    Electronically signed by Sukhwinder Bardales MD on 7/7/2022 at 2:56 PM

## 2022-07-12 NOTE — DISCHARGE SUMMARY
[facility] NOTE  OUTPATIENT  [place] [address]    Patient Name: Lucia Carranza        CSN: [id number]   YOB: 1990  Gender: male  LEYDI Schreiber - *,    Intervertebral disc disorders with radiculopathy, lumbar region [M51.16] ,      Patient is discharged from Physical Therapy services at this time. See last note for details related to results of therapy and goal achievement. Reason for discharge: Patient did not return calls to schedule additional sessions. Patient was on hold for MD follow up and did not call to schedule any additional visits after a message was left to schedule.        Othelia Goldberg, PT

## 2022-08-31 DIAGNOSIS — J30.2 SEASONAL ALLERGIES: ICD-10-CM

## 2022-09-01 ENCOUNTER — OFFICE VISIT (OUTPATIENT)
Dept: PSYCHOLOGY | Age: 32
End: 2022-09-01
Payer: MEDICAID

## 2022-09-01 ENCOUNTER — TELEPHONE (OUTPATIENT)
Dept: PSYCHIATRY | Age: 32
End: 2022-09-01

## 2022-09-01 DIAGNOSIS — F43.10 PTSD (POST-TRAUMATIC STRESS DISORDER): Primary | ICD-10-CM

## 2022-09-01 PROCEDURE — 90791 PSYCH DIAGNOSTIC EVALUATION: CPT | Performed by: PSYCHOLOGIST

## 2022-09-01 PROCEDURE — 4004F PT TOBACCO SCREEN RCVD TLK: CPT | Performed by: PSYCHOLOGIST

## 2022-09-01 RX ORDER — TRAZODONE HYDROCHLORIDE 150 MG/1
150 TABLET ORAL NIGHTLY
Qty: 30 TABLET | Refills: 2 | Status: SHIPPED | OUTPATIENT
Start: 2022-09-01

## 2022-09-01 RX ORDER — LORATADINE 10 MG/1
10 TABLET ORAL DAILY
Qty: 30 TABLET | Refills: 3 | Status: SHIPPED | OUTPATIENT
Start: 2022-09-01

## 2022-09-01 RX ORDER — MONTELUKAST SODIUM 10 MG/1
10 TABLET ORAL DAILY
Qty: 30 TABLET | Refills: 3 | Status: SHIPPED | OUTPATIENT
Start: 2022-09-01
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
## 2022-09-01 NOTE — TELEPHONE ENCOUNTER
Haroon Donnelly wrote into the office via [employer]:    I just realized you had me on half a trazodone which didnt work so I was taking 150 but my meds arent lasting a hole month. Should I try taking half again or am I able to stay on a whole tablet    --Please advise; he does not return until 09/08/22 with this provider; he sees Dr. Ken Zhu today.

## 2022-09-01 NOTE — TELEPHONE ENCOUNTER
Ok to continue with full 150 mg tablet. I have sent Rx for trazodone 150 mg nightly to his pharmacy.    Electronically signed by Shyann Edward MD on 9/1/2022 at 8:56 AM

## 2022-09-01 NOTE — TELEPHONE ENCOUNTER
Patient's last appointment was : 5/2/2022  Patient's next appointment is :   Future Appointments   Date Time Provider Juan Manuel Fitzpatrick   9/1/2022 10:00 AM Israel Cleveland, PhD N [facility] [place]   9/8/2022 10:00 AM MD Warren James PSYCH [facility] [address] [facility]     Last refilled:4/19/22    No results found for: LABA1C  Lab Results   Component Value Date    CHOL 211 (H) 08/04/2020    TRIG 283 (H) 08/04/2020    HDL 29 08/04/2020    LDLCALC 125 08/04/2020     Lab Results   Component Value Date     10/22/2021    K 4.0 10/22/2021     10/22/2021    CO2 26 10/22/2021    BUN 10 10/22/2021    CREATININE 0.8 10/22/2021    GLUCOSE 124 (H) 10/22/2021    CALCIUM 9.1 10/22/2021    PROT 6.6 08/04/2020    LABALBU 4.3 08/04/2020    BILITOT 0.4 08/04/2020    ALKPHOS 45 08/04/2020    AST 18 08/04/2020    ALT 17 08/04/2020    LABGLOM >90 10/22/2021    GFRAA >60 10/19/2021     Lab Results   Component Value Date    TSH 1.880 08/04/2020     Lab Results   Component Value Date    WBC 14.3 (H) 11/03/2021    HGB 13.5 11/03/2021    HCT 41.0 11/03/2021    MCV 92.1 11/03/2021     11/03/2021

## 2022-09-01 NOTE — TELEPHONE ENCOUNTER
Patient's last appointment was : 5/2/2022  Patient's next appointment is :   Future Appointments   Date Time Provider Juan Manuel Fitzpatrick   9/1/2022 10:00 AM Melisa Romero, PhD N [facility] [place]   9/8/2022 10:00 AM MD Anaya Zarate PSYCH [facility] [place]     Last refilled:4/8/22    No results found for: LABA1C  Lab Results   Component Value Date    CHOL 211 (H) 08/04/2020    TRIG 283 (H) 08/04/2020    HDL 29 08/04/2020    LDLCALC 125 08/04/2020     Lab Results   Component Value Date     10/22/2021    K 4.0 10/22/2021     10/22/2021    CO2 26 10/22/2021    BUN 10 10/22/2021    CREATININE 0.8 10/22/2021    GLUCOSE 124 (H) 10/22/2021    CALCIUM 9.1 10/22/2021    PROT 6.6 08/04/2020    LABALBU 4.3 08/04/2020    BILITOT 0.4 08/04/2020    ALKPHOS 45 08/04/2020    AST 18 08/04/2020    ALT 17 08/04/2020    LABGLOM >90 10/22/2021    GFRAA >60 10/19/2021     Lab Results   Component Value Date    TSH 1.880 08/04/2020     Lab Results   Component Value Date    WBC 14.3 (H) 11/03/2021    HGB 13.5 11/03/2021    HCT 41.0 11/03/2021    MCV 92.1 11/03/2021     11/03/2021

## 2022-09-08 ENCOUNTER — TELEMEDICINE (OUTPATIENT)
Dept: PSYCHIATRY | Age: 32
End: 2022-09-08
Payer: MEDICAID

## 2022-09-08 DIAGNOSIS — F29 PSYCHOSIS, UNSPECIFIED PSYCHOSIS TYPE (HCC): ICD-10-CM

## 2022-09-08 DIAGNOSIS — F43.10 PTSD (POST-TRAUMATIC STRESS DISORDER): Primary | ICD-10-CM

## 2022-09-08 DIAGNOSIS — R41.840 ATTENTION DEFICIT: ICD-10-CM

## 2022-09-08 DIAGNOSIS — F41.1 GAD (GENERALIZED ANXIETY DISORDER): ICD-10-CM

## 2022-09-08 DIAGNOSIS — F41.0 PANIC ATTACKS: ICD-10-CM

## 2022-09-08 DIAGNOSIS — F60.3 BORDERLINE PERSONALITY DISORDER (HCC): ICD-10-CM

## 2022-09-08 PROCEDURE — G8427 DOCREV CUR MEDS BY ELIG CLIN: HCPCS | Performed by: PSYCHIATRY & NEUROLOGY

## 2022-09-08 PROCEDURE — 99214 OFFICE O/P EST MOD 30 MIN: CPT | Performed by: PSYCHIATRY & NEUROLOGY

## 2022-09-08 RX ORDER — ALPRAZOLAM 0.5 MG/1
0.5 TABLET ORAL 2 TIMES DAILY PRN
Qty: 60 TABLET | Refills: 1 | Status: SHIPPED | OUTPATIENT
Start: 2022-09-08 | End: 2022-11-07

## 2022-09-08 RX ORDER — ESCITALOPRAM OXALATE 20 MG/1
40 TABLET ORAL DAILY
Qty: 60 TABLET | Refills: 2 | Status: SHIPPED | OUTPATIENT
Start: 2022-09-08

## 2022-09-08 RX ORDER — LAMOTRIGINE 200 MG/1
200 TABLET ORAL 2 TIMES DAILY
Qty: 60 TABLET | Refills: 2 | Status: SHIPPED | OUTPATIENT
Start: 2022-09-08

## 2022-09-08 NOTE — PROGRESS NOTES
EKG to monitor QTc. Lamictal, Lexapro, and Vraylar all with mood benefit. Diagnosis Orders   1. PTSD (post-traumatic stress disorder)        2. Panic attacks  ALPRAZolam (XANAX) 0.5 MG tablet      3. DANTE (generalized anxiety disorder)        4. Borderline personality disorder ([facility]        5. Psychosis, unspecified psychosis type ([facility]        6. Attention deficit        Medical Hx: asthma, heart murmur, Legg-Calve Perthes Disease       PLAN:     Medications:   Lexapro 40 QD - dose beyond FDA max, he feels benefit outweighs risk  Lamictal 200 mg BID   Decrease Trazodone 150 mg QHS to 75 mg QHS  Vraylar 1.5 mg QD  Xanax 0.5 mg BID prn panic  Therapy: previously saw Dr. Ambrosio Dorman and now sees Dr. Kong Blas  Labs/Tests/Imaging: NPT pending  Records Reviewed: [facility]  Patient advised to call if patient has any difficulties with treatment  Return in about 8 weeks (around 11/3/2022) for med check, follow up. Teddy Almaraz, was evaluated through a synchronous (real-time) audio-video encounter. The patient (or guardian if applicable) is aware that this is a billable service, which includes applicable copays. This virtual visit was conducted with patient's (and/or legal guardian's) consent. The visit was conducted pursuant to the emergency declaration under the [address] [place] [address] authority and the [employer] Act. Patient identification was verified, and a caregiver was present when appropriate. The patient was located in a state where the provider was licensed to provide care.       Total time spent for this encounter: not billed by time    Electronically signed by Jesus Manuel Tran MD on 9/8/2022 at 10:23 AM

## 2022-10-31 RX ORDER — CYCLOBENZAPRINE HCL 10 MG
10 TABLET ORAL 3 TIMES DAILY PRN
Qty: 21 TABLET | Refills: 0 | Status: SHIPPED | OUTPATIENT
Start: 2022-10-31 | End: 2022-11-10

## 2022-10-31 RX ORDER — MELOXICAM 15 MG/1
15 TABLET ORAL DAILY
Qty: 30 TABLET | Refills: 0 | Status: SHIPPED | OUTPATIENT
Start: 2022-10-31 | End: 2022-11-30

## 2022-12-06 DIAGNOSIS — K21.9 GASTROESOPHAGEAL REFLUX DISEASE WITHOUT ESOPHAGITIS: ICD-10-CM

## 2022-12-06 RX ORDER — PANTOPRAZOLE SODIUM 40 MG/1
40 TABLET, DELAYED RELEASE ORAL 2 TIMES DAILY WITH MEALS
Qty: 180 TABLET | Refills: 1 | Status: SHIPPED | OUTPATIENT
Start: 2022-12-06 | End: 2023-03-06

## 2022-12-06 NOTE — TELEPHONE ENCOUNTER
Patient's last appointment was : 5/2/2022  Patient's next appointment is :   Future Appointments   Date Time Provider Juan Manuel Fitzpatrick   1/10/2023  3:00 PM Jeffery Parsons MD N [facility] [place]   1/27/2023  2:30 PM Ras Urias, PhD N [facility] [place]   2/17/2023  3:30 PM Ras Urias, PhD N [facility] [place]   4/7/2023  2:30 PM Ras Urias, PhD N [facility] [place]   5/5/2023  2:30 PM Ras Urias, PhD N [facility] [place]     Last refilled:05/23/2022    No results found for: LABA1C  Lab Results   Component Value Date    CHOL 211 (H) 08/04/2020    TRIG 283 (H) 08/04/2020    HDL 29 08/04/2020    LDLCALC 125 08/04/2020     Lab Results   Component Value Date     10/22/2021    K 4.0 10/22/2021     10/22/2021    CO2 26 10/22/2021    BUN 10 10/22/2021    CREATININE 0.8 10/22/2021    GLUCOSE 124 (H) 10/22/2021    CALCIUM 9.1 10/22/2021    PROT 6.6 08/04/2020    LABALBU 4.3 08/04/2020    BILITOT 0.4 08/04/2020    ALKPHOS 45 08/04/2020    AST 18 08/04/2020    ALT 17 08/04/2020    LABGLOM >90 10/22/2021    GFRAA >60 10/19/2021     Lab Results   Component Value Date    TSH 1.880 08/04/2020     Lab Results   Component Value Date    WBC 14.3 (H) 11/03/2021    HGB 13.5 11/03/2021    HCT 41.0 11/03/2021    MCV 92.1 11/03/2021     11/03/2021

## 2023-01-02 ENCOUNTER — HOSPITAL ENCOUNTER (EMERGENCY)
Age: 33
Discharge: HOME | End: 2023-01-02
Payer: MEDICARE

## 2023-01-02 VITALS
DIASTOLIC BLOOD PRESSURE: 75 MMHG | SYSTOLIC BLOOD PRESSURE: 131 MMHG | HEART RATE: 74 BPM | TEMPERATURE: 97.4 F | RESPIRATION RATE: 16 BRPM | OXYGEN SATURATION: 96 %

## 2023-01-02 DIAGNOSIS — U07.1 COVID-19: Primary | ICD-10-CM

## 2023-01-02 LAB — SARS-COV-2, NAA: DETECTED

## 2023-01-02 PROCEDURE — 99213 OFFICE O/P EST LOW 20 MIN: CPT

## 2023-01-02 PROCEDURE — 87635 SARS-COV-2 COVID-19 AMP PRB: CPT

## 2023-01-02 RX ORDER — DEXTROMETHORPHAN HYDROBROMIDE AND PROMETHAZINE HYDROCHLORIDE 15; 6.25 MG/5ML; MG/5ML
5 SYRUP ORAL 4 TIMES DAILY PRN
Qty: 120 ML | Refills: 0 | Status: SHIPPED | OUTPATIENT
Start: 2023-01-02 | End: 2023-01-09

## 2023-01-02 ASSESSMENT — PAIN DESCRIPTION - LOCATION: LOCATION: GENERALIZED

## 2023-01-02 ASSESSMENT — PAIN DESCRIPTION - DESCRIPTORS: DESCRIPTORS: ACHING

## 2023-01-02 ASSESSMENT — ENCOUNTER SYMPTOMS
EYES NEGATIVE: 1
SORE THROAT: 1
COUGH: 1

## 2023-01-02 ASSESSMENT — PAIN DESCRIPTION - FREQUENCY: FREQUENCY: CONTINUOUS

## 2023-01-02 ASSESSMENT — PAIN SCALES - GENERAL: PAINLEVEL_OUTOF10: 3

## 2023-01-02 ASSESSMENT — PAIN - FUNCTIONAL ASSESSMENT: PAIN_FUNCTIONAL_ASSESSMENT: 0-10

## 2023-01-02 ASSESSMENT — PAIN DESCRIPTION - PAIN TYPE: TYPE: ACUTE PAIN

## 2023-01-02 NOTE — Clinical Note
Marylene Shin was seen and treated in our emergency department on 1/2/2023. COVID19 virus is suspected. Per the CDC guidelines we recommend home isolation until the following conditions are all met:    1. At least five days have passed since symptoms first appeared and/or had a close exposure,   2. After home isolation for five days, wearing a mask around others for the next five days,  3. At least 24 have passed since last fever without the use of fever-reducing medications and  4. Symptoms (eg cough, shortness of breath) have improved    If you have any questions or concerns, please don't hesitate to call.     He may return to work/school on 01/06/2023        Patrick Mcallister, LEYDI - CNP

## 2023-01-02 NOTE — ED TRIAGE NOTES
Pt to room 4 with c/o body aches, head ache, nasal congestion, fever and bilateral ear pain starting yesterday. He reports he tested positive for Covid on a home test and he will need a work note.

## 2023-01-02 NOTE — ED PROVIDER NOTES
[id number]       Chief Complaint   Patient presents with    Positive For Covid-19    Letter for School/Work    Generalized Body Aches    Fever    Otalgia       Nurses Notes reviewed and I agree except as noted in the HPI. HISTORY OF PRESENT ILLNESS   Valorie Mc is a 34 y.o. male who presents to urgent care today complaining of fever body aches chills. He has had symptoms for 2 days. States that he had a positive home COVID test yesterday. Requesting work note. He denies chest pain. He denies shortness of breath. REVIEW OF SYSTEMS     Review of Systems   Constitutional:  Positive for chills, fatigue and fever. HENT:  Positive for congestion and sore throat. Eyes: Negative. Respiratory:  Positive for cough. Cardiovascular:  Negative for chest pain. Musculoskeletal:  Positive for myalgias. PAST MEDICAL HISTORY         Diagnosis Date    Asthma     Bipolar 1 disorder ([facility] [address]     [place]    Borderline personality disorder [facility])     COPD (chronic obstructive pulmonary disease) ([facility] [address]     early stages    Depression     Dissociative identity disorder ([facility]     10/18/21    GERD (gastroesophageal reflux disease)     History of cardiac murmur in childhood     Vzpi-Gampe-Qlqaefq disease, left     Lumbar disc disease     Multiple allergies     PTSD (post-traumatic stress disorder)     Restless legs syndrome     Teeth missing     Under care of team 05/18/2021    psych-vidal [facility]an[facility]-last visit april 2021    Wellness examination 05/18/2021    pcp-Celia Baez-last visit april 2021       SURGICAL HISTORY     Patient  has a past surgical history that includes Total hip arthroplasty (Left, 2011); Tympanostomy tube placement; Injection Procedure For Sacroiliac Joint (Left, 10/23/2020); Pain management procedure (Left, 01/29/2021); Pain management procedure (Left, 02/12/2021); Nerve Block (N/A, 03/12/2021);  Pain management procedure (Left, 04/16/2021); [place] tooth extraction; Lumbar spine surgery (Left, 06/01/2021); Lumbar spine surgery (11/02/2021); lumbar fusion (Left, 11/2/2021); and lumbar fusion (Left, 11/2/2021). CURRENT MEDICATIONS       Discharge Medication List as of 1/2/2023 10:25 AM        CONTINUE these medications which have NOT CHANGED    Details   loratadine (CLARITIN) 10 MG tablet Take 1 tablet by mouth daily, Disp-30 tablet, R-3Normal      montelukast (SINGULAIR) 10 MG tablet Take 1 tablet by mouth daily, Disp-30 tablet, R-3Normal      pantoprazole (PROTONIX) 40 MG tablet Take 1 tablet by mouth 2 times daily (with meals), Disp-180 tablet, R-1Normal      meloxicam (MOBIC) 15 MG tablet Take 1 tablet by mouth daily, Disp-30 tablet, R-0Normal      lamoTRIgine (LAMICTAL) 200 MG tablet Take 1 tablet by mouth 2 times daily, Disp-60 tablet, R-2Normal      escitalopram (LEXAPRO) 20 MG tablet Take 2 tablets by mouth daily, Disp-60 tablet, R-2Normal      cariprazine hcl (VRAYLAR) 1.5 MG capsule Take 1 capsule by mouth daily, Disp-30 capsule, R-2Normal      traZODone (DESYREL) 150 MG tablet Take 1 tablet by mouth nightly, Disp-30 tablet, R-2Normal      rOPINIRole (REQUIP) 1 MG tablet Take 1 tablet by mouth nightly, Disp-90 tablet, R-3Normal      gabapentin (NEURONTIN) 800 MG tablet Take 1 tablet by mouth 3 times daily for 30 days. , Disp-90 tablet, R-0Normal      carbamide peroxide (DEBROX) 6.5 % otic solution Use as directed., R-0OTC      Handicap Placard MISC Starting Thu 9/2/2021, Disp-1 each, R-0, PrintExpires March 1st, 2022      azelastine (OPTIVAR) 0.05 % ophthalmic solution Place 1 drop into both eyes 2 times daily as neededHistorical Med      fluticasone (FLONASE) 50 MCG/ACT nasal spray 2 sprays by Each Nostril route daily Start with one spray daily for one week and increase to two sprays daily as needed, Disp-1 Bottle, R-3Normal      fluticasone-salmeterol (ADVAIR DISKUS) 100-50 MCG/DOSE diskus inhaler Inhale 1 puff into the lungs every 12 hours, Disp-60 each, R-3Normal      albuterol sulfate HFA (VENTOLIN HFA) 108 (90 Base) MCG/ACT inhaler Inhale 2 puffs into the lungs every 6 hours as needed for Wheezing, Disp-1 Inhaler, R-0Normal             ALLERGIES     Patient is has No Known Allergies. FAMILY HISTORY     Patient'sfamily history includes Depression in his mother; Heart Disease in his father. SOCIAL HISTORY     Patient  reports that he has quit smoking. His smoking use included cigarettes. He has a 10.00 pack-year smoking history. He has quit using smokeless tobacco.  His smokeless tobacco use included chew. He reports that he does not currently use alcohol. He reports that he does not currently use drugs after having used the following drugs: Marijuana Jennifer Dawson). PHYSICAL EXAM     ED TRIAGE VITALS  BP: 131/75, Temp: 97.4 °F (36.3 °C), Heart Rate: 74, Resp: 16, SpO2: 96 %  Physical Exam  Constitutional:       General: He is not in acute distress. Appearance: He is well-developed. He is not ill-appearing or toxic-appearing. HENT:      Head: Normocephalic and atraumatic. Right Ear: Tympanic membrane normal.      Left Ear: Tympanic membrane normal.      Nose: No congestion or rhinorrhea. Mouth/Throat:      Mouth: Mucous membranes are moist.      Pharynx: Oropharynx is clear. No pharyngeal swelling, posterior oropharyngeal erythema or uvula swelling. Tonsils: 0 on the right. 0 on the left. Eyes:      Conjunctiva/sclera: Conjunctivae normal.      Pupils: Pupils are equal, round, and reactive to light. Cardiovascular:      Rate and Rhythm: Normal rate and regular rhythm. Heart sounds: No murmur heard. Pulmonary:      Effort: Pulmonary effort is normal.      Breath sounds: Normal breath sounds. No wheezing. Abdominal:      General: Bowel sounds are normal.      Palpations: Abdomen is soft. Tenderness: There is no abdominal tenderness.    Musculoskeletal:      Cervical back: Normal range of motion and neck supple. Lymphadenopathy:      Cervical: No cervical adenopathy. Skin:     General: Skin is warm and dry. Capillary Refill: Capillary refill takes less than 2 seconds. Neurological:      General: No focal deficit present. Mental Status: He is alert and oriented to person, place, and time. Psychiatric:         Mood and Affect: Mood normal.         Behavior: Behavior normal.       DIAGNOSTIC RESULTS   Labs:  Results for orders placed or performed during the hospital encounter of 01/02/23   COVID-19, Rapid   Result Value Ref Range    SARS-CoV-2, MATTHEW DETECTED (AA) NOT DETECTED       IMAGING:  No orders to display     URGENT CARE COURSE:         Medications - No data to display  PROCEDURES:  FINALIMPRESSION      1. COVID-19        DISPOSITION/PLAN   DISPOSITION Decision To Discharge 01/02/2023 10:23:23 AM    Patient is positive for COVID. Will provide work note. We will provide promethazine with dextromethorphan. Continue acetaminophen and ibuprofen as needed for fever body aches chills. Use warm salt water gargles, cough drops, Chloraseptic spray. Follow up with PCP in days if no better. Meds as prescribed. Vaporizer at night. Increase fluids. If worse return or go to ER. PATIENT REFERRED TO:  No follow-up provider specified.   DISCHARGE MEDICATIONS:  Discharge Medication List as of 1/2/2023 10:25 AM        START taking these medications    Details   promethazine-dextromethorphan (PROMETHAZINE-DM) 6.25-15 MG/5ML syrup Take 5 mLs by mouth 4 times daily as needed for Cough, Disp-120 mL, R-0Normal           Discharge Medication List as of 1/2/2023 10:25 AM          LEYDI Costa - LEYDI Morris CNP  01/02/23 2010

## 2023-01-03 ENCOUNTER — TELEPHONE (OUTPATIENT)
Dept: FAMILY MEDICINE CLINIC | Age: 33
End: 2023-01-03

## 2023-01-03 DIAGNOSIS — B80 PINWORM INFECTION: Primary | ICD-10-CM

## 2023-01-03 RX ORDER — ALBENDAZOLE 200 MG/1
400 TABLET, FILM COATED ORAL ONCE
Qty: 4 TABLET | Refills: 0 | Status: SHIPPED | OUTPATIENT
Start: 2023-01-03 | End: 2023-01-03

## 2023-01-03 NOTE — TELEPHONE ENCOUNTER
aLura Huang MD Resident Signed      12:41 PM     Copy     Called mom to discuss conversation with Bryce Borrero from [address] [place] [address] and decision to treat family for pinworms. Mom verbalized understanding.

## 2023-01-03 NOTE — TELEPHONE ENCOUNTER
Patient's daughter had recent hospitalization for acute appendicitis with appendectomy. Surgical PA called with concern that pinworms were seen in abdomen during surgical exploration and wanted family treated. Will treat family for pinworm due to close exposure with patient. Discussed with wife, Barbara Rice. Treatment will be with Albendazole 400 mg once - repeat in 2 weeks.

## 2023-01-15 DIAGNOSIS — J30.2 SEASONAL ALLERGIES: ICD-10-CM

## 2023-01-16 RX ORDER — MONTELUKAST SODIUM 10 MG/1
10 TABLET ORAL DAILY
Qty: 30 TABLET | Refills: 3 | Status: SHIPPED | OUTPATIENT
Start: 2023-01-16

## 2023-01-16 NOTE — TELEPHONE ENCOUNTER
Patient's last appointment was : 5/2/2022  Patient's next appointment is :  1/27/23  Last refilled:9/1/22    No results found for: LABA1C  Lab Results   Component Value Date    CHOL 211 (H) 08/04/2020    TRIG 283 (H) 08/04/2020    HDL 29 08/04/2020    LDLCALC 125 08/04/2020     Lab Results   Component Value Date     10/22/2021    K 4.0 10/22/2021     10/22/2021    CO2 26 10/22/2021    BUN 10 10/22/2021    CREATININE 0.8 10/22/2021    GLUCOSE 124 (H) 10/22/2021    CALCIUM 9.1 10/22/2021    PROT 6.6 08/04/2020    LABALBU 4.3 08/04/2020    BILITOT 0.4 08/04/2020    ALKPHOS 45 08/04/2020    AST 18 08/04/2020    ALT 17 08/04/2020    LABGLOM >90 10/22/2021    GFRAA >60 10/19/2021     Lab Results   Component Value Date    TSH 1.880 08/04/2020     Lab Results   Component Value Date    WBC 14.3 (H) 11/03/2021    HGB 13.5 11/03/2021    HCT 41.0 11/03/2021    MCV 92.1 11/03/2021     11/03/2021

## 2023-01-17 RX ORDER — LAMOTRIGINE 200 MG/1
TABLET ORAL
Qty: 60 TABLET | Refills: 0 | Status: SHIPPED | OUTPATIENT
Start: 2023-01-17

## 2023-01-17 RX ORDER — TRAZODONE HYDROCHLORIDE 150 MG/1
150 TABLET ORAL NIGHTLY
Qty: 30 TABLET | Refills: 0 | Status: SHIPPED | OUTPATIENT
Start: 2023-01-17

## 2023-01-17 RX ORDER — CARIPRAZINE 1.5 MG/1
CAPSULE, GELATIN COATED ORAL
Qty: 30 CAPSULE | Refills: 0 | Status: SHIPPED | OUTPATIENT
Start: 2023-01-17

## 2023-01-17 NOTE — TELEPHONE ENCOUNTER
Patient's last appointment was : 5/2/2022  Patient's next appointment is :  none  Last refilled: 09/01/2022    No results found for: LABA1C  Lab Results   Component Value Date    CHOL 211 (H) 08/04/2020    TRIG 283 (H) 08/04/2020    HDL 29 08/04/2020    LDLCALC 125 08/04/2020     Lab Results   Component Value Date     10/22/2021    K 4.0 10/22/2021     10/22/2021    CO2 26 10/22/2021    BUN 10 10/22/2021    CREATININE 0.8 10/22/2021    GLUCOSE 124 (H) 10/22/2021    CALCIUM 9.1 10/22/2021    PROT 6.6 08/04/2020    LABALBU 4.3 08/04/2020    BILITOT 0.4 08/04/2020    ALKPHOS 45 08/04/2020    AST 18 08/04/2020    ALT 17 08/04/2020    LABGLOM >90 10/22/2021    GFRAA >60 10/19/2021     Lab Results   Component Value Date    TSH 1.880 08/04/2020     Lab Results   Component Value Date    WBC 14.3 (H) 11/03/2021    HGB 13.5 11/03/2021    HCT 41.0 11/03/2021    MCV 92.1 11/03/2021     11/03/2021

## 2023-01-17 NOTE — TELEPHONE ENCOUNTER
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 Medications are both pending your approval for a 30 day supply with 0 refills. He is scheduled to return with Dr. Nilesh Galicia on 03/07/23 and follows with Dr. Arthur Godoy as well.

## 2023-01-17 NOTE — TELEPHONE ENCOUNTER
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 Per chart; at his last appt with Dr. Zakiya Grover on he was instructed to decrease to 75mg/nightly on 09/08/2022 but patient notes that he is still taking 150mg/nightly. Medication is pending as loaded by patient 150mg/nightly for a 30 day supply with 0 refills; please advise otherwise. He is scheduled to return with Dr. Zakiya Grover on 03/07/23 and follows with Dr. Aga Cobb as well.

## 2023-01-18 RX ORDER — LORATADINE 10 MG/1
10 TABLET ORAL DAILY
Qty: 30 TABLET | Refills: 3 | Status: SHIPPED | OUTPATIENT
Start: 2023-01-18

## 2023-01-18 RX ORDER — TRAZODONE HYDROCHLORIDE 150 MG/1
150 TABLET ORAL NIGHTLY
Qty: 30 TABLET | Refills: 2 | OUTPATIENT
Start: 2023-01-18

## 2023-02-13 RX ORDER — LAMOTRIGINE 200 MG/1
TABLET ORAL
Qty: 60 TABLET | Refills: 0 | Status: SHIPPED | OUTPATIENT
Start: 2023-02-13

## 2023-02-13 RX ORDER — CARIPRAZINE 1.5 MG/1
CAPSULE, GELATIN COATED ORAL
Qty: 30 CAPSULE | Refills: 0 | Status: SHIPPED | OUTPATIENT
Start: 2023-02-13

## 2023-02-13 RX ORDER — ESCITALOPRAM OXALATE 20 MG/1
TABLET ORAL
Qty: 60 TABLET | Refills: 0 | Status: SHIPPED | OUTPATIENT
Start: 2023-02-13

## 2023-02-13 RX ORDER — TRAZODONE HYDROCHLORIDE 150 MG/1
150 TABLET ORAL NIGHTLY
Qty: 30 TABLET | Refills: 0 | Status: SHIPPED | OUTPATIENT
Start: 2023-02-13

## 2023-02-13 NOTE — TELEPHONE ENCOUNTER
[facility] pharmacy is requesting a refill on the following medications:  Requested Prescriptions     Pending Prescriptions Disp Refills    traZODone (DESYREL) 150 MG tablet [Pharmacy Med Name: TRAZODONE 150 MG TABLET] 30 tablet 0     Sig: take 1 tablet by mouth nightly    lamoTRIgine (LAMICTAL) 200 MG tablet [Pharmacy Med Name: LAMOTRIGINE 200 MG TABLET] 60 tablet 0     Sig: take 1 tablet by mouth twice a day    VRAYLAR 1.5 MG capsule [Pharmacy Med Name: [place] 1.5 MG CAPSULE] 30 capsule 0     Sig: take 1 capsule by mouth once daily       Date of last visit: 9/8/2022  Date of next visit (if applicable):03/07/2023  Pharmacy Name: [facility] [place]

## 2023-02-13 NOTE — TELEPHONE ENCOUNTER
[facility] is requesting a refill on the following medications:  Requested Prescriptions     Pending Prescriptions Disp Refills    escitalopram (LEXAPRO) 20 MG tablet [Pharmacy Med Name: ESCITALOPRAM 20 MG TABLET] 60 tablet 0     Sig: take 2 tablets by mouth once daily       Date of last visit: 9/8/2022  Date of next visit (if applicable):3/7/2023  Pharmacy Name: [facility]      Sofia Harman [place], [place]

## 2023-02-17 ENCOUNTER — TELEMEDICINE (OUTPATIENT)
Dept: PSYCHOLOGY | Age: 33
End: 2023-02-17

## 2023-02-17 DIAGNOSIS — F43.10 PTSD (POST-TRAUMATIC STRESS DISORDER): Primary | ICD-10-CM

## 2023-02-17 PROCEDURE — 90837 PSYTX W PT 60 MINUTES: CPT | Performed by: PSYCHOLOGIST

## 2023-02-17 NOTE — PROGRESS NOTES
Behavioral Health Consultation  Vijaya Moctezuma, PhD  Psychologist  2/21/2023       Time spent with Patient:  60 minutes  This is patient's second  [facility] appointment. Reason for Consult:  anxiety  Referring Provider: No referring provider defined for this encounter. Pt provided informed consent for the behavioral health program. Discussed with patient model of service to include the limits of confidentiality (i.e. abuse reporting, suicide intervention, etc.) and short-term intervention focused approach. Pt indicated understanding. Feedback given to PCP. Description:    MSE:    Appearance    cooperative  Appetite normal  Sleep disturbance No  Loss of pleasure No  Speech    spontaneous, normal rate, and normal volume  Mood    Anxious  Affect    normal affect  Thought Content    intact  Insight    Fair  Judgment    Intact  Suicide Assessment    no suicidal ideation  Homicidal Assessment Intent No  Cutting No  Enuresis No  Learning Disorder none      History:    Medications:   Current Outpatient Medications   Medication Sig Dispense Refill    loratadine (CLARITIN) 10 MG tablet Take 1 tablet by mouth daily 30 tablet 3    montelukast (SINGULAIR) 10 MG tablet Take 1 tablet by mouth daily 30 tablet 3    escitalopram (LEXAPRO) 20 MG tablet take 2 tablets by mouth once daily 60 tablet 0    traZODone (DESYREL) 150 MG tablet take 1 tablet by mouth nightly 30 tablet 0    lamoTRIgine (LAMICTAL) 200 MG tablet take 1 tablet by mouth twice a day 60 tablet 0    VRAYLAR 1.5 MG capsule take 1 capsule by mouth once daily 30 capsule 0    pantoprazole (PROTONIX) 40 MG tablet Take 1 tablet by mouth 2 times daily (with meals) 180 tablet 1    meloxicam (MOBIC) 15 MG tablet Take 1 tablet by mouth daily 30 tablet 0    rOPINIRole (REQUIP) 1 MG tablet Take 1 tablet by mouth nightly 90 tablet 3    gabapentin (NEURONTIN) 800 MG tablet Take 1 tablet by mouth 3 times daily for 30 days.  90 tablet 0    carbamide peroxide (DEBROX) 6.5 % otic solution Use as directed. 0    Handicap Placard MISC by Does not apply route Expires March 1st, 2022 1 each 0    azelastine (OPTIVAR) 0.05 % ophthalmic solution Place 1 drop into both eyes 2 times daily as needed (Patient not taking: Reported on 1/2/2023)      fluticasone (FLONASE) 50 MCG/ACT nasal spray 2 sprays by Each Nostril route daily Start with one spray daily for one week and increase to two sprays daily as needed (Patient not taking: Reported on 1/2/2023) 1 Bottle 3    fluticasone-salmeterol (ADVAIR DISKUS) 100-50 MCG/DOSE diskus inhaler Inhale 1 puff into the lungs every 12 hours 60 each 3    albuterol sulfate HFA (VENTOLIN HFA) 108 (90 Base) MCG/ACT inhaler Inhale 2 puffs into the lungs every 6 hours as needed for Wheezing (Patient not taking: Reported on 1/2/2023) 1 Inhaler 0     No current facility-administered medications for this visit.        Social History:   Social History     Socioeconomic History    Marital status:      Spouse name: Not on file    Number of children: Not on file    Years of education: Not on file    Highest education level: Not on file   Occupational History    Not on file   Tobacco Use    Smoking status: Former     Packs/day: 1.00     Years: 10.00     Pack years: 10.00     Types: Cigarettes    Smokeless tobacco: Former     Types: Chew   Vaping Use    Vaping Use: Some days   Substance and Sexual Activity    Alcohol use: Not Currently    Drug use: Not Currently     Types: Marijuana Mikaelanegra Ramirez)     Comment: medical card    Sexual activity: Yes     Partners: Female   Other Topics Concern    Not on file   Social History Narrative    Not on file     Social Determinants of Health     Financial Resource Strain: Not on file   Food Insecurity: Not on file   Transportation Needs: Not on file   Physical Activity: Not on file   Stress: Not on file   Social Connections: Not on file   Intimate Partner Violence: Not on file   Housing Stability: Not on file       TOBACCO:   reports that he has quit smoking. His smoking use included cigarettes. He has a 10.00 pack-year smoking history. He has quit using smokeless tobacco.  His smokeless tobacco use included chew. ETOH:   reports that he does not currently use alcohol. Family History:   Family History   Problem Relation Age of Onset    Depression Mother     Heart Disease Father            Assessment:  Patient was seen for posttraumatic stress disorder; since last session, he has returned to work and likes his job; he is not around people so he likes the fact that he is not interacting with people; he gets upset at times at work; he also has anxiety at home; he did not report any flashbacks; this therapist went over the mindfulness/grounding method of DBT to help him start to learn how to calm himself and take his mind off of the anxiety; he was able to use progressive relaxation method and did not think about anything else; he could see the benefits during the session; is recommended that he continue this until the next session. Sarbjit Martin, was evaluated through a synchronous (real-time) audio-video encounter. The patient (or guardian if applicable) is aware that this is a billable service, which includes applicable co-pays. This Virtual Visit was conducted with patient's (and/or legal guardian's) consent. The visit was conducted pursuant to the emergency declaration under the [facility], [address] [place] [address] authority and the [employer] and [employer] General Act. Patient identification was verified, and a caregiver was present when appropriate. The patient was located at Home: [address] [place] [address]  Provider was located at [facility] ([address] [place] [address] Dept): [facility] [address],  [address]   Diagnosis:      ICD-10-CM    1.  PTSD (post-traumatic stress disorder)  F43.10                Plan:  Pt interventions:  He is encouraged to do the breathing and the focused relaxation technique daily until the next session; he is encouraged to do this so he can learn to take his mind away from what worries him.

## 2023-02-28 ENCOUNTER — TELEPHONE (OUTPATIENT)
Dept: PSYCHIATRY | Age: 33
End: 2023-02-28

## 2023-02-28 DIAGNOSIS — F41.0 PANIC ATTACKS: Primary | ICD-10-CM

## 2023-02-28 RX ORDER — DIAZEPAM 5 MG/1
5 TABLET ORAL 2 TIMES DAILY PRN
Qty: 60 TABLET | Refills: 0 | Status: SHIPPED | OUTPATIENT
Start: 2023-02-28 | End: 2023-03-30

## 2023-02-28 NOTE — TELEPHONE ENCOUNTER
Dontrell Carpenter wrote into the office via [employer]:    I was wondering if we could switch me back to diazepam it seemed to help better than the Xanax does. I quit taking the Xanax because it wasnt helping as much     --Please advise. Per chart; the last Rx for Xanax 0.5mg (BID prn) was sent on 09/08/2022 for a 30 day supply with 1 refill. He is scheduled to return on 03/07/2023.

## 2023-03-14 RX ORDER — LAMOTRIGINE 200 MG/1
TABLET ORAL
Qty: 60 TABLET | Refills: 1 | Status: SHIPPED | OUTPATIENT
Start: 2023-03-14

## 2023-03-14 RX ORDER — CARIPRAZINE 1.5 MG/1
CAPSULE, GELATIN COATED ORAL
Qty: 30 CAPSULE | Refills: 1 | Status: SHIPPED | OUTPATIENT
Start: 2023-03-14

## 2023-03-14 RX ORDER — ESCITALOPRAM OXALATE 20 MG/1
TABLET ORAL
Qty: 60 TABLET | Refills: 1 | Status: SHIPPED | OUTPATIENT
Start: 2023-03-14

## 2023-03-14 RX ORDER — TRAZODONE HYDROCHLORIDE 150 MG/1
150 TABLET ORAL NIGHTLY
Qty: 30 TABLET | Refills: 1 | Status: SHIPPED | OUTPATIENT
Start: 2023-03-14

## 2023-03-14 NOTE — TELEPHONE ENCOUNTER
[facility] is requesting a refill on the following medications:  Requested Prescriptions     Pending Prescriptions Disp Refills    VRAYLAR 1.5 MG capsule [Pharmacy Med Name: [place] 1.5 MG CAPSULE] 30 capsule 1     Sig: take 1 capsule by mouth once daily    lamoTRIgine (LAMICTAL) 200 MG tablet [Pharmacy Med Name: LAMOTRIGINE 200 MG TABLET] 60 tablet 1     Sig: take 1 tablet by mouth twice a day    traZODone (DESYREL) 150 MG tablet [Pharmacy Med Name: TRAZODONE 150 MG TABLET] 30 tablet 1     Sig: take 1 tablet by mouth nightly    escitalopram (LEXAPRO) 20 MG tablet [Pharmacy Med Name: ESCITALOPRAM 20 MG TABLET] 60 tablet 1     Sig: take 2 tablets by mouth once daily       Date of last visit: 9/8/2022  Date of next visit (if applicable):05/09/2023  Pharmacy Name: [id number]      Della Harman, [address] [place] [address]

## 2023-03-17 RX ORDER — TRAZODONE HYDROCHLORIDE 150 MG/1
150 TABLET ORAL NIGHTLY
Qty: 30 TABLET | Refills: 1 | OUTPATIENT
Start: 2023-03-17

## 2023-03-17 RX ORDER — LAMOTRIGINE 200 MG/1
TABLET ORAL
Qty: 60 TABLET | Refills: 1 | OUTPATIENT
Start: 2023-03-17

## 2023-03-17 RX ORDER — ESCITALOPRAM OXALATE 20 MG/1
TABLET ORAL
Qty: 60 TABLET | Refills: 1 | OUTPATIENT
Start: 2023-03-17

## 2023-03-21 ENCOUNTER — TELEPHONE (OUTPATIENT)
Dept: PSYCHIATRY | Age: 33
End: 2023-03-21

## 2023-03-21 DIAGNOSIS — F41.0 PANIC ATTACKS: ICD-10-CM

## 2023-03-21 RX ORDER — DIAZEPAM 5 MG/1
7.5 TABLET ORAL 2 TIMES DAILY PRN
Qty: 90 TABLET | Refills: 1 | Status: SHIPPED | OUTPATIENT
Start: 2023-03-21 | End: 2023-05-20

## 2023-03-21 NOTE — TELEPHONE ENCOUNTER
Our office received the following message via [employer]:     Im only using it as needed maybe 3 times a week but 1.5 tablets are fine with me do they have a 7.5 mg tablet or am I just cutting the pill in half.

## 2023-03-21 NOTE — TELEPHONE ENCOUNTER
Rx for Valium 7.5 mg to be taken up to twice daily as needed for anxiety/panic sent.    Electronically signed by Brien Ortiz MD on 3/21/2023 at 5:04 PM

## 2023-03-21 NOTE — TELEPHONE ENCOUNTER
Our office received the following message via [employer]:    I have some left but if you could call in new one that would be fine

## 2023-03-21 NOTE — TELEPHONE ENCOUNTER
Our office received the following message via [employer]:    I take my diazepam when my anxiety kicks in but one only helps a bit so I been taking two when I need them I didnt know if I could take 2 or if theres a higher dosage of them that you would recommend thank you for your time

## 2023-03-21 NOTE — TELEPHONE ENCOUNTER
How often is he using the Valium? We can increase from his current 5 mg dose. I recommend he try taking 1.5 tablets for 7.5 mg dose to see if that is more helpful. I'd like to avoid increasing dose to 10 mg if possible.    Electronically signed by Stephanie Stevens MD on 3/21/2023 at 8:56 AM

## 2023-03-21 NOTE — TELEPHONE ENCOUNTER
He will have to cut the 5 mg tablet in half to take 1.5 tabs for new 7.5 mg dose. Let me know if he needs a new Rx.    Electronically signed by Joan Teresa MD on 3/21/2023 at 11:25 AM

## 2023-04-15 ENCOUNTER — HOSPITAL ENCOUNTER (EMERGENCY)
Age: 33
Discharge: HOME | End: 2023-04-15
Payer: MEDICARE

## 2023-04-15 ENCOUNTER — APPOINTMENT (OUTPATIENT)
Dept: GENERAL RADIOLOGY | Age: 33
End: 2023-04-15
Payer: MEDICARE

## 2023-04-15 VITALS
HEART RATE: 72 BPM | SYSTOLIC BLOOD PRESSURE: 125 MMHG | RESPIRATION RATE: 16 BRPM | DIASTOLIC BLOOD PRESSURE: 75 MMHG | TEMPERATURE: 98.9 F | OXYGEN SATURATION: 96 %

## 2023-04-15 DIAGNOSIS — M94.0 COSTOCHONDRITIS, ACUTE: Primary | ICD-10-CM

## 2023-04-15 PROCEDURE — 99212 OFFICE O/P EST SF 10 MIN: CPT | Performed by: NURSE PRACTITIONER

## 2023-04-15 PROCEDURE — 71100 X-RAY EXAM RIBS UNI 2 VIEWS: CPT

## 2023-04-15 PROCEDURE — 99213 OFFICE O/P EST LOW 20 MIN: CPT

## 2023-04-15 RX ORDER — KETOROLAC TROMETHAMINE 10 MG/1
10 TABLET, FILM COATED ORAL EVERY 8 HOURS PRN
Qty: 15 TABLET | Refills: 0 | Status: SHIPPED | OUTPATIENT
Start: 2023-04-15

## 2023-04-15 ASSESSMENT — PAIN DESCRIPTION - PAIN TYPE: TYPE: ACUTE PAIN

## 2023-04-15 ASSESSMENT — PAIN DESCRIPTION - DESCRIPTORS: DESCRIPTORS: ACHING

## 2023-04-15 ASSESSMENT — PAIN DESCRIPTION - LOCATION: LOCATION: RIB CAGE

## 2023-04-15 ASSESSMENT — PAIN DESCRIPTION - ORIENTATION: ORIENTATION: LEFT

## 2023-04-15 ASSESSMENT — PAIN - FUNCTIONAL ASSESSMENT: PAIN_FUNCTIONAL_ASSESSMENT: 0-10

## 2023-04-15 ASSESSMENT — PAIN SCALES - GENERAL: PAINLEVEL_OUTOF10: 8

## 2023-04-17 ASSESSMENT — ENCOUNTER SYMPTOMS
BACK PAIN: 0
NAUSEA: 0
ABDOMINAL PAIN: 0
ORTHOPNEA: 0
SHORTNESS OF BREATH: 0
COUGH: 0
HEARTBURN: 0
TROUBLE SWALLOWING: 0
VOMITING: 0

## 2023-04-17 NOTE — ED PROVIDER NOTES
presents with chest pain. The patient has been evaluated and the history and physical exam suggest a benign etiology. I see nothing to suggest coronary ischemia, myocardial infarction, pulmonary embolism, thoracic aortic dissection, significant pericarditis, pneumonia, pneumothorax, or acute abdomen. I feel the patient can be safely discharged to home with outpatient follow up. Instructions have been given for the patient to Dial 911 or go directly to the ED for any worsening of the symptoms, including but not limited to increased pain, shortness of breath, abdominal pain or weakness. The patient is agreeable to the treatment plan that ambulatory without assistance and will follow-up with her primary care provider for further evaluation and management of care.        PATIENT REFERRED TO:  Concepcion Chacon MD  [address] [address]  [phone]    Schedule an appointment as soon as possible for a visit       DISCHARGE MEDICATIONS:  Discharge Medication List as of 4/15/2023  8:02 PM        START taking these medications    Details   ketorolac (TORADOL) 10 MG tablet Take 1 tablet by mouth every 8 hours as needed for Pain, Disp-15 tablet, R-0Normal           Discharge Medication List as of 4/15/2023  8:02 PM          LEYDI Briones CNP, APRN - CNP  04/17/23 1026

## 2023-04-26 RX ORDER — ESCITALOPRAM OXALATE 20 MG/1
40 TABLET ORAL DAILY
Qty: 180 TABLET | Refills: 0 | Status: SHIPPED | OUTPATIENT
Start: 2023-04-26

## 2023-04-26 NOTE — TELEPHONE ENCOUNTER
[facility] our office a notice that Barrie's insurance requires a 90 day supply for his Lexapro 20mg medication. Per chart; the last Rx was sent for a 30 day supply with 1 refill on 03/14/2023. Medication is loaded for a 90 day supply with 0 refills; he is scheduled to return on 05/09/2023.
Sent
Statement Selected

## 2023-05-05 ENCOUNTER — TELEMEDICINE (OUTPATIENT)
Dept: PSYCHOLOGY | Age: 33
End: 2023-05-05
Payer: MEDICARE

## 2023-05-05 DIAGNOSIS — F43.10 PTSD (POST-TRAUMATIC STRESS DISORDER): Primary | ICD-10-CM

## 2023-05-05 PROCEDURE — 90832 PSYTX W PT 30 MINUTES: CPT | Performed by: PSYCHOLOGIST
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
## 2023-05-16 DIAGNOSIS — F41.0 PANIC ATTACKS: ICD-10-CM

## 2023-05-16 DIAGNOSIS — J30.2 SEASONAL ALLERGIES: ICD-10-CM

## 2023-05-16 DIAGNOSIS — G25.81 RESTLESS LEG: ICD-10-CM

## 2023-05-16 RX ORDER — DIAZEPAM 5 MG/1
7.5 TABLET ORAL 2 TIMES DAILY PRN
Qty: 90 TABLET | Refills: 1 | Status: SHIPPED | OUTPATIENT
Start: 2023-05-16 | End: 2023-06-20 | Stop reason: SDUPTHER

## 2023-05-16 RX ORDER — MONTELUKAST SODIUM 10 MG/1
10 TABLET ORAL DAILY
Qty: 30 TABLET | Refills: 3 | Status: SHIPPED | OUTPATIENT
Start: 2023-05-16

## 2023-05-16 RX ORDER — ROPINIROLE 1 MG/1
1 TABLET, FILM COATED ORAL NIGHTLY
Qty: 90 TABLET | Refills: 3 | Status: SHIPPED | OUTPATIENT
Start: 2023-05-16

## 2023-05-16 RX ORDER — ESCITALOPRAM OXALATE 20 MG/1
40 TABLET ORAL DAILY
Qty: 180 TABLET | Refills: 0 | Status: CANCELLED | OUTPATIENT
Start: 2023-05-16

## 2023-05-16 RX ORDER — LORATADINE 10 MG/1
10 TABLET ORAL DAILY
Qty: 30 TABLET | Refills: 3 | Status: SHIPPED | OUTPATIENT
Start: 2023-05-16

## 2023-05-16 NOTE — TELEPHONE ENCOUNTER
Patient's last appointment was : 5/2/23  Patient's next appointment is :  None  Last refilled: 1/18/23 ; 1/16/23 ; 5/16/22   [facility]  Pharmacy Verified    No results found for: LABA1C  Lab Results   Component Value Date    CHOL 211 (H) 08/04/2020    TRIG 283 (H) 08/04/2020    HDL 29 08/04/2020    LDLCALC 125 08/04/2020     Lab Results   Component Value Date     10/22/2021    K 4.0 10/22/2021     10/22/2021    CO2 26 10/22/2021    BUN 10 10/22/2021    CREATININE 0.8 10/22/2021    GLUCOSE 124 (H) 10/22/2021    CALCIUM 9.1 10/22/2021    PROT 6.6 08/04/2020    LABALBU 4.3 08/04/2020    BILITOT 0.4 08/04/2020    ALKPHOS 45 08/04/2020    AST 18 08/04/2020    ALT 17 08/04/2020    LABGLOM >90 10/22/2021    GFRAA >60 10/19/2021     Lab Results   Component Value Date    TSH 1.880 08/04/2020     Lab Results   Component Value Date    WBC 14.3 (H) 11/03/2021    HGB 13.5 11/03/2021    HCT 41.0 11/03/2021    MCV 92.1 11/03/2021     11/03/2021

## 2023-05-30 RX ORDER — TRAZODONE HYDROCHLORIDE 150 MG/1
150 TABLET ORAL NIGHTLY
Qty: 30 TABLET | Refills: 1 | Status: SHIPPED | OUTPATIENT
Start: 2023-05-30

## 2023-06-04 DIAGNOSIS — K21.9 GASTROESOPHAGEAL REFLUX DISEASE WITHOUT ESOPHAGITIS: ICD-10-CM

## 2023-06-05 RX ORDER — PANTOPRAZOLE SODIUM 40 MG/1
40 TABLET, DELAYED RELEASE ORAL 2 TIMES DAILY WITH MEALS
Qty: 180 TABLET | Refills: 1 | Status: SHIPPED | OUTPATIENT
Start: 2023-06-05 | End: 2023-09-03

## 2023-06-19 RX ORDER — LAMOTRIGINE 200 MG/1
200 TABLET ORAL 2 TIMES DAILY
Qty: 60 TABLET | Refills: 1 | Status: CANCELLED | OUTPATIENT
Start: 2023-06-19

## 2023-06-20 ENCOUNTER — TELEMEDICINE (OUTPATIENT)
Dept: PSYCHIATRY | Age: 33
End: 2023-06-20
Payer: MEDICARE

## 2023-06-20 DIAGNOSIS — F29 PSYCHOSIS, UNSPECIFIED PSYCHOSIS TYPE (HCC): ICD-10-CM

## 2023-06-20 DIAGNOSIS — R41.840 ATTENTION DEFICIT: ICD-10-CM

## 2023-06-20 DIAGNOSIS — F60.3 BORDERLINE PERSONALITY DISORDER (HCC): ICD-10-CM

## 2023-06-20 DIAGNOSIS — F43.10 PTSD (POST-TRAUMATIC STRESS DISORDER): Primary | ICD-10-CM

## 2023-06-20 DIAGNOSIS — F41.0 PANIC ATTACKS: ICD-10-CM

## 2023-06-20 DIAGNOSIS — F41.1 GAD (GENERALIZED ANXIETY DISORDER): ICD-10-CM

## 2023-06-20 PROCEDURE — G8427 DOCREV CUR MEDS BY ELIG CLIN: HCPCS | Performed by: PSYCHIATRY & NEUROLOGY

## 2023-06-20 PROCEDURE — 99214 OFFICE O/P EST MOD 30 MIN: CPT | Performed by: PSYCHIATRY & NEUROLOGY

## 2023-06-20 RX ORDER — ESCITALOPRAM OXALATE 20 MG/1
40 TABLET ORAL DAILY
Qty: 180 TABLET | Refills: 0 | Status: SHIPPED | OUTPATIENT
Start: 2023-06-20

## 2023-06-20 RX ORDER — LAMOTRIGINE 200 MG/1
200 TABLET ORAL 2 TIMES DAILY
Qty: 180 TABLET | Refills: 1 | Status: SHIPPED | OUTPATIENT
Start: 2023-06-20

## 2023-06-20 RX ORDER — TRAZODONE HYDROCHLORIDE 150 MG/1
150 TABLET ORAL NIGHTLY
Qty: 90 TABLET | Refills: 0 | Status: SHIPPED | OUTPATIENT
Start: 2023-06-20

## 2023-06-20 RX ORDER — DIAZEPAM 5 MG/1
5 TABLET ORAL DAILY PRN
Qty: 30 TABLET | Refills: 1 | Status: SHIPPED | OUTPATIENT
Start: 2023-06-20 | End: 2023-08-19

## 2023-06-20 NOTE — TELEPHONE ENCOUNTER
Called patient and left him a message to give me a call back. Checking to see if he has been out and for how long.

## 2023-06-20 NOTE — PROGRESS NOTES
[address] [facility] [id number] [phone]    Progress Note    Patient:  Bindu Ponce  YOB: 1990  PCP:  Yin Mckeon MD  Visit Date:  6/20/2023      Chief Complaint   Patient presents with    Follow-up    Medication Check    Anxiety    Panic Attack    Mood Swings       SUBJECTIVE:      Bindu Ponce, a 28 y. o. male, presents for a follow up visit. He presents alone. Last saw me in Sept.   Doing well. Started working in Nov and found a job he can stick with. 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 Calm environment working with 1 other person. Notes feeling \"a little\" better getting out and working. Less financial stress. Has had paranoid feelings of losing his wife the last 3 weeks, no trigger for that. Fear of abandonment. Mood is \"pretty steady\". Using Valium 5 mg once every 2-3 days. Enjoying time outdoors. \"I feel good\". Using trazodone on prn basis, 150 mg tablet which is no longer causing grogginess the next day. Sleep improved with working. NM a couple times per week. Still in therapy with Dr. Gely Chaney. Discussed tx options and we agree to decrease Valium Rx. Med Trials:Chantix (nausea), trazodone, gabapentin, Ritalin, medical cannabis, Lamictal, Lexapro, Klonopin, Seroquel (no benefit, side effects), doxazosin, Risperdal (no benefit), Latuda (no benefit)    OBJECTIVE:  Vitals: There were no vitals taken for this visit. MENTAL STATUS EXAM:    GENERAL  Build: Normal    Hygiene:  Appropriate   SENSORIUM Orientation: Place, Person, Time, & Situation     Consciousness: Alert    ATTENTION   Focused   RELATEDNESS  Cooperative    EYE CONTACT   Good    InPSYCHOMOTOR  Normal    SPEECH Volume: Normal     Rate: Normal rate and tone    Amplitude:  Within normal limits   MOOD  \"Pretty steady\"     AFFECT Range: Limited , mood congruent    THOUGHT Process:

## 2023-07-12 ENCOUNTER — APPOINTMENT (OUTPATIENT)
Dept: GENERAL RADIOLOGY | Age: 33
End: 2023-07-12
Payer: MEDICARE

## 2023-07-12 ENCOUNTER — HOSPITAL ENCOUNTER (EMERGENCY)
Age: 33
Discharge: HOME | End: 2023-07-12
Payer: MEDICARE

## 2023-07-12 VITALS
RESPIRATION RATE: 16 BRPM | SYSTOLIC BLOOD PRESSURE: 139 MMHG | HEIGHT: 71 IN | DIASTOLIC BLOOD PRESSURE: 81 MMHG | BODY MASS INDEX: 32.62 KG/M2 | TEMPERATURE: 98 F | OXYGEN SATURATION: 97 % | WEIGHT: 233 LBS | HEART RATE: 97 BPM

## 2023-07-12 DIAGNOSIS — S81.012A LACERATION OF LEFT KNEE, INITIAL ENCOUNTER: Primary | ICD-10-CM

## 2023-07-12 DIAGNOSIS — S80.01XA CONTUSION OF RIGHT KNEE, INITIAL ENCOUNTER: ICD-10-CM

## 2023-07-12 PROCEDURE — 99213 OFFICE O/P EST LOW 20 MIN: CPT | Performed by: NURSE PRACTITIONER

## 2023-07-12 PROCEDURE — 2500000003 HC RX 250 WO HCPCS: Performed by: NURSE PRACTITIONER

## 2023-07-12 PROCEDURE — 12001 RPR S/N/AX/GEN/TRNK 2.5CM/<: CPT | Performed by: NURSE PRACTITIONER

## 2023-07-12 PROCEDURE — 99213 OFFICE O/P EST LOW 20 MIN: CPT

## 2023-07-12 PROCEDURE — 6370000000 HC RX 637 (ALT 250 FOR IP): Performed by: NURSE PRACTITIONER

## 2023-07-12 RX ORDER — IBUPROFEN 800 MG/1
800 TABLET ORAL EVERY 8 HOURS PRN
Qty: 30 TABLET | Refills: 0 | Status: SHIPPED | OUTPATIENT
Start: 2023-07-12 | End: 2023-07-22

## 2023-07-12 RX ORDER — LIDOCAINE HYDROCHLORIDE AND EPINEPHRINE 10; 10 MG/ML; UG/ML
20 INJECTION, SOLUTION INFILTRATION; PERINEURAL ONCE
Status: COMPLETED | OUTPATIENT
Start: 2023-07-12 | End: 2023-07-12

## 2023-07-12 RX ORDER — IBUPROFEN 800 MG/1
800 TABLET ORAL ONCE
Status: COMPLETED | OUTPATIENT
Start: 2023-07-12 | End: 2023-07-12

## 2023-07-12 RX ADMIN — IBUPROFEN 800 MG: 800 TABLET, FILM COATED ORAL at 10:36

## 2023-07-12 RX ADMIN — LIDOCAINE HYDROCHLORIDE,EPINEPHRINE BITARTRATE 20 ML: 10; .01 INJECTION, SOLUTION INFILTRATION; PERINEURAL at 10:37

## 2023-07-12 ASSESSMENT — PAIN DESCRIPTION - ORIENTATION
ORIENTATION: LEFT

## 2023-07-12 ASSESSMENT — ENCOUNTER SYMPTOMS
EYE DISCHARGE: 0
VOMITING: 0
SHORTNESS OF BREATH: 0
DIARRHEA: 0
RHINORRHEA: 0
EYE REDNESS: 0
COUGH: 0
SORE THROAT: 0
TROUBLE SWALLOWING: 0
NAUSEA: 0

## 2023-07-12 ASSESSMENT — PAIN DESCRIPTION - FREQUENCY: FREQUENCY: CONTINUOUS

## 2023-07-12 ASSESSMENT — PAIN DESCRIPTION - PAIN TYPE: TYPE: ACUTE PAIN

## 2023-07-12 ASSESSMENT — PAIN - FUNCTIONAL ASSESSMENT
PAIN_FUNCTIONAL_ASSESSMENT: NONE - DENIES PAIN
PAIN_FUNCTIONAL_ASSESSMENT: 0-10

## 2023-07-12 ASSESSMENT — PAIN DESCRIPTION - DESCRIPTORS
DESCRIPTORS: NUMBNESS
DESCRIPTORS: ACHING;SORE

## 2023-07-12 ASSESSMENT — PAIN DESCRIPTION - ONSET: ONSET: SUDDEN

## 2023-07-12 ASSESSMENT — PAIN DESCRIPTION - LOCATION
LOCATION: KNEE

## 2023-07-12 ASSESSMENT — PAIN SCALES - GENERAL
PAINLEVEL_OUTOF10: 4
PAINLEVEL_OUTOF10: 4

## 2023-07-12 NOTE — DISCHARGE INSTRUCTIONS
Monitor for wound for signs of infection: Redness, swelling, drainage or discharge, and increased pain. Skin adhesive will fall off in approximately 7 to 10 days. Please do not remove prematurely. Avoid immersing in dirty water for 7 to 10 days (hot tub, dishwater, etc.). May shower and wash hands as usual.  May use Tylenol or Motrin per package instructions for pain.

## 2023-07-12 NOTE — ED TRIAGE NOTES
Arrives to [facility] for the evaluation of left knee injury/laceration after having a fall this morning around 0600. Was letting his dogs outside and one of the dogs knocked him over in the gravel driveway. Has a puncture wound in the left knee and abrasions to the right knee. Has a bandage in place to the left knee on arrival.  Removed, bleeding controlled. Rates pain in the left knee 4/10 in severity. Cleaned the puncture wound site with antimicrobial wound cleanser. Tolerated well. Waiting provider to assess.

## 2023-07-12 NOTE — ED PROVIDER NOTES
[facility]  Urgent Care Encounter      CHIEF COMPLAINT       Chief Complaint   Patient presents with    Knee Injury     Left Knee Laceration        Nurses Notes reviewed and I agree except as noted in the HPI. HISTORY OF PRESENT ILLNESS   Kavitha Howard is a 28 y.o. male who presents with a knee injury that occurred approximately 4 hours prior to arrival.  Patient states he was walking his dog this morning and he tripped and fell on both knees in the driveway. He does have a gravel driveway. He has pain in both knees with left being greater than right. He is not having difficulty walking. Last tetanus shot was approximately 4 years ago. Present pain level is 4/10 but was 8/10 this morning. Has not had anything for pain yet since the accident. REVIEW OF SYSTEMS     Review of Systems   Constitutional:  Negative for chills, diaphoresis, fatigue and fever. HENT:  Negative for congestion, ear pain, rhinorrhea, sore throat and trouble swallowing. Eyes:  Negative for discharge and redness. Respiratory:  Negative for cough and shortness of breath. Cardiovascular:  Negative for chest pain. Gastrointestinal:  Negative for diarrhea, nausea and vomiting. Genitourinary:  Negative for decreased urine volume. Musculoskeletal:  Negative for neck pain and neck stiffness. Skin:  Negative for rash. Neurological:  Negative for headaches. Hematological:  Negative for adenopathy. Psychiatric/Behavioral:  Negative for sleep disturbance.       PAST MEDICAL HISTORY         Diagnosis Date    Asthma     Bipolar 1 disorder ([address])     Therese Tomlin, [address]    Borderline personality disorder [facility])     COPD (chronic obstructive pulmonary disease) ([address])     early stages    Depression     Dissociative identity disorder ([address])     10/18/21    GERD (gastroesophageal reflux disease)     History of cardiac murmur in childhood     Gnfs-Xydcv-Emhulxh disease, left     Lumbar disc disease

## 2023-07-13 ENCOUNTER — OFFICE VISIT (OUTPATIENT)
Dept: FAMILY MEDICINE CLINIC | Age: 33
End: 2023-07-13
Payer: MEDICARE

## 2023-07-13 VITALS
WEIGHT: 224.2 LBS | SYSTOLIC BLOOD PRESSURE: 140 MMHG | DIASTOLIC BLOOD PRESSURE: 90 MMHG | OXYGEN SATURATION: 96 % | RESPIRATION RATE: 21 BRPM | BODY MASS INDEX: 31.39 KG/M2 | TEMPERATURE: 98.2 F | HEIGHT: 71 IN | HEART RATE: 87 BPM

## 2023-07-13 DIAGNOSIS — J45.909 ASTHMA, UNSPECIFIED ASTHMA SEVERITY, UNSPECIFIED WHETHER COMPLICATED, UNSPECIFIED WHETHER PERSISTENT: ICD-10-CM

## 2023-07-13 DIAGNOSIS — S81.002D OPEN WOUND OF LEFT KNEE, SUBSEQUENT ENCOUNTER: ICD-10-CM

## 2023-07-13 DIAGNOSIS — Z09 HOSPITAL DISCHARGE FOLLOW-UP: Primary | ICD-10-CM

## 2023-07-13 PROCEDURE — 1111F DSCHRG MED/CURRENT MED MERGE: CPT

## 2023-07-13 PROCEDURE — G8417 CALC BMI ABV UP PARAM F/U: HCPCS

## 2023-07-13 PROCEDURE — 99214 OFFICE O/P EST MOD 30 MIN: CPT

## 2023-07-13 PROCEDURE — G8427 DOCREV CUR MEDS BY ELIG CLIN: HCPCS

## 2023-07-13 PROCEDURE — 1036F TOBACCO NON-USER: CPT

## 2023-07-13 RX ORDER — AMOXICILLIN AND CLAVULANATE POTASSIUM 875; 125 MG/1; MG/1
1 TABLET, FILM COATED ORAL 2 TIMES DAILY
Qty: 10 TABLET | Refills: 0 | Status: SHIPPED | OUTPATIENT
Start: 2023-07-13 | End: 2023-07-18

## 2023-07-13 RX ORDER — FLUTICASONE PROPIONATE AND SALMETEROL XINAFOATE 115; 21 UG/1; UG/1
2 AEROSOL, METERED RESPIRATORY (INHALATION) 2 TIMES DAILY
Qty: 1 EACH | Refills: 3 | Status: SHIPPED | OUTPATIENT
Start: 2023-07-13

## 2023-07-13 ASSESSMENT — ENCOUNTER SYMPTOMS
SHORTNESS OF BREATH: 0
SORE THROAT: 0
NAUSEA: 0
VOMITING: 0
DIARRHEA: 0
WHEEZING: 0
RHINORRHEA: 0
CHEST TIGHTNESS: 0
EYE PAIN: 0
CONSTIPATION: 0
ABDOMINAL PAIN: 0

## 2023-07-13 NOTE — PROGRESS NOTES
Immunizations Administered       Name Date Dose Route    TDaP, ADACEL (age 6y-58y), Blayne Tang (age 10y+), IM, 0.5mL 7/13/2023 0.5 mL Intramuscular    Site: Deltoid- Right    Lot: [id number]    NDC: 51596-992-41          Katina Delgado [facility]  7/13/2023 11:46 AM     For Pharmacy Admin Tracking Only    Program: Medical Group  CPA in place:  No  Recommendation Provided To: Provider: 1 via Verbally to provider  Intervention Detail: Vaccine Recommended/Administered  Intervention Accepted By: Provider: 1  Gap Closed?: No   Time Spent (min): 15

## 2023-07-13 NOTE — PROGRESS NOTES
Post-Discharge Transitional Care Follow Up      Jenniffer Ibrahim YOB: 1990    Date of Office Visit:  7/13/2023  Date of Hospital Admission: 7/12/23  Date of Hospital Discharge: 7/12/23  Readmission Risk Score (high >=14%. Medium >=10%):No data recorded    Care management risk score Rising risk (score 2-5) and Complex Care (Scores >=6): No Risk Score On File     Non face to face  following discharge, date last encounter closed (first attempt may have been earlier): *No documented post hospital discharge outreach found in the last 14 days       Call initiated 2 business days of discharge: *No response recorded in the last 14 days     Hospital discharge follow-up  -     WI DISCHARGE MEDS RECONCILED W/ CURRENT OUTPATIENT MED LIST  Open wound of left knee, subsequent encounter  -     Tdap, BOOSTRIX, (age 8 yrs+), IM  -     amoxicillin-clavulanate (AUGMENTIN) 875-125 MG per tablet; Take 1 tablet by mouth 2 times daily for 5 days, Disp-10 tablet, R-0Normal    Last tetanus shot on file was 1995, given shot today  Due to pus and erythema was given 5 days of augmentin   Recommended pain control with tylenol and ibuprofen intermittently     Asthma, unspecified asthma severity, unspecified whether complicated, unspecified whether persistent  -     fluticasone-salmeterol (ADVAIR HFA) 115-21 MCG/ACT inhaler; Inhale 2 puffs into the lungs 2 times daily, Disp-1 each, R-3Normal    Medical Decision Making: moderate complexity  Return if symptoms worsen or fail to improve. Touch base next week to see progress    Subjective:   VALARIE Ibrahim is a 28 y.o. male with a pmhx of osgood schlatter, depression who came to the clinic for follow up from the urgent care. Of note, patient states he was sitting for work on the morning of 7/12/2023, when his dog came running out in front of him, causing him to trip. Patient went to urgent care for debridement and evaluation.   Was sent home on

## 2023-07-15 NOTE — PROGRESS NOTES
Attending Physician Statement  I have discussed the case, including pertinent history and exam findings with the resident. I also have seen the patient and performed key portions of the examination. I agree with the documented assessment and plan as documented by the resident.   GC modifier added to this encounter      Marta Belle MD 7/15/2023 12:01 PM

## 2023-08-08 ENCOUNTER — TELEMEDICINE (OUTPATIENT)
Dept: PSYCHIATRY | Age: 33
End: 2023-08-08
Payer: MEDICARE

## 2023-08-08 DIAGNOSIS — R41.840 ATTENTION DEFICIT: ICD-10-CM

## 2023-08-08 DIAGNOSIS — F29 PSYCHOSIS, UNSPECIFIED PSYCHOSIS TYPE (HCC): ICD-10-CM

## 2023-08-08 DIAGNOSIS — F41.1 GAD (GENERALIZED ANXIETY DISORDER): ICD-10-CM

## 2023-08-08 DIAGNOSIS — F60.3 BORDERLINE PERSONALITY DISORDER (HCC): ICD-10-CM

## 2023-08-08 DIAGNOSIS — F43.10 PTSD (POST-TRAUMATIC STRESS DISORDER): Primary | ICD-10-CM

## 2023-08-08 DIAGNOSIS — F41.0 PANIC ATTACKS: ICD-10-CM

## 2023-08-08 PROCEDURE — 99213 OFFICE O/P EST LOW 20 MIN: CPT | Performed by: PSYCHIATRY & NEUROLOGY

## 2023-08-08 PROCEDURE — G8427 DOCREV CUR MEDS BY ELIG CLIN: HCPCS | Performed by: PSYCHIATRY & NEUROLOGY

## 2023-08-08 RX ORDER — ESCITALOPRAM OXALATE 20 MG/1
40 TABLET ORAL DAILY
Qty: 180 TABLET | Refills: 0 | Status: SHIPPED | OUTPATIENT
Start: 2023-08-08

## 2023-08-08 RX ORDER — TRAZODONE HYDROCHLORIDE 150 MG/1
150 TABLET ORAL NIGHTLY
Qty: 90 TABLET | Refills: 0 | Status: SHIPPED | OUTPATIENT
Start: 2023-08-08

## 2023-08-08 RX ORDER — DIAZEPAM 5 MG/1
5 TABLET ORAL DAILY PRN
Qty: 30 TABLET | Refills: 1 | Status: SHIPPED | OUTPATIENT
Start: 2023-08-08 | End: 2023-10-07

## 2023-08-08 NOTE — PROGRESS NOTES
[facility] PSYCHIATRY  [address]  [facility] [address]  [phone]    Progress Note    Patient:  Juan Coles YOB: 1990  PCP:  Kavon Dickson MD  Visit Date:  8/8/2023      Chief Complaint   Patient presents with    Follow-up    Medication Check    Anxiety    Panic Attack    Trauma    Insomnia       SUBJECTIVE:      Juan Coles, a 28 y. o. male, presents for a follow up visit. He presents with his wife and 2 young daughters. Doing well overall. Mood \"Alright. \"  Doing more on Wednesdays, \"family day\", went [place] the lake. Swam with kids. Went to the park. Has been busy working. Night terrors worsened a week ago, unclear trigger. Nightmares 3-4 times per night. More fatigue with worse sleep. Not using Valium much lately. Anxiety is under control. Denies much stress lately. Sleep was better with working initially. Dissociating noting he zones out. Still interested in NPT referral to r/o ADHD. Declines to make any med changes today. Med Trials:Chantix (nausea), trazodone, gabapentin, Ritalin, medical cannabis, Lamictal, Lexapro, Klonopin, Seroquel (no benefit, side effects), doxazosin, Risperdal (no benefit), Latuda (no benefit)    OBJECTIVE:  Vitals: There were no vitals taken for this visit. MENTAL STATUS EXAM:    GENERAL  Build: Normal    Hygiene:  Appropriate in casual dress, multiple tattoos and piercings   SENSORIUM Orientation: Place, Person, Time, & Situation     Consciousness: Alert    ATTENTION   Focused   RELATEDNESS  Cooperative    EYE CONTACT   Good    InPSYCHOMOTOR  Normal    SPEECH Volume: Normal     Rate: Normal rate and tone    Amplitude:  Within normal limits   MOOD  \"alright\"     AFFECT Range: Full, mood congruent    THOUGHT Process:  Goal-Directed     Content: no evidence of psychosis, doesn't appear to respond to internal stimuli   COGNITION Insight: Good

## 2023-09-15 ENCOUNTER — HOSPITAL ENCOUNTER (EMERGENCY)
Age: 33
Discharge: HOME | End: 2023-09-15
Payer: MEDICARE

## 2023-09-15 ENCOUNTER — FOLLOWUP TELEPHONE ENCOUNTER (OUTPATIENT)
Dept: FAMILY MEDICINE CLINIC | Age: 33
End: 2023-09-15

## 2023-09-15 VITALS
SYSTOLIC BLOOD PRESSURE: 123 MMHG | WEIGHT: 223 LBS | BODY MASS INDEX: 31.22 KG/M2 | RESPIRATION RATE: 16 BRPM | HEIGHT: 71 IN | HEART RATE: 54 BPM | OXYGEN SATURATION: 98 % | TEMPERATURE: 97.4 F | DIASTOLIC BLOOD PRESSURE: 77 MMHG

## 2023-09-15 DIAGNOSIS — J06.9 VIRAL URI WITH COUGH: Primary | ICD-10-CM

## 2023-09-15 PROCEDURE — 99213 OFFICE O/P EST LOW 20 MIN: CPT

## 2023-09-15 RX ORDER — FLUTICASONE PROPIONATE 50 MCG
1 SPRAY, SUSPENSION (ML) NASAL DAILY
Qty: 16 G | Refills: 0 | Status: SHIPPED | OUTPATIENT
Start: 2023-09-15

## 2023-09-15 RX ORDER — DEXTROMETHORPHAN HYDROBROMIDE AND PROMETHAZINE HYDROCHLORIDE 15; 6.25 MG/5ML; MG/5ML
5 SYRUP ORAL 4 TIMES DAILY PRN
Qty: 140 ML | Refills: 0 | Status: SHIPPED | OUTPATIENT
Start: 2023-09-15 | End: 2023-09-22

## 2023-09-15 ASSESSMENT — PAIN - FUNCTIONAL ASSESSMENT
PAIN_FUNCTIONAL_ASSESSMENT: PREVENTS OR INTERFERES SOME ACTIVE ACTIVITIES AND ADLS
PAIN_FUNCTIONAL_ASSESSMENT: 0-10

## 2023-09-15 ASSESSMENT — ENCOUNTER SYMPTOMS
SORE THROAT: 1
COUGH: 1

## 2023-09-15 ASSESSMENT — PAIN DESCRIPTION - LOCATION: LOCATION: THROAT

## 2023-09-15 ASSESSMENT — PAIN DESCRIPTION - DESCRIPTORS: DESCRIPTORS: DISCOMFORT

## 2023-09-15 ASSESSMENT — PAIN DESCRIPTION - PAIN TYPE: TYPE: ACUTE PAIN

## 2023-09-15 ASSESSMENT — PAIN SCALES - GENERAL: PAINLEVEL_OUTOF10: 5

## 2023-09-15 NOTE — DISCHARGE INSTRUCTIONS
Sinus rinses  Tylenol and motrin  Salt water gurgles  Promethazine DM for cough 4x a day  Flonase daily  Follow up with PCP

## 2023-09-15 NOTE — ED TRIAGE NOTES
Patient ambulated to room with complaint of nasal congestion that started a couple of days ago and then yesterday started cough and sore throat.

## 2023-09-21 ENCOUNTER — HOSPITAL ENCOUNTER (EMERGENCY)
Age: 33
Discharge: HOME | End: 2023-09-21
Payer: MEDICARE

## 2023-09-21 VITALS
SYSTOLIC BLOOD PRESSURE: 119 MMHG | WEIGHT: 219 LBS | HEART RATE: 72 BPM | OXYGEN SATURATION: 96 % | HEIGHT: 71 IN | TEMPERATURE: 98.7 F | RESPIRATION RATE: 16 BRPM | BODY MASS INDEX: 30.66 KG/M2 | DIASTOLIC BLOOD PRESSURE: 72 MMHG

## 2023-09-21 DIAGNOSIS — K29.00 ACUTE SUPERFICIAL GASTRITIS WITHOUT HEMORRHAGE: Primary | ICD-10-CM

## 2023-09-21 DIAGNOSIS — K21.9 GASTROESOPHAGEAL REFLUX DISEASE WITHOUT ESOPHAGITIS: ICD-10-CM

## 2023-09-21 PROCEDURE — 99213 OFFICE O/P EST LOW 20 MIN: CPT

## 2023-09-21 PROCEDURE — 99213 OFFICE O/P EST LOW 20 MIN: CPT | Performed by: NURSE PRACTITIONER

## 2023-09-21 RX ORDER — SUCRALFATE ORAL 1 G/10ML
1 SUSPENSION ORAL NIGHTLY
Qty: 70 ML | Refills: 0 | Status: SHIPPED | OUTPATIENT
Start: 2023-09-21 | End: 2023-09-28

## 2023-09-21 RX ORDER — ONDANSETRON 4 MG/1
4 TABLET, ORALLY DISINTEGRATING ORAL 3 TIMES DAILY PRN
Qty: 21 TABLET | Refills: 0 | Status: SHIPPED | OUTPATIENT
Start: 2023-09-21

## 2023-09-21 RX ORDER — KETOROLAC TROMETHAMINE 30 MG/ML
60 INJECTION, SOLUTION INTRAMUSCULAR; INTRAVENOUS ONCE
Status: DISCONTINUED | OUTPATIENT
Start: 2023-09-21 | End: 2023-09-21

## 2023-09-21 ASSESSMENT — ENCOUNTER SYMPTOMS
STRIDOR: 0
TENESMUS: 0
BLOATING: 0
ODYNOPHAGIA: 0
WHEEZING: 0
JAUNDICE: 0
DIARRHEA: 1
CHEST TIGHTNESS: 0
VOMITING: 1
ABDOMINAL PAIN: 1
RECTAL PAIN: 0
APNEA: 0
CONSTIPATION: 0
CHOKING: 0
NAUSEA: 1
COLOR CHANGE: 0
SHORTNESS OF BREATH: 0
BACK PAIN: 0
ANAL BLEEDING: 0
ABDOMINAL DISTENTION: 0
BLOOD IN STOOL: 0
COUGH: 0
HEMATEMESIS: 0
HEMATOCHEZIA: 0

## 2023-09-21 ASSESSMENT — PAIN - FUNCTIONAL ASSESSMENT: PAIN_FUNCTIONAL_ASSESSMENT: NONE - DENIES PAIN

## 2023-09-21 NOTE — ED TRIAGE NOTES
Pt to [facility] ambulatory with diarrhea, and vomiting x 1 today. Pt needs a work note. This started last Wednesday.

## 2023-09-21 NOTE — ED PROVIDER NOTES
[address]  Urgent Care Encounter      CHIEF COMPLAINT       Chief Complaint   Patient presents with    Diarrhea    Emesis     X 1 today       Nurses Notes reviewed and I agree except as noted in the HPI. HISTORY OFPRESENT ILLNESS   Buddy Harding is a 28 y.o. The history is provided by the patient. No  was used. The history is provided by the patient. No  was used. GI Problem  The primary symptoms include abdominal pain, nausea, vomiting, diarrhea and myalgias. Primary symptoms do not include fever, weight loss, fatigue, melena, hematemesis, jaundice, hematochezia, dysuria, arthralgias or rash. The illness began 6 to 7 days ago. The onset was gradual. The problem has not changed since onset. The abdominal pain began more than 2 days ago. The abdominal pain has been unchanged since its onset. The abdominal pain is located in the epigastric region. The abdominal pain does not radiate. The severity of the abdominal pain is 2/10. The abdominal pain is relieved by nothing. Nausea began 6 to 7 days ago. The nausea is exacerbated by food. The vomiting began more than 2 days ago. Vomiting occurred once. The emesis contains stomach contents. The diarrhea began 6 to 7 days ago. The diarrhea is watery. The diarrhea occurs once per day. Myalgias began 6 to 7 days ago. The myalgias have been unchanged since their onset. The myalgias are generalized. The discomfort from the myalgias is mild. The myalgias are not associated with weakness, tenderness or swelling. The illness is also significant for chills. The illness does not include anorexia, dysphagia, odynophagia, bloating, constipation, tenesmus, back pain or itching. Significant associated medical issues include GERD.  Associated medical issues do not include inflammatory bowel disease, gallstones, liver disease, alcohol abuse, PUD, gastric bypass, bowel resection, irritable bowel DIVERTICULITIS, INCARCERATED HERNIA, PANCREATITIS,  PERFORATED BOWEL or ULCER  thus I consider the discharge disposition reasonable. Also, there is no evidence or peritonitis, sepsis, or toxicity. The patient and/or family and I have discussed the diagnosis and risks, and we agree with discharging home to follow-up with their primary doctor. We also discussed returning to the Emergency Department immediately if new or worsening symptoms occur. We have discussed the symptoms which are most concerning (e.g., severe bleeding, fever, changing or worsening pain, vomiting) that necessitate immediate return.         REFERRED TO:  Panfilo Marrufo MD  [facility] [address]  [phone]    Schedule an appointment as soon as possible for a visit       DISCHARGE MEDICATIONS:  Discharge Medication List as of 9/21/2023  8:40 AM        START taking these medications    Details   sucralfate (CARAFATE) 1 GM/10ML suspension Take 10 mLs by mouth nightly for 7 days, Disp-70 mL, R-0Normal      ondansetron (ZOFRAN-ODT) 4 MG disintegrating tablet Take 1 tablet by mouth 3 times daily as needed for Nausea or Vomiting, Disp-21 tablet, R-0Normal           Discharge Medication List as of 9/21/2023  8:40 AM          [place] Righter, APRN - CNP         [place] Righter, APRN - CNP  09/21/23 [id number]

## 2023-10-02 ENCOUNTER — TELEPHONE (OUTPATIENT)
Dept: PSYCHIATRY | Age: 33
End: 2023-10-02

## 2023-10-02 NOTE — TELEPHONE ENCOUNTER
Mame Andersen wrote back into the office via  [employer]:    Yes please I would like to start lamictal back    Please advise.

## 2023-10-02 NOTE — TELEPHONE ENCOUNTER
He can resume the Vraylar 1.5 mg once daily. He should start the Lexapro back at 10 mg once daily (split 20 mg tab in half). He can resume the trazodone at current dose or cut tablet in half if full tablet is too much after being off it. The Lamictal will have to be restarted back at the 25 mg dose and we can titrate up to a higher dose every two weeks. Let me know if he wants to restart the Lamictal and I'll send the Rx for the lower dose.    Electronically signed by Leo Emery MD on 10/2/2023 at 10:59 AM

## 2023-10-03 ENCOUNTER — TELEMEDICINE (OUTPATIENT)
Dept: PSYCHIATRY | Age: 33
End: 2023-10-03
Payer: MEDICARE

## 2023-10-03 DIAGNOSIS — F43.10 PTSD (POST-TRAUMATIC STRESS DISORDER): Primary | ICD-10-CM

## 2023-10-03 DIAGNOSIS — F60.3 BORDERLINE PERSONALITY DISORDER (HCC): ICD-10-CM

## 2023-10-03 DIAGNOSIS — R41.840 ATTENTION DEFICIT: ICD-10-CM

## 2023-10-03 DIAGNOSIS — F41.0 PANIC ATTACKS: ICD-10-CM

## 2023-10-03 DIAGNOSIS — F41.1 GAD (GENERALIZED ANXIETY DISORDER): ICD-10-CM

## 2023-10-03 DIAGNOSIS — J30.2 SEASONAL ALLERGIES: ICD-10-CM

## 2023-10-03 DIAGNOSIS — F29 PSYCHOSIS, UNSPECIFIED PSYCHOSIS TYPE (HCC): ICD-10-CM

## 2023-10-03 PROCEDURE — G8427 DOCREV CUR MEDS BY ELIG CLIN: HCPCS | Performed by: PSYCHIATRY & NEUROLOGY

## 2023-10-03 PROCEDURE — 99214 OFFICE O/P EST MOD 30 MIN: CPT | Performed by: PSYCHIATRY & NEUROLOGY

## 2023-10-03 RX ORDER — LAMOTRIGINE 25 MG/1
TABLET ORAL
Qty: 60 TABLET | Refills: 1 | Status: SHIPPED | OUTPATIENT
Start: 2023-10-03

## 2023-10-03 RX ORDER — TRAZODONE HYDROCHLORIDE 150 MG/1
150 TABLET ORAL NIGHTLY
Qty: 90 TABLET | Refills: 0 | Status: SHIPPED | OUTPATIENT
Start: 2023-10-03

## 2023-10-03 NOTE — PROGRESS NOTES
conducted with patient's (and/or legal guardian's) consent. Patient identification was verified, and a caregiver was present when appropriate. The patient was located at Home: [facility] [place] [address]  Provider was located at Home ([address] [place] [address]): [place]         Total time spent for this encounter: Not billed by time    --Jesus Farnsworth MD on 10/3/2023 at 4:28 PM    An electronic signature was used to authenticate this note.

## 2023-10-03 NOTE — TELEPHONE ENCOUNTER
Lamictal Rx has been sent. He will take 25 mg once daily for 2 weeks. Then increase to 50 mg once daily. We can increase dose every 2 weeks if needed going forward until he reaches an effective dose.   Electronically signed by Henry Guardado MD on 10/3/2023 at 7:55 AM

## 2023-10-04 RX ORDER — MONTELUKAST SODIUM 10 MG/1
10 TABLET ORAL DAILY
Qty: 30 TABLET | Refills: 3 | Status: SHIPPED | OUTPATIENT
Start: 2023-10-04

## 2023-10-04 RX ORDER — LORATADINE 10 MG/1
10 TABLET ORAL DAILY
Qty: 30 TABLET | Refills: 3 | Status: SHIPPED | OUTPATIENT
Start: 2023-10-04

## 2023-10-04 NOTE — TELEPHONE ENCOUNTER
Patient's last appointment was : 9/15/2023 with our   Patient's next appointment is : Visit date not found with our Dr. Carlos Eduardo Vaughn does the script need sent to: [address] [facility] [facility] [address]       No results found for: \"LABA1C\"  Lab Results   Component Value Date    CHOL 211 (H) 08/04/2020    TRIG 283 (H) 08/04/2020    HDL 29 08/04/2020    LDLCALC 125 08/04/2020     Lab Results   Component Value Date     10/22/2021    K 4.0 10/22/2021     10/22/2021    CO2 26 10/22/2021    BUN 10 10/22/2021    CREATININE 0.8 10/22/2021    GLUCOSE 124 (H) 10/22/2021    CALCIUM 9.1 10/22/2021    PROT 6.6 08/04/2020    LABALBU 4.3 08/04/2020    BILITOT 0.4 08/04/2020    ALKPHOS 45 08/04/2020    AST 18 08/04/2020    ALT 17 08/04/2020    LABGLOM >90 10/22/2021    GFRAA >60 10/19/2021     Lab Results   Component Value Date    TSH 1.880 08/04/2020     Lab Results   Component Value Date    WBC 14.3 (H) 11/03/2021    HGB 13.5 11/03/2021    HCT 41.0 11/03/2021    MCV 92.1 11/03/2021     11/03/2021

## 2023-11-14 ENCOUNTER — TELEMEDICINE (OUTPATIENT)
Dept: PSYCHIATRY | Age: 33
End: 2023-11-14
Payer: MEDICARE

## 2023-11-14 DIAGNOSIS — F43.10 PTSD (POST-TRAUMATIC STRESS DISORDER): Primary | ICD-10-CM

## 2023-11-14 DIAGNOSIS — R41.840 ATTENTION DEFICIT: ICD-10-CM

## 2023-11-14 DIAGNOSIS — F60.3 BORDERLINE PERSONALITY DISORDER (HCC): ICD-10-CM

## 2023-11-14 DIAGNOSIS — F29 PSYCHOSIS, UNSPECIFIED PSYCHOSIS TYPE (HCC): ICD-10-CM

## 2023-11-14 DIAGNOSIS — F41.1 GAD (GENERALIZED ANXIETY DISORDER): ICD-10-CM

## 2023-11-14 DIAGNOSIS — F41.0 PANIC ATTACKS: ICD-10-CM

## 2023-11-14 PROCEDURE — G8427 DOCREV CUR MEDS BY ELIG CLIN: HCPCS | Performed by: PSYCHIATRY & NEUROLOGY

## 2023-11-14 PROCEDURE — 99214 OFFICE O/P EST MOD 30 MIN: CPT | Performed by: PSYCHIATRY & NEUROLOGY

## 2023-11-14 RX ORDER — ESCITALOPRAM OXALATE 20 MG/1
40 TABLET ORAL DAILY
Qty: 180 TABLET | Refills: 0 | Status: SHIPPED | OUTPATIENT
Start: 2023-11-14

## 2023-11-14 RX ORDER — TRAZODONE HYDROCHLORIDE 150 MG/1
150 TABLET ORAL NIGHTLY
Qty: 90 TABLET | Refills: 0 | Status: SHIPPED | OUTPATIENT
Start: 2023-11-14

## 2023-11-14 RX ORDER — LAMOTRIGINE 100 MG/1
100 TABLET ORAL DAILY
Qty: 90 TABLET | Refills: 0 | Status: SHIPPED | OUTPATIENT
Start: 2023-11-14

## 2023-11-14 NOTE — PROGRESS NOTES
[address] [facility] PSYCHIATRY  [facility] [facility] [address]  [place] [id number] [phone]    Progress Note    Patient:  Jorge Alberto Olivera. YOB: 1990  PCP:  Sheryl Poe MD  Visit Date:  11/14/2023      Chief Complaint   Patient presents with    Follow-up    Medication Check       SUBJECTIVE:      Jorge Alberto Lawson, a 28 y. o. male, presents for a follow up visit. He presents alone. More anhedonia. Lost motivation and interest.   Mood is more depressed. Notes his dad's anniversary is approaching. With restarting meds feeling \"a lot better than I was\" noting the voices were \"getting out of hand\". Still hears voices every day. Distressing. All day long. Listens to music at work and uses that to drown it out. Some commands to hurt himself or \"random\" other people. Notes the thoughts are manageable. Denies any intent or plan to hurt himself or anyone else. Notes he titrated Lexapro back up to 40 mg dose. His wife is pregnant. Will be their 3rd child together, his 7th. Their youngest is 10 mos old. 15 yo son doesn't come around much noting negative things his mom has said to him. More frequent dissociations. \"Zone out completely\" could happen midsentence talking to his wife. Will sit on couch and pay attention to nothing. Be less responsive. Hasn't had a chance to call Dr. Ha Christine' office to schedule NPT. I provided the phone number again. Will get results from recent EGD later this week. Ongoing GI issues but no longer vomiting. Discussed tx options and we agree to increase Lamictal for mood. Med Trials:Chantix (nausea), trazodone, gabapentin, Ritalin, medical cannabis, Lamictal, Lexapro, Klonopin, Seroquel (no benefit, side effects), doxazosin, Risperdal (no benefit), Latuda (no benefit)    OBJECTIVE:  Vitals: There were no vitals taken for this visit.     MENTAL STATUS EXAM:    GENERAL  Build: Normal

## 2023-11-27 DIAGNOSIS — G25.81 RESTLESS LEG: ICD-10-CM

## 2023-11-27 RX ORDER — ESCITALOPRAM OXALATE 20 MG/1
40 TABLET ORAL DAILY
Qty: 60 TABLET | Refills: 1 | Status: SHIPPED | OUTPATIENT
Start: 2023-11-27

## 2023-11-27 RX ORDER — LAMOTRIGINE 100 MG/1
100 TABLET ORAL DAILY
Qty: 30 TABLET | Refills: 1 | Status: SHIPPED | OUTPATIENT
Start: 2023-11-27

## 2023-11-27 RX ORDER — ROPINIROLE 1 MG/1
1 TABLET, FILM COATED ORAL NIGHTLY
Qty: 90 TABLET | Refills: 3 | Status: SHIPPED | OUTPATIENT
Start: 2023-11-27

## 2023-11-27 NOTE — TELEPHONE ENCOUNTER
I'm not aware of who is Rx him propranolol. Rx for Lamictal and Lexapro sent.   Electronically signed by Lindsey Han MD on 11/27/2023 at 8:30 AM

## 2023-11-27 NOTE — TELEPHONE ENCOUNTER
Patient's last appointment was : 7/13/2023 with our   Patient's next appointment is : Visit date not found with our Dr. Edgar Mccartney refilled on: 05/16/2023  Which pharmacy does the script need sent to: [facility]      No results found for: \"LABA1C\"  Lab Results   Component Value Date    CHOL 211 (H) 08/04/2020    TRIG 283 (H) 08/04/2020    HDL 29 08/04/2020    LDLCALC 125 08/04/2020     Lab Results   Component Value Date     10/22/2021    K 4.0 10/22/2021     10/22/2021    CO2 26 10/22/2021    BUN 10 10/22/2021    CREATININE 0.8 10/22/2021    GLUCOSE 124 (H) 10/22/2021    CALCIUM 9.1 10/22/2021    PROT 6.6 08/04/2020    LABALBU 4.3 08/04/2020    BILITOT 0.4 08/04/2020    ALKPHOS 45 08/04/2020    AST 18 08/04/2020    ALT 17 08/04/2020    LABGLOM >90 10/22/2021    GFRAA >60 10/19/2021     Lab Results   Component Value Date    TSH 1.880 08/04/2020     Lab Results   Component Value Date    WBC 14.3 (H) 11/03/2021    HGB 13.5 11/03/2021    HCT 41.0 11/03/2021    MCV 92.1 11/03/2021     11/03/2021

## 2023-11-27 NOTE — TELEPHONE ENCOUNTER
Sabrina Esparza wrote into the office via [employer]:      Alejandra     I picked up my meds from [facility] on Monday and when I got home my dog was attacked by another dog in the midst of all this my pills got thrown away or misplaced it was my propanol, lamictal and lexapro is there a possible way to have them refilled or do I just gotta wait 3 months til I can get another refill. Per chart all medications were sent in last on 11/14/23 except the Requip was back from 05/16/23 for a 90 day supply with 3 refills. *I have loaded the Lexapro and Lamictal (30 days only) but the Requip should have 1 remaining refill with a note stating that fill early due to being misplaced. Please advise otherwise? He is scheduled to return on 01/11/24; last seen on 11/14/23.  *Please advise on Propranolol (do not see where he is prescribed this)

## 2023-11-27 NOTE — TELEPHONE ENCOUNTER
Patient was notified of this information via [employer].    Along with to contact the office if he had any issues picking up these medications

## 2023-11-27 NOTE — TELEPHONE ENCOUNTER
Medication refilled, please let us know if you need anything  Please call and have patient schedule an appointment to follow up.

## 2023-11-28 ENCOUNTER — HOSPITAL ENCOUNTER (OUTPATIENT)
Dept: ULTRASOUND IMAGING | Age: 33
Discharge: HOME | End: 2023-11-28
Payer: MEDICARE

## 2023-11-28 DIAGNOSIS — R10.10 UPPER ABDOMINAL PAIN: ICD-10-CM

## 2023-11-28 DIAGNOSIS — Z98.890 S/P ENDOSCOPY: ICD-10-CM

## 2023-11-28 DIAGNOSIS — K29.90 GASTRITIS AND DUODENITIS: ICD-10-CM

## 2023-11-28 DIAGNOSIS — K21.9 GASTROESOPHAGEAL REFLUX DISEASE WITHOUT ESOPHAGITIS: ICD-10-CM

## 2023-11-28 DIAGNOSIS — K64.1 GRADE II INTERNAL HEMORRHOIDS: ICD-10-CM

## 2023-11-28 DIAGNOSIS — R19.7 DIARRHEA, UNSPECIFIED TYPE: ICD-10-CM

## 2023-11-28 DIAGNOSIS — Z98.890 S/P COLONOSCOPY: ICD-10-CM

## 2023-11-28 PROCEDURE — 76705 ECHO EXAM OF ABDOMEN: CPT

## 2023-12-10 DIAGNOSIS — K21.9 GASTROESOPHAGEAL REFLUX DISEASE WITHOUT ESOPHAGITIS: ICD-10-CM

## 2023-12-11 ENCOUNTER — TELEPHONE (OUTPATIENT)
Dept: PSYCHIATRY | Age: 33
End: 2023-12-11

## 2023-12-11 RX ORDER — LAMOTRIGINE 200 MG/1
200 TABLET ORAL DAILY
Qty: 30 TABLET | Refills: 1 | Status: SHIPPED | OUTPATIENT
Start: 2023-12-11

## 2023-12-11 RX ORDER — PANTOPRAZOLE SODIUM 40 MG/1
40 TABLET, DELAYED RELEASE ORAL 2 TIMES DAILY WITH MEALS
Qty: 180 TABLET | Refills: 1 | Status: SHIPPED | OUTPATIENT
Start: 2023-12-11 | End: 2024-06-08

## 2023-12-11 NOTE — TELEPHONE ENCOUNTER
Patient's last appointment was : 07/13/2023 with our   Patient's next appointment is : Visit date not found  Last refilled on: 06/05/2023  Which pharmacy does the script need sent to: [facility].       No results found for: \"LABA1C\"  Lab Results   Component Value Date    CHOL 211 (H) 08/04/2020    TRIG 283 (H) 08/04/2020    HDL 29 08/04/2020    LDLCALC 125 08/04/2020     Lab Results   Component Value Date     10/22/2021    K 4.0 10/22/2021     10/22/2021    CO2 26 10/22/2021    BUN 10 10/22/2021    CREATININE 0.8 10/22/2021    GLUCOSE 124 (H) 10/22/2021    CALCIUM 9.1 10/22/2021    PROT 6.6 08/04/2020    LABALBU 4.3 08/04/2020    BILITOT 0.4 08/04/2020    ALKPHOS 45 08/04/2020    AST 18 08/04/2020    ALT 17 08/04/2020    LABGLOM >90 10/22/2021    GFRAA >60 10/19/2021     Lab Results   Component Value Date    TSH 1.880 08/04/2020     Lab Results   Component Value Date    WBC 14.3 (H) 11/03/2021    HGB 13.5 11/03/2021    HCT 41.0 11/03/2021    MCV 92.1 11/03/2021     11/03/2021

## 2023-12-11 NOTE — TELEPHONE ENCOUNTER
Antonio Carranza wrote into the office via [employer]:      Is it too soon to go back to my regular dose of 200 mg lamictal a day     Per chart; he was seen on 11/14/23 where he was increased on his Lamictal from 50mg to 100mg. He is scheduled to return on 01/11/24.

## 2023-12-11 NOTE — TELEPHONE ENCOUNTER
Okay to increase Lamictal.   Rx sent for Lamictal 200 mg once daily.    Electronically signed by Liliya Crocker MD on 12/11/2023 at 11:35 AM

## 2024-01-11 ENCOUNTER — TELEMEDICINE (OUTPATIENT)
Dept: PSYCHIATRY | Age: 34
End: 2024-01-11
Payer: MEDICARE

## 2024-01-11 DIAGNOSIS — F29 PSYCHOSIS, UNSPECIFIED PSYCHOSIS TYPE (HCC): ICD-10-CM

## 2024-01-11 DIAGNOSIS — F60.3 BORDERLINE PERSONALITY DISORDER (HCC): ICD-10-CM

## 2024-01-11 DIAGNOSIS — F41.1 GAD (GENERALIZED ANXIETY DISORDER): ICD-10-CM

## 2024-01-11 DIAGNOSIS — R41.840 ATTENTION DEFICIT: ICD-10-CM

## 2024-01-11 DIAGNOSIS — F43.10 PTSD (POST-TRAUMATIC STRESS DISORDER): Primary | ICD-10-CM

## 2024-01-11 DIAGNOSIS — F41.0 PANIC ATTACKS: ICD-10-CM

## 2024-01-11 PROCEDURE — G8427 DOCREV CUR MEDS BY ELIG CLIN: HCPCS | Performed by: PSYCHIATRY & NEUROLOGY

## 2024-01-11 PROCEDURE — 99214 OFFICE O/P EST MOD 30 MIN: CPT | Performed by: PSYCHIATRY & NEUROLOGY

## 2024-01-11 RX ORDER — TRAZODONE HYDROCHLORIDE 150 MG/1
150 TABLET ORAL NIGHTLY
Qty: 90 TABLET | Refills: 0 | Status: SHIPPED | OUTPATIENT
Start: 2024-01-11

## 2024-01-11 RX ORDER — LAMOTRIGINE 200 MG/1
200 TABLET ORAL DAILY
Qty: 90 TABLET | Refills: 0 | Status: SHIPPED | OUTPATIENT
Start: 2024-01-11

## 2024-01-11 RX ORDER — ESCITALOPRAM OXALATE 20 MG/1
40 TABLET ORAL DAILY
Qty: 180 TABLET | Refills: 0 | Status: SHIPPED | OUTPATIENT
Start: 2024-01-11

## 2024-01-11 RX ORDER — DIAZEPAM 10 MG/1
10 TABLET ORAL DAILY PRN
Qty: 90 TABLET | Refills: 0 | Status: SHIPPED | OUTPATIENT
Start: 2024-01-11 | End: 2024-04-10

## 2024-01-11 NOTE — PROGRESS NOTES
Normal rate and at times somewhat down tone    Amplitude: Within normal limits   MOOD  \"A little better\"     AFFECT Range: limited , mood congruent, social smile present   THOUGHT Process:  Goal-Directed     Content: hallucinations:  auditory:  see above , doesn't appear to respond to internal stimuli   COGNITION Insight: Good    Judgement:  Intact    MEMORY  did not test, no gross deficits     INTELLIGENCE  Average     Mobility/Gait: Independently     Controlled SubstancesMonitoring: Periodic Controlled Substance Monitoring: Possible medication side effects, risk of tolerance/dependence & alternative treatments discussed., No signs of potential drug abuse or diversion identified. (Nanci Beckford MD)       ASSESSMENT: Will increase Vraylar for mood and Valium prn panic. NPT pending. Will monitor for SI, psychosis, dissociations.     Lamictal, Lexapro, and Vraylar all with mood benefit.   Diagnosis Orders   1. PTSD (post-traumatic stress disorder)        2. Panic attacks  diazePAM (VALIUM) 10 MG tablet      3. DANTE (generalized anxiety disorder)        4. Borderline personality disorder (HCC)        5. Psychosis, unspecified psychosis type (HCC)        6. Attention deficit            PLAN:     Medications:   Lexapro 40 QD - he's aware dose is beyond FDA max, feels benefit outweighs risk  Lamictal 200 mg QD  Trazodone 150 mg QHS  Increase Vraylar 1.5 mg QD to 3 mg QD  Increase Valium 5 mg to 10 mg daily prn panic/anxiety  Therapy: none, last saw Dr. Canales in May '23, message sent to inquire about getting back on his schedule  Labs/Tests/Imaging: NPT referral pending to Dr. Caicedo ([phone])  Records Reviewed: [facility]  Patient advised to call if patient has any difficulties with treatment  Return in about 4 weeks (around 2/8/2024) for follow up, med check.      Barrie Chavez Jr., was evaluated through a synchronous (real-time) audio-video encounter. The patient (or guardian if applicable) is aware that this

## 2024-01-22 ENCOUNTER — HOSPITAL ENCOUNTER (EMERGENCY)
Age: 34
Discharge: HOME | End: 2024-01-22
Payer: MEDICARE

## 2024-01-22 VITALS
WEIGHT: 220 LBS | TEMPERATURE: 98.2 F | OXYGEN SATURATION: 96 % | RESPIRATION RATE: 16 BRPM | DIASTOLIC BLOOD PRESSURE: 70 MMHG | SYSTOLIC BLOOD PRESSURE: 123 MMHG | HEART RATE: 71 BPM | BODY MASS INDEX: 30.68 KG/M2

## 2024-01-22 DIAGNOSIS — K52.9 GASTROENTERITIS: Primary | ICD-10-CM

## 2024-01-22 PROCEDURE — 99213 OFFICE O/P EST LOW 20 MIN: CPT

## 2024-01-22 PROCEDURE — 99213 OFFICE O/P EST LOW 20 MIN: CPT | Performed by: NURSE PRACTITIONER

## 2024-01-22 ASSESSMENT — ENCOUNTER SYMPTOMS
NAUSEA: 0
VOMITING: 0
WHEEZING: 0
DIARRHEA: 1
COUGH: 0
ABDOMINAL PAIN: 1
SHORTNESS OF BREATH: 0

## 2024-01-22 ASSESSMENT — PAIN DESCRIPTION - FREQUENCY: FREQUENCY: INTERMITTENT

## 2024-01-22 ASSESSMENT — PAIN DESCRIPTION - LOCATION: LOCATION: ABDOMEN

## 2024-01-22 ASSESSMENT — PAIN DESCRIPTION - DESCRIPTORS: DESCRIPTORS: ACHING;CRAMPING

## 2024-01-22 ASSESSMENT — PAIN DESCRIPTION - ONSET: ONSET: PROGRESSIVE

## 2024-01-22 ASSESSMENT — PAIN SCALES - GENERAL: PAINLEVEL_OUTOF10: 4

## 2024-01-22 ASSESSMENT — PAIN DESCRIPTION - PAIN TYPE: TYPE: ACUTE PAIN

## 2024-01-22 NOTE — ED PROVIDER NOTES
[facility] URGENT CARE  UrgentCare Encounter      CHIEFCOMPLAINT       Chief Complaint   Patient presents with    Abdominal Pain    Diarrhea    Letter for School/Work       Nurses Notes reviewed and I agree except as noted in the HPI.  HISTORY OF PRESENT ILLNESS   Barrie Chavez Jr. is a 33 y.o. male who presents to the urgent care for evaluation.     He complains of abdominal cramping and diarrhea   This has been a chronic issue. Follow with GI and PCP for this.   Just was not feeling well this morning and is going to be late to work today, now feeling better.       The patient/patient representative has no other acute complaints at this time.    REVIEW OF SYSTEMS     Review of Systems   Constitutional:  Negative for chills and fever.   Respiratory:  Negative for cough, shortness of breath and wheezing.    Cardiovascular:  Negative for chest pain and palpitations.   Gastrointestinal:  Positive for abdominal pain and diarrhea. Negative for nausea and vomiting.   Skin:  Negative for rash.   Allergic/Immunologic: Negative for environmental allergies and food allergies.   Neurological:  Negative for headaches.       PAST MEDICAL HISTORY         Diagnosis Date    Asthma     Bipolar 1 disorder ([facility])     Nalini Bennett    Borderline personality disorder ([facility])     COPD (chronic obstructive pulmonary disease) ([facility])     early stages    Depression     Dissociative identity disorder ([facility])     10/18/21    GERD (gastroesophageal reflux disease)     History of cardiac murmur in childhood     Nomd-Glivo-Ixgawir disease, left     Lumbar disc disease     Multiple allergies     PTSD (post-traumatic stress disorder)     Restless legs syndrome     Teeth missing     Under care of team 05/18/2021    psych-vidal ethan-lima-last visit april 2021    Wellness examination 05/18/2021    pcp-Celia Baez-last visit april 2021       SURGICAL HISTORY     Patient  has a past surgical history that includes Total hip

## 2024-01-22 NOTE — ED TRIAGE NOTES
Pt to room 3 with his daughter. He reports that he has been having problems with abdominal pain and diarrhea for approx 2 months and is seeing a GI doctor for it and has had a colonoscopy and EGD, he reports he has a hida scan scheduled but is having increased diarrhea and abdominal cramping and needs a worknote because he had to call in sick today.

## 2024-01-28 DIAGNOSIS — J30.2 SEASONAL ALLERGIES: ICD-10-CM

## 2024-01-29 RX ORDER — MONTELUKAST SODIUM 10 MG/1
10 TABLET ORAL DAILY
Qty: 30 TABLET | Refills: 3 | OUTPATIENT
Start: 2024-01-29

## 2024-01-29 NOTE — TELEPHONE ENCOUNTER
This medication refill is regarding a MyChart request. Refill requested by patient.    Requested Prescriptions     Pending Prescriptions Disp Refills    montelukast (SINGULAIR) 10 MG tablet 30 tablet 3     Sig: Take 1 tablet by mouth daily       Date of last visit: 9/15/2023   Date of next visit: None  Date of last refill: 12/18/2023  30/3    Last Lipid Panel:    Lab Results   Component Value Date/Time    CHOL 211 08/04/2020 09:59 AM    TRIG 283 08/04/2020 09:59 AM    HDL 29 08/04/2020 09:59 AM    LDLCALC 125 08/04/2020 09:59 AM     Last CMP:   Lab Results   Component Value Date     10/22/2021    K 4.0 10/22/2021     10/22/2021    CO2 26 10/22/2021    BUN 10 10/22/2021    CREATININE 0.8 10/22/2021    GLUCOSE 124 (H) 10/22/2021    CALCIUM 9.1 10/22/2021    PROT 6.6 08/04/2020    LABALBU 4.3 08/04/2020    BILITOT 0.4 08/04/2020    ALKPHOS 45 08/04/2020    AST 18 08/04/2020    ALT 17 08/04/2020    LABGLOM >90 10/22/2021    GFRAA >60 10/19/2021       Last Thyroid:    Lab Results   Component Value Date    TSH 1.880 08/04/2020     Rx verified, ordered and set to EP.

## 2024-01-30 ENCOUNTER — HOSPITAL ENCOUNTER (EMERGENCY)
Age: 34
Discharge: HOME | End: 2024-01-30
Payer: MEDICARE

## 2024-01-30 VITALS
DIASTOLIC BLOOD PRESSURE: 91 MMHG | TEMPERATURE: 98.1 F | WEIGHT: 226 LBS | HEART RATE: 97 BPM | OXYGEN SATURATION: 99 % | SYSTOLIC BLOOD PRESSURE: 127 MMHG | BODY MASS INDEX: 31.52 KG/M2 | RESPIRATION RATE: 16 BRPM

## 2024-01-30 DIAGNOSIS — R11.2 NAUSEA VOMITING AND DIARRHEA: Primary | ICD-10-CM

## 2024-01-30 DIAGNOSIS — R19.7 NAUSEA VOMITING AND DIARRHEA: Primary | ICD-10-CM

## 2024-01-30 LAB
ALBUMIN SERPL BCG-MCNC: 4.6 G/DL (ref 3.5–5.1)
ALP SERPL-CCNC: 50 U/L (ref 38–126)
ALT SERPL W/O P-5'-P-CCNC: 15 U/L (ref 11–66)
ANION GAP SERPL CALC-SCNC: 14 MEQ/L (ref 8–16)
AST SERPL-CCNC: 17 U/L (ref 5–40)
BASOPHILS ABSOLUTE: 0.1 THOU/MM3 (ref 0–0.1)
BASOPHILS NFR BLD AUTO: 0.6 %
BILIRUB CONJ SERPL-MCNC: < 0.2 MG/DL (ref 0–0.3)
BILIRUB SERPL-MCNC: 0.4 MG/DL (ref 0.3–1.2)
BUN SERPL-MCNC: 10 MG/DL (ref 7–22)
CALCIUM SERPL-MCNC: 9.6 MG/DL (ref 8.5–10.5)
CHLORIDE SERPL-SCNC: 101 MEQ/L (ref 98–111)
CO2 SERPL-SCNC: 23 MEQ/L (ref 23–33)
CREAT SERPL-MCNC: 0.8 MG/DL (ref 0.4–1.2)
DEPRECATED RDW RBC AUTO: 42.5 FL (ref 35–45)
EOSINOPHIL NFR BLD AUTO: 1.5 %
EOSINOPHILS ABSOLUTE: 0.1 THOU/MM3 (ref 0–0.4)
ERYTHROCYTE [DISTWIDTH] IN BLOOD BY AUTOMATED COUNT: 13 % (ref 11.5–14.5)
GFR SERPL CREATININE-BSD FRML MDRD: > 60 ML/MIN/1.73M2
GLUCOSE SERPL-MCNC: 136 MG/DL (ref 70–108)
HCT VFR BLD AUTO: 48.9 % (ref 42–52)
HGB BLD-MCNC: 16.2 GM/DL (ref 14–18)
IMM GRANULOCYTES # BLD AUTO: 0.04 THOU/MM3 (ref 0–0.07)
IMM GRANULOCYTES NFR BLD AUTO: 0.4 %
LIPASE SERPL-CCNC: 25.4 U/L (ref 5.6–51.3)
LYMPHOCYTES ABSOLUTE: 2.7 THOU/MM3 (ref 1–4.8)
LYMPHOCYTES NFR BLD AUTO: 30.4 %
MAGNESIUM SERPL-MCNC: 2 MG/DL (ref 1.6–2.4)
MCH RBC QN AUTO: 30 PG (ref 26–33)
MCHC RBC AUTO-ENTMCNC: 33.1 GM/DL (ref 32.2–35.5)
MCV RBC AUTO: 90.6 FL (ref 80–94)
MONOCYTES ABSOLUTE: 0.6 THOU/MM3 (ref 0.4–1.3)
MONOCYTES NFR BLD AUTO: 6.4 %
NEUTROPHILS NFR BLD AUTO: 60.7 %
NRBC BLD AUTO-RTO: 0 /100 WBC
OSMOLALITY SERPL CALC.SUM OF ELEC: 276.8 MOSMOL/KG (ref 275–300)
PLATELET # BLD AUTO: 232 THOU/MM3 (ref 130–400)
PMV BLD AUTO: 10.5 FL (ref 9.4–12.4)
POTASSIUM SERPL-SCNC: 4.3 MEQ/L (ref 3.5–5.2)
PROT SERPL-MCNC: 7.3 G/DL (ref 6.1–8)
RBC # BLD AUTO: 5.4 MILL/MM3 (ref 4.7–6.1)
SEGMENTED NEUTROPHILS ABSOLUTE COUNT: 5.4 THOU/MM3 (ref 1.8–7.7)
SODIUM SERPL-SCNC: 138 MEQ/L (ref 135–145)
WBC # BLD AUTO: 8.9 THOU/MM3 (ref 4.8–10.8)

## 2024-01-30 PROCEDURE — 99284 EMERGENCY DEPT VISIT MOD MDM: CPT

## 2024-01-30 PROCEDURE — 96374 THER/PROPH/DIAG INJ IV PUSH: CPT

## 2024-01-30 PROCEDURE — 2500000003 HC RX 250 WO HCPCS: Performed by: PHYSICIAN ASSISTANT

## 2024-01-30 PROCEDURE — 83735 ASSAY OF MAGNESIUM: CPT

## 2024-01-30 PROCEDURE — 82248 BILIRUBIN DIRECT: CPT

## 2024-01-30 PROCEDURE — 36415 COLL VENOUS BLD VENIPUNCTURE: CPT

## 2024-01-30 PROCEDURE — 83690 ASSAY OF LIPASE: CPT

## 2024-01-30 PROCEDURE — 80053 COMPREHEN METABOLIC PANEL: CPT

## 2024-01-30 PROCEDURE — 85025 COMPLETE CBC W/AUTO DIFF WBC: CPT

## 2024-01-30 PROCEDURE — 6360000002 HC RX W HCPCS: Performed by: PHYSICIAN ASSISTANT

## 2024-01-30 PROCEDURE — 2580000003 HC RX 258: Performed by: PHYSICIAN ASSISTANT

## 2024-01-30 PROCEDURE — 96375 TX/PRO/DX INJ NEW DRUG ADDON: CPT

## 2024-01-30 PROCEDURE — A4216 STERILE WATER/SALINE, 10 ML: HCPCS | Performed by: PHYSICIAN ASSISTANT

## 2024-01-30 RX ORDER — ONDANSETRON 2 MG/ML
4 INJECTION INTRAMUSCULAR; INTRAVENOUS ONCE
Status: COMPLETED | OUTPATIENT
Start: 2024-01-30 | End: 2024-01-30

## 2024-01-30 RX ORDER — ONDANSETRON 4 MG/1
4 TABLET, ORALLY DISINTEGRATING ORAL 3 TIMES DAILY PRN
Qty: 21 TABLET | Refills: 0 | Status: SHIPPED | OUTPATIENT
Start: 2024-01-30

## 2024-01-30 RX ORDER — 0.9 % SODIUM CHLORIDE 0.9 %
1000 INTRAVENOUS SOLUTION INTRAVENOUS ONCE
Status: COMPLETED | OUTPATIENT
Start: 2024-01-30 | End: 2024-01-30

## 2024-01-30 RX ADMIN — SODIUM CHLORIDE 1000 ML: 9 INJECTION, SOLUTION INTRAVENOUS at 08:29

## 2024-01-30 RX ADMIN — FAMOTIDINE 20 MG: 10 INJECTION, SOLUTION INTRAVENOUS at 08:24

## 2024-01-30 RX ADMIN — ONDANSETRON 4 MG: 2 INJECTION INTRAMUSCULAR; INTRAVENOUS at 08:23

## 2024-01-30 ASSESSMENT — PAIN - FUNCTIONAL ASSESSMENT: PAIN_FUNCTIONAL_ASSESSMENT: 0-10

## 2024-01-30 NOTE — ED TRIAGE NOTES
Pt to ED with diarrhea for three months and vomiting this morning. Pt states that he was coughing this morning and it made his stomach hurt and then he threw up. Pt states that he has been seeing someone with gastro for the diarrhea. Pt states he has 3/10 pain in the LRQ.

## 2024-01-30 NOTE — ED PROVIDER NOTES
specimens: no    Risk of Complications, Morbidity, and/or Mortality  Presenting problems: moderate  Diagnostic procedures: moderate  Management options: moderate    Patient Progress  Patient progress: stable    /     Vitals Reviewed:    Vitals:    01/30/24 0752   BP: (!) 127/91   Pulse: 97   Resp: 16   Temp: 98.1 °F (36.7 °C)   SpO2: 99%   Weight: 102.5 kg (226 lb)       The patient was seen and examined. Appropriate diagnostic testing was performed and results reviewed with the patient.      The results of pertinent diagnostic studies and exam findings were discussed.     ED Medications administered this visit:  (None if blank)  Medications   sodium chloride 0.9 % bolus 1,000 mL (0 mLs IntraVENous Stopped 1/30/24 0930)   famotidine (PEPCID) 20 mg in sodium chloride (PF) 0.9 % 10 mL injection (20 mg IntraVENous Given 1/30/24 0824)   ondansetron (ZOFRAN) injection 4 mg (4 mg IntraVENous Given 1/30/24 0823)         PROCEDURES: (None if blank)      CRITICAL CARE: (None if blank)      DISCHARGE PRESCRIPTIONS: (None if blank)  Discharge Medication List as of 1/30/2024  9:22 AM        START taking these medications    Details   !! ondansetron (ZOFRAN-ODT) 4 MG disintegrating tablet Take 1 tablet by mouth 3 times daily as needed for Nausea or Vomiting, Disp-21 tablet, R-0Normal       !! - Potential duplicate medications found. Please discuss with provider.          FINAL IMPRESSION      1. Nausea vomiting and diarrhea          DISPOSITION/PLAN   DISPOSITION Decision To Discharge 01/30/2024 09:21:34 AM      OUTPATIENT FOLLOW UP THE PATIENT:  Torsten Díaz MD  [address] [place] [id number]  [phone]    In 1 week        TRACY Georges Jeremy R, PA  01/30/24 8442

## 2024-02-02 ENCOUNTER — TELEPHONE (OUTPATIENT)
Dept: FAMILY MEDICINE CLINIC | Age: 34
End: 2024-02-02

## 2024-02-02 NOTE — TELEPHONE ENCOUNTER
Patient was seen at ED on 1/30/2024 for stomach ache. Patient was experiencing nausea, vomiting, diarrhea. Patient states that he has been experiencing GI issues for the past 4-5 months. Patient is scheduled to have a HIDA scan on 2/12/2024. Patient states that he has turned in FMLA forms to be filled out by [employer]cret[employer] PATEL but has not received those back. Patient states that he needs a note for work for 2/2/2024. Patient states that this morning he woke up he was experiencing a bad stomach cramp and diarrhea. Patient states that he was able to return to work on 1/31/2024 and 2/1/2024. Please advise. No available appointments for today.

## 2024-02-05 ENCOUNTER — TELEMEDICINE (OUTPATIENT)
Dept: PSYCHOLOGY | Age: 34
End: 2024-02-05
Payer: MEDICARE

## 2024-02-05 DIAGNOSIS — F43.10 PTSD (POST-TRAUMATIC STRESS DISORDER): Primary | ICD-10-CM

## 2024-02-05 PROCEDURE — 90837 PSYTX W PT 60 MINUTES: CPT | Performed by: PSYCHOLOGIST

## 2024-02-05 NOTE — PROGRESS NOTES
Unknown (6/14/2023)    PRAPARE - Transportation     Lack of Transportation (Medical): Not on file     Lack of Transportation (Non-Medical): No   Physical Activity: Not on file   Stress: Not on file   Social Connections: Not on file   Intimate Partner Violence: Not on file   Housing Stability: Unknown (6/14/2023)    Housing Stability Vital Sign     Unable to Pay for Housing in the Last Year: Not on file     Number of Places Lived in the Last Year: Not on file     Unstable Housing in the Last Year: No       TOBACCO:   reports that he has quit smoking. His smoking use included cigarettes. He has a 10.0 pack-year smoking history. He has quit using smokeless tobacco.  His smokeless tobacco use included chew.  ETOH:   reports that he does not currently use alcohol.    Family History:   Family History   Problem Relation Age of Onset    Depression Mother     Heart Disease Father     Crohn's Disease Maternal Grandfather     Colon Cancer Neg Hx     Colon Polyps Neg Hx            Assessment:  Patient was seen for anxiety; session focused on talking about his nausea and his stomach issues; he says he has missed a lot of work because of nausea and also diarrhea; he said that they are doing test such as endoscopic, and other test to rule out the issues; he has not considered diet yet but he says that they eat a lot of vegetables and fruit at their house; this therapist discussed fast food and preservatives with him; he still consumes a lot of nicotine through vaping; he also drinks a lot of caffeine-he likes Mountain Dew; this therapist discussed with him the correlation between nicotine, caffeine and anxiety; therapist discussed with him grounding techniques and ways of staying focused on now and not over thinking about the future; still having ongoing anxiety.  Barrie Chavez Jr., was evaluated through a synchronous (real-time) audio-video encounter. The patient (or guardian if applicable) is aware that this is a billable

## 2024-02-12 ENCOUNTER — HOSPITAL ENCOUNTER (OUTPATIENT)
Dept: NUCLEAR MEDICINE | Age: 34
Discharge: HOME | End: 2024-02-12
Payer: MEDICARE

## 2024-02-12 DIAGNOSIS — K29.90 GASTRITIS AND DUODENITIS: ICD-10-CM

## 2024-02-12 DIAGNOSIS — Z98.890 S/P ENDOSCOPY: ICD-10-CM

## 2024-02-12 DIAGNOSIS — Z98.890 S/P COLONOSCOPY: ICD-10-CM

## 2024-02-12 DIAGNOSIS — K21.9 GASTROESOPHAGEAL REFLUX DISEASE WITHOUT ESOPHAGITIS: ICD-10-CM

## 2024-02-12 DIAGNOSIS — R19.7 DIARRHEA, UNSPECIFIED TYPE: ICD-10-CM

## 2024-02-12 DIAGNOSIS — K64.1 GRADE II INTERNAL HEMORRHOIDS: ICD-10-CM

## 2024-02-12 DIAGNOSIS — R10.10 UPPER ABDOMINAL PAIN: ICD-10-CM

## 2024-02-12 PROCEDURE — 3430000000 HC RX DIAGNOSTIC RADIOPHARMACEUTICAL

## 2024-02-12 PROCEDURE — 78227 HEPATOBIL SYST IMAGE W/DRUG: CPT

## 2024-02-12 PROCEDURE — A9537 TC99M MEBROFENIN: HCPCS

## 2024-02-12 RX ADMIN — Medication 7.4 MILLICURIE: at 10:25

## 2024-02-15 ENCOUNTER — OFFICE VISIT (OUTPATIENT)
Dept: FAMILY MEDICINE CLINIC | Age: 34
End: 2024-02-15

## 2024-02-15 VITALS
BODY MASS INDEX: 31.64 KG/M2 | OXYGEN SATURATION: 98 % | TEMPERATURE: 98.2 F | HEART RATE: 69 BPM | DIASTOLIC BLOOD PRESSURE: 84 MMHG | RESPIRATION RATE: 16 BRPM | WEIGHT: 226 LBS | HEIGHT: 71 IN | SYSTOLIC BLOOD PRESSURE: 124 MMHG

## 2024-02-15 DIAGNOSIS — K52.9 CHRONIC DIARRHEA: Primary | ICD-10-CM

## 2024-02-15 DIAGNOSIS — K82.9 GALLBLADDER PAIN: ICD-10-CM

## 2024-02-15 NOTE — PROGRESS NOTES
Patient:Barrie Chavez Jr.  YOB: 1990   MRN:[id number]    Subjective   33 y.o. male who presents for the following: Follow-up (Patient in office today for abdominal pain and diarrhea, states that he had to miss work today so will need a note. States that he has an hyperactive gallbladder and feels that it could be that. Is in the midst of getting scheduled to have removed soon.//)    Patient presents to the clinic with a chronic history (over one year) of RUQ pain, nausea, vomiting and non-bloody diarrhea. Recent RUQ noted no acute finding no cholelithiasis; HIDA scan- normal gallbladder EF, patent ducts (no cholecystitis). Recent EGD and colonoscopy noted no acute findings.  Patient recent G.I. follow-up (02/13) with concern for IBS and supportive management.  Patient with planned Cholecystectomy on 02/22/24.   Patient denies any fever, chills, shortness of breath, chest pain and urinary symptoms.       Review of Systems   Gastrointestinal:  Positive for abdominal pain (Right upper quadrant), diarrhea and nausea.     PMHx: He has a past medical history of Asthma, Bipolar 1 disorder (HCC), Borderline personality disorder (HCC), COPD (chronic obstructive pulmonary disease) (HCC), Depression, Dissociative identity disorder (HCC), GERD (gastroesophageal reflux disease), History of cardiac murmur in childhood, Fntt-Piuvc-Zewphbj disease, left, Lumbar disc disease, Multiple allergies, PTSD (post-traumatic stress disorder), Restless legs syndrome, Teeth missing, Under care of team, and Wellness examination.    Objective     Vitals:    02/15/24 1641   BP: 124/84   Site: Right Upper Arm   Position: Sitting   Cuff Size: Medium Adult   Pulse: 69   Resp: 16   Temp: 98.2 °F (36.8 °C)   TempSrc: Oral   SpO2: 98%   Weight: 102.5 kg (226 lb)   Height: 1.803 m (5' 11\")   Body mass index is 31.52 kg/m².    Physical Exam  Constitutional:       General: He is not in acute distress.     Appearance: Normal

## 2024-02-15 NOTE — PROGRESS NOTES
S: 33 y.o. male with   Chief Complaint   Patient presents with    Follow-up     Patient in office today for abdominal pain and diarrhea, states that he had to miss work today so will need a note. States that he has an hyperactive gallbladder and feels that it could be that. Is in the midst of getting scheduled to have removed soon.           HPI: please see resident note for HPI and ROS.      O: VS:  height is 1.803 m (5' 11\") and weight is 102.5 kg (226 lb). His oral temperature is 98.2 °F (36.8 °C). His blood pressure is 124/84 and his pulse is 69. His respiration is 16 and oxygen saturation is 98%.        Diagnosis Orders   1. Chronic diarrhea        2. Gallbladder pain            Plan:  Please refer to resident note for full plan.    33-year-old male here for follow up. History of nausea/vomiting, RUQ discomfort. Recent RUQ US and HIDA scan normal. Scheduled for cholecystectomy next week with Dr. Noriega? He also complains of chronic diarrhea; follows with GI. Had EGD and colonoscopy in 10/2023 with Dr. Cline which were normal. Concerns for IBS-D per GI. Advised to follow up with specialists as scheduled. Follow up in 4-6 weeks for recheck on chronic conditions or sooner if needed.     Health Maintenance Due   Topic Date Due    Hepatitis B vaccine (1 of 3 - 3-dose series) Never done    COVID-19 Vaccine (1) Never done    Pneumococcal 0-64 years Vaccine (1 - PCV) Never done    HIV screen  Never done    Hepatitis C screen  Never done    Flu vaccine (1) Never done    Annual Wellness Visit (Medicare)  Never done       Attending Physician Statement  I have discussed the case, including pertinent history and exam findings with the resident.  I agree with the documented assessment and plan as documented by the resident.        Mariluz Clay, DO 2/24/2024 2:42 PM

## 2024-02-15 NOTE — PROGRESS NOTES
S: 33 y.o. male with   Chief Complaint   Patient presents with    Follow-up     Patient in office today for abdominal pain and diarrhea, states that he had to miss work today so will need a note. States that he has an hyperactive gallbladder and feels that it could be that. Is in the midst of getting scheduled to have removed soon.           HPI: please see resident note for HPI and ROS.      BP Readings from Last 3 Encounters:   02/15/24 124/84   02/13/24 121/76   01/30/24 (!) 127/91     Wt Readings from Last 3 Encounters:   02/15/24 102.5 kg (226 lb)   02/13/24 103.4 kg (228 lb)   01/30/24 102.5 kg (226 lb)       O: VS:  height is 1.803 m (5' 11\") and weight is 102.5 kg (226 lb). His oral temperature is 98.2 °F (36.8 °C). His blood pressure is 124/84 and his pulse is 69. His respiration is 16 and oxygen saturation is 98%.   Exam per Dr. Flynn     Diagnosis Orders   1. Gallbladder pain        2. Chronic diarrhea            Plan:  Please refer to resident note for full plan.  Give work note today  Continue increased fiber intake for diarrhea       Return if symptoms worsen.    Health Maintenance Due   Topic Date Due    Hepatitis B vaccine (1 of 3 - 3-dose series) Never done    COVID-19 Vaccine (1) Never done    Pneumococcal 0-64 years Vaccine (1 - PCV) Never done    HIV screen  Never done    Hepatitis C screen  Never done    Flu vaccine (1) Never done    Annual Wellness Visit (Medicare)  Never done     I have discussed the case, including pertinent history and exam findings with the resident and attending physician.   I agree with the documented assessment and plan as documented by the resident.      Andrew Gonzalez, DO 2/15/2024 4:58 PM

## 2024-02-21 NOTE — PROGRESS NOTES
Follow all instructions given by your physician  Do not eat or drink anything after midnight prior to surgery(includes water, chewing gum, mints and ice chips)  Sips of water am of surgery with allowed medications  Do not use chewing tobacco after midnight prior to surgery  May brush teeth do not swallow water  Do not smoke, drink alcoholic beverages or use any illicit drugs for 24 hours prior to surgery  Bring insurance info and photo ID  Bring pertinent paperwork with you from Doctor or surgeons's office  Wear clean comfortable, loose-fitting clothing  No make-up, nail polish, jewelry, piercings, or contact lenses to be worn day of surgery  No glue on dentures morning of surgery; you will be asked to remove them for surgery. Case for glasses.  Shower the night before and the morning of surgery with cleansing soap provided or a liquid antibacterial soap, dry with new fresh clean towel after each shower, no lotions, creams or powder.  Clean sheets and pillowcase on bed night before surgery  Bring medications in original bottles, Bring rescue inhalers with you  Bring CPAP/BIPAP machine if you have one ( you may be charged if one is needed in recovery room ) no farzaneh     Our pharmacy has a Meds to Beds program where they will deliver any new prescriptions you may have to your room before you leave. Our Pharmacy will clear it through your insurance; for example (same co pay). This enables you to take your new RX as soon as you need when you get home and avoids stop/wait delays on the way home.  Please have a form of payment with you and have someone designated as your Pharmacy contact with their phone # as you may not feel well or still be under the influence of anesthesia.    Please refer to the SSI-Surgical Site Infection Flyer you hopefully received in the mail-together we can prevent infections; signs and symptoms reviewed.  When discharged be sure you understand how to care for your wound and that you have  the Dr./office phone # to call if you have any concerns or questions about your wound.     needed at discharge and someone over 18 to stay with you for 24 hours overnight (surgery may be cancelled if you don't have this)  Report to [facility] on 2nd floor  If you would become ill prior to surgery, please call the surgeon  May have a visitor with you, we request that you limit to 2 visitors in pre-op area  Masks are recommended but not required, new masks at entrance desk  Call [phone] for any questions

## 2024-02-23 ENCOUNTER — ANESTHESIA EVENT (OUTPATIENT)
Dept: OPERATING ROOM | Age: 34
End: 2024-02-23
Payer: MEDICARE

## 2024-02-23 ENCOUNTER — ANESTHESIA (OUTPATIENT)
Dept: OPERATING ROOM | Age: 34
End: 2024-02-23
Payer: MEDICARE

## 2024-02-23 ENCOUNTER — HOSPITAL ENCOUNTER (OUTPATIENT)
Age: 34
Setting detail: OUTPATIENT SURGERY
Discharge: HOME | End: 2024-02-23
Attending: SURGERY | Admitting: SURGERY
Payer: MEDICARE

## 2024-02-23 VITALS
SYSTOLIC BLOOD PRESSURE: 153 MMHG | RESPIRATION RATE: 20 BRPM | DIASTOLIC BLOOD PRESSURE: 76 MMHG | BODY MASS INDEX: 31.89 KG/M2 | TEMPERATURE: 96.9 F | OXYGEN SATURATION: 97 % | HEART RATE: 68 BPM | HEIGHT: 71 IN | WEIGHT: 227.8 LBS

## 2024-02-23 DIAGNOSIS — K80.50 BILIARY COLIC: Primary | ICD-10-CM

## 2024-02-23 PROCEDURE — 7100000010 HC PHASE II RECOVERY - FIRST 15 MIN: Performed by: SURGERY

## 2024-02-23 PROCEDURE — 7100000000 HC PACU RECOVERY - FIRST 15 MIN: Performed by: SURGERY

## 2024-02-23 PROCEDURE — 2709999900 HC NON-CHARGEABLE SUPPLY: Performed by: SURGERY

## 2024-02-23 PROCEDURE — 6360000002 HC RX W HCPCS: Performed by: ANESTHESIOLOGY

## 2024-02-23 PROCEDURE — 6360000002 HC RX W HCPCS: Performed by: NURSE ANESTHETIST, CERTIFIED REGISTERED

## 2024-02-23 PROCEDURE — 3700000000 HC ANESTHESIA ATTENDED CARE: Performed by: SURGERY

## 2024-02-23 PROCEDURE — 6360000002 HC RX W HCPCS: Performed by: SURGERY

## 2024-02-23 PROCEDURE — 6360000002 HC RX W HCPCS

## 2024-02-23 PROCEDURE — 3700000001 HC ADD 15 MINUTES (ANESTHESIA): Performed by: SURGERY

## 2024-02-23 PROCEDURE — 7100000001 HC PACU RECOVERY - ADDTL 15 MIN: Performed by: SURGERY

## 2024-02-23 PROCEDURE — 3600000003 HC SURGERY LEVEL 3 BASE: Performed by: SURGERY

## 2024-02-23 PROCEDURE — 2500000003 HC RX 250 WO HCPCS: Performed by: NURSE ANESTHETIST, CERTIFIED REGISTERED

## 2024-02-23 PROCEDURE — 6370000000 HC RX 637 (ALT 250 FOR IP): Performed by: ANESTHESIOLOGY

## 2024-02-23 PROCEDURE — 7100000011 HC PHASE II RECOVERY - ADDTL 15 MIN: Performed by: SURGERY

## 2024-02-23 PROCEDURE — 2580000003 HC RX 258: Performed by: SURGERY

## 2024-02-23 PROCEDURE — 88304 TISSUE EXAM BY PATHOLOGIST: CPT

## 2024-02-23 PROCEDURE — 3600000013 HC SURGERY LEVEL 3 ADDTL 15MIN: Performed by: SURGERY

## 2024-02-23 RX ORDER — FENTANYL CITRATE 50 UG/ML
50 INJECTION, SOLUTION INTRAMUSCULAR; INTRAVENOUS EVERY 5 MIN PRN
Status: DISCONTINUED | OUTPATIENT
Start: 2024-02-23 | End: 2024-02-23 | Stop reason: HOSPADM

## 2024-02-23 RX ORDER — MIDAZOLAM HYDROCHLORIDE 1 MG/ML
INJECTION INTRAMUSCULAR; INTRAVENOUS PRN
Status: DISCONTINUED | OUTPATIENT
Start: 2024-02-23 | End: 2024-02-23 | Stop reason: SDUPTHER

## 2024-02-23 RX ORDER — LIDOCAINE HCL/PF 100 MG/5ML
SYRINGE (ML) INJECTION PRN
Status: DISCONTINUED | OUTPATIENT
Start: 2024-02-23 | End: 2024-02-23 | Stop reason: SDUPTHER

## 2024-02-23 RX ORDER — DIPHENHYDRAMINE HYDROCHLORIDE 50 MG/ML
12.5 INJECTION INTRAMUSCULAR; INTRAVENOUS
Status: DISCONTINUED | OUTPATIENT
Start: 2024-02-23 | End: 2024-02-23 | Stop reason: HOSPADM

## 2024-02-23 RX ORDER — ACETAMINOPHEN 325 MG/1
650 TABLET ORAL ONCE
Status: DISCONTINUED | OUTPATIENT
Start: 2024-02-23 | End: 2024-02-23 | Stop reason: HOSPADM

## 2024-02-23 RX ORDER — MEPERIDINE HYDROCHLORIDE 25 MG/ML
12.5 INJECTION INTRAMUSCULAR; INTRAVENOUS; SUBCUTANEOUS ONCE
Status: DISCONTINUED | OUTPATIENT
Start: 2024-02-23 | End: 2024-02-23 | Stop reason: HOSPADM

## 2024-02-23 RX ORDER — OXYCODONE HYDROCHLORIDE AND ACETAMINOPHEN 5; 325 MG/1; MG/1
1 TABLET ORAL EVERY 6 HOURS PRN
Qty: 12 TABLET | Refills: 0 | Status: SHIPPED | OUTPATIENT
Start: 2024-02-23 | End: 2024-02-27

## 2024-02-23 RX ORDER — ROCURONIUM BROMIDE 10 MG/ML
INJECTION, SOLUTION INTRAVENOUS PRN
Status: DISCONTINUED | OUTPATIENT
Start: 2024-02-23 | End: 2024-02-23 | Stop reason: SDUPTHER

## 2024-02-23 RX ORDER — SODIUM CHLORIDE 9 MG/ML
INJECTION, SOLUTION INTRAVENOUS CONTINUOUS
Status: DISCONTINUED | OUTPATIENT
Start: 2024-02-23 | End: 2024-02-23 | Stop reason: HOSPADM

## 2024-02-23 RX ORDER — ONDANSETRON 2 MG/ML
4 INJECTION INTRAMUSCULAR; INTRAVENOUS
Status: DISCONTINUED | OUTPATIENT
Start: 2024-02-23 | End: 2024-02-23 | Stop reason: HOSPADM

## 2024-02-23 RX ORDER — DROPERIDOL 2.5 MG/ML
0.62 INJECTION, SOLUTION INTRAMUSCULAR; INTRAVENOUS
Status: DISCONTINUED | OUTPATIENT
Start: 2024-02-23 | End: 2024-02-23 | Stop reason: HOSPADM

## 2024-02-23 RX ORDER — FENTANYL CITRATE 50 UG/ML
INJECTION, SOLUTION INTRAMUSCULAR; INTRAVENOUS PRN
Status: DISCONTINUED | OUTPATIENT
Start: 2024-02-23 | End: 2024-02-23 | Stop reason: SDUPTHER

## 2024-02-23 RX ORDER — HYDRALAZINE HYDROCHLORIDE 20 MG/ML
10 INJECTION INTRAMUSCULAR; INTRAVENOUS
Status: DISCONTINUED | OUTPATIENT
Start: 2024-02-23 | End: 2024-02-23 | Stop reason: HOSPADM

## 2024-02-23 RX ORDER — OXYCODONE HYDROCHLORIDE 5 MG/1
5 TABLET ORAL
Status: COMPLETED | OUTPATIENT
Start: 2024-02-23 | End: 2024-02-23

## 2024-02-23 RX ORDER — PROPOFOL 10 MG/ML
INJECTION, EMULSION INTRAVENOUS PRN
Status: DISCONTINUED | OUTPATIENT
Start: 2024-02-23 | End: 2024-02-23 | Stop reason: SDUPTHER

## 2024-02-23 RX ORDER — SODIUM CHLORIDE 0.9 % (FLUSH) 0.9 %
5-40 SYRINGE (ML) INJECTION EVERY 12 HOURS SCHEDULED
Status: DISCONTINUED | OUTPATIENT
Start: 2024-02-23 | End: 2024-02-23 | Stop reason: HOSPADM

## 2024-02-23 RX ORDER — ATROPINE SULFATE 1 MG/ML
INJECTION, SOLUTION INTRAVENOUS PRN
Status: DISCONTINUED | OUTPATIENT
Start: 2024-02-23 | End: 2024-02-23 | Stop reason: SDUPTHER

## 2024-02-23 RX ORDER — IPRATROPIUM BROMIDE AND ALBUTEROL SULFATE 2.5; .5 MG/3ML; MG/3ML
1 SOLUTION RESPIRATORY (INHALATION)
Status: DISCONTINUED | OUTPATIENT
Start: 2024-02-23 | End: 2024-02-23 | Stop reason: HOSPADM

## 2024-02-23 RX ORDER — DEXTROSE MONOHYDRATE 100 MG/ML
INJECTION, SOLUTION INTRAVENOUS CONTINUOUS PRN
Status: DISCONTINUED | OUTPATIENT
Start: 2024-02-23 | End: 2024-02-23 | Stop reason: HOSPADM

## 2024-02-23 RX ORDER — LABETALOL HYDROCHLORIDE 5 MG/ML
10 INJECTION INTRAVENOUS
Status: DISCONTINUED | OUTPATIENT
Start: 2024-02-23 | End: 2024-02-23 | Stop reason: HOSPADM

## 2024-02-23 RX ORDER — SODIUM CHLORIDE 0.9 % (FLUSH) 0.9 %
5-40 SYRINGE (ML) INJECTION PRN
Status: DISCONTINUED | OUTPATIENT
Start: 2024-02-23 | End: 2024-02-23 | Stop reason: HOSPADM

## 2024-02-23 RX ORDER — GLYCOPYRROLATE 0.2 MG/ML
INJECTION INTRAMUSCULAR; INTRAVENOUS PRN
Status: DISCONTINUED | OUTPATIENT
Start: 2024-02-23 | End: 2024-02-23 | Stop reason: SDUPTHER

## 2024-02-23 RX ORDER — SODIUM CHLORIDE 9 MG/ML
INJECTION, SOLUTION INTRAVENOUS PRN
Status: DISCONTINUED | OUTPATIENT
Start: 2024-02-23 | End: 2024-02-23 | Stop reason: HOSPADM

## 2024-02-23 RX ORDER — BUPIVACAINE HYDROCHLORIDE 5 MG/ML
INJECTION, SOLUTION PERINEURAL PRN
Status: DISCONTINUED | OUTPATIENT
Start: 2024-02-23 | End: 2024-02-23 | Stop reason: ALTCHOICE

## 2024-02-23 RX ORDER — GLUCAGON 1 MG/ML
1 KIT INJECTION PRN
Status: DISCONTINUED | OUTPATIENT
Start: 2024-02-23 | End: 2024-02-23 | Stop reason: HOSPADM

## 2024-02-23 RX ORDER — DEXAMETHASONE SODIUM PHOSPHATE 10 MG/ML
INJECTION, EMULSION INTRAMUSCULAR; INTRAVENOUS PRN
Status: DISCONTINUED | OUTPATIENT
Start: 2024-02-23 | End: 2024-02-23 | Stop reason: SDUPTHER

## 2024-02-23 RX ADMIN — Medication 80 MG: at 12:31

## 2024-02-23 RX ADMIN — ATROPINE SULFATE 0.6 MG: 1 INJECTION, SOLUTION INTRAVENOUS at 12:53

## 2024-02-23 RX ADMIN — DEXAMETHASONE SODIUM PHOSPHATE 10 MG: 10 INJECTION, EMULSION INTRAMUSCULAR; INTRAVENOUS at 12:41

## 2024-02-23 RX ADMIN — GLYCOPYRROLATE 0.2 MG: 0.2 INJECTION INTRAMUSCULAR; INTRAVENOUS at 12:48

## 2024-02-23 RX ADMIN — OXYCODONE HYDROCHLORIDE 5 MG: 5 TABLET ORAL at 14:15

## 2024-02-23 RX ADMIN — HYDROMORPHONE HYDROCHLORIDE 0.25 MG: 1 INJECTION, SOLUTION INTRAMUSCULAR; INTRAVENOUS; SUBCUTANEOUS at 13:40

## 2024-02-23 RX ADMIN — FENTANYL CITRATE 50 MCG: 50 INJECTION INTRAMUSCULAR; INTRAVENOUS at 13:27

## 2024-02-23 RX ADMIN — FENTANYL CITRATE 100 MCG: 50 INJECTION INTRAMUSCULAR; INTRAVENOUS at 12:43

## 2024-02-23 RX ADMIN — SODIUM CHLORIDE: 9 INJECTION, SOLUTION INTRAVENOUS at 10:24

## 2024-02-23 RX ADMIN — HYDROMORPHONE HYDROCHLORIDE 0.25 MG: 1 INJECTION, SOLUTION INTRAMUSCULAR; INTRAVENOUS; SUBCUTANEOUS at 13:35

## 2024-02-23 RX ADMIN — SUGAMMADEX 200 MG: 100 INJECTION, SOLUTION INTRAVENOUS at 13:03

## 2024-02-23 RX ADMIN — PROPOFOL 200 MG: 10 INJECTION, EMULSION INTRAVENOUS at 12:32

## 2024-02-23 RX ADMIN — ROCURONIUM BROMIDE 50 MG: 10 INJECTION INTRAVENOUS at 12:32

## 2024-02-23 RX ADMIN — SODIUM CHLORIDE: 9 INJECTION, SOLUTION INTRAVENOUS at 12:59

## 2024-02-23 RX ADMIN — FENTANYL CITRATE 50 MCG: 50 INJECTION INTRAMUSCULAR; INTRAVENOUS at 12:29

## 2024-02-23 RX ADMIN — FENTANYL CITRATE 50 MCG: 50 INJECTION INTRAMUSCULAR; INTRAVENOUS at 13:24

## 2024-02-23 RX ADMIN — MIDAZOLAM 2 MG: 1 INJECTION INTRAMUSCULAR; INTRAVENOUS at 12:27

## 2024-02-23 ASSESSMENT — PAIN SCALES - GENERAL
PAINLEVEL_OUTOF10: 5
PAINLEVEL_OUTOF10: 6
PAINLEVEL_OUTOF10: 3
PAINLEVEL_OUTOF10: 5
PAINLEVEL_OUTOF10: 3
PAINLEVEL_OUTOF10: 6

## 2024-02-23 ASSESSMENT — PAIN - FUNCTIONAL ASSESSMENT
PAIN_FUNCTIONAL_ASSESSMENT: 0-10
PAIN_FUNCTIONAL_ASSESSMENT: 0-10

## 2024-02-23 ASSESSMENT — PAIN DESCRIPTION - LOCATION
LOCATION: ABDOMEN

## 2024-02-23 ASSESSMENT — LIFESTYLE VARIABLES: SMOKING_STATUS: 1

## 2024-02-23 ASSESSMENT — PAIN DESCRIPTION - DESCRIPTORS: DESCRIPTORS: ACHING;DISCOMFORT

## 2024-02-23 NOTE — DISCHARGE INSTRUCTIONS
GENERAL ANESTHESIA OR SEDATION  1. Do not drive or operate hazardous machinery for 24 hours.  2. Do not make important business or personal decisions for 24 hours.  3. Do not drink alcoholic beverages or use tobacco for 24 hours.    ACTIVITY INSTRUCTIONS:  [] Rest today. Resume light to normal activity tomorrow.   [] You may resume normal activity tomorrow. Do not engage in strenuous activity that may place stress on your incision.  [x] Do not drive for 3-5 days and avoid heavy lifting, tugging, pullings greater than 10-20 lbs until seen in the office.      DIET INSTRUCTIONS:  []Begin with clear liquids. If not nauseated, may increase to a low-fat diet when you desire. Greasy and spicy foods are not advised.  [x]Regular diet as tolerated.  []Other:     MEDICATIONS  [x]Prescription sent with you to be used as directed.   []Lortab   []Vicodin   [x]Percocet   []Tylenol #3   []Oxycontin   Do not drink alcohol or drive while taking these medications. You may experience dizziness or drowsiness with these medications. You may also experience constipation which can be relieved with stool softners or laxatives.  [x]You may resume your daily prescription medication schedule unless otherwise specified.  [x]Do not take 325mg Aspirin or other blood thinners such as Coumadin or Plavix for 5 days.     WOUND/DRESSING INSTRUCTIONS:  Always ensure you and your care giver clean hands before and after caring for the wound.  [x] Keep dressing clean and dry for 48 hours. Change when soiled or wet.      [x] Allow steri-strips to fall off on their own.   [x] Ice operative site for 20 minutes 4 times a day.     [x] May wash over incision in shower in 48 hours, but do not soak in a bath.  [] Take sitz bath for 20 minutes twice daily and after bowel movements.  [] Keep the abdominal binder in place during the day. May remove to shower and at night.  [] Remove packing from wound in 24 hours and replace with AMD dressings daily.  [] Empty DARYA  drain daily and record the amounts.    BREAST PROCEDURES  []Following a breast procedure, it is important to continue to where supportive garments.  []Following a sentinal lymph node biopsy, you should not be alarmed if your urine has a blue color to it. This is your body eliminating the dye used for the procedure.    ABDOMINAL/LAPAROSCOPIC SURGERY  [x]You are encouraged to get up and move around as this helps with the circulation and speeds up the healing process.  [x]Breath deeply and cough from time to time. This helps to clear your lungs and helps prevent pneumonia.  [x]Supporting your incision with a pillow or your hand helps to minimize discomfort and pain.  [x]Laparoscopic patients may develop shoulder pain in the first 48 hours from the gas used during the procedure.    FOLLOW-UP CARE. SPECIFICALLY WATCH FOR:   Fever over 101 degrees by mouth   Increased redness, warmth, hardness at operative site.   Blood soaked dressing (small amounts of oozing may be normal.)   Increased or progressive drainage from the surgical area   Inability to urinate or blood in the urine   Pain not relieved by the medications ordered   Persistent nausea and/or vomiting, unable to retain fluids.    FOLLOW-UP APPOINTMENT   []1 week   [x]2 weeks   []Other    Call my office if you have any problem that concerns you . After hours, you can reach the answering service via the office phone number. IF YOU NEED IMMEDIATE ATTENTION, GO TO THE EMERGENCY ROOM AND YOUR DOCTOR WILL BE CONTACTED.

## 2024-02-23 NOTE — ANESTHESIA POSTPROCEDURE EVALUATION
Department of Anesthesiology  Postprocedure Note    Patient: Barrie Chavez Jr.  MRN: [id number]  YOB: 1990  Date of evaluation: 2/23/2024    Procedure Summary       Date: 02/23/24 Room / Location: [facility] OR  / [facility] OR    Anesthesia Start: 1225 Anesthesia Stop: 1331    Procedure: Laparoscopic Cholecystectomy (Abdomen) Diagnosis:       Biliary colic      (Biliary colic [K80.50])    Surgeons: Renzo Noriega MD Responsible Provider: Elvis Lin DO    Anesthesia Type: general ASA Status: 2            Anesthesia Type: No value filed.    Apoorva Phase I: Apoorva Score: 10    Apoorva Phase II:      Anesthesia Post Evaluation    Patient location during evaluation: PACU  Patient participation: complete - patient participated  Level of consciousness: awake  Airway patency: patent  Nausea & Vomiting: no nausea  Cardiovascular status: hemodynamically stable  Respiratory status: acceptable  Hydration status: stable  Pain management: adequate    No notable events documented.

## 2024-02-23 NOTE — OP NOTE
[address] [address]                            OPERATIVE REPORT      PATIENT NAME: TREMAYNE WALKER          : 1990  MED REC NO: [id number]                       ROOM: [facility]                                               (General) POOL                                               RM    ACCOUNT NO: [id number]                       ADMIT DATE: 2024  PROVIDER: Renzo Noriega MD      DATE OF PROCEDURE:  2024    SURGEON:  Renzo Noriega MD    PREOPERATIVE DIAGNOSIS:  Biliary dyskinesia.    POSTOPERATIVE DIAGNOSIS:  Biliary dyskinesia.    OPERATION:  Laparoscopic cholecystectomy.    ANESTHESIA:  General.    COMPLICATIONS:  None.    BLOOD LOSS:  5 mL.    INDICATION FOR PROCEDURE:  The patient is a 33-year-old male with fairly classic biliary colic symptoms with a markedly high HIDA scan.  The patient is here for laparoscopic cholecystectomy.    FINDINGS:  Liver appeared normal.  All of the bowel surfaces were normal.    DESCRIPTION OF PROCEDURE:  The patient was brought into the operating suite, placed supine on the operating room table.  After adequate inhalational anesthesia was administered, the patient's abdomen was prepped and draped in the usual sterile fashion.  An incision was made below the umbilicus.  A Veress needle was placed, and CO2 was insufflated to a pressure of 15.  The Veress needle was removed.  A trocar was placed, and a camera was placed through the trocar verifying intraabdominal placement of trocar.  The other 3 trocars, 1 in the epigastrium, 2 laterally were placed.  The liver appeared normal.  The dome of the gallbladder was grasped, retracted, and then the neck of the gallbladder was grasped, retracted laterally, obtaining the critical view.  The hepatoduodenal ligament was dissected out.  The cystic duct was identified, it was doubly clipped on the common duct side, one on the  gallbladder side and divided.  The cystic artery was divided with 2 clips on the hepatic artery side and the gallbladder was removed from liver bed using electrocautery and delivered out of the abdominal cavity via the epigastric stab wound.  The area around the liver was irrigated and suctioned out.  There was no bleeding or bile leakage.  Then, closure was begun.  The trocars were removed.  The air was aspirated out of the abdominal cavity.  Interrupted 4-0 Vicryl was used to close the skin.  Steri-Strips were applied.          BECKA REYNOSO MD      D:  02/23/2024 13:13:43     T:  02/23/2024 13:46:53     BRANDON/SILVER  Job #:  [id number]     Doc#:  [id number]

## 2024-02-23 NOTE — PROGRESS NOTES
Pt returned to [facility] room 1. Vitals and assessment as charted. 0.9 infusing, @500ml to count from PACU. Pt has blueberrymuffin and water. Family at the bedside. Pt and family verbalized understanding of discharge criteria and call light use. Call light in reach.

## 2024-02-23 NOTE — PROGRESS NOTES
1327: Pt arrives to pacu, awakens to verbal stimuli. Pt on NC, respirations easy and unlabored. C/o pain, fentanyl administered per crna.  1335: Pt continues to state pain 5/10, 0.25mg of dilaudid administered  1340: No change in pain, 0.25mg of dilaudid given  1345: Pt resting off and on with eyes closed, easily awakens to voice. States pain tolerable.   1400: Pt meets criteria for discharge from pacu, transported back to [facility] in stable condition. VSS

## 2024-02-23 NOTE — PROGRESS NOTES
Pt admitted to [facility] room 1 and oriented to unit. SCD sleeves applied. Nares swabbed. Pt verbalized permission for first name, last initial and physicians name on white board. SDS board and discharge criteria explained, pt and family verbalized understanding. Pt denies thoughts of harming self or others. Call light in reach. Family at the bedside.  Carolyne [phone]

## 2024-02-23 NOTE — BRIEF OP NOTE
Brief Postoperative Note      Patient: Barrie Chavez Jr.  YOB: 1990  MRN: [id number]    Date of Procedure: 2/23/2024    Pre-Op Diagnosis Codes:     * Biliary colic [K80.50]    Post-Op Diagnosis: same       Procedure(s):  Laparoscopic Cholecystectomy    Surgeon(s):  Renzo Noriega MD    Assistant:      Anesthesia: General    Estimated Blood Loss (mL):     Complications:     Specimens:   ID Type Source Tests Collected by Time Destination   A : Gallbladder Tissue Gallbladder SURGICAL PATHOLOGY Renzo Noriega MD 2/23/2024 1251        Implants:        Drains:     Findings: see op note      Electronically signed by Renzo Noriega MD on 2/23/2024 at 1:11 PM

## 2024-02-23 NOTE — ANESTHESIA PRE PROCEDURE
HC INJECTION PROCEDURE FOR SACROILIAC JOINT Left 10/23/2020    SACROILIAC JOINT INJECTION performed by Daphne Myers MD at [facility] OR   • LUMBAR FUSION Left 11/02/2021    OLIF L3-4 performed by Earl Mcghee DO at [facility] OR   • LUMBAR FUSION Left 11/02/2021    POSTERIOR FIXATION, FACETECTOMY/FORAMINTOMY L3-4 performed by Earl Mcghee DO at [facility] OR   • LUMBAR SPINE SURGERY Left 06/01/2021    POSTERIOR LUMBAR MICRODISCECTOMY L3-4 performed by Earl Mcghee DO at [facility] OR   • LUMBAR SPINE SURGERY  11/02/2021    OLIF L3-4, PRONE, C-ARM, MICROSCOPE, [facility],  EVOKES- #[id number] SHEA (Left Flank) ,   • NERVE BLOCK N/A 03/12/2021    NERVE BLOCK #1 L3-4, L4-5 performed by Daphne Myers MD at [facility] OR   • PAIN MANAGEMENT PROCEDURE Left 01/29/2021    LUMBAR TRANSFORAMINAL L3-4 performed by Daphne Myers MD at [facility] OR   • PAIN MANAGEMENT PROCEDURE Left 02/12/2021    EPIDURAL STEROID INJECTION caudal performed by Daphne Myers MD at [facility] OR   • PAIN MANAGEMENT PROCEDURE Left 04/16/2021    LUMBAR TRANSFORAMINAL L3 performed by Daphne Myers MD at [facility] OR   • TOTAL HIP ARTHROPLASTY Left 2011    Left hip replacement   • TYMPANOSTOMY TUBE PLACEMENT      as child   • WISDOM TOOTH EXTRACTION      2017       Social History:    Social History     Tobacco Use   • Smoking status: Former     Current packs/day: 1.00     Average packs/day: 1 pack/day for 10.0 years (10.0 ttl pk-yrs)     Types: Cigarettes   • Smokeless tobacco: Former     Types: Chew   Substance Use Topics   • Alcohol use: Not Currently     Comment: Rare                                Counseling given: Not Answered      Vital Signs (Current):   Vitals:    02/21/24 1322 02/23/24 1010   BP:  126/62   Pulse:  68   Resp:  20   Temp:  97.4 °F (36.3 °C)   TempSrc:  Tympanic   SpO2:  97%   Weight: 102.5 kg (226 lb) 103.3 kg (227 lb 12.8 oz)   Height: 1.803 m (5' 11\") 1.803 m (5' 11\")

## 2024-02-23 NOTE — H&P
[address] [address]                            PREOPERATIVE H&P      PATIENT NAME: TREMAYNE WALKER          : 1990  MED REC NO: [id number]                       ROOM:   ACCOUNT NO: [id number]                       ADMIT DATE: 2024  PROVIDER: Renzo Noriega MD      CHIEF COMPLAINT:  Biliary colic, biliary dyskinesia.    HISTORY OF PRESENT ILLNESS:  The patient is a 33-year-old male who has had a 4- to 5-month history of fairly classic biliary colic symptoms with right upper quadrant pain that does go to the shoulder.  He was seen by GI Service, Dr. Cline with a workup that included EGD and colonoscopy.  He does have reflux disease.  He has been on Protonix and ultrasound of the gallbladder that was negative for stones with a HIDA scan showing a 90% ejection fraction, which is supernormal.  He does have a family history of gallbladder disease in his father.  Because of the persistence in length of his symptoms and his abnormal HIDA which when supernormal is indicative but it is still a biliary dyskinesia, he is being admitted at this time for laparoscopic cholecystectomy.    PAST MEDICAL HISTORY:  Positive for COPD and GERD.  He denies coronary artery disease, diabetes.  However, he also has a history of bipolar disorder, borderline personality disorder, depression, and he has RLS and Geen-Sskyn-Kqtxpva disease.    SURGERIES:  Include lumbar fusion, a posterior microdiscectomy.  He has had nerve blocks, colonoscopies, EGD.  He has had a left total hip replacement, and he has had wisdom teeth removed and tubes in the ears.    MEDICATIONS:  Advair, Claritin, Debrox, GlycoLax, Lamictal, Zofran, Protonix, ropinirole, trazodone, Valium, and Vraylar.    ALLERGIES:  NONE.      REVIEW OF SYSTEMS:  Ten-point review of systems negative.    PHYSICAL EXAMINATION:  GENERAL:  The patient is a 33-year-old male, appears his age.  HEAD,

## 2024-02-23 NOTE — H&P
[facility]  History and Physical Update      Pt Name: Barrie Chavez Jr.  MRN: [id number]  YOB: 1990  Date of evaluation: 2/22/2024    [x] I have examined the patient and reviewed the H&P/Consult and there are no changes to the patient or plans.    [] I have examined the patient and reviewed the H&P/Consult and have noted the following changes:        Renzo Noriega MD  Electronically signed 2/22/2024 at 10:51 PM

## 2024-02-26 RX ORDER — LORATADINE 10 MG/1
10 TABLET ORAL DAILY
Qty: 30 TABLET | Refills: 3 | Status: SHIPPED | OUTPATIENT
Start: 2024-02-26

## 2024-02-26 NOTE — TELEPHONE ENCOUNTER
Patient's last appointment was : 2/15/2024 with our   Patient's next appointment is : 3/18/2024 with our Dr. Flynn  Last refilled on: 10/04/2023  Which pharmacy does the script need sent to: [facility]      No results found for: \"LABA1C\"  Lab Results   Component Value Date    CHOL 211 (H) 08/04/2020    TRIG 283 (H) 08/04/2020    HDL 29 08/04/2020    LDLCALC 125 08/04/2020     Lab Results   Component Value Date     01/30/2024    K 4.3 01/30/2024     01/30/2024    CO2 23 01/30/2024    BUN 10 01/30/2024    CREATININE 0.8 01/30/2024    GLUCOSE 136 (H) 01/30/2024    CALCIUM 9.6 01/30/2024    PROT 7.3 01/30/2024    LABALBU 4.6 01/30/2024    BILITOT 0.4 01/30/2024    ALKPHOS 50 01/30/2024    AST 17 01/30/2024    ALT 15 01/30/2024    LABGLOM >60 01/30/2024    GFRAA >60 10/19/2021     Lab Results   Component Value Date    TSH 1.880 08/04/2020     Lab Results   Component Value Date    WBC 8.9 01/30/2024    HGB 16.2 01/30/2024    HCT 48.9 01/30/2024    MCV 90.6 01/30/2024     01/30/2024

## 2024-02-27 ENCOUNTER — TELEMEDICINE (OUTPATIENT)
Dept: PSYCHIATRY | Age: 34
End: 2024-02-27
Payer: MEDICARE

## 2024-02-27 DIAGNOSIS — F60.3 BORDERLINE PERSONALITY DISORDER (HCC): ICD-10-CM

## 2024-02-27 DIAGNOSIS — F41.0 PANIC ATTACKS: ICD-10-CM

## 2024-02-27 DIAGNOSIS — F29 PSYCHOSIS, UNSPECIFIED PSYCHOSIS TYPE (HCC): ICD-10-CM

## 2024-02-27 DIAGNOSIS — F41.1 GAD (GENERALIZED ANXIETY DISORDER): ICD-10-CM

## 2024-02-27 DIAGNOSIS — F43.10 PTSD (POST-TRAUMATIC STRESS DISORDER): Primary | ICD-10-CM

## 2024-02-27 DIAGNOSIS — R41.840 ATTENTION DEFICIT: ICD-10-CM

## 2024-02-27 PROCEDURE — 99214 OFFICE O/P EST MOD 30 MIN: CPT | Performed by: PSYCHIATRY & NEUROLOGY

## 2024-02-27 PROCEDURE — G8427 DOCREV CUR MEDS BY ELIG CLIN: HCPCS | Performed by: PSYCHIATRY & NEUROLOGY

## 2024-02-27 RX ORDER — ALPRAZOLAM 0.5 MG/1
0.5 TABLET ORAL 2 TIMES DAILY PRN
Qty: 60 TABLET | Refills: 1 | Status: SHIPPED | OUTPATIENT
Start: 2024-02-27 | End: 2024-04-27

## 2024-02-27 NOTE — PROGRESS NOTES
[facility] [place] [facility]  [facility] PSYCHI[facility]  [address] [address] [phone]    Progress Note    Patient:  Barrie Chavez Jr.  YOB: 1990  PCP:  Torsten Díaz MD  Visit Date:  2/27/2024      Chief Complaint   Patient presents with    Follow-up    Medication Check    Anxiety    Panic Attack    Mood Swings       SUBJECTIVE:      Barrie Chavez Jr., a 33 y.o. male, presents for a follow up visit.      He presents alone.    Had laparoscopic cholecystectomy on Friday. Last 4 mos GI workup and finally found the culprit.   Still in pain, occasional use of Percocet.   Wife has been doing more at home so he can rest.   Still anxiety \"panic attacks\" \"quite a bit\". Can be up to 2-3 times per day. Every other day. Hasn't been taking Valium noting higher dose makes him too tired and lower dose didn't help panic. He's aware to not take BZD with narcotic pain medication d/t risk of respiratory depression and accidental overdose.   Notes some mood benefit with Varylar increase. Mood \"a little bit better\".   \"A little more normal.\"    Will return to seeing Dr. Canales for therapy soon.   Still occasional vague auditory hallucinations \"here and there\".   Dissociating \"quite often\". He can get himself out of it. Sometimes stays there to feel numb  Found out his wife is due with a boy 6/21.   Discussed tx options and we agree to change Valium to Xanax prn panic. He did well with Xanax in the past.   No SI.       Med Trials:Chantix (nausea), trazodone, gabapentin, Ritalin, medical cannabis, Lamictal, Lexapro, Klonopin, Seroquel (no benefit, side effects), doxazosin, Risperdal (no benefit), Latuda (no benefit)    OBJECTIVE:  Vitals: There were no vitals taken for this visit.    MENTAL STATUS EXAM:    GENERAL  Build: Normal    Hygiene:  Appropriate, in casual dress   SENSORIUM Orientation: Place, Person, Time, & Situation     Consciousness: Alert 
none

## 2024-02-29 DIAGNOSIS — G89.18 POST-OPERATIVE PAIN: Primary | ICD-10-CM

## 2024-02-29 RX ORDER — OXYCODONE HYDROCHLORIDE AND ACETAMINOPHEN 5; 325 MG/1; MG/1
1 TABLET ORAL EVERY 6 HOURS PRN
Qty: 28 TABLET | Refills: 0 | Status: SHIPPED | OUTPATIENT
Start: 2024-02-29 | End: 2024-03-07

## 2024-03-11 ENCOUNTER — TELEPHONE (OUTPATIENT)
Dept: PSYCHIATRY | Age: 34
End: 2024-03-11

## 2024-03-11 NOTE — TELEPHONE ENCOUNTER
Barrie wrote into the office over the weekend:      I have been taking the Xanax and have noticed that one pill doesn’t quite do much but 2 seems to work very well. I know you wanted me to update you so we could be on the same page     Per chart; he was seen on 2/27/24 where he was started on Xanax 0.5mg BID PRN and stopped on Valium. Please advise anything further; he is scheduled to return on 04/04/24.

## 2024-03-12 NOTE — TELEPHONE ENCOUNTER
He can use two of the 0.5 mg Xanax tablets to make 1 mg dose and use 1 mg up to 2 times per day as needed for anxiety or panic.   We can send a new Rx when needed.   Electronically signed by Nanci Beckford MD on 3/12/2024 at 10:30 AM

## 2024-03-25 DIAGNOSIS — F41.0 PANIC ATTACKS: ICD-10-CM

## 2024-03-25 NOTE — TELEPHONE ENCOUNTER
Barrie wrote into the office via [employer]; he labeled the message; Xanax:    I’m running low on my medication since we moved me to 1 mg twice a day and was messaging to ask for a refill.     *Per chart he was increased from the 0.5mg BID to 1mg BID (see encounter dated 03/11/24). Last Rx was sent for the 0.5mg (1 tablet BID) on 02/27/24).    Medication is pending your approval for Xanax 1mg BID) with 1 refill; please advise otherwise. He is scheduled to return on 04/04/2024. Last seen on 02/27/24.

## 2024-03-26 RX ORDER — ALPRAZOLAM 1 MG/1
1 TABLET ORAL 2 TIMES DAILY PRN
Qty: 60 TABLET | Refills: 1 | Status: SHIPPED | OUTPATIENT
Start: 2024-03-26 | End: 2024-05-25

## 2024-04-04 ENCOUNTER — TELEMEDICINE (OUTPATIENT)
Dept: PSYCHIATRY | Age: 34
End: 2024-04-04
Payer: MEDICARE

## 2024-04-04 DIAGNOSIS — F41.1 GAD (GENERALIZED ANXIETY DISORDER): ICD-10-CM

## 2024-04-04 DIAGNOSIS — F29 PSYCHOSIS, UNSPECIFIED PSYCHOSIS TYPE (HCC): ICD-10-CM

## 2024-04-04 DIAGNOSIS — F43.10 PTSD (POST-TRAUMATIC STRESS DISORDER): Primary | ICD-10-CM

## 2024-04-04 DIAGNOSIS — F60.3 BORDERLINE PERSONALITY DISORDER (HCC): ICD-10-CM

## 2024-04-04 DIAGNOSIS — R41.840 ATTENTION DEFICIT: ICD-10-CM

## 2024-04-04 DIAGNOSIS — F41.0 PANIC ATTACKS: ICD-10-CM

## 2024-04-04 PROCEDURE — G8427 DOCREV CUR MEDS BY ELIG CLIN: HCPCS | Performed by: PSYCHIATRY & NEUROLOGY

## 2024-04-04 PROCEDURE — 99214 OFFICE O/P EST MOD 30 MIN: CPT | Performed by: PSYCHIATRY & NEUROLOGY

## 2024-04-04 RX ORDER — TRAZODONE HYDROCHLORIDE 150 MG/1
150 TABLET ORAL NIGHTLY
Qty: 90 TABLET | Refills: 0 | Status: SHIPPED | OUTPATIENT
Start: 2024-04-04

## 2024-04-04 RX ORDER — ESCITALOPRAM OXALATE 20 MG/1
40 TABLET ORAL DAILY
Qty: 180 TABLET | Refills: 0 | Status: SHIPPED | OUTPATIENT
Start: 2024-04-04

## 2024-04-04 RX ORDER — LAMOTRIGINE 200 MG/1
200 TABLET ORAL DAILY
Qty: 90 TABLET | Refills: 0 | Status: SHIPPED | OUTPATIENT
Start: 2024-04-04

## 2024-04-04 NOTE — PROGRESS NOTES
time    --Nanci Beckford MD on 4/7/2024 at 8:57 AM    An electronic signature was used to authenticate this note.

## 2024-04-09 RX ORDER — CARIPRAZINE 3 MG/1
3 CAPSULE, GELATIN COATED ORAL DAILY
Qty: 90 CAPSULE | Refills: 0 | OUTPATIENT
Start: 2024-04-09

## 2024-05-06 ENCOUNTER — TELEPHONE (OUTPATIENT)
Dept: PSYCHIATRY | Age: 34
End: 2024-05-06

## 2024-05-06 NOTE — TELEPHONE ENCOUNTER
Barrie wrote into the office via [employer]; he labeled the message Trazodone:      I have noticed that when I take the normal dosage we previously had me taking was making me tired throughout the day so I been taking a half and it’s been working great!!

## 2024-05-08 NOTE — TELEPHONE ENCOUNTER
I am okay with him taking a smaller dose of the trazodone.   Electronically signed by Nanci Beckford MD on 5/8/2024 at 7:20 AM

## 2024-05-09 ENCOUNTER — TELEMEDICINE (OUTPATIENT)
Dept: PSYCHOLOGY | Age: 34
End: 2024-05-09
Payer: MEDICARE

## 2024-05-09 DIAGNOSIS — F43.10 PTSD (POST-TRAUMATIC STRESS DISORDER): Primary | ICD-10-CM

## 2024-05-09 PROCEDURE — 90837 PSYTX W PT 60 MINUTES: CPT | Performed by: PSYCHOLOGIST

## 2024-05-09 NOTE — PROGRESS NOTES
Behavioral Health Consultation  Nikunj Canales, PhD  Psychologist  5/10/2024       Time spent with Patient:  60 minutes  This is patient's fifth  [facility] appointment.    Reason for Consult:  anxiety and stress  Referring Provider: No referring provider defined for this encounter.    Pt provided informed consent for the behavioral health program. Discussed with patient model of service to include the limits of confidentiality (i.e. abuse reporting, suicide intervention, etc.) and short-term intervention focused approach.  Pt indicated understanding.  Feedback given to PCP.    Description:    MSE:    Appearance    cooperative  Appetite normal  Sleep disturbance No  Loss of pleasure No  Speech    normal rate, normal volume, and well articulated  Mood    Anxious  Affect    normal affect  Thought Content    intact  Insight    Fair  Judgment    Intact  Suicide Assessment    no suicidal ideation  Homicidal Assessment Intent No  Cutting No  Enuresis No  Learning Disorder none      History:    Medications:   Current Outpatient Medications   Medication Sig Dispense Refill    predniSONE (DELTASONE) 20 MG tablet Take 2 tablets by mouth daily for 10 days 20 tablet 0    benzonatate (TESSALON) 200 MG capsule Take 1 capsule by mouth 3 times daily as needed for Cough 30 capsule 0    fluticasone (FLONASE) 50 MCG/ACT nasal spray 2 sprays by Each Nostril route daily 16 g 0    pseudoephedrine-guaiFENesin (MUCINEX D)  MG per extended release tablet Take 1 tablet by mouth every 12 hours as needed for Congestion 14 tablet 1    cariprazine hcl (VRAYLAR) 4.5 MG CAPS capsule Take 1 capsule by mouth daily 90 capsule 0    lamoTRIgine (LAMICTAL) 200 MG tablet Take 1 tablet by mouth daily 90 tablet 0    escitalopram (LEXAPRO) 20 MG tablet Take 2 tablets by mouth daily 180 tablet 0    traZODone (DESYREL) 150 MG tablet Take 1 tablet by mouth nightly 90 tablet 0    ALPRAZolam (XANAX) 1 MG tablet Take 1 tablet by mouth 2 times daily as needed for

## 2024-05-10 ENCOUNTER — HOSPITAL ENCOUNTER (EMERGENCY)
Age: 34
Discharge: HOME | End: 2024-05-10
Payer: MEDICARE

## 2024-05-10 VITALS
SYSTOLIC BLOOD PRESSURE: 123 MMHG | HEART RATE: 72 BPM | WEIGHT: 226 LBS | BODY MASS INDEX: 31.64 KG/M2 | HEIGHT: 71 IN | OXYGEN SATURATION: 98 % | RESPIRATION RATE: 16 BRPM | DIASTOLIC BLOOD PRESSURE: 83 MMHG | TEMPERATURE: 98.8 F

## 2024-05-10 DIAGNOSIS — J06.9 UPPER RESPIRATORY TRACT INFECTION, UNSPECIFIED TYPE: Primary | ICD-10-CM

## 2024-05-10 PROCEDURE — 99214 OFFICE O/P EST MOD 30 MIN: CPT | Performed by: NURSE PRACTITIONER

## 2024-05-10 PROCEDURE — 99213 OFFICE O/P EST LOW 20 MIN: CPT

## 2024-05-10 RX ORDER — BENZONATATE 200 MG/1
200 CAPSULE ORAL 3 TIMES DAILY PRN
Qty: 30 CAPSULE | Refills: 0 | Status: SHIPPED | OUTPATIENT
Start: 2024-05-10 | End: 2024-05-17

## 2024-05-10 RX ORDER — GUAIFENESIN, PSEUDOEPHEDRINE HYDROCHLORIDE 600; 60 MG/1; MG/1
1 TABLET, EXTENDED RELEASE ORAL EVERY 12 HOURS PRN
Qty: 14 TABLET | Refills: 1 | Status: SHIPPED | OUTPATIENT
Start: 2024-05-10 | End: 2024-05-24

## 2024-05-10 RX ORDER — PREDNISONE 20 MG/1
40 TABLET ORAL DAILY
Qty: 20 TABLET | Refills: 0 | Status: SHIPPED | OUTPATIENT
Start: 2024-05-10 | End: 2024-05-20

## 2024-05-10 RX ORDER — FLUTICASONE PROPIONATE 50 MCG
2 SPRAY, SUSPENSION (ML) NASAL DAILY
Qty: 16 G | Refills: 0 | Status: SHIPPED | OUTPATIENT
Start: 2024-05-10

## 2024-05-10 ASSESSMENT — PAIN DESCRIPTION - ONSET: ONSET: GRADUAL

## 2024-05-10 ASSESSMENT — ENCOUNTER SYMPTOMS
DIARRHEA: 0
VOMITING: 0
CHEST TIGHTNESS: 0
SHORTNESS OF BREATH: 0
COUGH: 1
NAUSEA: 0
RHINORRHEA: 1
SORE THROAT: 1

## 2024-05-10 ASSESSMENT — PAIN DESCRIPTION - LOCATION: LOCATION: EAR

## 2024-05-10 ASSESSMENT — PAIN DESCRIPTION - FREQUENCY: FREQUENCY: CONTINUOUS

## 2024-05-10 ASSESSMENT — PAIN DESCRIPTION - ORIENTATION: ORIENTATION: RIGHT;LEFT

## 2024-05-10 ASSESSMENT — PAIN - FUNCTIONAL ASSESSMENT
PAIN_FUNCTIONAL_ASSESSMENT: 0-10
PAIN_FUNCTIONAL_ASSESSMENT: ACTIVITIES ARE NOT PREVENTED

## 2024-05-10 ASSESSMENT — PAIN DESCRIPTION - PAIN TYPE: TYPE: ACUTE PAIN

## 2024-05-10 ASSESSMENT — PAIN SCALES - GENERAL: PAINLEVEL_OUTOF10: 4

## 2024-05-10 NOTE — ED PROVIDER NOTES
[facility] URGENT CARE  Urgent Care Encounter       CHIEF COMPLAINT       Chief Complaint   Patient presents with    Cough    Otalgia     bilateral    URI       Nurses Notes reviewed and I agree except as noted in the HPI.  HISTORY OF PRESENT ILLNESS   Barrie Chavez Jr. is a 34 y.o. male who presents to the [facility] urgent care for evaluation of cough.  Reports his symptoms started yesterday and are progressively worsening.  Does report that his children have had URIs.  Reports congestion, rhinorrhea, postnasal drainage, and otalgia.  Denies fever or chills.  Denies fatigue or myalgia.    The history is provided by the patient. No  was used.       REVIEW OF SYSTEMS     Review of Systems   Constitutional:  Negative for activity change, appetite change, chills, fatigue and fever.   HENT:  Positive for congestion, ear pain, rhinorrhea and sore throat. Negative for ear discharge.    Respiratory:  Positive for cough. Negative for chest tightness and shortness of breath.    Cardiovascular:  Negative for chest pain.   Gastrointestinal:  Negative for diarrhea, nausea and vomiting.   Genitourinary:  Negative for dysuria.   Musculoskeletal:  Negative for myalgias.   Skin:  Negative for rash.   Allergic/Immunologic: Negative for environmental allergies and food allergies.   Neurological:  Negative for dizziness and headaches.       PAST MEDICAL HISTORY         Diagnosis Date    Asthma     Bipolar 1 disorder ([facility])     Nalini Bennett    Borderline personality disorder ([facility])     COPD (chronic obstructive pulmonary disease) ([facility])     early stages    Depression     Dissociative identity disorder ([facility])     10/18/21    GERD (gastroesophageal reflux disease)     History of cardiac murmur in childhood     Izwg-Pnpgo-Feqkknq disease, left     ( a rare condition in which the ball-shaped head of the thighbone (femoral head) temporarily loses its blood supply)    Lumbar disc disease      tenderness. Normal range of motion.      Cervical back: Normal range of motion.   Neurological:      General: No focal deficit present.      Mental Status: He is alert and oriented to person, place, and time.   Psychiatric:         Mood and Affect: Mood normal.         Behavior: Behavior normal.         DIAGNOSTIC RESULTS     Labs:No results found for this visit on 05/10/24.    IMAGING:    No orders to display         EKG: None      URGENT CARE COURSE:     Vitals:    05/10/24 0907   BP: 123/83   Pulse: 72   Resp: 16   Temp: 98.8 °F (37.1 °C)   TempSrc: Oral   SpO2: 98%   Weight: 102.5 kg (226 lb)   Height: 1.803 m (5' 11\")       Medications - No data to display         PROCEDURES:  None    FINAL IMPRESSION      1. Upper respiratory tract infection, unspecified type          DISPOSITION/ PLAN     Patient seen and evaluated for the above symptoms.  Assessment consistent with acute viral URI with cough.  I did discuss with patient that the symptoms are likely viral in nature and that antibiotics would not be effective at this time.  He is provided a prescription for prednisone, Flonase, Mucinex D, and Tessalon.  We did discuss that the symptoms are likely benign and self-limiting.  Symptoms may be present for up to 7 to 10 days.  Patient is encouraged to use over-the-counter Zyrtec, Flonase, and Mucinex D.  Can use over-the-counter cough suppressant.  Should have good hand hygiene and cover mouth when coughing.  Instructed use over-the-counter Tylenol and Motrin for pain or fever.  Should follow-up with PCP in 3 to 5 days and worsening symptoms.  Should present to the emergency department if symptoms worsen or other symptoms deemed emergent.  Patient is agreeable with the above plan and denies questions or concerns at this time.      PATIENT REFERRED TO:  Torsten Díaz MD  [address] / [facility] [address]      DISCHARGE MEDICATIONS:  New Prescriptions    BENZONATATE (TESSALON) 200 MG CAPSULE    Take 1 capsule

## 2024-05-10 NOTE — ED TRIAGE NOTES
Pt to [facility] ambulatory with a cough, runny nose, and bilateral ear pain.  This started yesterday.  No fevers.

## 2024-05-13 ENCOUNTER — HOSPITAL ENCOUNTER (EMERGENCY)
Age: 34
Discharge: HOME | End: 2024-05-13
Payer: MEDICARE

## 2024-05-13 VITALS
OXYGEN SATURATION: 97 % | HEIGHT: 71 IN | RESPIRATION RATE: 16 BRPM | BODY MASS INDEX: 31.64 KG/M2 | DIASTOLIC BLOOD PRESSURE: 77 MMHG | SYSTOLIC BLOOD PRESSURE: 126 MMHG | TEMPERATURE: 97 F | WEIGHT: 226 LBS | HEART RATE: 67 BPM

## 2024-05-13 DIAGNOSIS — J40 BRONCHITIS: Primary | ICD-10-CM

## 2024-05-13 LAB — S PYO AG THROAT QL: NEGATIVE

## 2024-05-13 PROCEDURE — 99214 OFFICE O/P EST MOD 30 MIN: CPT

## 2024-05-13 PROCEDURE — 99213 OFFICE O/P EST LOW 20 MIN: CPT

## 2024-05-13 PROCEDURE — 87651 STREP A DNA AMP PROBE: CPT

## 2024-05-13 RX ORDER — AMOXICILLIN AND CLAVULANATE POTASSIUM 875; 125 MG/1; MG/1
1 TABLET, FILM COATED ORAL 2 TIMES DAILY
Qty: 20 TABLET | Refills: 0 | Status: SHIPPED | OUTPATIENT
Start: 2024-05-13 | End: 2024-05-23

## 2024-05-13 ASSESSMENT — PAIN DESCRIPTION - DESCRIPTORS: DESCRIPTORS: ACHING

## 2024-05-13 ASSESSMENT — ENCOUNTER SYMPTOMS: SORE THROAT: 1

## 2024-05-13 ASSESSMENT — PAIN SCALES - GENERAL: PAINLEVEL_OUTOF10: 4

## 2024-05-13 ASSESSMENT — PAIN DESCRIPTION - LOCATION: LOCATION: THROAT

## 2024-05-13 ASSESSMENT — PAIN - FUNCTIONAL ASSESSMENT: PAIN_FUNCTIONAL_ASSESSMENT: 0-10

## 2024-05-13 NOTE — DISCHARGE INSTRUCTIONS
ATB as prescribed.  Continue prescribed medications.  Humidification use.   Increase water intake, frequent hand washing.  Tylenol / Ibuprofen as needed for fever and or pain.  Follow up with PCP in 3-5 days if no improvement or sooner with worsening symptoms.
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
## 2024-05-13 NOTE — ED TRIAGE NOTES
Pt seen 5/10/24 for symptoms cough and congestion 5/9/24 dx with URI given steroid and cough med. Pt states cough improving but has sore throat, headache and body aches.

## 2024-05-22 ENCOUNTER — TELEMEDICINE (OUTPATIENT)
Dept: PSYCHIATRY | Age: 34
End: 2024-05-22
Payer: MEDICARE

## 2024-05-22 DIAGNOSIS — F60.3 BORDERLINE PERSONALITY DISORDER (HCC): ICD-10-CM

## 2024-05-22 DIAGNOSIS — R41.840 ATTENTION DEFICIT: ICD-10-CM

## 2024-05-22 DIAGNOSIS — F41.0 PANIC ATTACKS: ICD-10-CM

## 2024-05-22 DIAGNOSIS — F41.1 GAD (GENERALIZED ANXIETY DISORDER): ICD-10-CM

## 2024-05-22 DIAGNOSIS — F29 PSYCHOSIS, UNSPECIFIED PSYCHOSIS TYPE (HCC): ICD-10-CM

## 2024-05-22 DIAGNOSIS — F43.10 PTSD (POST-TRAUMATIC STRESS DISORDER): Primary | ICD-10-CM

## 2024-05-22 PROCEDURE — G8427 DOCREV CUR MEDS BY ELIG CLIN: HCPCS | Performed by: PSYCHIATRY & NEUROLOGY

## 2024-05-22 PROCEDURE — 99214 OFFICE O/P EST MOD 30 MIN: CPT | Performed by: PSYCHIATRY & NEUROLOGY

## 2024-05-22 RX ORDER — LAMOTRIGINE 200 MG/1
200 TABLET ORAL DAILY
Qty: 90 TABLET | Refills: 0 | Status: SHIPPED | OUTPATIENT
Start: 2024-05-22

## 2024-05-22 RX ORDER — ALPRAZOLAM 2 MG/1
2 TABLET ORAL DAILY PRN
Qty: 30 TABLET | Refills: 1 | Status: SHIPPED | OUTPATIENT
Start: 2024-05-22 | End: 2024-07-21

## 2024-05-22 RX ORDER — TRAZODONE HYDROCHLORIDE 150 MG/1
150 TABLET ORAL NIGHTLY
Qty: 90 TABLET | Refills: 0 | Status: SHIPPED | OUTPATIENT
Start: 2024-05-22

## 2024-05-22 RX ORDER — ESCITALOPRAM OXALATE 20 MG/1
40 TABLET ORAL DAILY
Qty: 180 TABLET | Refills: 0 | Status: SHIPPED | OUTPATIENT
Start: 2024-05-22

## 2024-05-22 NOTE — PROGRESS NOTES
(real-time) audio-video encounter. The patient (or guardian if applicable) is aware that this is a billable service, which includes applicable co-pays. This Virtual Visit was conducted with patient's (and/or legal guardian's) consent. Patient identification was verified, and a caregiver was present when appropriate.   The patient was located at Home: [facility] [place] [address]  Provider was located at Home (Appt Dept State): [place]       Total time spent for this encounter: Not billed by time    --Nanci Beckford MD on 5/22/2024 at 4:26 PM    An electronic signature was used to authenticate this note.

## 2024-06-11 DIAGNOSIS — J30.2 SEASONAL ALLERGIES: ICD-10-CM

## 2024-06-11 DIAGNOSIS — K21.9 GASTROESOPHAGEAL REFLUX DISEASE WITHOUT ESOPHAGITIS: ICD-10-CM

## 2024-06-11 RX ORDER — PANTOPRAZOLE SODIUM 40 MG/1
40 TABLET, DELAYED RELEASE ORAL 2 TIMES DAILY WITH MEALS
Qty: 180 TABLET | Refills: 1 | Status: SHIPPED | OUTPATIENT
Start: 2024-06-11

## 2024-06-11 RX ORDER — MONTELUKAST SODIUM 10 MG/1
10 TABLET ORAL DAILY
Qty: 30 TABLET | Refills: 3 | Status: SHIPPED | OUTPATIENT
Start: 2024-06-11

## 2024-06-11 NOTE — TELEPHONE ENCOUNTER
Patient's last appointment was : 2/15/2024 with our   Patient's next appointment is : Visit date not found   Last refilled on: 12/18/2023  Which pharmacy does the script need sent to: [facility] [address]      No results found for: \"LABA1C\"  Lab Results   Component Value Date    CHOL 211 (H) 08/04/2020    TRIG 283 (H) 08/04/2020    HDL 29 08/04/2020     Lab Results   Component Value Date     01/30/2024    K 4.3 01/30/2024     01/30/2024    CO2 23 01/30/2024    BUN 10 01/30/2024    CREATININE 0.8 01/30/2024    GLUCOSE 136 (H) 01/30/2024    CALCIUM 9.6 01/30/2024    BILITOT 0.4 01/30/2024    ALKPHOS 50 01/30/2024    AST 17 01/30/2024    ALT 15 01/30/2024    LABGLOM >60 01/30/2024    GFRAA >60 10/19/2021     Lab Results   Component Value Date    TSH 1.880 08/04/2020     Lab Results   Component Value Date    WBC 8.9 01/30/2024    HGB 16.2 01/30/2024    HCT 48.9 01/30/2024    MCV 90.6 01/30/2024     01/30/2024

## 2024-06-11 NOTE — TELEPHONE ENCOUNTER
Patient's last appointment was : 2/15/2024 with our   Patient's next appointment is : No appointment found  Last refilled on: 12/11/2023  Which pharmacy does the script need sent to: [facility] [address]      No results found for: \"LABA1C\"  Lab Results   Component Value Date    CHOL 211 (H) 08/04/2020    TRIG 283 (H) 08/04/2020    HDL 29 08/04/2020     Lab Results   Component Value Date     01/30/2024    K 4.3 01/30/2024     01/30/2024    CO2 23 01/30/2024    BUN 10 01/30/2024    CREATININE 0.8 01/30/2024    GLUCOSE 136 (H) 01/30/2024    CALCIUM 9.6 01/30/2024    BILITOT 0.4 01/30/2024    ALKPHOS 50 01/30/2024    AST 17 01/30/2024    ALT 15 01/30/2024    LABGLOM >60 01/30/2024    GFRAA >60 10/19/2021     Lab Results   Component Value Date    TSH 1.880 08/04/2020     Lab Results   Component Value Date    WBC 8.9 01/30/2024    HGB 16.2 01/30/2024    HCT 48.9 01/30/2024    MCV 90.6 01/30/2024     01/30/2024

## 2024-06-21 RX ORDER — LORATADINE 10 MG/1
10 TABLET ORAL DAILY
Qty: 30 TABLET | Refills: 3 | Status: SHIPPED | OUTPATIENT
Start: 2024-06-21

## 2024-06-21 NOTE — TELEPHONE ENCOUNTER
Patient's last appointment was : 2/15/2024 with our   Patient's next appointment is : Visit date not found   Last refilled on: 02/26/2024  Which pharmacy does the script need sent to: [facility] [address]      No results found for: \"LABA1C\"  Lab Results   Component Value Date    CHOL 211 (H) 08/04/2020    TRIG 283 (H) 08/04/2020    HDL 29 08/04/2020     Lab Results   Component Value Date     01/30/2024    K 4.3 01/30/2024     01/30/2024    CO2 23 01/30/2024    BUN 10 01/30/2024    CREATININE 0.8 01/30/2024    GLUCOSE 136 (H) 01/30/2024    CALCIUM 9.6 01/30/2024    BILITOT 0.4 01/30/2024    ALKPHOS 50 01/30/2024    AST 17 01/30/2024    ALT 15 01/30/2024    LABGLOM >60 01/30/2024    GFRAA >60 10/19/2021     Lab Results   Component Value Date    TSH 1.880 08/04/2020     Lab Results   Component Value Date    WBC 8.9 01/30/2024    HGB 16.2 01/30/2024    HCT 48.9 01/30/2024    MCV 90.6 01/30/2024     01/30/2024

## 2024-07-03 RX ORDER — CARIPRAZINE 1.5 MG/1
1.5 CAPSULE, GELATIN COATED ORAL DAILY
Qty: 90 CAPSULE | Refills: 0 | OUTPATIENT
Start: 2024-07-03

## 2024-07-10 ENCOUNTER — TELEMEDICINE (OUTPATIENT)
Dept: PSYCHIATRY | Age: 34
End: 2024-07-10
Payer: MEDICARE

## 2024-07-10 DIAGNOSIS — F41.0 PANIC ATTACKS: ICD-10-CM

## 2024-07-10 DIAGNOSIS — F41.1 GAD (GENERALIZED ANXIETY DISORDER): ICD-10-CM

## 2024-07-10 DIAGNOSIS — F60.3 BORDERLINE PERSONALITY DISORDER (HCC): ICD-10-CM

## 2024-07-10 DIAGNOSIS — F29 PSYCHOSIS, UNSPECIFIED PSYCHOSIS TYPE (HCC): ICD-10-CM

## 2024-07-10 DIAGNOSIS — F43.10 PTSD (POST-TRAUMATIC STRESS DISORDER): Primary | ICD-10-CM

## 2024-07-10 DIAGNOSIS — R41.840 ATTENTION DEFICIT: ICD-10-CM

## 2024-07-10 PROCEDURE — G8427 DOCREV CUR MEDS BY ELIG CLIN: HCPCS | Performed by: PSYCHIATRY & NEUROLOGY

## 2024-07-10 PROCEDURE — 99213 OFFICE O/P EST LOW 20 MIN: CPT | Performed by: PSYCHIATRY & NEUROLOGY

## 2024-07-10 NOTE — PROGRESS NOTES
[facility] [place] [facility]  [facility]  [address] [facility] [address] [address] [phone]    Progress Note    Patient:  Barrie Chavez Jr.  YOB: 1990  PCP:  Torsten Díaz MD  Visit Date:  7/10/2024      Chief Complaint   Patient presents with    Follow-up    Medication Check    Anxiety    Panic Attack    Mood Swings       SUBJECTIVE:      Barrie Chavez Jr., a 34 y.o. male, presents for a follow up visit.      He presents with his dtr in the background.    Doing well. \"Good\".   \"Tired\" noting he is working a new job with early hours.   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   Mood \"really good\". Denies any outbursts.   Still some panic attacks. Uses the Xanax for that. 3-4 times per week. Doesn't use it each time.  Notes with his [employer] when working gets fears of abandonment, fears his wife will leave him. Paranoid.   She gave birth to their son on June 14. They are doing well.   Stopped using medical cannabis d/t  job.   Denies any alcohol use.   Sleep \"decent\". Baby sleeps with his wife and sleeps soundly. Youngset dtr \"absolute nightmare\" wakes up screaming with night terrors. He gets nightmares 2-3 times per week. If really tired won't dream at all.   Appetite is good.   Still hallucinations. Mostly hearing voices. Every day. Will ask if wife saying something. Muffled. No commands to hurt self or others. Sees shadow figures, not as often.  No SI.  Denies sedation with current Xanax dose.    Declines to make any medication changes today.       Med Trials:Chantix (nausea), trazodone, gabapentin, Ritalin, medical cannabis, Lamictal, Lexapro, Klonopin, Seroquel (no benefit, side effects), doxazosin, Risperdal (no benefit), Latuda (no benefit)    OBJECTIVE:  Vitals: There were no vitals taken for this visit.    MENTAL STATUS EXAM:    GENERAL  Build: Normal    Hygiene:  Appropriate, multiple piercings to lip, nose   SENSORIUM

## 2024-07-11 RX ORDER — TRAZODONE HYDROCHLORIDE 150 MG/1
150 TABLET ORAL NIGHTLY
Qty: 90 TABLET | Refills: 0 | Status: SHIPPED | OUTPATIENT
Start: 2024-07-11

## 2024-07-11 RX ORDER — ESCITALOPRAM OXALATE 20 MG/1
40 TABLET ORAL DAILY
Qty: 180 TABLET | Refills: 0 | Status: SHIPPED | OUTPATIENT
Start: 2024-07-11

## 2024-07-11 RX ORDER — LAMOTRIGINE 200 MG/1
200 TABLET ORAL DAILY
Qty: 90 TABLET | Refills: 0 | Status: SHIPPED | OUTPATIENT
Start: 2024-07-11

## 2024-07-11 RX ORDER — ALPRAZOLAM 2 MG/1
2 TABLET ORAL DAILY PRN
Qty: 90 TABLET | Refills: 0 | Status: SHIPPED | OUTPATIENT
Start: 2024-07-11 | End: 2024-10-09

## 2024-07-15 ENCOUNTER — OFFICE VISIT (OUTPATIENT)
Dept: FAMILY MEDICINE CLINIC | Age: 34
End: 2024-07-15
Payer: MEDICARE

## 2024-07-15 VITALS
WEIGHT: 225.2 LBS | DIASTOLIC BLOOD PRESSURE: 82 MMHG | BODY MASS INDEX: 31.53 KG/M2 | HEART RATE: 67 BPM | SYSTOLIC BLOOD PRESSURE: 130 MMHG | OXYGEN SATURATION: 98 % | RESPIRATION RATE: 12 BRPM | HEIGHT: 71 IN | TEMPERATURE: 97.8 F

## 2024-07-15 DIAGNOSIS — E43 UNSPECIFIED SEVERE PROTEIN-CALORIE MALNUTRITION (HCC): ICD-10-CM

## 2024-07-15 DIAGNOSIS — R19.7 DIARRHEA, UNSPECIFIED TYPE: Primary | ICD-10-CM

## 2024-07-15 DIAGNOSIS — R61 HYPERHIDROSIS: ICD-10-CM

## 2024-07-15 DIAGNOSIS — Z90.49 STATUS POST CHOLECYSTECTOMY: ICD-10-CM

## 2024-07-15 DIAGNOSIS — E44.0 MODERATE PROTEIN-CALORIE MALNUTRITION (HCC): ICD-10-CM

## 2024-07-15 DIAGNOSIS — K31.89 STOMACH SPASM: ICD-10-CM

## 2024-07-15 DIAGNOSIS — R06.00 DYSPNEA, UNSPECIFIED TYPE: ICD-10-CM

## 2024-07-15 PROCEDURE — 4004F PT TOBACCO SCREEN RCVD TLK: CPT

## 2024-07-15 PROCEDURE — 99214 OFFICE O/P EST MOD 30 MIN: CPT

## 2024-07-15 PROCEDURE — G8427 DOCREV CUR MEDS BY ELIG CLIN: HCPCS

## 2024-07-15 PROCEDURE — G8417 CALC BMI ABV UP PARAM F/U: HCPCS

## 2024-07-15 RX ORDER — DICYCLOMINE HYDROCHLORIDE 10 MG/1
10 CAPSULE ORAL DAILY PRN
Qty: 120 CAPSULE | Refills: 0 | Status: SHIPPED | OUTPATIENT
Start: 2024-07-15

## 2024-07-15 RX ORDER — OMEGA-3S/DHA/EPA/FISH OIL 1000-1400
1 CAPSULE,DELAYED RELEASE (ENTERIC COATED) ORAL DAILY
Qty: 30 TABLET | Refills: 1 | Status: SHIPPED | OUTPATIENT
Start: 2024-07-15 | End: 2024-08-14

## 2024-07-15 NOTE — PROGRESS NOTES
S: 34 y.o. male with   Chief Complaint   Patient presents with    Diarrhea     Pt states for the last couple days he has been trouble eating without his stomach getting upset    Discuss Medications     Pt would like to discuss medication for excessive sweating.       HPI: please see resident note for HPI and ROS.    BP Readings from Last 3 Encounters:   07/24/24 122/74   07/15/24 130/82   05/13/24 126/77     Wt Readings from Last 3 Encounters:   07/24/24 102.2 kg (225 lb 6.4 oz)   07/15/24 102.2 kg (225 lb 3.2 oz)   05/13/24 102.5 kg (226 lb)       O: VS:  height is 1.803 m (5' 10.98\") and weight is 102.2 kg (225 lb 3.2 oz). His oral temperature is 97.8 °F (36.6 °C). His blood pressure is 130/82 and his pulse is 67. His respiration is 12 and oxygen saturation is 98%.   AAO/NAD, appropriate affect for mood  CV:  RRR, no murmur  Resp: CTAB     Diagnosis Orders   1. Diarrhea, unspecified type  Ferritin    Copper, Serum    Folate    Iron    Vitamin B12    Vitamin B6    Vitamin B1    Vitamin A    Vitamin C    Vitamin D 25 Hydroxy    Zinc    Fiber Adult Gummies 2 g CHEW      2. Status post cholecystectomy  Ferritin    Copper, Serum    Folate    Iron    Vitamin B12    Vitamin B6    Vitamin B1    Vitamin A    Vitamin C    Vitamin D 25 Hydroxy    Zinc    Fiber Adult Gummies 2 g CHEW      3. Stomach spasm  dicyclomine (BENTYL) 10 MG capsule      4. Dyspnea, unspecified type  Echo (TTE) complete (PRN contrast/bubble/strain/3D)      5. Moderate protein-calorie malnutrition (HCC)  Ferritin      6. Unspecified severe protein-calorie malnutrition (HCC)  Iron    Vitamin D 25 Hydroxy      7. Hyperhidrosis  aluminum chloride (DRYSOL) 20 % external solution          Plan:  Please refer to resident note for full plan.    34 year old male presents to the office for concerns of diarrhea.     Patient been having chronic diarrhea status post laparoscopic cholecystectomy approximately 4 months ago with intermittent shortness of breath.

## 2024-07-15 NOTE — PROGRESS NOTES
Health Maintenance Due   Topic Date Due    Hepatitis B vaccine (1 of 3 - 3-dose series) Never done    COVID-19 Vaccine (1) Never done    Pneumococcal 0-64 years Vaccine (1 of 2 - PCV) Never done    HIV screen  Never done    Hepatitis C screen  Never done    Annual Wellness Visit (Medicare)  Never done    Diabetes screen  Never done    Depression Monitoring  06/14/2024

## 2024-07-15 NOTE — PATIENT INSTRUCTIONS
Thank you   Thank you for trusting us with your healthcare needs. You may receive a survey regarding today's visit. It would help us out if you would take a few moments to provide your feedback. We value your input.  Please bring in ALL medications BOTTLES, including inhalers, herbal supplements, over the counter, prescribed & non-prescribed medicine. The office would like actual medication bottles and a list.         4.  Prior to getting your labs drawn, check with your insurance company for benefits and eligibility of lab services.  Often, insurance companies cover certain tests for preventative visits only.  It is patient's responsibility to    see what is covered.    5.  If the list below has been completed, PLEASE FAX RECORDS TO OUR OFFICE @ [phone]. Once the records have been received we will update your records at our office:  Health Maintenance Due   Topic Date Due    Hepatitis B vaccine (1 of 3 - 3-dose series) Never done    COVID-19 Vaccine (1) Never done    Pneumococcal 0-64 years Vaccine (1 of 2 - PCV) Never done    HIV screen  Never done    Hepatitis C screen  Never done    Annual Wellness Visit (Medicare)  Never done    Diabetes screen  Never done    Depression Monitoring  06/14/2024

## 2024-07-15 NOTE — PROGRESS NOTES
Patient:Barrie Chavez Jr.  YOB: 1990  MRN: [id number]    Subjective   34 y.o. male who presents for the following: Diarrhea (Pt states for the last couple days he has been trouble eating without his stomach getting upset) and Discuss Medications (Pt would like to discuss medication for excessive sweating.)    HPI  Here for an acute visit.   Patient states that he has been expereicing pain even when he eats a little bit. He has a hx of lap nery.   Describes the pain as achy, especially 30 min after eating.   Patient also endorses having dairrhea even though lap nery was 5-6 months ago.     Hx of smoking  Previously smoked 1 1/2 quit may of 2022.    Of note patietns Dad had MI at age 50       Review of Systems   Review of Systems   Constitutional:  Negative for chills, diaphoresis and fever.        Sweating   HENT:  Negative for congestion.    Respiratory:  Negative for chest tightness, shortness of breath and wheezing.    Cardiovascular:  Negative for chest pain and leg swelling.   Gastrointestinal:  Positive for abdominal pain. Negative for constipation, diarrhea, nausea and vomiting.   Musculoskeletal:  Negative for arthralgias and myalgias.   Skin:  Negative for rash.   Neurological:  Negative for syncope, light-headedness and headaches.   Psychiatric/Behavioral: Negative.       Patient History    Past Medical History:  He has a past medical history of ADHD (attention deficit hyperactivity disorder), Asthma, Bipolar 1 disorder (HCC), Borderline personality disorder (HCC), Chronic back pain, COPD (chronic obstructive pulmonary disease) ([facility]), Depression, Dissociative identity disorder ([facility]), GERD (gastroesophageal reflux disease), Hearing loss, History of cardiac murmur in childhood, Kqrt-Sryyt-Epzslau disease, left, Lumbar disc disease, Multiple allergies, PTSD (post-traumatic stress disorder), Restless legs syndrome, Teeth missing, Under care of team, and Wellness examination.    Social

## 2024-07-24 ENCOUNTER — OFFICE VISIT (OUTPATIENT)
Dept: FAMILY MEDICINE CLINIC | Age: 34
End: 2024-07-24
Payer: MEDICARE

## 2024-07-24 VITALS
TEMPERATURE: 97.9 F | WEIGHT: 225.4 LBS | SYSTOLIC BLOOD PRESSURE: 122 MMHG | DIASTOLIC BLOOD PRESSURE: 74 MMHG | RESPIRATION RATE: 12 BRPM | HEART RATE: 60 BPM | HEIGHT: 71 IN | BODY MASS INDEX: 31.56 KG/M2 | OXYGEN SATURATION: 97 %

## 2024-07-24 DIAGNOSIS — Z86.69 HISTORY OF IMPACTED EAR WAX: ICD-10-CM

## 2024-07-24 DIAGNOSIS — R19.7 DIARRHEA, UNSPECIFIED TYPE: ICD-10-CM

## 2024-07-24 DIAGNOSIS — K52.9 CHRONIC DIARRHEA: ICD-10-CM

## 2024-07-24 DIAGNOSIS — J45.909 ASTHMA, UNSPECIFIED ASTHMA SEVERITY, UNSPECIFIED WHETHER COMPLICATED, UNSPECIFIED WHETHER PERSISTENT: ICD-10-CM

## 2024-07-24 DIAGNOSIS — J06.9 VIRAL URI: Primary | ICD-10-CM

## 2024-07-24 DIAGNOSIS — Z90.49 STATUS POST CHOLECYSTECTOMY: ICD-10-CM

## 2024-07-24 DIAGNOSIS — H61.23 BILATERAL IMPACTED CERUMEN: ICD-10-CM

## 2024-07-24 PROCEDURE — G8427 DOCREV CUR MEDS BY ELIG CLIN: HCPCS

## 2024-07-24 PROCEDURE — 1036F TOBACCO NON-USER: CPT

## 2024-07-24 PROCEDURE — 99214 OFFICE O/P EST MOD 30 MIN: CPT

## 2024-07-24 PROCEDURE — G8417 CALC BMI ABV UP PARAM F/U: HCPCS

## 2024-07-24 RX ORDER — BENZONATATE 200 MG/1
200 CAPSULE ORAL 3 TIMES DAILY PRN
Qty: 30 CAPSULE | Refills: 0 | Status: SHIPPED | OUTPATIENT
Start: 2024-07-24 | End: 2024-08-03

## 2024-07-24 RX ORDER — FLUTICASONE PROPIONATE AND SALMETEROL XINAFOATE 115; 21 UG/1; UG/1
2 AEROSOL, METERED RESPIRATORY (INHALATION) 2 TIMES DAILY
Qty: 1 EACH | Refills: 3 | Status: SHIPPED | OUTPATIENT
Start: 2024-07-24

## 2024-07-24 RX ORDER — GUAIFENESIN 600 MG/1
600 TABLET, EXTENDED RELEASE ORAL 2 TIMES DAILY
Qty: 30 TABLET | Refills: 0 | Status: SHIPPED | OUTPATIENT
Start: 2024-07-24 | End: 2024-08-08

## 2024-07-24 RX ORDER — CEPHALEXIN 500 MG/1
CAPSULE ORAL
COMMUNITY
Start: 2024-04-29 | End: 2024-07-24

## 2024-07-24 RX ORDER — ALBUTEROL SULFATE 90 UG/1
2 AEROSOL, METERED RESPIRATORY (INHALATION) 4 TIMES DAILY PRN
Qty: 18 G | Refills: 5 | Status: SHIPPED | OUTPATIENT
Start: 2024-07-24

## 2024-07-24 ASSESSMENT — ENCOUNTER SYMPTOMS
CONSTIPATION: 0
DIARRHEA: 0
ABDOMINAL PAIN: 1
WHEEZING: 0
SHORTNESS OF BREATH: 0
VOMITING: 0
CHEST TIGHTNESS: 0
NAUSEA: 0

## 2024-07-24 NOTE — PATIENT INSTRUCTIONS
If Guafenesin (Mucinex) is more expensive from the pharmacy, you can purchase Mucinex over the counter instead.

## 2024-07-24 NOTE — PROGRESS NOTES
S: 34 y.o. male with   Chief Complaint   Patient presents with    Nasal Congestion     Patient in office today w/o complaints of runny nose, vomiting and diarrhea. Patient states whole family is ill, his symptoms started Friday.      Reviewed hx and ROS in detail with resident prior to exam.  See notes for additional details.     BP Readings from Last 3 Encounters:   07/24/24 122/74   07/15/24 130/82   05/13/24 126/77     Wt Readings from Last 3 Encounters:   07/24/24 102.2 kg (225 lb 6.4 oz)   07/15/24 102.2 kg (225 lb 3.2 oz)   05/13/24 102.5 kg (226 lb)       O: VS:  height is 1.803 m (5' 10.98\") and weight is 102.2 kg (225 lb 6.4 oz). His oral temperature is 97.9 °F (36.6 °C). His blood pressure is 122/74 and his pulse is 60. His respiration is 12 and oxygen saturation is 97%.   AAO/NAD, appropriate affect for mood  Abd: mild ttp R mid abdomen and flank, no hepatomegaly appreciated     Diagnosis Orders   1. History of impacted ear wax        2. Bilateral impacted cerumen        3. Asthma, unspecified asthma severity, unspecified whether complicated, unspecified whether persistent          Health Maintenance Due   Topic Date Due    Hepatitis B vaccine (1 of 3 - 3-dose series) Never done    COVID-19 Vaccine (1) Never done    Pneumococcal 0-64 years Vaccine (1 of 2 - PCV) Never done    HIV screen  Never done    Hepatitis C screen  Never done    Annual Wellness Visit (Medicare)  Never done    Diabetes screen  Never done         Attending Physician Statement  I have discussed the case, including pertinent history and exam findings with the resident. I also have seen the patient and performed key portions of the examination.  I agree with the documented assessment and plan as documented by the resident.  GC modifier added to this encounter      Katelyn Ann Leopold, MD 7/24/2024 4:00 PM      
Normal   wall thickness. Normal wall motion. Normal diastolic function.    Tricuspid Valve: Mild regurgitation.    Image quality is adequate.      Patient Data     I have reviewed: active problem list, medication list, social history, health maintenance, notes from last encounter, notes from last 2 encounters, lab results    BP Readings from Last 3 Encounters:   07/30/24 135/89   07/29/24 135/89   07/24/24 122/74     Wt Readings from Last 3 Encounters:   07/30/24 102.1 kg (225 lb)   07/24/24 102.2 kg (225 lb 6.4 oz)   07/15/24 102.2 kg (225 lb 3.2 oz)     Immunization History   Administered Date(s) Administered    DTaP vaccine 1990, 04/27/1994, 09/07/1994, 03/15/1995    Hib vaccine 04/27/1994    MMR, PRIORIX, M-M-R II, (age 12m+), SC, 0.5mL 04/27/1994    Poliovirus, IPOL, (age 6w+), SC/IM, 0.5mL 1989, 04/27/1994, 09/07/1994, 03/15/1995    TDaP, ADACEL (age 10y-64y), BOOSTRIX (age 10y+), IM, 0.5mL 07/13/2023     Health Maintenance   Topic Date Due    Hepatitis B vaccine (1 of 3 - 3-dose series) Never done    COVID-19 Vaccine (1) Never done    Pneumococcal 0-64 years Vaccine (1 of 2 - PCV) Never done    HIV screen  Never done    Hepatitis C screen  Never done    Annual Wellness Visit (Medicare)  Never done    Diabetes screen  Never done    Flu vaccine (1) 08/01/2024    Depression Monitoring  07/15/2025    DTaP/Tdap/Td vaccine (6 - Td or Tdap) 07/13/2033    Hib vaccine  Completed    Polio vaccine  Completed    Hepatitis A vaccine  Aged Out    HPV vaccine  Aged Out    Meningococcal (ACWY) vaccine  Aged Out    Varicella vaccine  Discontinued     The ASCVD Risk score (David CARRASCO, et al., 2019) failed to calculate for the following reasons:    The 2019 ASCVD risk score is only valid for ages 40 to 79        7/15/2024     4:31 PM 6/14/2023    11:03 AM 1/28/2022    10:01 AM 9/28/2020     2:25 PM   PHQ Scores   PHQ2 Score 6  5 4   PHQ9 Score 18 10 21 17     Interpretation of Total Score Depression Severity: 1-4 =

## 2024-07-29 ENCOUNTER — HOSPITAL ENCOUNTER (EMERGENCY)
Age: 34
Discharge: HOME | End: 2024-07-29
Payer: MEDICARE

## 2024-07-29 VITALS
RESPIRATION RATE: 18 BRPM | OXYGEN SATURATION: 97 % | SYSTOLIC BLOOD PRESSURE: 135 MMHG | HEART RATE: 86 BPM | DIASTOLIC BLOOD PRESSURE: 89 MMHG | TEMPERATURE: 97.4 F

## 2024-07-29 DIAGNOSIS — R19.7 NAUSEA, VOMITING AND DIARRHEA: Primary | ICD-10-CM

## 2024-07-29 DIAGNOSIS — R11.2 NAUSEA, VOMITING AND DIARRHEA: Primary | ICD-10-CM

## 2024-07-29 PROCEDURE — 99213 OFFICE O/P EST LOW 20 MIN: CPT

## 2024-07-29 PROCEDURE — 99213 OFFICE O/P EST LOW 20 MIN: CPT | Performed by: NURSE PRACTITIONER

## 2024-07-29 RX ORDER — ONDANSETRON 4 MG/1
4 TABLET, ORALLY DISINTEGRATING ORAL EVERY 8 HOURS PRN
Qty: 20 TABLET | Refills: 0 | Status: SHIPPED | OUTPATIENT
Start: 2024-07-29 | End: 2024-08-05

## 2024-07-29 ASSESSMENT — ENCOUNTER SYMPTOMS
NAUSEA: 1
VOMITING: 1
DIARRHEA: 1

## 2024-07-29 ASSESSMENT — PAIN - FUNCTIONAL ASSESSMENT: PAIN_FUNCTIONAL_ASSESSMENT: NONE - DENIES PAIN

## 2024-07-29 NOTE — ED TRIAGE NOTES
To room with c/o diarrhea x 1 week. Seen by PCP and ordered blood tests with stool sample but he has not completed. Vomiting started yesterday.

## 2024-07-29 NOTE — ED PROVIDER NOTES
prescribed.     Encourage oral fluids hydration.    Rest.     Report to the ED with any new or worsening abdominal pain.         PATIENT REFERRED TO:  Torsten Díaz MD  [facility] [address] [address]  [phone]      As needed, If symptoms worsen    DISCHARGE MEDICATIONS:  Discharge Medication List as of 7/29/2024  4:04 PM        START taking these medications    Details   ondansetron (ZOFRAN-ODT) 4 MG disintegrating tablet Place 1 tablet under the tongue every 8 hours as needed for Nausea or Vomiting May Sub regular tablet (non-ODT) if insurance does not cover ODT., Disp-20 tablet, R-0Normal           Discharge Medication List as of 7/29/2024  4:04 PM          LEYDI Farias - Geena Sewell APRN - CNP  08/02/24 [id number]

## 2024-07-29 NOTE — DISCHARGE INSTRUCTIONS
Medications as prescribed.    Please follow with primary care provider as needed.    Complete your outpatient testing.    Report to the ER with new or severe symptoms.

## 2024-07-29 NOTE — ED TRIAGE NOTES
Vomiting x 4 in last 24 hrs. Diarrhea x 5 in last 24 hrs. Sent home from work today. Needs nausea med and work note today.

## 2024-07-30 ENCOUNTER — HOSPITAL ENCOUNTER (OUTPATIENT)
Age: 34
Discharge: HOME | End: 2024-08-01
Payer: MEDICARE

## 2024-07-30 VITALS
WEIGHT: 225 LBS | BODY MASS INDEX: 32.21 KG/M2 | DIASTOLIC BLOOD PRESSURE: 89 MMHG | HEIGHT: 70 IN | SYSTOLIC BLOOD PRESSURE: 135 MMHG

## 2024-07-30 DIAGNOSIS — R06.00 DYSPNEA, UNSPECIFIED TYPE: ICD-10-CM

## 2024-07-30 LAB
ECHO AV CUSP MM: 2.3 CM
ECHO AV PEAK GRADIENT: 7 MMHG
ECHO AV PEAK VELOCITY: 1.4 M/S
ECHO AV VELOCITY RATIO: 0.71
ECHO BSA: 2.24 M2
ECHO EST RA PRESSURE: 5 MMHG
ECHO LA AREA 2C: 12.6 CM2
ECHO LA AREA 4C: 13.5 CM2
ECHO LA DIAMETER INDEX: 1.6 CM/M2
ECHO LA DIAMETER: 3.5 CM
ECHO LA MAJOR AXIS: 4.8 CM
ECHO LA MINOR AXIS: 4.3 CM
ECHO LA VOL BP: 32 ML (ref 18–58)
ECHO LA VOL MOD A2C: 30 ML (ref 18–58)
ECHO LA VOL MOD A4C: 30 ML (ref 18–58)
ECHO LA VOL/BSA BIPLANE: 15 ML/M2 (ref 16–34)
ECHO LA VOLUME INDEX MOD A2C: 14 ML/M2 (ref 16–34)
ECHO LA VOLUME INDEX MOD A4C: 14 ML/M2 (ref 16–34)
ECHO LV E' LATERAL VELOCITY: 8 CM/S
ECHO LV E' SEPTAL VELOCITY: 7 CM/S
ECHO LV FRACTIONAL SHORTENING: 33 % (ref 28–44)
ECHO LV INTERNAL DIMENSION DIASTOLE INDEX: 2.19 CM/M2
ECHO LV INTERNAL DIMENSION DIASTOLIC: 4.8 CM (ref 4.2–5.9)
ECHO LV INTERNAL DIMENSION SYSTOLIC INDEX: 1.46 CM/M2
ECHO LV INTERNAL DIMENSION SYSTOLIC: 3.2 CM
ECHO LV ISOVOLUMETRIC RELAXATION TIME (IVRT): 67 MS
ECHO LV IVSD: 0.8 CM (ref 0.6–1)
ECHO LV MASS 2D: 126.7 G (ref 88–224)
ECHO LV MASS INDEX 2D: 57.8 G/M2 (ref 49–115)
ECHO LV POSTERIOR WALL DIASTOLIC: 0.8 CM (ref 0.6–1)
ECHO LV RELATIVE WALL THICKNESS RATIO: 0.33
ECHO LVOT PEAK GRADIENT: 4 MMHG
ECHO LVOT PEAK VELOCITY: 1 M/S
ECHO MV A VELOCITY: 0.71 M/S
ECHO MV E DECELERATION TIME (DT): 179 MS
ECHO MV E VELOCITY: 0.62 M/S
ECHO MV E/A RATIO: 0.87
ECHO MV E/E' LATERAL: 7.75
ECHO MV E/E' RATIO (AVERAGED): 8.3
ECHO MV E/E' SEPTAL: 8.86
ECHO PV MAX VELOCITY: 0.9 M/S
ECHO PV PEAK GRADIENT: 3 MMHG
ECHO RIGHT VENTRICULAR SYSTOLIC PRESSURE (RVSP): 29 MMHG
ECHO RV INTERNAL DIMENSION: 2.9 CM
ECHO RV TAPSE: 2 CM (ref 1.7–?)
ECHO TV E WAVE: 0.5 M/S
ECHO TV REGURGITANT MAX VELOCITY: 2.45 M/S
ECHO TV REGURGITANT PEAK GRADIENT: 24 MMHG

## 2024-07-30 PROCEDURE — 93306 TTE W/DOPPLER COMPLETE: CPT

## 2024-07-30 PROCEDURE — 93306 TTE W/DOPPLER COMPLETE: CPT | Performed by: INTERNAL MEDICINE

## 2024-07-31 ASSESSMENT — ENCOUNTER SYMPTOMS
COUGH: 1
SHORTNESS OF BREATH: 0
SINUS PRESSURE: 1
BLOOD IN STOOL: 0
ABDOMINAL PAIN: 1
DIARRHEA: 0
NAUSEA: 1
WHEEZING: 0
RHINORRHEA: 1
SORE THROAT: 0
EYE ITCHING: 0
VOMITING: 1

## 2024-08-02 ENCOUNTER — TELEMEDICINE (OUTPATIENT)
Dept: PSYCHOLOGY | Age: 34
End: 2024-08-02
Payer: MEDICARE

## 2024-08-02 DIAGNOSIS — F43.10 PTSD (POST-TRAUMATIC STRESS DISORDER): Primary | ICD-10-CM

## 2024-08-02 PROCEDURE — 90832 PSYTX W PT 30 MINUTES: CPT | Performed by: PSYCHOLOGIST

## 2024-08-02 NOTE — PROGRESS NOTES
Behavioral Health Consultation  Nikunj Canales, PhD  Psychologist  8/2/2024       Time spent with Patient:  30 minutes  This is patient's sixth  [facility] appointment.    Reason for Consult:  anxiety and stress  Referring Provider: No referring provider defined for this encounter.    Pt provided informed consent for the behavioral health program. Discussed with patient model of service to include the limits of confidentiality (i.e. abuse reporting, suicide intervention, etc.) and short-term intervention focused approach.  Pt indicated understanding.  Feedback given to PCP.    Description:    MSE:    Appearance    cooperative  Appetite normal  Sleep disturbance No  Loss of pleasure No  Speech    normal rate, normal volume, and well articulated  Mood    Anxious  Affect    anxiety  Thought Content    intact  Insight    Fair  Judgment    Intact  Suicide Assessment    no suicidal ideation  Homicidal Assessment Intent No  Cutting No  Enuresis No  Learning Disorder none      History:    Medications:   Current Outpatient Medications   Medication Sig Dispense Refill    ondansetron (ZOFRAN-ODT) 4 MG disintegrating tablet Place 1 tablet under the tongue every 8 hours as needed for Nausea or Vomiting May Sub regular tablet (non-ODT) if insurance does not cover ODT. 20 tablet 0    carbamide peroxide (DEBROX) 6.5 % otic solution Use as directed. (Patient not taking: Reported on 7/29/2024) 15 mL 2    fluticasone-salmeterol (ADVAIR HFA) 115-21 MCG/ACT inhaler Inhale 2 puffs into the lungs 2 times daily 1 each 3    albuterol sulfate HFA (VENTOLIN HFA) 108 (90 Base) MCG/ACT inhaler Inhale 2 puffs into the lungs 4 times daily as needed for Wheezing 18 g 5    benzonatate (TESSALON) 200 MG capsule Take 1 capsule by mouth 3 times daily as needed for Cough 30 capsule 0    guaiFENesin (MUCINEX) 600 MG extended release tablet Take 1 tablet by mouth 2 times daily for 15 days 30 tablet 0    dicyclomine (BENTYL) 10 MG capsule Take 1 capsule by

## 2024-08-12 DIAGNOSIS — F41.0 PANIC ATTACKS: ICD-10-CM

## 2024-08-12 NOTE — TELEPHONE ENCOUNTER
Barrie Chavez Jr. is requesting a refill on the following medications:  Requested Prescriptions     Pending Prescriptions Disp Refills    ALPRAZolam (XANAX) 2 MG tablet 90 tablet 0     Sig: Take 1 tablet by mouth daily as needed for Anxiety (panic) for up to 90 days. Max Daily Amount: 2 mg     *Previously used [facility] pharmacy.     Date of last visit: 7/10/2024  Date of next visit (if applicable):9/23/2024  Pharmacy Name: Dilcia Valladares MA

## 2024-08-13 DIAGNOSIS — G25.81 RESTLESS LEG: ICD-10-CM

## 2024-08-13 DIAGNOSIS — K21.9 GASTROESOPHAGEAL REFLUX DISEASE WITHOUT ESOPHAGITIS: ICD-10-CM

## 2024-08-13 DIAGNOSIS — J30.2 SEASONAL ALLERGIES: ICD-10-CM

## 2024-08-13 DIAGNOSIS — F41.0 PANIC ATTACKS: ICD-10-CM

## 2024-08-13 RX ORDER — LAMOTRIGINE 200 MG/1
200 TABLET ORAL DAILY
Qty: 30 TABLET | Refills: 2 | Status: SHIPPED | OUTPATIENT
Start: 2024-08-13

## 2024-08-13 RX ORDER — LORATADINE 10 MG/1
10 TABLET ORAL DAILY
Qty: 30 TABLET | Refills: 3 | Status: SHIPPED | OUTPATIENT
Start: 2024-08-13

## 2024-08-13 RX ORDER — ESCITALOPRAM OXALATE 20 MG/1
40 TABLET ORAL DAILY
Qty: 60 TABLET | Refills: 2 | Status: SHIPPED | OUTPATIENT
Start: 2024-08-13

## 2024-08-13 RX ORDER — MONTELUKAST SODIUM 10 MG/1
10 TABLET ORAL DAILY
Qty: 30 TABLET | Refills: 3 | Status: SHIPPED | OUTPATIENT
Start: 2024-08-13

## 2024-08-13 RX ORDER — PANTOPRAZOLE SODIUM 40 MG/1
40 TABLET, DELAYED RELEASE ORAL 2 TIMES DAILY WITH MEALS
Qty: 180 TABLET | Refills: 1 | Status: SHIPPED | OUTPATIENT
Start: 2024-08-13

## 2024-08-13 RX ORDER — ALPRAZOLAM 2 MG/1
2 TABLET ORAL DAILY PRN
Qty: 30 TABLET | Refills: 1 | Status: SHIPPED | OUTPATIENT
Start: 2024-08-13 | End: 2024-10-12

## 2024-08-13 RX ORDER — ALPRAZOLAM 2 MG
2 TABLET ORAL DAILY PRN
Qty: 30 TABLET | Refills: 1 | OUTPATIENT
Start: 2024-08-13 | End: 2024-10-12

## 2024-08-13 RX ORDER — TRAZODONE HYDROCHLORIDE 150 MG/1
150 TABLET ORAL NIGHTLY
Qty: 30 TABLET | Refills: 2 | Status: SHIPPED | OUTPATIENT
Start: 2024-08-13

## 2024-08-13 NOTE — TELEPHONE ENCOUNTER
Patient's last appointment was : 7/24/2024 with our   Patient's next appointment is : 8/13/2024 with our Dr.   Last refilled on: 06/11/2024  Which pharmacy does the script need sent to: [address]      No results found for: \"LABA1C\"  Lab Results   Component Value Date    CHOL 211 (H) 08/04/2020    TRIG 283 (H) 08/04/2020    HDL 29 08/04/2020     Lab Results   Component Value Date     01/30/2024    K 4.3 01/30/2024     01/30/2024    CO2 23 01/30/2024    BUN 10 01/30/2024    CREATININE 0.8 01/30/2024    GLUCOSE 136 (H) 01/30/2024    CALCIUM 9.6 01/30/2024    BILITOT 0.4 01/30/2024    ALKPHOS 50 01/30/2024    AST 17 01/30/2024    ALT 15 01/30/2024    LABGLOM >60 01/30/2024    GFRAA >60 10/19/2021     Lab Results   Component Value Date    TSH 1.880 08/04/2020     Lab Results   Component Value Date    WBC 8.9 01/30/2024    HGB 16.2 01/30/2024    HCT 48.9 01/30/2024    MCV 90.6 01/30/2024     01/30/2024

## 2024-08-13 NOTE — TELEPHONE ENCOUNTER
Barrie is requesting several medication refills via [employer]ing:    Lexapro 20mg;#60 with 0 refills, Lamictal 200mg;#30 with 0 refills and Trazodone 150mg;#30 with 0 refills; all last sent on 07/11/24. *He had transferred pharmacies due to [facility] closing.    Medications are pending your approval for a 30 day supply with 0 refills; he is scheduled to return on 09/23/24. Last seen on 07/10/24.

## 2024-08-13 NOTE — TELEPHONE ENCOUNTER
Patient's last appointment was : 7/24/2024 with our   Patient's next appointment is : 8/13/2024 with our Dr.   Last refilled on:   Which pharmacy does the script need sent to: [address]      No results found for: \"LABA1C\"  Lab Results   Component Value Date    CHOL 211 (H) 08/04/2020    TRIG 283 (H) 08/04/2020    HDL 29 08/04/2020     Lab Results   Component Value Date     01/30/2024    K 4.3 01/30/2024     01/30/2024    CO2 23 01/30/2024    BUN 10 01/30/2024    CREATININE 0.8 01/30/2024    GLUCOSE 136 (H) 01/30/2024    CALCIUM 9.6 01/30/2024    BILITOT 0.4 01/30/2024    ALKPHOS 50 01/30/2024    AST 17 01/30/2024    ALT 15 01/30/2024    LABGLOM >60 01/30/2024    GFRAA >60 10/19/2021     Lab Results   Component Value Date    TSH 1.880 08/04/2020     Lab Results   Component Value Date    WBC 8.9 01/30/2024    HGB 16.2 01/30/2024    HCT 48.9 01/30/2024    MCV 90.6 01/30/2024     01/30/2024

## 2024-08-13 NOTE — TELEPHONE ENCOUNTER
Patient's last appointment was : 7/24/2024 with our   Patient's next appointment is : Visit date not found with our DrReed   Last refilled on: 11/27/2023  Which pharmacy does the script need sent to: [address]      No results found for: \"LABA1C\"  Lab Results   Component Value Date    CHOL 211 (H) 08/04/2020    TRIG 283 (H) 08/04/2020    HDL 29 08/04/2020     Lab Results   Component Value Date     01/30/2024    K 4.3 01/30/2024     01/30/2024    CO2 23 01/30/2024    BUN 10 01/30/2024    CREATININE 0.8 01/30/2024    GLUCOSE 136 (H) 01/30/2024    CALCIUM 9.6 01/30/2024    BILITOT 0.4 01/30/2024    ALKPHOS 50 01/30/2024    AST 17 01/30/2024    ALT 15 01/30/2024    LABGLOM >60 01/30/2024    GFRAA >60 10/19/2021     Lab Results   Component Value Date    TSH 1.880 08/04/2020     Lab Results   Component Value Date    WBC 8.9 01/30/2024    HGB 16.2 01/30/2024    HCT 48.9 01/30/2024    MCV 90.6 01/30/2024     01/30/2024

## 2024-08-29 RX ORDER — ROPINIROLE 1 MG/1
1 TABLET, FILM COATED ORAL NIGHTLY
Qty: 30 TABLET | Refills: 0 | Status: SHIPPED | OUTPATIENT
Start: 2024-08-29 | End: 2024-09-28

## 2024-08-29 NOTE — TELEPHONE ENCOUNTER
Changed to 30 days for now, because provider would like to discuss with patient(even via virtual visit) how medication is going and if we would trial a different medication. Please let me know if I can provide any further clarification. Thank you.

## 2024-09-23 ENCOUNTER — OFFICE VISIT (OUTPATIENT)
Dept: PSYCHIATRY | Age: 34
End: 2024-09-23
Payer: MEDICARE

## 2024-09-23 DIAGNOSIS — F41.0 PANIC ATTACKS: ICD-10-CM

## 2024-09-23 DIAGNOSIS — F43.10 PTSD (POST-TRAUMATIC STRESS DISORDER): Primary | ICD-10-CM

## 2024-09-23 DIAGNOSIS — F29 PSYCHOSIS, UNSPECIFIED PSYCHOSIS TYPE (HCC): ICD-10-CM

## 2024-09-23 DIAGNOSIS — F41.1 GAD (GENERALIZED ANXIETY DISORDER): ICD-10-CM

## 2024-09-23 DIAGNOSIS — F60.3 BORDERLINE PERSONALITY DISORDER (HCC): ICD-10-CM

## 2024-09-23 PROCEDURE — 99214 OFFICE O/P EST MOD 30 MIN: CPT | Performed by: PSYCHIATRY & NEUROLOGY

## 2024-09-23 PROCEDURE — 1036F TOBACCO NON-USER: CPT | Performed by: PSYCHIATRY & NEUROLOGY

## 2024-09-23 PROCEDURE — G8427 DOCREV CUR MEDS BY ELIG CLIN: HCPCS | Performed by: PSYCHIATRY & NEUROLOGY

## 2024-09-23 PROCEDURE — G8417 CALC BMI ABV UP PARAM F/U: HCPCS | Performed by: PSYCHIATRY & NEUROLOGY

## 2024-09-23 RX ORDER — TRAZODONE HYDROCHLORIDE 150 MG/1
150 TABLET ORAL NIGHTLY
Qty: 90 TABLET | Refills: 0 | Status: SHIPPED | OUTPATIENT
Start: 2024-09-23

## 2024-09-23 RX ORDER — ALPRAZOLAM 2 MG
2 TABLET ORAL DAILY PRN
Qty: 30 TABLET | Refills: 1 | Status: SHIPPED | OUTPATIENT
Start: 2024-09-23 | End: 2024-11-22

## 2024-09-23 RX ORDER — ESCITALOPRAM OXALATE 20 MG/1
40 TABLET ORAL DAILY
Qty: 180 TABLET | Refills: 0 | Status: SHIPPED | OUTPATIENT
Start: 2024-09-23

## 2024-09-23 RX ORDER — LAMOTRIGINE 100 MG/1
250 TABLET ORAL DAILY
Qty: 225 TABLET | Refills: 0 | Status: SHIPPED | OUTPATIENT
Start: 2024-09-23

## 2024-10-01 DIAGNOSIS — G25.81 RESTLESS LEG: ICD-10-CM

## 2024-10-01 NOTE — TELEPHONE ENCOUNTER
Patient's last appointment was: 7/24/2024  with our   Patient's next appointment is: Visit date not found  with our DrReed   Last refilled on: 08/29  Which pharmacy does the script need sent to:   [facility] #[id number] - [place], [place] - [address]          No results found for: \"LABA1C\"  Lab Results   Component Value Date    CHOL 211 (H) 08/04/2020    TRIG 283 (H) 08/04/2020    HDL 29 08/04/2020     Lab Results   Component Value Date     01/30/2024    K 4.3 01/30/2024     01/30/2024    CO2 23 01/30/2024    BUN 10 01/30/2024    CREATININE 0.8 01/30/2024    GLUCOSE 136 (H) 01/30/2024    CALCIUM 9.6 01/30/2024    BILITOT 0.4 01/30/2024    ALKPHOS 50 01/30/2024    AST 17 01/30/2024    ALT 15 01/30/2024    LABGLOM >60 01/30/2024    GFRAA >60 10/19/2021     Lab Results   Component Value Date    TSH 1.880 08/04/2020     Lab Results   Component Value Date    WBC 8.9 01/30/2024    HGB 16.2 01/30/2024    HCT 48.9 01/30/2024    MCV 90.6 01/30/2024     01/30/2024

## 2024-10-02 RX ORDER — ROPINIROLE 1 MG/1
1 TABLET, FILM COATED ORAL NIGHTLY
Qty: 30 TABLET | Refills: 0 | Status: SHIPPED | OUTPATIENT
Start: 2024-10-02

## 2024-10-07 ENCOUNTER — TELEPHONE (OUTPATIENT)
Dept: PSYCHIATRY | Age: 34
End: 2024-10-07

## 2024-10-07 NOTE — TELEPHONE ENCOUNTER
How much trazodone is he using? We might consider a trazodone increase before trying a new sleep aid?  Electronically signed by Nanci Beckford MD on 10/7/2024 at 9:11 AM

## 2024-10-07 NOTE — TELEPHONE ENCOUNTER
Barrie wrote into the office via [employer]:    I haven’t been sleeping very well with the trazodone i was wondering if we could try something else for insomnia.     *Staff wrote back to clarify some symptoms; awaiting response. He is scheduled to return on 11/06/24. Seen last on 09/23/24.

## 2024-10-07 NOTE — TELEPHONE ENCOUNTER
He can try trazodone 200 mg before we change to an alternative. He can also try 250 mg and 300 mg dose if 200 mg ineffective.   Electronically signed by Nanci Beckford MD on 10/7/2024 at 11:39 AM

## 2024-10-07 NOTE — TELEPHONE ENCOUNTER
Barrie wrote back into the office via [employer]:    I’m literally getting 3-4 hours of sleep. It’s been going on for a couple weeks. There is no new stressors I actually just found out I got a better paying job so that was actually good news for me. I’m having trouble going to sleep and staying asleep. I was just wondering what my alternatives were. Thank you in advance.     Please advise.

## 2024-10-09 ENCOUNTER — TELEPHONE (OUTPATIENT)
Dept: PSYCHIATRY | Age: 34
End: 2024-10-09

## 2024-10-09 RX ORDER — DOXEPIN HYDROCHLORIDE 10 MG/ML
3-6 SOLUTION ORAL NIGHTLY
Qty: 20 ML | Refills: 2 | Status: SHIPPED | OUTPATIENT
Start: 2024-10-09

## 2024-10-09 NOTE — TELEPHONE ENCOUNTER
Barrie wrote into the office via [employer]:    I tried 300mg of trazodone last night which didn’t work I’ve had maybe 3 hrs of sleep. I was wondering what the next step would be I’m just worried about not sleeping once I start my job cause it’s 6a-6:30p.     Please advise; he is scheduled to return on 11/06/24.

## 2024-10-09 NOTE — TELEPHONE ENCOUNTER
Rx sent for doxepin 3-6 mg taken at night for sleep. I prefer to try the liquid which gives us a smaller dose which is indicated for sleep and has less side effects.   He should try stopping or quickly decreasing trazodone dose while he starts the doxepin.   Electronically signed by Nanci Beckford MD on 10/9/2024 at 1:01 PM

## 2024-10-09 NOTE — TELEPHONE ENCOUNTER
Barrie wrote into the office via [employer]:    I’m willing to try it. Does it only come in liquid? Either way I am open to try it     Please advise.

## 2024-10-09 NOTE — TELEPHONE ENCOUNTER
I recommend he consider a trial of doxepin for sleep, it comes in liquid. Let me know if he is agreeable and I'll send in Rx.   Electronically signed by Nanci Beckford MD on 10/9/2024 at 8:34 AM

## 2024-11-04 DIAGNOSIS — G25.81 RESTLESS LEG: ICD-10-CM

## 2024-11-04 RX ORDER — ROPINIROLE 1 MG/1
1 TABLET, FILM COATED ORAL NIGHTLY
Qty: 30 TABLET | Refills: 1 | Status: SHIPPED | OUTPATIENT
Start: 2024-11-04

## 2024-11-04 NOTE — TELEPHONE ENCOUNTER
Patient's last appointment was: 7/24/2024  with our   Patient's next appointment is: Visit date not found  with our DrReed   Last refilled on:  10/2/2024   Which pharmacy does the script need sent to: [facility] #[id number] - [place], [place] - [address]       No results found for: \"LABA1C\"  Lab Results   Component Value Date    CHOL 211 (H) 08/04/2020    TRIG 283 (H) 08/04/2020    HDL 29 08/04/2020     Lab Results   Component Value Date     01/30/2024    K 4.3 01/30/2024     01/30/2024    CO2 23 01/30/2024    BUN 10 01/30/2024    CREATININE 0.8 01/30/2024    GLUCOSE 136 (H) 01/30/2024    CALCIUM 9.6 01/30/2024    BILITOT 0.4 01/30/2024    ALKPHOS 50 01/30/2024    AST 17 01/30/2024    ALT 15 01/30/2024    LABGLOM >60 01/30/2024    GFRAA >60 10/19/2021     Lab Results   Component Value Date    TSH 1.880 08/04/2020     Lab Results   Component Value Date    WBC 8.9 01/30/2024    HGB 16.2 01/30/2024    HCT 48.9 01/30/2024    MCV 90.6 01/30/2024     01/30/2024

## 2024-11-06 ENCOUNTER — OFFICE VISIT (OUTPATIENT)
Dept: FAMILY MEDICINE CLINIC | Age: 34
End: 2024-11-06

## 2024-11-06 VITALS
RESPIRATION RATE: 12 BRPM | WEIGHT: 226.6 LBS | TEMPERATURE: 98.2 F | HEART RATE: 60 BPM | SYSTOLIC BLOOD PRESSURE: 126 MMHG | DIASTOLIC BLOOD PRESSURE: 82 MMHG | BODY MASS INDEX: 32.44 KG/M2 | OXYGEN SATURATION: 98 % | HEIGHT: 70 IN

## 2024-11-06 DIAGNOSIS — A08.4 VIRAL GASTROENTERITIS: Primary | ICD-10-CM

## 2024-11-06 DIAGNOSIS — R68.89 FLU-LIKE SYMPTOMS: ICD-10-CM

## 2024-11-06 ASSESSMENT — ENCOUNTER SYMPTOMS
RHINORRHEA: 0
VOMITING: 1
CONSTIPATION: 0
SHORTNESS OF BREATH: 0
NAUSEA: 0
DIARRHEA: 1
SORE THROAT: 1
WHEEZING: 0
COUGH: 1
BLOOD IN STOOL: 0
ABDOMINAL PAIN: 0
PHOTOPHOBIA: 0

## 2024-11-06 NOTE — PROGRESS NOTES
Health Maintenance Due   Topic Date Due    Pneumococcal 0-64 years Vaccine (1 of 2 - PCV) Never done    HIV screen  Never done    Hepatitis C screen  Never done    Hepatitis B vaccine (1 of 3 - 19+ 3-dose series) Never done    Annual Wellness Visit (Medicare)  Never done    Diabetes screen  Never done    Flu vaccine (1) Never done    COVID-19 Vaccine (1 - 2023-24 season) Never done       
S: 34 y.o. male with   Chief Complaint   Patient presents with    Cough     Diarrhea and vomiting started Saturday evening       Reviewed hx and ROS in detail with resident prior to exam.  See notes for additional details.     BP Readings from Last 3 Encounters:   11/06/24 126/82   07/30/24 135/89   07/29/24 135/89     Wt Readings from Last 3 Encounters:   11/06/24 102.8 kg (226 lb 9.6 oz)   07/30/24 102.1 kg (225 lb)   07/24/24 102.2 kg (225 lb 6.4 oz)           O: VS:  height is 1.778 m (5' 10\") and weight is 102.8 kg (226 lb 9.6 oz). His oral temperature is 98.2 °F (36.8 °C). His blood pressure is 126/82 and his pulse is 60. His respiration is 12 and oxygen saturation is 98%.       Health Maintenance Due   Topic Date Due    Pneumococcal 0-64 years Vaccine (1 of 2 - PCV) Never done    HIV screen  Never done    Hepatitis C screen  Never done    Hepatitis B vaccine (1 of 3 - 19+ 3-dose series) Never done    Annual Wellness Visit (Medicare)  Never done    Diabetes screen  Never done    Flu vaccine (1) Never done    COVID-19 Vaccine (1 - 2023-24 season) Never done         Attending Physician Statement  I have discussed the case, including pertinent history and exam findings with the resident.  I agree with the documented assessment and plan as documented by the resident.  GC modifier added to this encounter      Katelyn Ann Leopold, MD 11/6/2024 4:42 PM      
 Pulse 60   Temp 98.2 °F (36.8 °C) (Oral)   Resp 12   Ht 1.778 m (5' 10\")   Wt 102.8 kg (226 lb 9.6 oz)   SpO2 98%   BMI 32.51 kg/m²   Body mass index is 32.51 kg/m².  BP Readings from Last 3 Encounters:   11/06/24 126/82   07/30/24 135/89   07/29/24 135/89         Physical Exam  Constitutional:       General: He is not in acute distress.     Appearance: Normal appearance. He is obese.   HENT:      Head: Normocephalic and atraumatic.      Right Ear: Tympanic membrane, ear canal and external ear normal.      Left Ear: Tympanic membrane, ear canal and external ear normal.      Nose: Nose normal. No congestion.      Mouth/Throat:      Mouth: Mucous membranes are moist.      Pharynx: Oropharynx is clear. No oropharyngeal exudate.   Eyes:      General: No scleral icterus.     Extraocular Movements: Extraocular movements intact.      Conjunctiva/sclera: Conjunctivae normal.   Cardiovascular:      Rate and Rhythm: Normal rate and regular rhythm.      Heart sounds: Normal heart sounds. No murmur heard.     No friction rub. No gallop.   Pulmonary:      Effort: Pulmonary effort is normal. No respiratory distress.      Breath sounds: Normal breath sounds. No wheezing, rhonchi or rales.   Abdominal:      General: Abdomen is flat. There is no distension.      Palpations: Abdomen is soft.      Tenderness: There is no abdominal tenderness. There is no right CVA tenderness, left CVA tenderness or guarding.   Musculoskeletal:         General: Normal range of motion.      Cervical back: Normal range of motion.      Right lower leg: No edema.      Left lower leg: No edema.   Skin:     General: Skin is warm and dry.   Neurological:      General: No focal deficit present.      Mental Status: He is alert and oriented to person, place, and time.      Motor: No weakness.      Gait: Gait normal.      Deep Tendon Reflexes: Reflexes normal.         No results found for this visit on 11/06/24.    No results found for: \"LABA1C\"    Lab

## 2024-11-06 NOTE — PATIENT INSTRUCTIONS
Thank you   Thank you for trusting us with your healthcare needs. You may receive a survey regarding today's visit. It would help us out if you would take a few moments to provide your feedback. We value your input.  Please bring in ALL medications BOTTLES, including inhalers, herbal supplements, over the counter, prescribed & non-prescribed medicine. The office would like actual medication bottles and a list.         4.  Prior to getting your labs drawn, check with your insurance company for benefits and eligibility of lab services.  Often, insurance companies cover certain tests for preventative visits only.  It is patient's responsibility to    see what is covered.    5.  If the list below has been completed, PLEASE FAX RECORDS TO OUR OFFICE @ [phone]. Once the records have been received we will update your records at our office:  Health Maintenance Due   Topic Date Due    Pneumococcal 0-64 years Vaccine (1 of 2 - PCV) Never done    HIV screen  Never done    Hepatitis C screen  Never done    Hepatitis B vaccine (1 of 3 - 19+ 3-dose series) Never done    Annual Wellness Visit (Medicare)  Never done    Diabetes screen  Never done    Flu vaccine (1) Never done    COVID-19 Vaccine (1 - 2023-24 season) Never done

## 2024-11-07 DIAGNOSIS — J30.2 SEASONAL ALLERGIES: ICD-10-CM

## 2024-11-07 NOTE — TELEPHONE ENCOUNTER
Patient's last appointment was: 11/6/2024  with our   Patient's next appointment is: Visit date not found  with our DrReed   Last refilled on: 11/4/2024   Which pharmacy does the script need sent to:   [facility] #[id number] - [place], [place] - [address] -         No results found for: \"LABA1C\"  Lab Results   Component Value Date    CHOL 211 (H) 08/04/2020    TRIG 283 (H) 08/04/2020    HDL 29 08/04/2020     Lab Results   Component Value Date     01/30/2024    K 4.3 01/30/2024     01/30/2024    CO2 23 01/30/2024    BUN 10 01/30/2024    CREATININE 0.8 01/30/2024    GLUCOSE 136 (H) 01/30/2024    CALCIUM 9.6 01/30/2024    BILITOT 0.4 01/30/2024    ALKPHOS 50 01/30/2024    AST 17 01/30/2024    ALT 15 01/30/2024    LABGLOM >60 01/30/2024    GFRAA >60 10/19/2021     Lab Results   Component Value Date    TSH 1.880 08/04/2020     Lab Results   Component Value Date    WBC 8.9 01/30/2024    HGB 16.2 01/30/2024    HCT 48.9 01/30/2024    MCV 90.6 01/30/2024     01/30/2024

## 2024-11-10 RX ORDER — MONTELUKAST SODIUM 10 MG/1
10 TABLET ORAL DAILY
Qty: 30 TABLET | Refills: 3 | Status: SHIPPED | OUTPATIENT
Start: 2024-11-10

## 2024-11-15 ENCOUNTER — TELEPHONE (OUTPATIENT)
Dept: PSYCHIATRY | Age: 34
End: 2024-11-15

## 2024-11-15 NOTE — TELEPHONE ENCOUNTER
We can provide the letter he is requesting.   Electronically signed by Nanci Beckford MD on 11/15/2024 at 11:09 AM

## 2024-11-15 NOTE — TELEPHONE ENCOUNTER
Patient called to check on status of this request. He was advised provider is out of the office on Friday's and a message has been sent to her for review. Patient will also reach out to PCP to see if they may be willing to assist with a work note. Please advise.

## 2024-11-15 NOTE — TELEPHONE ENCOUNTER
Barrie wrote into the office via [employer]:    I was wondering if I could get a note to cover me from being off there 12th-15th I have had some serious mental issues that have stopped me from going to work the past few days with panic attacks due to children being sick and household stress if possible it would be greatly appreciated. I’d like to return to work on the 16th but can’t without a drs note.     Please advise; he was last seen on 09/23/24 and is scheduled to return on 12/12/24.

## 2024-11-20 ENCOUNTER — OFFICE VISIT (OUTPATIENT)
Dept: FAMILY MEDICINE CLINIC | Age: 34
End: 2024-11-20

## 2024-11-20 VITALS
OXYGEN SATURATION: 97 % | BODY MASS INDEX: 32.41 KG/M2 | SYSTOLIC BLOOD PRESSURE: 118 MMHG | WEIGHT: 226.4 LBS | HEART RATE: 64 BPM | TEMPERATURE: 97.6 F | RESPIRATION RATE: 12 BRPM | DIASTOLIC BLOOD PRESSURE: 70 MMHG | HEIGHT: 70 IN

## 2024-11-20 DIAGNOSIS — H66.002 NON-RECURRENT ACUTE SUPPURATIVE OTITIS MEDIA OF LEFT EAR WITHOUT SPONTANEOUS RUPTURE OF TYMPANIC MEMBRANE: Primary | ICD-10-CM

## 2024-11-20 ASSESSMENT — ENCOUNTER SYMPTOMS
DIARRHEA: 0
SINUS PRESSURE: 1
COUGH: 1
ABDOMINAL PAIN: 0
BLOOD IN STOOL: 0
RHINORRHEA: 1
VOMITING: 0
CONSTIPATION: 0
NAUSEA: 0
SORE THROAT: 0
SHORTNESS OF BREATH: 0

## 2024-11-20 NOTE — PATIENT INSTRUCTIONS
Thank you   Thank you for trusting us with your healthcare needs. You may receive a survey regarding today's visit. It would help us out if you would take a few moments to provide your feedback. We value your input.  Please bring in ALL medications BOTTLES, including inhalers, herbal supplements, over the counter, prescribed & non-prescribed medicine. The office would like actual medication bottles and a list.         4.  Prior to getting your labs drawn, check with your insurance company for benefits and eligibility of lab services.  Often, insurance companies cover certain tests for preventative visits only.  It is patient's responsibility to    see what is covered.    5.  If the list below has been completed, PLEASE FAX RECORDS TO OUR OFFICE @ [phone]. Once the records have been received we will update your records at our office:  Health Maintenance Due   Topic Date Due    Pneumococcal 0-64 years Vaccine (1 of 2 - PCV) Never done    HIV screen  Never done    Hepatitis C screen  Never done    Hepatitis B vaccine (1 of 3 - 19+ 3-dose series) Never done    Annual Wellness Visit (Medicare)  Never done    Diabetes screen  Never done    Flu vaccine (1) Never done    COVID-19 Vaccine (1 - 2023-24 season) Never done

## 2024-11-20 NOTE — PROGRESS NOTES
[facility] PROFESSION[facility]  [facility]  [address]  [facility] [address]  Dept: [phone]  Dept Fax: [phone]  Loc: [phone]      Barrie Chavez Jr. is a 34 y.o. male who presents today for:  Chief Complaint   Patient presents with    Ear Pain     Left ear pain that goes down Left side of neck that started Saturday also will need letter for work       HPI:   Barrie Chavez Jr. is 34 y.o. presents today for acute visit   Pt states that he is recovering from a viral illness last week but starting on Saturday he was starting to feel left ear pain that radiated to his left jaw. He has not tried anything for the pain because he states he was not sure what he should try. Does admit to sick contacts as his kids are also sick right now.   States that he tested himself for COVID this morning and were negative. Declines wanting to get tested today in office. Pt would like a work note to return to work Friday.    Objective:     Vitals:    11/20/24 1620   BP: 118/70   Pulse: 64   Resp: 12   Temp: 97.6 °F (36.4 °C)   SpO2: 97%         Wt Readings from Last 3 Encounters:   11/20/24 102.7 kg (226 lb 6.4 oz)   11/06/24 102.8 kg (226 lb 9.6 oz)   07/30/24 102.1 kg (225 lb)       BP Readings from Last 3 Encounters:   11/20/24 118/70   11/06/24 126/82   07/30/24 135/89       Lab Results   Component Value Date    WBC 8.9 01/30/2024    HGB 16.2 01/30/2024    HCT 48.9 01/30/2024    MCV 90.6 01/30/2024     01/30/2024     Lab Results   Component Value Date     01/30/2024    K 4.3 01/30/2024     01/30/2024    CO2 23 01/30/2024    BUN 10 01/30/2024    CREATININE 0.8 01/30/2024    GLUCOSE 136 (H) 01/30/2024    CALCIUM 9.6 01/30/2024    BILITOT 0.4 01/30/2024    ALKPHOS 50 01/30/2024    AST 17 01/30/2024    ALT 15 01/30/2024    LABGLOM >60 01/30/2024    GFRAA >60 10/19/2021     Lab Results   Component Value Date    TSH 1.880 08/04/2020     No results

## 2024-11-20 NOTE — PROGRESS NOTES
Attending Physician Note    I reviewed the patient's medical history, the resident's findings on physical examination, the patient's diagnoses, and treatment plan as documented in the resident note.  I concur with the treatment plan as documented.  Any additional suggestions noted below.    GE modifier

## 2024-11-20 NOTE — PROGRESS NOTES
Health Maintenance Due   Topic Date Due    Pneumococcal 0-64 years Vaccine (1 of 2 - PCV) Never done    HIV screen  Never done    Hepatitis C screen  Never done    Hepatitis B vaccine (1 of 3 - 19+ 3-dose series) Never done    Annual Wellness Visit (Medicare)  Never done    Diabetes screen  Never done    Flu vaccine (1) Never done    COVID-19 Vaccine (1 - 2023-24 season) Never done

## 2024-11-27 ENCOUNTER — TELEPHONE (OUTPATIENT)
Dept: FAMILY MEDICINE CLINIC | Age: 34
End: 2024-11-27

## 2024-11-27 NOTE — TELEPHONE ENCOUNTER
----- Message from Bryan MILLER sent at 11/27/2024  9:02 AM EST -----  Regarding: E[facility] Appointment Request  ECC Appointment Request    Patient needs appointment for ECC Appointment Type: Existing Condition Follow Up.    Patient Requested Dates(s): Dec. 2, 2024   Patient Requested Time: 9 AM   Provider Name: Torsten Díaz MD     Reason for Appointment Request: Established Patient - Available appointments did not meet patient need // pt would like to set up an appt. For a regular check to make sure he is now fit to work. If the pt's pcp is not available, It's okay to have another pcp in the practice.   --------------------------------------------------------------------------------------------------------------------------    Relationship to Patient: Self     Call Back Information: OK to leave message on voicemail  Preferred Call Back Number: Phone [phone]

## 2024-12-02 ENCOUNTER — OFFICE VISIT (OUTPATIENT)
Dept: FAMILY MEDICINE CLINIC | Age: 34
End: 2024-12-02

## 2024-12-02 VITALS
SYSTOLIC BLOOD PRESSURE: 122 MMHG | DIASTOLIC BLOOD PRESSURE: 84 MMHG | OXYGEN SATURATION: 97 % | BODY MASS INDEX: 32.73 KG/M2 | WEIGHT: 228.6 LBS | HEIGHT: 70 IN | RESPIRATION RATE: 12 BRPM | HEART RATE: 77 BPM | TEMPERATURE: 97.8 F

## 2024-12-02 DIAGNOSIS — M25.552 CHRONIC LEFT HIP PAIN: Primary | ICD-10-CM

## 2024-12-02 DIAGNOSIS — Z87.39 HISTORY OF LEGG-CALVE-PERTHES DISEASE: ICD-10-CM

## 2024-12-02 DIAGNOSIS — G89.29 CHRONIC LEFT HIP PAIN: Primary | ICD-10-CM

## 2024-12-02 DIAGNOSIS — K52.9 CHRONIC DIARRHEA: ICD-10-CM

## 2024-12-02 DIAGNOSIS — H65.91 MIDDLE EAR EFFUSION, RIGHT: ICD-10-CM

## 2024-12-02 DIAGNOSIS — R73.09 ELEVATED GLUCOSE: ICD-10-CM

## 2024-12-02 DIAGNOSIS — Z13.6 ENCOUNTER FOR SCREENING FOR CARDIOVASCULAR DISORDERS: ICD-10-CM

## 2024-12-02 RX ORDER — ALPRAZOLAM 2 MG/1
TABLET ORAL
COMMUNITY
Start: 2024-11-10

## 2024-12-02 ASSESSMENT — ENCOUNTER SYMPTOMS
NAUSEA: 0
WHEEZING: 0
RHINORRHEA: 0
BLOOD IN STOOL: 0
VOMITING: 0
CONSTIPATION: 0
ABDOMINAL PAIN: 0
DIARRHEA: 1
SHORTNESS OF BREATH: 0
COUGH: 0

## 2024-12-02 NOTE — PROGRESS NOTES
[facility] PROFESSIONAL SERVS  [facility]  [address]  [facility] [address]  Dept: [phone]  Dept Fax: [phone]  Loc: [phone]  Resident Note    Patient:Barrie Chavez Jr. Sex: male  YOB: 1990 Age:34 y.o.  MRN: [id number]  Assessment & Plan   1. History of Psei-Rcoil-Blfrshz disease  2. Chronic left hip pain  - Chronic, not controlled  - Recommended patient either return to prior orthopedic surgeon who administered the steroid injections or [facility] - patient to call office if he needs referral placed (could not remember name or location of prior surgeon at time of visit)  - Given note to return to work with restrictions for no stairs    3. Middle ear effusion, right  - Chronic. Suspected combination of current vaping and seasonal allergies  - Continue Flonase, Loratadine 10mg, and Montelukast 10mg  - Recommended complete cesssation of vaping    4. Chronic diarrhea  - Chronic, not controlled  - Printed lab orders from 24 for patient to complete  - Additionally ordered HgbA1C due to history of hyperglycemia and no prior HgbA1C    5. Elevated glucose  - 24 CMP with Glucose 136 (has been elevated in the past as well) with no prior HgbA1C  - Will check HgbA1C  Orders  - Hemoglobin A1C; Future    6. Encounter for screening for cardiovascular disorders  Orders  - Lipid Panel; Future     Health Maintenance/Vaccinations  - Immunizations: TDaP (23)  Age appropriate for Influenza and COVID  - Colon Cancer Screening: 10/14/23 Colonoscopy (due to history of diarrhea) by Dr. Cline appeared normal with recommendations for fiber supplementation  - Lung Cancer Screening: Former tobacco use with 11py history (quit in May 2022)  - Diabetes and Diabetic Retinopathy Screenin24 CMP with Glucose 136 (has been elevated in the past as well) with no prior HgbA1C   Needs an HgbA1C  - Lipid Screenin20 Lipid Panel with T Cholesterol 211,

## 2024-12-02 NOTE — PROGRESS NOTES
Barrie Chavez .  1990    Patient is a 34 y.o. male presenting for return to work note.  He has a past medical history of ADHD (attention deficit hyperactivity disorder), Asthma, Bipolar 1 disorder ([facility]), Borderline personality disorder ([facility]), Chronic back pain, COPD (chronic obstructive pulmonary disease) ([facility]), Depression, Dissociative identity disorder ([facility]), GERD (gastroesophageal reflux disease), Hearing loss, History of cardiac murmur in childhood, Rblf-Clrid-Kegdkzd disease, left, Lumbar disc disease, Multiple allergies, PTSD (post-traumatic stress disorder), Restless legs syndrome, Teeth missing, Under care of team, and Wellness examination.  Patient has been out of work for around 1 month now for a viral infection that turned into an ear infection. He tried to return to work around 2 weeks ago, but was unable to put the ear protectors in and was sent home. 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 He says he has improved from his ear infection and viral illness and overall is doing well with the left hip. He reports he is able to walk and stand for long periods of time, but when he climbs stairs he states his hip hurts and feels like it is going to give out. Patient thinks the surgery was done at [facility], but the surgeon has returned and moved so he was unable to follow up. He says he went to [facility] at some point for injections, but it has been some time.     Review of Systems   Constitutional:  Negative for activity change and appetite change.   HENT:  Positive for postnasal drip (allergies). Negative for congestion and rhinorrhea.    Respiratory:  Negative for cough, shortness of breath and wheezing.    Cardiovascular:  Negative for chest pain and palpitations.   Gastrointestinal:  Positive for diarrhea. Negative for abdominal pain, blood in stool, constipation, nausea and vomiting.

## 2024-12-02 NOTE — PATIENT INSTRUCTIONS
Get labs done. Call [facility] for appointment. If need referral, let this office know.    You can complete your labwork at 1 of 4 locations  There are several lab locations near the family medicine clinic.  Imaging services are at the [facility] [place] [facility] (see 3rd option below).     1. [facility] stand-alone building with parking out front  [address] ([facility], just across the street from the Emergency Dept / [facility] Parking Garage)  [facility] [place] [address]  P: [phone]     Hours:  Mon - Thurs 6:00AM - 6:00PM  Fri 6 AM to 4 PM  Saturday 6:30AM - Noon        2. [facility] on the second floor of same building where you had your family medicine appointment  [address]   [place] [address]  P: [phone]     Hours:  Mon - Thu 8:30AM-3:00PM  Closed for lunch - 12:00pm - 1:00PM  Fri - Closed     3. [facility]  Can also get x-ray, CT scans, other imaging here  [address], [place] [address]  Tel: [phone]     Hours:  Mon - Fri 6am to 5pm  Saturday - 6:00 am - 12:00 pm *xray,ct, other imaging services not available at this location on weekends.  Sunday - Closed     4. [facility] list of lab and imaging centers:  [email or web address]  *Please call before you go to double check times and whether they can definitely obtain labs for you.      Thank you   Thank you for trusting us with your healthcare needs. You may receive a survey regarding today's visit. It would help us out if you would take a few moments to provide your feedback. We value your input.  Please bring in ALL medications BOTTLES, including inhalers, herbal supplements, over the counter, prescribed & non-prescribed medicine. The office would like actual medication bottles and a list.         4.  Prior to getting your labs drawn, check with your insurance company for benefits and eligibility of lab services.  Often,

## 2024-12-02 NOTE — PROGRESS NOTES
S: 34 y.o. male with   Chief Complaint   Patient presents with    Annual Exam     Would like letter to return back to work      Work note needed -- could not use ear piece due to recent ear infection, slight left over congestion, possible allergies. Missed work for a month, works at [employer].   Asking for restrictions:  No climbing stairs.  Has abnormal gait due to hip trouble. H/o Hip replacement LCP, also with back pain troubles.   Otherwise ready to return to work.     11/20 --Ear infection with URI   11/6 -- viral GE,   Diarrhea episodes since July off and on; seen in July. Work up ordered not done yet.     Patient on Medicare    BP Readings from Last 3 Encounters:   12/02/24 122/84   11/20/24 118/70   11/06/24 126/82     Wt Readings from Last 3 Encounters:   12/02/24 103.7 kg (228 lb 9.6 oz)   11/20/24 102.7 kg (226 lb 6.4 oz)   11/06/24 102.8 kg (226 lb 9.6 oz)       O: VS:   Vitals:    12/02/24 0952   BP: 122/84   Site: Left Upper Arm   Position: Sitting   Cuff Size: Medium Adult   Pulse: 77   Resp: 12   Temp: 97.8 °F (36.6 °C)   TempSrc: Oral   SpO2: 97%   Weight: 103.7 kg (228 lb 9.6 oz)   Height: 1.778 m (5' 10\")     Body mass index is 32.8 kg/m².    AAO/NAD, appropriate affect for mood  Normocephalic, atraumatic, eyes - conjunctiva and sclera normal,   skin no rashes on exposed areas   Insight, judgement normal and in no acute distress  Walks with abnormal gait, left hip stiffness.     Lab Results   Component Value Date    WBC 8.9 01/30/2024    HGB 16.2 01/30/2024    HCT 48.9 01/30/2024     01/30/2024    CHOL 211 (H) 08/04/2020    TRIG 283 (H) 08/04/2020    HDL 29 08/04/2020     08/04/2020    AST 17 01/30/2024     01/30/2024    K 4.3 01/30/2024     01/30/2024    CREATININE 0.8 01/30/2024    BUN 10 01/30/2024    CO2 23 01/30/2024    TSH 1.880 08/04/2020    LABGLOM >60 01/30/2024    MG 2.0 01/30/2024    CALCIUM 9.6 01/30/2024    VITD25 19 (L) 08/04/2020   No results found for:

## 2024-12-04 ASSESSMENT — ENCOUNTER SYMPTOMS
SINUS PRESSURE: 0
SHORTNESS OF BREATH: 0
DIARRHEA: 1
NAUSEA: 0
COLOR CHANGE: 0

## 2024-12-07 DIAGNOSIS — G25.81 RESTLESS LEG: ICD-10-CM

## 2024-12-09 RX ORDER — ROPINIROLE 1 MG/1
1 TABLET, FILM COATED ORAL NIGHTLY
Qty: 30 TABLET | Refills: 1 | Status: SHIPPED | OUTPATIENT
Start: 2024-12-09

## 2024-12-09 NOTE — TELEPHONE ENCOUNTER
Patient's last appointment was: 12/2/2024  with our   Patient's next appointment is: 3/5/2025  with our Dr. Díaz  Last refilled on: 12/4/2024   Which pharmacy does the script need sent to: [id number] [place], [place]       No results found for: \"LABA1C\"  Lab Results   Component Value Date    CHOL 211 (H) 08/04/2020    TRIG 283 (H) 08/04/2020    HDL 29 08/04/2020     Lab Results   Component Value Date     01/30/2024    K 4.3 01/30/2024     01/30/2024    CO2 23 01/30/2024    BUN 10 01/30/2024    CREATININE 0.8 01/30/2024    GLUCOSE 136 (H) 01/30/2024    CALCIUM 9.6 01/30/2024    BILITOT 0.4 01/30/2024    ALKPHOS 50 01/30/2024    AST 17 01/30/2024    ALT 15 01/30/2024    LABGLOM >60 01/30/2024    GFRAA >60 10/19/2021     Lab Results   Component Value Date    TSH 1.880 08/04/2020     Lab Results   Component Value Date    WBC 8.9 01/30/2024    HGB 16.2 01/30/2024    HCT 48.9 01/30/2024    MCV 90.6 01/30/2024     01/30/2024

## 2024-12-10 DIAGNOSIS — F41.0 PANIC DISORDER WITHOUT AGORAPHOBIA: Primary | ICD-10-CM

## 2024-12-10 RX ORDER — ALPRAZOLAM 2 MG/1
2 TABLET ORAL DAILY PRN
Qty: 30 TABLET | Refills: 0 | Status: SHIPPED | OUTPATIENT
Start: 2024-12-10 | End: 2024-12-12 | Stop reason: SDUPTHER

## 2024-12-10 NOTE — TELEPHONE ENCOUNTER
Barrie wrote into the office via [employer]:    I am low on my Xanax and have no more refills left would it be possible to get that sent over to my pharmacy for refill     Medication was last sent in on 09/23/24 for a 30 day supply with 1 refill on 09/23/24.    Medication is pending your approval for a 30 day supply with 0 refills; he is scheduled to return on 12/12/24. Last seen on 09/23/24. Please advise otherwise.

## 2024-12-11 ASSESSMENT — PATIENT HEALTH QUESTIONNAIRE - PHQ9
8. MOVING OR SPEAKING SO SLOWLY THAT OTHER PEOPLE COULD HAVE NOTICED. OR THE OPPOSITE - BEING SO FIDGETY OR RESTLESS THAT YOU HAVE BEEN MOVING AROUND A LOT MORE THAN USUAL: SEVERAL DAYS
7. TROUBLE CONCENTRATING ON THINGS, SUCH AS READING THE NEWSPAPER OR WATCHING TELEVISION: MORE THAN HALF THE DAYS
SUM OF ALL RESPONSES TO PHQ9 QUESTIONS 1 & 2: 4
9. THOUGHTS THAT YOU WOULD BE BETTER OFF DEAD, OR OF HURTING YOURSELF: SEVERAL DAYS
1. LITTLE INTEREST OR PLEASURE IN DOING THINGS: MORE THAN HALF THE DAYS
SUM OF ALL RESPONSES TO PHQ QUESTIONS 1-9: 13
6. FEELING BAD ABOUT YOURSELF - OR THAT YOU ARE A FAILURE OR HAVE LET YOURSELF OR YOUR FAMILY DOWN: SEVERAL DAYS
4. FEELING TIRED OR HAVING LITTLE ENERGY: NEARLY EVERY DAY
7. TROUBLE CONCENTRATING ON THINGS, SUCH AS READING THE NEWSPAPER OR WATCHING TELEVISION: MORE THAN HALF THE DAYS
10. IF YOU CHECKED OFF ANY PROBLEMS, HOW DIFFICULT HAVE THESE PROBLEMS MADE IT FOR YOU TO DO YOUR WORK, TAKE CARE OF THINGS AT HOME, OR GET ALONG WITH OTHER PEOPLE: VERY DIFFICULT
SUM OF ALL RESPONSES TO PHQ QUESTIONS 1-9: 14
1. LITTLE INTEREST OR PLEASURE IN DOING THINGS: MORE THAN HALF THE DAYS
2. FEELING DOWN, DEPRESSED OR HOPELESS: MORE THAN HALF THE DAYS
3. TROUBLE FALLING OR STAYING ASLEEP: SEVERAL DAYS
4. FEELING TIRED OR HAVING LITTLE ENERGY: NEARLY EVERY DAY
10. IF YOU CHECKED OFF ANY PROBLEMS, HOW DIFFICULT HAVE THESE PROBLEMS MADE IT FOR YOU TO DO YOUR WORK, TAKE CARE OF THINGS AT HOME, OR GET ALONG WITH OTHER PEOPLE: VERY DIFFICULT
SUM OF ALL RESPONSES TO PHQ QUESTIONS 1-9: 14
2. FEELING DOWN, DEPRESSED OR HOPELESS: MORE THAN HALF THE DAYS
5. POOR APPETITE OR OVEREATING: SEVERAL DAYS
3. TROUBLE FALLING OR STAYING ASLEEP: SEVERAL DAYS
SUM OF ALL RESPONSES TO PHQ QUESTIONS 1-9: 14
SUM OF ALL RESPONSES TO PHQ QUESTIONS 1-9: 14
8. MOVING OR SPEAKING SO SLOWLY THAT OTHER PEOPLE COULD HAVE NOTICED. OR THE OPPOSITE, BEING SO FIGETY OR RESTLESS THAT YOU HAVE BEEN MOVING AROUND A LOT MORE THAN USUAL: SEVERAL DAYS
6. FEELING BAD ABOUT YOURSELF - OR THAT YOU ARE A FAILURE OR HAVE LET YOURSELF OR YOUR FAMILY DOWN: SEVERAL DAYS
9. THOUGHTS THAT YOU WOULD BE BETTER OFF DEAD, OR OF HURTING YOURSELF: SEVERAL DAYS
5. POOR APPETITE OR OVEREATING: SEVERAL DAYS

## 2024-12-11 ASSESSMENT — ANXIETY QUESTIONNAIRES
2. NOT BEING ABLE TO STOP OR CONTROL WORRYING: SEVERAL DAYS
1. FEELING NERVOUS, ANXIOUS, OR ON EDGE: SEVERAL DAYS
7. FEELING AFRAID AS IF SOMETHING AWFUL MIGHT HAPPEN: SEVERAL DAYS
4. TROUBLE RELAXING: MORE THAN HALF THE DAYS
1. FEELING NERVOUS, ANXIOUS, OR ON EDGE: SEVERAL DAYS
3. WORRYING TOO MUCH ABOUT DIFFERENT THINGS: SEVERAL DAYS
5. BEING SO RESTLESS THAT IT IS HARD TO SIT STILL: SEVERAL DAYS
4. TROUBLE RELAXING: MORE THAN HALF THE DAYS
3. WORRYING TOO MUCH ABOUT DIFFERENT THINGS: SEVERAL DAYS
5. BEING SO RESTLESS THAT IT IS HARD TO SIT STILL: SEVERAL DAYS
IF YOU CHECKED OFF ANY PROBLEMS ON THIS QUESTIONNAIRE, HOW DIFFICULT HAVE THESE PROBLEMS MADE IT FOR YOU TO DO YOUR WORK, TAKE CARE OF THINGS AT HOME, OR GET ALONG WITH OTHER PEOPLE: SOMEWHAT DIFFICULT
GAD7 TOTAL SCORE: 9
6. BECOMING EASILY ANNOYED OR IRRITABLE: MORE THAN HALF THE DAYS
2. NOT BEING ABLE TO STOP OR CONTROL WORRYING: SEVERAL DAYS
6. BECOMING EASILY ANNOYED OR IRRITABLE: MORE THAN HALF THE DAYS
IF YOU CHECKED OFF ANY PROBLEMS ON THIS QUESTIONNAIRE, HOW DIFFICULT HAVE THESE PROBLEMS MADE IT FOR YOU TO DO YOUR WORK, TAKE CARE OF THINGS AT HOME, OR GET ALONG WITH OTHER PEOPLE: SOMEWHAT DIFFICULT
7. FEELING AFRAID AS IF SOMETHING AWFUL MIGHT HAPPEN: SEVERAL DAYS

## 2024-12-11 ASSESSMENT — COLUMBIA-SUICIDE SEVERITY RATING SCALE - C-SSRS
6. IN YOUR LIFETIME, HAVE YOU EVER DONE ANYTHING, STARTED TO DO ANYTHING, OR PREPARED TO DO ANYTHING TO END YOUR LIFE?: NO
1. IN THE PAST MONTH, HAVE YOU WISHED YOU WERE DEAD OR WISHED YOU COULD GO TO SLEEP AND NOT WAKE UP?: NO
2. IN THE PAST MONTH, HAVE YOU ACTUALLY HAD ANY THOUGHTS OF KILLING YOURSELF?: NO

## 2024-12-12 ENCOUNTER — TELEMEDICINE (OUTPATIENT)
Dept: PSYCHIATRY | Age: 34
End: 2024-12-12

## 2024-12-12 ENCOUNTER — TELEPHONE (OUTPATIENT)
Dept: FAMILY MEDICINE CLINIC | Age: 34
End: 2024-12-12

## 2024-12-12 DIAGNOSIS — F43.10 PTSD (POST-TRAUMATIC STRESS DISORDER): ICD-10-CM

## 2024-12-12 DIAGNOSIS — F60.3 BORDERLINE PERSONALITY DISORDER (HCC): ICD-10-CM

## 2024-12-12 DIAGNOSIS — F41.1 GAD (GENERALIZED ANXIETY DISORDER): ICD-10-CM

## 2024-12-12 DIAGNOSIS — F41.0 PANIC DISORDER WITHOUT AGORAPHOBIA: Primary | ICD-10-CM

## 2024-12-12 DIAGNOSIS — F29 PSYCHOSIS, UNSPECIFIED PSYCHOSIS TYPE (HCC): ICD-10-CM

## 2024-12-12 DIAGNOSIS — F32.A DEPRESSION, UNSPECIFIED DEPRESSION TYPE: ICD-10-CM

## 2024-12-12 RX ORDER — LAMOTRIGINE 100 MG/1
250 TABLET ORAL DAILY
Qty: 225 TABLET | Refills: 0 | Status: SHIPPED | OUTPATIENT
Start: 2024-12-12

## 2024-12-12 RX ORDER — ALPRAZOLAM 2 MG/1
2 TABLET ORAL DAILY PRN
Qty: 30 TABLET | Refills: 1 | Status: SHIPPED | OUTPATIENT
Start: 2024-12-12 | End: 2025-02-10

## 2024-12-12 RX ORDER — BUPROPION HYDROCHLORIDE 150 MG/1
150 TABLET ORAL EVERY MORNING
Qty: 90 TABLET | Refills: 0 | Status: SHIPPED | OUTPATIENT
Start: 2024-12-12

## 2024-12-12 RX ORDER — ESCITALOPRAM OXALATE 20 MG/1
40 TABLET ORAL DAILY
Qty: 180 TABLET | Refills: 0 | Status: SHIPPED | OUTPATIENT
Start: 2024-12-12

## 2024-12-12 RX ORDER — DOXEPIN HYDROCHLORIDE 10 MG/ML
3-6 SOLUTION ORAL NIGHTLY
Qty: 60 ML | Refills: 0 | Status: SHIPPED | OUTPATIENT
Start: 2024-12-12

## 2024-12-12 NOTE — TELEPHONE ENCOUNTER
Patient in office dropping off return to work forms that need filled out ASAP . Forms are in PCP mailbox for completion .  Please call patient and fax forms when ready .

## 2024-12-12 NOTE — PROGRESS NOTES
[facility] [place] [facility]  [facility]  [address] [address] [phone]    Progress Note    Patient:  Barrie Chavez Jr.  YOB: 1990  PCP:  Torsten Díaz MD  Visit Date:  12/12/2024      Chief Complaint   Patient presents with    Follow-up    Medication Check    Anxiety    Panic Attack    Depression       SUBJECTIVE:      Barrie Chavez Jr., a 34 y.o. male, presents for a follow up visit.      He presents alone.   Hasn't worked the last 5 weeks noting he was off for a viral infection then ear infection. RTW held up because PCP wants him on restrictions for his hip to not climb stairs. Waiting to get forms filled out to return.   Losing his voice.   Mood \"really depressed lately\" noting hypersomnia. Feels worse being more idle, off work,  lacks purpose.  More fatigue.   Anniversary of his dad's death coming up on 12/21, Xmas has always been harder since losing him.   Notes his 6 children ages 14, 12, 10, 4, 1.5, 6 mos.   Shows me his infant son Андрей who is asleep.   Now on doxepin 4 mg for sleep. Takes 3 hrs to kick in so takes it a little earlier.   Not using any trazodone. Still trouble falling and staying asleep.   Might stop doxepin x a few days and still hypersomnia in daytime. Sleep aid doesn't seem to change it.  Notes last Lamictal increase with some mood benefit.  Ongoing AH, nondistressing. Some commands to hurt himself \"I don't listen to it though.\" Denies any suicidal ideations lately. No VH.   Hasn't had vit D checked. I suggested he start vit D3 supplement if not wanting to do labs.   Discussed tx options and we agree to start Wellbutrin for mood, energy, focus, motivation.      Med Trials:Chantix (nausea), trazodone, gabapentin, Ritalin, medical cannabis, Lamictal, Lexapro, Klonopin, Seroquel (no benefit, side effects), doxazosin, Risperdal (no benefit), Latuda (no benefit)    OBJECTIVE:  Vitals: There were no

## 2024-12-17 DIAGNOSIS — K21.9 GASTROESOPHAGEAL REFLUX DISEASE WITHOUT ESOPHAGITIS: ICD-10-CM

## 2024-12-17 RX ORDER — DOXEPIN HYDROCHLORIDE 10 MG/ML
SOLUTION ORAL
Qty: 60 ML | Refills: 0 | OUTPATIENT
Start: 2024-12-17

## 2024-12-17 RX ORDER — PANTOPRAZOLE SODIUM 40 MG/1
TABLET, DELAYED RELEASE ORAL
Qty: 180 TABLET | Refills: 1 | Status: SHIPPED | OUTPATIENT
Start: 2024-12-17

## 2024-12-17 NOTE — TELEPHONE ENCOUNTER
Patient's last appointment was: 12/2/2024  with our   Patient's next appointment is: 3/5/2025  with our Dr. Díaz  Last refilled on: 12/6/2024  Which pharmacy does the script need sent to: [facility] [address]      No results found for: \"LABA1C\"  Lab Results   Component Value Date    CHOL 211 (H) 08/04/2020    TRIG 283 (H) 08/04/2020    HDL 29 08/04/2020     Lab Results   Component Value Date     01/30/2024    K 4.3 01/30/2024     01/30/2024    CO2 23 01/30/2024    BUN 10 01/30/2024    CREATININE 0.8 01/30/2024    GLUCOSE 136 (H) 01/30/2024    CALCIUM 9.6 01/30/2024    BILITOT 0.4 01/30/2024    ALKPHOS 50 01/30/2024    AST 17 01/30/2024    ALT 15 01/30/2024    LABGLOM >60 01/30/2024    GFRAA >60 10/19/2021     Lab Results   Component Value Date    TSH 1.880 08/04/2020     Lab Results   Component Value Date    WBC 8.9 01/30/2024    HGB 16.2 01/30/2024    HCT 48.9 01/30/2024    MCV 90.6 01/30/2024     01/30/2024

## 2025-01-06 ENCOUNTER — TELEPHONE (OUTPATIENT)
Dept: FAMILY MEDICINE CLINIC | Age: 35
End: 2025-01-06

## 2025-01-06 NOTE — TELEPHONE ENCOUNTER
Patient called in stating that paperwork was marked that he can't walk or climb stairs, but it only needs to say he can't climb stairs, and asked if the walking portion could be taken off. Forms placed back into mailbox. Thank you!

## 2025-01-15 DIAGNOSIS — J45.909 ASTHMA, UNSPECIFIED ASTHMA SEVERITY, UNSPECIFIED WHETHER COMPLICATED, UNSPECIFIED WHETHER PERSISTENT: ICD-10-CM

## 2025-01-15 RX ORDER — ALBUTEROL SULFATE 90 UG/1
2 INHALANT RESPIRATORY (INHALATION) 4 TIMES DAILY PRN
Qty: 18 G | Refills: 5 | Status: SHIPPED | OUTPATIENT
Start: 2025-01-15

## 2025-01-15 NOTE — TELEPHONE ENCOUNTER
Patient's last appointment was: 12/2/2024  with our   Patient's next appointment is: 3/5/2025  with our Dr. Díaz  Last refilled on: 12/2024  Which pharmacy does the script need sent to: [address]      No results found for: \"LABA1C\"  Lab Results   Component Value Date    CHOL 211 (H) 08/04/2020    TRIG 283 (H) 08/04/2020    HDL 29 08/04/2020     Lab Results   Component Value Date     01/30/2024    K 4.3 01/30/2024     01/30/2024    CO2 23 01/30/2024    BUN 10 01/30/2024    CREATININE 0.8 01/30/2024    GLUCOSE 136 (H) 01/30/2024    CALCIUM 9.6 01/30/2024    BILITOT 0.4 01/30/2024    ALKPHOS 50 01/30/2024    AST 17 01/30/2024    ALT 15 01/30/2024    LABGLOM >60 01/30/2024    GFRAA >60 10/19/2021     Lab Results   Component Value Date    TSH 1.880 08/04/2020     Lab Results   Component Value Date    WBC 8.9 01/30/2024    HGB 16.2 01/30/2024    HCT 48.9 01/30/2024    MCV 90.6 01/30/2024     01/30/2024

## 2025-01-21 ENCOUNTER — TELEPHONE (OUTPATIENT)
Dept: FAMILY MEDICINE CLINIC | Age: 35
End: 2025-01-21

## 2025-01-21 NOTE — TELEPHONE ENCOUNTER
Barrie Chavez Jr. called and states that he would like the [place] paperwork Faxed to Child Support at Fax [phone]. Form faxed.

## 2025-01-27 ASSESSMENT — PATIENT HEALTH QUESTIONNAIRE - PHQ9
1. LITTLE INTEREST OR PLEASURE IN DOING THINGS: MORE THAN HALF THE DAYS
4. FEELING TIRED OR HAVING LITTLE ENERGY: SEVERAL DAYS
7. TROUBLE CONCENTRATING ON THINGS, SUCH AS READING THE NEWSPAPER OR WATCHING TELEVISION: MORE THAN HALF THE DAYS
SUM OF ALL RESPONSES TO PHQ QUESTIONS 1-9: 12
SUM OF ALL RESPONSES TO PHQ9 QUESTIONS 1 & 2: 3
5. POOR APPETITE OR OVEREATING: MORE THAN HALF THE DAYS
5. POOR APPETITE OR OVEREATING: MORE THAN HALF THE DAYS
7. TROUBLE CONCENTRATING ON THINGS, SUCH AS READING THE NEWSPAPER OR WATCHING TELEVISION: MORE THAN HALF THE DAYS
10. IF YOU CHECKED OFF ANY PROBLEMS, HOW DIFFICULT HAVE THESE PROBLEMS MADE IT FOR YOU TO DO YOUR WORK, TAKE CARE OF THINGS AT HOME, OR GET ALONG WITH OTHER PEOPLE: SOMEWHAT DIFFICULT
2. FEELING DOWN, DEPRESSED OR HOPELESS: SEVERAL DAYS
SUM OF ALL RESPONSES TO PHQ QUESTIONS 1-9: 12
8. MOVING OR SPEAKING SO SLOWLY THAT OTHER PEOPLE COULD HAVE NOTICED. OR THE OPPOSITE - BEING SO FIDGETY OR RESTLESS THAT YOU HAVE BEEN MOVING AROUND A LOT MORE THAN USUAL: MORE THAN HALF THE DAYS
SUM OF ALL RESPONSES TO PHQ QUESTIONS 1-9: 12
2. FEELING DOWN, DEPRESSED OR HOPELESS: SEVERAL DAYS
3. TROUBLE FALLING OR STAYING ASLEEP: SEVERAL DAYS
10. IF YOU CHECKED OFF ANY PROBLEMS, HOW DIFFICULT HAVE THESE PROBLEMS MADE IT FOR YOU TO DO YOUR WORK, TAKE CARE OF THINGS AT HOME, OR GET ALONG WITH OTHER PEOPLE: SOMEWHAT DIFFICULT
3. TROUBLE FALLING OR STAYING ASLEEP: SEVERAL DAYS
1. LITTLE INTEREST OR PLEASURE IN DOING THINGS: MORE THAN HALF THE DAYS
8. MOVING OR SPEAKING SO SLOWLY THAT OTHER PEOPLE COULD HAVE NOTICED. OR THE OPPOSITE, BEING SO FIGETY OR RESTLESS THAT YOU HAVE BEEN MOVING AROUND A LOT MORE THAN USUAL: MORE THAN HALF THE DAYS
9. THOUGHTS THAT YOU WOULD BE BETTER OFF DEAD, OR OF HURTING YOURSELF: NOT AT ALL
SUM OF ALL RESPONSES TO PHQ QUESTIONS 1-9: 12
9. THOUGHTS THAT YOU WOULD BE BETTER OFF DEAD, OR OF HURTING YOURSELF: NOT AT ALL
SUM OF ALL RESPONSES TO PHQ QUESTIONS 1-9: 12
6. FEELING BAD ABOUT YOURSELF - OR THAT YOU ARE A FAILURE OR HAVE LET YOURSELF OR YOUR FAMILY DOWN: SEVERAL DAYS
6. FEELING BAD ABOUT YOURSELF - OR THAT YOU ARE A FAILURE OR HAVE LET YOURSELF OR YOUR FAMILY DOWN: SEVERAL DAYS
4. FEELING TIRED OR HAVING LITTLE ENERGY: SEVERAL DAYS

## 2025-01-27 ASSESSMENT — ANXIETY QUESTIONNAIRES
IF YOU CHECKED OFF ANY PROBLEMS ON THIS QUESTIONNAIRE, HOW DIFFICULT HAVE THESE PROBLEMS MADE IT FOR YOU TO DO YOUR WORK, TAKE CARE OF THINGS AT HOME, OR GET ALONG WITH OTHER PEOPLE: SOMEWHAT DIFFICULT
GAD7 TOTAL SCORE: 9
3. WORRYING TOO MUCH ABOUT DIFFERENT THINGS: SEVERAL DAYS
4. TROUBLE RELAXING: MORE THAN HALF THE DAYS
2. NOT BEING ABLE TO STOP OR CONTROL WORRYING: SEVERAL DAYS
6. BECOMING EASILY ANNOYED OR IRRITABLE: SEVERAL DAYS
5. BEING SO RESTLESS THAT IT IS HARD TO SIT STILL: MORE THAN HALF THE DAYS
7. FEELING AFRAID AS IF SOMETHING AWFUL MIGHT HAPPEN: SEVERAL DAYS
5. BEING SO RESTLESS THAT IT IS HARD TO SIT STILL: MORE THAN HALF THE DAYS
2. NOT BEING ABLE TO STOP OR CONTROL WORRYING: SEVERAL DAYS
6. BECOMING EASILY ANNOYED OR IRRITABLE: SEVERAL DAYS
IF YOU CHECKED OFF ANY PROBLEMS ON THIS QUESTIONNAIRE, HOW DIFFICULT HAVE THESE PROBLEMS MADE IT FOR YOU TO DO YOUR WORK, TAKE CARE OF THINGS AT HOME, OR GET ALONG WITH OTHER PEOPLE: SOMEWHAT DIFFICULT
7. FEELING AFRAID AS IF SOMETHING AWFUL MIGHT HAPPEN: SEVERAL DAYS
1. FEELING NERVOUS, ANXIOUS, OR ON EDGE: SEVERAL DAYS
1. FEELING NERVOUS, ANXIOUS, OR ON EDGE: SEVERAL DAYS
3. WORRYING TOO MUCH ABOUT DIFFERENT THINGS: SEVERAL DAYS
4. TROUBLE RELAXING: MORE THAN HALF THE DAYS

## 2025-01-27 NOTE — TELEPHONE ENCOUNTER
Patient Education        Well Visit, Ages 18 to 65: Care Instructions  Well visits can help you stay healthy. Your doctor has checked your overall health and may have suggested ways to take good care of yourself. Your doctor also may have recommended tests. You can help prevent illness with healthy eating, good sleep, vaccinations, regular exercise, and other steps.    Get the tests that you and your doctor decide on. Depending on your age and risks, examples might include screening for diabetes; hepatitis C; HIV; and cervical, breast, lung, and colon cancer. Screening helps find diseases before any symptoms appear.   Eat healthy foods. Choose fruits, vegetables, whole grains, lean protein, and low-fat dairy foods. Limit saturated fat and reduce salt.     Limit alcohol. Men should have no more than 2 drinks a day. Women should have no more than 1. For some people, no alcohol is the best choice.   Exercise. Get at least 30 minutes of exercise on most days of the week. Walking can be a good choice.     Reach and stay at your healthy weight. This will lower your risk for many health problems.   Take care of your mental health. Try to stay connected with friends, family, and community, and find ways to manage stress.     If you're feeling depressed or hopeless, talk to someone. A counselor can help. If you don't have a counselor, talk to your doctor.   Talk to your doctor if you think you may have a problem with alcohol or drug use. This includes prescription medicines, marijuana, and other drugs.     Avoid tobacco and nicotine: Don't smoke, vape, or chew. If you need help quitting, talk to your doctor.   Practice safer sex. Getting tested, using condoms or dental dams, and limiting sex partners can help prevent STIs.     Use birth control if it's important to you to prevent pregnancy. Talk with your doctor about your choices and what might be best for you.   Prevent problems where you can. Protect your skin from too  Pt states that he needs a letter for child support that states that he can not work due to his surgery.

## 2025-01-28 ENCOUNTER — TELEMEDICINE (OUTPATIENT)
Dept: PSYCHIATRY | Age: 35
End: 2025-01-28
Payer: MEDICARE

## 2025-01-28 DIAGNOSIS — F29 PSYCHOSIS, UNSPECIFIED PSYCHOSIS TYPE (HCC): ICD-10-CM

## 2025-01-28 DIAGNOSIS — F41.0 PANIC DISORDER WITHOUT AGORAPHOBIA: ICD-10-CM

## 2025-01-28 DIAGNOSIS — F43.10 PTSD (POST-TRAUMATIC STRESS DISORDER): Primary | ICD-10-CM

## 2025-01-28 DIAGNOSIS — F32.A DEPRESSION, UNSPECIFIED DEPRESSION TYPE: ICD-10-CM

## 2025-01-28 DIAGNOSIS — F41.1 GAD (GENERALIZED ANXIETY DISORDER): ICD-10-CM

## 2025-01-28 DIAGNOSIS — F60.3 BORDERLINE PERSONALITY DISORDER (HCC): ICD-10-CM

## 2025-01-28 PROCEDURE — G8427 DOCREV CUR MEDS BY ELIG CLIN: HCPCS | Performed by: PSYCHIATRY & NEUROLOGY

## 2025-01-28 PROCEDURE — 99214 OFFICE O/P EST MOD 30 MIN: CPT | Performed by: PSYCHIATRY & NEUROLOGY

## 2025-01-28 RX ORDER — ALPRAZOLAM 2 MG/1
2 TABLET ORAL DAILY PRN
Qty: 30 TABLET | Refills: 1 | Status: SHIPPED | OUTPATIENT
Start: 2025-01-28 | End: 2025-03-29

## 2025-01-28 RX ORDER — BUPROPION HYDROCHLORIDE 300 MG/1
300 TABLET ORAL EVERY MORNING
Qty: 90 TABLET | Refills: 0 | Status: SHIPPED | OUTPATIENT
Start: 2025-01-28

## 2025-01-28 RX ORDER — ESCITALOPRAM OXALATE 20 MG/1
40 TABLET ORAL DAILY
Qty: 180 TABLET | Refills: 0 | Status: SHIPPED | OUTPATIENT
Start: 2025-01-28

## 2025-01-28 RX ORDER — DOXEPIN HYDROCHLORIDE 10 MG/ML
3-6 SOLUTION ORAL NIGHTLY
Qty: 60 ML | Refills: 0 | Status: SHIPPED | OUTPATIENT
Start: 2025-01-28

## 2025-01-28 RX ORDER — LAMOTRIGINE 100 MG/1
250 TABLET ORAL DAILY
Qty: 225 TABLET | Refills: 0 | Status: SHIPPED | OUTPATIENT
Start: 2025-01-28

## 2025-01-28 NOTE — PROGRESS NOTES
[facility] [place] [facility]  [facility] PSYCHIATRY  [address] [facility] [id number] [phone]    Progress Note    Patient:  Barrie Chavez Jr.  YOB: 1990  PCP:  Torsten Díaz MD  Visit Date:  1/28/2025      Chief Complaint   Patient presents with    Follow-up    Medication Check    Anxiety    Depression       SUBJECTIVE:      Barrie Chavez Jr., a 34 y.o. male, presents for a follow up visit.      He presents alone.   C/o headache.   Now on Wellbutrin. Denies daily HA or any side effects.   Mood is still feeling depressed. Unchanged from last visit.   Gets to return to work tomorrow after 3 mos off. Feeling ready. Transportation issues are causing more stress and anxiety.  Thinks going back to work will help mood.   Notes he was put on work restrictions for stairs and finally was able to get ADA papers filled out to return.  Still hypersomnia. Appetite is low. Eating once/day.  Still AVH every couple days. Nondistressing. No worsening of psychosis with Wellbutrin. Some command hallucinations present but denies SI.   Trouble focusing, fatigue, anhedonia.   Discussed tx options and we agree to increase Wellbutrin for mood.       Med Trials:Chantix (nausea), trazodone, gabapentin, Ritalin, medical cannabis, Lamictal, Lexapro, Klonopin, Seroquel (no benefit, side effects), doxazosin, Risperdal (no benefit), Latuda (no benefit)    OBJECTIVE:  Vitals: There were no vitals taken for this visit.    MENTAL STATUS EXAM:    GENERAL  Build: Normal    Hygiene:  Appropriate in casual dress, multiple piercings and tattoos   SENSORIUM Orientation: Place, Person, Time, & Situation     Consciousness: Alert    ATTENTION   Focused   RELATEDNESS  Cooperative    EYE CONTACT   Good    InPSYCHOMOTOR  Normal    SPEECH Volume: Normal     Rate: Normal rate and down tone    Amplitude: Within normal limits   MOOD  Dysphoric    AFFECT Range: limited , mood congruent, social

## 2025-02-10 ENCOUNTER — TELEPHONE (OUTPATIENT)
Dept: PSYCHIATRY | Age: 35
End: 2025-02-10

## 2025-02-10 NOTE — TELEPHONE ENCOUNTER
Barrie wrote into the office via [employer]:    I missed today and gonna miss tomorrow at work due to me not feeling very good mentally. I was wondering if I could get a note to return to work on sunday.     Please advise.

## 2025-02-10 NOTE — TELEPHONE ENCOUNTER
Barrie wrote back via [employer]; No I don’t feel I need any medication changes as of now. Am I able to access the work slip through [employer]     Work excuse has been composed

## 2025-02-10 NOTE — TELEPHONE ENCOUNTER
We can provide this for Barrie. Does he feel he needs any medication changes?  Electronically signed by Nanci Beckford MD on 2/10/2025 at 9:36 AM

## 2025-02-10 NOTE — TELEPHONE ENCOUNTER
I believe he can access the letter through [employer]?   Electronically signed by Nanci Beckford MD on 2/10/2025 at 1:29 PM

## 2025-02-12 ENCOUNTER — TELEPHONE (OUTPATIENT)
Dept: PSYCHIATRY | Age: 35
End: 2025-02-12

## 2025-02-12 NOTE — TELEPHONE ENCOUNTER
We can provide the requested note.   Electronically signed by Nanci Beckford MD on 2/12/2025 at 1:46 PM

## 2025-02-12 NOTE — TELEPHONE ENCOUNTER
Barrie wrote into the office via [employer]:    I took my nighttime medication this morning on accident and had to leave work early due to being out of it. I was wondering if I could get a note covering me today? It was requested by my employer.     Please advise.

## 2025-03-03 NOTE — TELEPHONE ENCOUNTER
.Patient's last appointment was: 12/2/2024  with our   Patient's next appointment is: Visit date not found  Last refilled on: 08/13/2024  Which pharmacy does the script need sent to: [address]      No results found for: \"LABA1C\"  Lab Results   Component Value Date    CHOL 211 (H) 08/04/2020    TRIG 283 (H) 08/04/2020    HDL 29 08/04/2020     Lab Results   Component Value Date     01/30/2024    K 4.3 01/30/2024     01/30/2024    CO2 23 01/30/2024    BUN 10 01/30/2024    CREATININE 0.8 01/30/2024    GLUCOSE 136 (H) 01/30/2024    CALCIUM 9.6 01/30/2024    BILITOT 0.4 01/30/2024    ALKPHOS 50 01/30/2024    AST 17 01/30/2024    ALT 15 01/30/2024    LABGLOM >60 01/30/2024    GFRAA >60 10/19/2021     Lab Results   Component Value Date    TSH 1.880 08/04/2020     Lab Results   Component Value Date    WBC 8.9 01/30/2024    HGB 16.2 01/30/2024    HCT 48.9 01/30/2024    MCV 90.6 01/30/2024     01/30/2024

## 2025-03-04 RX ORDER — LORATADINE 10 MG/1
10 TABLET ORAL DAILY
Qty: 30 TABLET | Refills: 3 | Status: SHIPPED | OUTPATIENT
Start: 2025-03-04

## 2025-03-12 DIAGNOSIS — F32.A DEPRESSION, UNSPECIFIED DEPRESSION TYPE: ICD-10-CM

## 2025-03-12 RX ORDER — CARIPRAZINE 4.5 MG/1
4.5 CAPSULE, GELATIN COATED ORAL DAILY
Qty: 90 CAPSULE | Refills: 0 | OUTPATIENT
Start: 2025-03-12

## 2025-03-12 RX ORDER — LAMOTRIGINE 100 MG/1
TABLET ORAL
Qty: 225 TABLET | Refills: 0 | OUTPATIENT
Start: 2025-03-12

## 2025-03-13 RX ORDER — ESCITALOPRAM OXALATE 20 MG/1
40 TABLET ORAL DAILY
Qty: 180 TABLET | Refills: 0 | Status: SHIPPED | OUTPATIENT
Start: 2025-03-13

## 2025-03-13 NOTE — TELEPHONE ENCOUNTER
Pharmacy called requesting a refill on the following medications:  Requested Prescriptions     Pending Prescriptions Disp Refills    escitalopram (LEXAPRO) 20 MG tablet [Pharmacy Med Name: ESCITALOPRAM 20MG TABLETS] 180 tablet 0     Sig: TAKE 2 TABLETS BY MOUTH DAILY       Date of last visit: 1/28/2025  Date of next visit (if applicable):3/19/2025    Pharmacy Name: [address] [place], [place]       Thanks,  Leslee Gaston MA

## 2025-03-19 ENCOUNTER — TELEMEDICINE (OUTPATIENT)
Dept: PSYCHIATRY | Age: 35
End: 2025-03-19

## 2025-03-19 DIAGNOSIS — F29 PSYCHOSIS, UNSPECIFIED PSYCHOSIS TYPE (HCC): ICD-10-CM

## 2025-03-19 DIAGNOSIS — F41.1 GAD (GENERALIZED ANXIETY DISORDER): ICD-10-CM

## 2025-03-19 DIAGNOSIS — F41.0 PANIC DISORDER WITHOUT AGORAPHOBIA: ICD-10-CM

## 2025-03-19 DIAGNOSIS — F32.A DEPRESSION, UNSPECIFIED DEPRESSION TYPE: ICD-10-CM

## 2025-03-19 DIAGNOSIS — F60.3 BORDERLINE PERSONALITY DISORDER (HCC): ICD-10-CM

## 2025-03-19 DIAGNOSIS — F43.10 PTSD (POST-TRAUMATIC STRESS DISORDER): Primary | ICD-10-CM

## 2025-03-19 RX ORDER — ALPRAZOLAM 2 MG/1
2 TABLET ORAL DAILY PRN
Qty: 30 TABLET | Refills: 1 | Status: SHIPPED | OUTPATIENT
Start: 2025-03-19 | End: 2025-05-18

## 2025-03-19 RX ORDER — LAMOTRIGINE 100 MG/1
250 TABLET ORAL DAILY
Qty: 225 TABLET | Refills: 0 | Status: SHIPPED | OUTPATIENT
Start: 2025-03-19

## 2025-03-19 RX ORDER — BUPROPION HYDROCHLORIDE 300 MG/1
300 TABLET ORAL EVERY MORNING
Qty: 90 TABLET | Refills: 0 | Status: SHIPPED | OUTPATIENT
Start: 2025-03-19

## 2025-03-19 RX ORDER — BUPROPION HYDROCHLORIDE 150 MG/1
150 TABLET ORAL EVERY MORNING
Qty: 90 TABLET | Refills: 0 | Status: SHIPPED | OUTPATIENT
Start: 2025-03-19

## 2025-03-19 RX ORDER — ESCITALOPRAM OXALATE 20 MG/1
40 TABLET ORAL DAILY
Qty: 180 TABLET | Refills: 0 | Status: SHIPPED | OUTPATIENT
Start: 2025-03-19

## 2025-03-19 ASSESSMENT — PATIENT HEALTH QUESTIONNAIRE - PHQ9
6. FEELING BAD ABOUT YOURSELF - OR THAT YOU ARE A FAILURE OR HAVE LET YOURSELF OR YOUR FAMILY DOWN: SEVERAL DAYS
9. THOUGHTS THAT YOU WOULD BE BETTER OFF DEAD, OR OF HURTING YOURSELF: SEVERAL DAYS
4. FEELING TIRED OR HAVING LITTLE ENERGY: SEVERAL DAYS
1. LITTLE INTEREST OR PLEASURE IN DOING THINGS: SEVERAL DAYS
SUM OF ALL RESPONSES TO PHQ QUESTIONS 1-9: 7
8. MOVING OR SPEAKING SO SLOWLY THAT OTHER PEOPLE COULD HAVE NOTICED. OR THE OPPOSITE - BEING SO FIDGETY OR RESTLESS THAT YOU HAVE BEEN MOVING AROUND A LOT MORE THAN USUAL: SEVERAL DAYS
3. TROUBLE FALLING OR STAYING ASLEEP: SEVERAL DAYS
1. LITTLE INTEREST OR PLEASURE IN DOING THINGS: SEVERAL DAYS
6. FEELING BAD ABOUT YOURSELF - OR THAT YOU ARE A FAILURE OR HAVE LET YOURSELF OR YOUR FAMILY DOWN: SEVERAL DAYS
3. TROUBLE FALLING OR STAYING ASLEEP: SEVERAL DAYS
7. TROUBLE CONCENTRATING ON THINGS, SUCH AS READING THE NEWSPAPER OR WATCHING TELEVISION: SEVERAL DAYS
SUM OF ALL RESPONSES TO PHQ QUESTIONS 1-9: 8
SUM OF ALL RESPONSES TO PHQ QUESTIONS 1-9: 8
10. IF YOU CHECKED OFF ANY PROBLEMS, HOW DIFFICULT HAVE THESE PROBLEMS MADE IT FOR YOU TO DO YOUR WORK, TAKE CARE OF THINGS AT HOME, OR GET ALONG WITH OTHER PEOPLE: SOMEWHAT DIFFICULT
9. THOUGHTS THAT YOU WOULD BE BETTER OFF DEAD, OR OF HURTING YOURSELF: SEVERAL DAYS
5. POOR APPETITE OR OVEREATING: NOT AT ALL
10. IF YOU CHECKED OFF ANY PROBLEMS, HOW DIFFICULT HAVE THESE PROBLEMS MADE IT FOR YOU TO DO YOUR WORK, TAKE CARE OF THINGS AT HOME, OR GET ALONG WITH OTHER PEOPLE: SOMEWHAT DIFFICULT
7. TROUBLE CONCENTRATING ON THINGS, SUCH AS READING THE NEWSPAPER OR WATCHING TELEVISION: SEVERAL DAYS
8. MOVING OR SPEAKING SO SLOWLY THAT OTHER PEOPLE COULD HAVE NOTICED. OR THE OPPOSITE, BEING SO FIGETY OR RESTLESS THAT YOU HAVE BEEN MOVING AROUND A LOT MORE THAN USUAL: SEVERAL DAYS
SUM OF ALL RESPONSES TO PHQ QUESTIONS 1-9: 8
2. FEELING DOWN, DEPRESSED OR HOPELESS: SEVERAL DAYS
5. POOR APPETITE OR OVEREATING: NOT AT ALL
2. FEELING DOWN, DEPRESSED OR HOPELESS: SEVERAL DAYS
4. FEELING TIRED OR HAVING LITTLE ENERGY: SEVERAL DAYS
SUM OF ALL RESPONSES TO PHQ QUESTIONS 1-9: 8

## 2025-03-19 ASSESSMENT — COLUMBIA-SUICIDE SEVERITY RATING SCALE - C-SSRS
1. IN THE PAST MONTH, HAVE YOU WISHED YOU WERE DEAD OR WISHED YOU COULD GO TO SLEEP AND NOT WAKE UP?: NO
6. IN YOUR LIFETIME, HAVE YOU EVER DONE ANYTHING, STARTED TO DO ANYTHING, OR PREPARED TO DO ANYTHING TO END YOUR LIFE?: NO
2. IN THE PAST MONTH, HAVE YOU ACTUALLY HAD ANY THOUGHTS OF KILLING YOURSELF?: NO

## 2025-03-19 NOTE — PROGRESS NOTES
beyond FDA max, feels benefit outweighs risk  Lamictal 250 mg daily  No longer taking Doxepin (10mg/mL liquid) 4 mg nightly   Vraylar 4.5 mg daily  Xanax 2 mg daily prn panic  Increase Wellbutrin  mg to 450 mg QAM  Therapy: hasn't seen Dr. Canales in awhile noting plan to schedule another session  Labs/Tests/Imaging: NPT referral pending to Dr. Caicedo ([phone]) - Patient aware to call to schedule.  Records Reviewed: [facility]  Patient advised to call if patient has any difficulties with treatment  Return in about 4 weeks (around 4/16/2025) for med check.    Barrie Chavez Jr., was evaluated through a synchronous (real-time) audio-video encounter. The patient (or guardian if applicable) is aware that this is a billable service, which includes applicable co-pays. This Virtual Visit was conducted with patient's (and/or legal guardian's) consent. Patient identification was verified, and a caregiver was present when appropriate.   The patient was located at Home: [address] [place], [place] [id number] Oliver  [place] [address]  Provider was located at Home (Appt Dept State): [place]  Confirm you are appropriately licensed, registered, or certified to deliver care in the state where the patient is located as indicated above. If you are not or unsure, please re-schedule the visit: Yes, I confirm.      Total time spent for this encounter: Not billed by time    --Nanci Beckford MD on 3/20/2025 at 7:25 AM    An electronic signature was used to authenticate this note.

## 2025-04-16 ASSESSMENT — PATIENT HEALTH QUESTIONNAIRE - PHQ9
7. TROUBLE CONCENTRATING ON THINGS, SUCH AS READING THE NEWSPAPER OR WATCHING TELEVISION: NEARLY EVERY DAY
5. POOR APPETITE OR OVEREATING: MORE THAN HALF THE DAYS
SUM OF ALL RESPONSES TO PHQ QUESTIONS 1-9: 21
6. FEELING BAD ABOUT YOURSELF - OR THAT YOU ARE A FAILURE OR HAVE LET YOURSELF OR YOUR FAMILY DOWN: MORE THAN HALF THE DAYS
SUM OF ALL RESPONSES TO PHQ QUESTIONS 1-9: 19
9. THOUGHTS THAT YOU WOULD BE BETTER OFF DEAD, OR OF HURTING YOURSELF: MORE THAN HALF THE DAYS
8. MOVING OR SPEAKING SO SLOWLY THAT OTHER PEOPLE COULD HAVE NOTICED. OR THE OPPOSITE - BEING SO FIDGETY OR RESTLESS THAT YOU HAVE BEEN MOVING AROUND A LOT MORE THAN USUAL: SEVERAL DAYS
1. LITTLE INTEREST OR PLEASURE IN DOING THINGS: NEARLY EVERY DAY
SUM OF ALL RESPONSES TO PHQ QUESTIONS 1-9: 21
2. FEELING DOWN, DEPRESSED OR HOPELESS: NEARLY EVERY DAY
2. FEELING DOWN, DEPRESSED OR HOPELESS: NEARLY EVERY DAY
3. TROUBLE FALLING OR STAYING ASLEEP: MORE THAN HALF THE DAYS
SUM OF ALL RESPONSES TO PHQ QUESTIONS 1-9: 21
SUM OF ALL RESPONSES TO PHQ QUESTIONS 1-9: 21
10. IF YOU CHECKED OFF ANY PROBLEMS, HOW DIFFICULT HAVE THESE PROBLEMS MADE IT FOR YOU TO DO YOUR WORK, TAKE CARE OF THINGS AT HOME, OR GET ALONG WITH OTHER PEOPLE: VERY DIFFICULT
1. LITTLE INTEREST OR PLEASURE IN DOING THINGS: NEARLY EVERY DAY
8. MOVING OR SPEAKING SO SLOWLY THAT OTHER PEOPLE COULD HAVE NOTICED. OR THE OPPOSITE, BEING SO FIGETY OR RESTLESS THAT YOU HAVE BEEN MOVING AROUND A LOT MORE THAN USUAL: SEVERAL DAYS
4. FEELING TIRED OR HAVING LITTLE ENERGY: NEARLY EVERY DAY

## 2025-04-16 ASSESSMENT — COLUMBIA-SUICIDE SEVERITY RATING SCALE - C-SSRS
6. IN YOUR LIFETIME, HAVE YOU EVER DONE ANYTHING, STARTED TO DO ANYTHING, OR PREPARED TO DO ANYTHING TO END YOUR LIFE?: NO
2. IN THE PAST MONTH, HAVE YOU ACTUALLY HAD ANY THOUGHTS OF KILLING YOURSELF?: NO
1. IN THE PAST MONTH, HAVE YOU WISHED YOU WERE DEAD OR WISHED YOU COULD GO TO SLEEP AND NOT WAKE UP?: NO

## 2025-04-17 ENCOUNTER — TELEMEDICINE (OUTPATIENT)
Dept: PSYCHIATRY | Age: 35
End: 2025-04-17

## 2025-04-17 DIAGNOSIS — F29 PSYCHOSIS, UNSPECIFIED PSYCHOSIS TYPE (HCC): ICD-10-CM

## 2025-04-17 DIAGNOSIS — F32.A DEPRESSION, UNSPECIFIED DEPRESSION TYPE: ICD-10-CM

## 2025-04-17 DIAGNOSIS — F41.0 PANIC DISORDER WITHOUT AGORAPHOBIA: ICD-10-CM

## 2025-04-17 DIAGNOSIS — F60.3 BORDERLINE PERSONALITY DISORDER (HCC): ICD-10-CM

## 2025-04-17 DIAGNOSIS — F41.1 GAD (GENERALIZED ANXIETY DISORDER): ICD-10-CM

## 2025-04-17 DIAGNOSIS — F43.10 PTSD (POST-TRAUMATIC STRESS DISORDER): Primary | ICD-10-CM

## 2025-04-17 RX ORDER — MIRTAZAPINE 7.5 MG/1
7.5 TABLET, FILM COATED ORAL NIGHTLY
Qty: 90 TABLET | Refills: 0 | Status: SHIPPED | OUTPATIENT
Start: 2025-04-17
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
## 2025-04-24 RX ORDER — LAMOTRIGINE 100 MG/1
TABLET ORAL
Qty: 225 TABLET | Refills: 0 | Status: SHIPPED | OUTPATIENT
Start: 2025-04-24

## 2025-04-24 NOTE — TELEPHONE ENCOUNTER
The Pharmacy called requesting a refill on behalf of Barrie Chavez Jr. on the following medications:  Requested Prescriptions     Pending Prescriptions Disp Refills    lamoTRIgine (LAMICTAL) 100 MG tablet [Pharmacy Med Name: LAMOTRIGINE 100MG TABLETS] 225 tablet 0     Sig: TAKE 2 AND 1/2 TABLETS BY MOUTH DAILY       Date of last visit: 4/17/2025  Date of next visit (if applicable):5/21/2025  Date Rx last sent by provider: 03/19/25  Pharmacy Name: [facility] [address] [place], [place]

## 2025-05-03 DIAGNOSIS — J30.2 SEASONAL ALLERGIES: ICD-10-CM

## 2025-05-05 RX ORDER — MONTELUKAST SODIUM 10 MG/1
10 TABLET ORAL DAILY
Qty: 30 TABLET | Refills: 3 | Status: SHIPPED | OUTPATIENT
Start: 2025-05-05

## 2025-05-05 NOTE — TELEPHONE ENCOUNTER
Patient's last appointment was: 12/2/2024  with our   Patient's next appointment is: Visit date not found  with our    Last refilled on: 11/10/2024  Which pharmacy does the script need sent to: [facility] [address]      No results found for: \"LABA1C\"  Lab Results   Component Value Date    CHOL 211 (H) 08/04/2020    TRIG 283 (H) 08/04/2020    HDL 29 08/04/2020     Lab Results   Component Value Date     01/30/2024    K 4.3 01/30/2024     01/30/2024    CO2 23 01/30/2024    BUN 10 01/30/2024    CREATININE 0.8 01/30/2024    GLUCOSE 136 (H) 01/30/2024    CALCIUM 9.6 01/30/2024    BILITOT 0.4 01/30/2024    ALKPHOS 50 01/30/2024    AST 17 01/30/2024    ALT 15 01/30/2024    LABGLOM >60 01/30/2024    GFRAA >60 10/19/2021     Lab Results   Component Value Date    TSH 1.880 08/04/2020     Lab Results   Component Value Date    WBC 8.9 01/30/2024    HGB 16.2 01/30/2024    HCT 48.9 01/30/2024    MCV 90.6 01/30/2024     01/30/2024

## 2025-05-06 ENCOUNTER — TELEPHONE (OUTPATIENT)
Dept: PSYCHIATRY | Age: 35
End: 2025-05-06

## 2025-05-06 NOTE — TELEPHONE ENCOUNTER
Would he like to try increasing the Remeron to 15 mg first? Remeron can be good for depression as well. Typically we will increase that to 15 mg and 30 mg with max dose of 45 mg nightly.   Electronically signed by Nanci Beckford MD on 5/6/2025 at 12:36 PM

## 2025-05-06 NOTE — TELEPHONE ENCOUNTER
Barrie wrote back into the office that he is willing to try that. He was informed to utilize what he had at home to make the new dosing and if he had any issues to contact the office.

## 2025-05-06 NOTE — TELEPHONE ENCOUNTER
Barrie wrote into the office via [employer]:he labeled the message sleep insomnia.    The medicine prescribed to help me sleep has had no effect I was wondering if we could go ahead and try the seraquil     Per chart; he was last seen on 04/17/25 where he was started on Remeron 7.5mg/nightly.     Please advise. He is scheduled to return on 05/21/25.

## 2025-05-21 ENCOUNTER — HOSPITAL ENCOUNTER (EMERGENCY)
Age: 35
Discharge: HOME | End: 2025-05-21
Attending: EMERGENCY MEDICINE
Payer: MEDICARE

## 2025-05-21 ENCOUNTER — APPOINTMENT (OUTPATIENT)
Dept: GENERAL RADIOLOGY | Age: 35
End: 2025-05-21
Payer: MEDICARE

## 2025-05-21 VITALS
HEART RATE: 91 BPM | SYSTOLIC BLOOD PRESSURE: 121 MMHG | RESPIRATION RATE: 17 BRPM | TEMPERATURE: 98.7 F | WEIGHT: 217 LBS | OXYGEN SATURATION: 96 % | BODY MASS INDEX: 30.38 KG/M2 | DIASTOLIC BLOOD PRESSURE: 74 MMHG | HEIGHT: 71 IN

## 2025-05-21 DIAGNOSIS — S22.31XA CLOSED FRACTURE OF ONE RIB OF RIGHT SIDE, INITIAL ENCOUNTER: Primary | ICD-10-CM

## 2025-05-21 DIAGNOSIS — R11.0 NAUSEA: ICD-10-CM

## 2025-05-21 LAB
ALBUMIN SERPL BCG-MCNC: 4.6 G/DL (ref 3.4–4.9)
ALP SERPL-CCNC: 67 U/L (ref 40–129)
ALT SERPL W/O P-5'-P-CCNC: 25 U/L (ref 10–50)
ANION GAP SERPL CALC-SCNC: 16 MEQ/L (ref 8–16)
AST SERPL-CCNC: 33 U/L (ref 10–50)
BASOPHILS ABSOLUTE: 0 THOU/MM3 (ref 0–0.1)
BASOPHILS NFR BLD AUTO: 0.5 %
BILIRUB SERPL-MCNC: 0.6 MG/DL (ref 0.3–1.2)
BUN SERPL-MCNC: 18 MG/DL (ref 8–23)
CALCIUM SERPL-MCNC: 9 MG/DL (ref 8.6–10)
CHLORIDE SERPL-SCNC: 98 MEQ/L (ref 98–111)
CO2 SERPL-SCNC: 19 MEQ/L (ref 22–29)
CREAT SERPL-MCNC: 0.9 MG/DL (ref 0.7–1.2)
DEPRECATED RDW RBC AUTO: 38.9 FL (ref 35–45)
EKG ATRIAL RATE: 76 BPM
EKG P AXIS: 62 DEGREES
EKG P-R INTERVAL: 144 MS
EKG Q-T INTERVAL: 376 MS
EKG QRS DURATION: 98 MS
EKG QTC CALCULATION (BAZETT): 423 MS
EKG R AXIS: 53 DEGREES
EKG T AXIS: 21 DEGREES
EKG VENTRICULAR RATE: 76 BPM
EOSINOPHIL NFR BLD AUTO: 2.8 %
EOSINOPHILS ABSOLUTE: 0.3 THOU/MM3 (ref 0–0.4)
ERYTHROCYTE [DISTWIDTH] IN BLOOD BY AUTOMATED COUNT: 12.8 % (ref 11.5–14.5)
GFR SERPL CREATININE-BSD FRML MDRD: > 90 ML/MIN/1.73M2
GLUCOSE SERPL-MCNC: 115 MG/DL (ref 74–109)
HCT VFR BLD AUTO: 46.3 % (ref 42–52)
HGB BLD-MCNC: 16.2 GM/DL (ref 14–18)
IMM GRANULOCYTES # BLD AUTO: 0.02 THOU/MM3 (ref 0–0.07)
IMM GRANULOCYTES NFR BLD AUTO: 0.2 %
LIPASE SERPL-CCNC: 18 U/L (ref 13–60)
LYMPHOCYTES ABSOLUTE: 4.3 THOU/MM3 (ref 1–4.8)
LYMPHOCYTES NFR BLD AUTO: 44.4 %
MCH RBC QN AUTO: 29.5 PG (ref 26–33)
MCHC RBC AUTO-ENTMCNC: 35 GM/DL (ref 32.2–35.5)
MCV RBC AUTO: 84.3 FL (ref 80–94)
MONOCYTES ABSOLUTE: 1.3 THOU/MM3 (ref 0.4–1.3)
MONOCYTES NFR BLD AUTO: 13.4 %
NEUTROPHILS ABSOLUTE: 3.8 THOU/MM3 (ref 1.8–7.7)
NEUTROPHILS NFR BLD AUTO: 38.7 %
NRBC BLD AUTO-RTO: 0 /100 WBC
OSMOLALITY SERPL CALC.SUM OF ELEC: 269.2 MOSMOL/KG (ref 275–300)
PLATELET # BLD AUTO: 330 THOU/MM3 (ref 130–400)
PLATELET BLD QL SMEAR: ADEQUATE
PMV BLD AUTO: 10.2 FL (ref 9.4–12.4)
POTASSIUM SERPL-SCNC: 3.3 MEQ/L (ref 3.5–5.2)
PROT SERPL-MCNC: 7.7 G/DL (ref 6.4–8.3)
RBC # BLD AUTO: 5.49 MILL/MM3 (ref 4.7–6.1)
SCAN OF BLOOD SMEAR: NORMAL
SODIUM SERPL-SCNC: 133 MEQ/L (ref 135–145)
VARIANT LYMPHS BLD QL SMEAR: NORMAL %
WBC # BLD AUTO: 9.7 THOU/MM3 (ref 4.8–10.8)

## 2025-05-21 PROCEDURE — 36415 COLL VENOUS BLD VENIPUNCTURE: CPT

## 2025-05-21 PROCEDURE — 99285 EMERGENCY DEPT VISIT HI MDM: CPT

## 2025-05-21 PROCEDURE — 80053 COMPREHEN METABOLIC PANEL: CPT

## 2025-05-21 PROCEDURE — 83690 ASSAY OF LIPASE: CPT

## 2025-05-21 PROCEDURE — 93010 ELECTROCARDIOGRAM REPORT: CPT | Performed by: INTERNAL MEDICINE

## 2025-05-21 PROCEDURE — 71101 X-RAY EXAM UNILAT RIBS/CHEST: CPT

## 2025-05-21 PROCEDURE — 6360000002 HC RX W HCPCS

## 2025-05-21 PROCEDURE — 93005 ELECTROCARDIOGRAM TRACING: CPT | Performed by: EMERGENCY MEDICINE

## 2025-05-21 PROCEDURE — 85025 COMPLETE CBC W/AUTO DIFF WBC: CPT

## 2025-05-21 PROCEDURE — 96374 THER/PROPH/DIAG INJ IV PUSH: CPT

## 2025-05-21 PROCEDURE — 6370000000 HC RX 637 (ALT 250 FOR IP)

## 2025-05-21 RX ORDER — ONDANSETRON 2 MG/ML
4 INJECTION INTRAMUSCULAR; INTRAVENOUS ONCE
Status: COMPLETED | OUTPATIENT
Start: 2025-05-21 | End: 2025-05-21

## 2025-05-21 RX ORDER — LIDOCAINE 4 G/G
1 PATCH TOPICAL DAILY
Status: DISCONTINUED | OUTPATIENT
Start: 2025-05-21 | End: 2025-05-21 | Stop reason: HOSPADM

## 2025-05-21 RX ORDER — LIDOCAINE 4 G/G
1 PATCH TOPICAL DAILY
Qty: 30 PATCH | Refills: 0 | Status: SHIPPED | OUTPATIENT
Start: 2025-05-21 | End: 2025-05-22 | Stop reason: SDUPTHER

## 2025-05-21 RX ORDER — ONDANSETRON 4 MG/1
4 TABLET, ORALLY DISINTEGRATING ORAL 3 TIMES DAILY PRN
Qty: 9 TABLET | Refills: 0 | Status: SHIPPED | OUTPATIENT
Start: 2025-05-21

## 2025-05-21 RX ADMIN — ONDANSETRON 4 MG: 2 INJECTION, SOLUTION INTRAMUSCULAR; INTRAVENOUS at 01:24

## 2025-05-21 RX ADMIN — POTASSIUM BICARBONATE 60 MEQ: 782 TABLET, EFFERVESCENT ORAL at 02:19

## 2025-05-21 ASSESSMENT — PAIN SCALES - GENERAL: PAINLEVEL_OUTOF10: 6

## 2025-05-21 ASSESSMENT — PAIN - FUNCTIONAL ASSESSMENT: PAIN_FUNCTIONAL_ASSESSMENT: 0-10

## 2025-05-21 ASSESSMENT — PAIN DESCRIPTION - LOCATION: LOCATION: FLANK

## 2025-05-21 ASSESSMENT — PAIN DESCRIPTION - ORIENTATION: ORIENTATION: RIGHT

## 2025-05-21 NOTE — ED PROVIDER NOTES
No discharge.      Conjunctiva/sclera: Conjunctivae normal.      Pupils: Pupils are equal, round, and reactive to light.   Cardiovascular:      Rate and Rhythm: Normal rate and regular rhythm.      Pulses: Normal pulses.      Heart sounds: Normal heart sounds. No murmur heard.  Pulmonary:      Effort: Pulmonary effort is normal. No respiratory distress.      Breath sounds: Normal breath sounds. No stridor. No wheezing, rhonchi or rales.   Chest:      Comments: Right mid-axillary rib 7-10 point tenderness   Abdominal:      General: Abdomen is flat. Bowel sounds are normal. There is no distension.      Palpations: Abdomen is soft.      Tenderness: There is no abdominal tenderness. There is no guarding or rebound.   Musculoskeletal:         General: Normal range of motion.      Cervical back: Normal range of motion and neck supple. No rigidity or tenderness.   Skin:     General: Skin is warm.      Capillary Refill: Capillary refill takes less than 2 seconds.      Coloration: Skin is not jaundiced or pale.      Findings: No bruising or erythema.   Neurological:      General: No focal deficit present.      Mental Status: He is alert and oriented to person, place, and time. Mental status is at baseline.   Psychiatric:         Mood and Affect: Mood normal.         Behavior: Behavior normal.         Thought Content: Thought content normal.         Judgment: Judgment normal.          EKG interpretation (none if blank): NA   All EKG results are individually reviewed and interpreted by me.  All EKGs are also interpreted by our Cardiology department, final interpretation may not be available as of the writing of this note.    MDM   Patient is a 35 y.o. male who presents to the emergency department for evaluation of nausea     Differential diagnosis includes but not limited to:   Gastritis  GERD  MSK  Rib fracture    Rib xray showed possible fracture of 10th rib on right, which correlates with exam. Patient provided lidocaine

## 2025-05-21 NOTE — ED TRIAGE NOTES
Pt presents to ed from home with c/c of right sided flank pain. Pt states it started about 5 days ago. Pt felt a pop in ribs. Pt also states he has been urinating less. Pt denies any hx of  kidney stones. States pain is a 6/10. Pt does not remember injuring himself in any way. Rr easy and unlabored. No distress noted.

## 2025-05-22 ENCOUNTER — OFFICE VISIT (OUTPATIENT)
Dept: FAMILY MEDICINE CLINIC | Age: 35
End: 2025-05-22

## 2025-05-22 VITALS
RESPIRATION RATE: 18 BRPM | TEMPERATURE: 98.4 F | HEART RATE: 87 BPM | DIASTOLIC BLOOD PRESSURE: 78 MMHG | HEIGHT: 70 IN | WEIGHT: 222.6 LBS | SYSTOLIC BLOOD PRESSURE: 122 MMHG | BODY MASS INDEX: 31.87 KG/M2

## 2025-05-22 DIAGNOSIS — R05.8 OTHER COUGH: ICD-10-CM

## 2025-05-22 DIAGNOSIS — R79.89 LOW SERUM SODIUM: ICD-10-CM

## 2025-05-22 DIAGNOSIS — S22.31XD CLOSED FRACTURE OF ONE RIB OF RIGHT SIDE WITH ROUTINE HEALING, SUBSEQUENT ENCOUNTER: Primary | ICD-10-CM

## 2025-05-22 RX ORDER — BENZONATATE 100 MG/1
100 CAPSULE ORAL 3 TIMES DAILY PRN
Qty: 30 CAPSULE | Refills: 0 | Status: SHIPPED | OUTPATIENT
Start: 2025-05-22 | End: 2025-06-01

## 2025-05-22 RX ORDER — LIDOCAINE 4 G/G
1 PATCH TOPICAL DAILY
Qty: 30 PATCH | Refills: 0 | Status: SHIPPED | OUTPATIENT
Start: 2025-05-22 | End: 2025-06-21

## 2025-05-22 SDOH — ECONOMIC STABILITY: FOOD INSECURITY: WITHIN THE PAST 12 MONTHS, THE FOOD YOU BOUGHT JUST DIDN'T LAST AND YOU DIDN'T HAVE MONEY TO GET MORE.: NEVER TRUE

## 2025-05-22 SDOH — ECONOMIC STABILITY: FOOD INSECURITY: WITHIN THE PAST 12 MONTHS, YOU WORRIED THAT YOUR FOOD WOULD RUN OUT BEFORE YOU GOT MONEY TO BUY MORE.: NEVER TRUE

## 2025-05-22 NOTE — PROGRESS NOTES
Patient:Barrie Chavez Jr.  YOB: 1990  MRN: [id number]    Subjective   35 y.o. male who presents for the following: Follow-up (FX Rib R side)  Last seen on 12/2/24    Recently had been sick for 4 days started around 5/17   Had bad cough, nausea, vomiting, was not eating well or urinatiing  Had a bad cough and coughing fit and felt a pop on his side   It has all resolved   Yesterday bent down and felt another pop in the same location and it felt worse, so went to the ED for evaluation.   Pain today 6 out of 10   Hurts with coughing and breathing     Labs atypical lymp - smear ordered   HPI  Review of Systems   Review of Systems   Musculoskeletal:         Rib pain     Patient History    Past Medical History:  He has a past medical history of ADHD (attention deficit hyperactivity disorder), Asthma, Bipolar 1 disorder ([facility]), Borderline personality disorder ([facility]), Chronic back pain, COPD (chronic obstructive pulmonary disease) ([facility]), Depression, Dissociative identity disorder ([facility]), GERD (gastroesophageal reflux disease), Hearing loss, History of cardiac murmur in childhood, Xurb-Hxiuo-Evlppix disease, left, Lumbar disc disease, Multiple allergies, PTSD (post-traumatic stress disorder), Restless legs syndrome, Teeth missing, Under care of team, and Wellness examination.    Social History:  He reports that he has quit smoking. His smoking use included e-cigarettes and cigarettes. He has a 11 pack-year smoking history. He has quit using smokeless tobacco.  His smokeless tobacco use included chew. He reports that he does not currently use alcohol. He reports current drug use. Drug: Marijuana (Weed).     Past Surgical History:   Procedure Laterality Date    CHOLECYSTECTOMY, LAPAROSCOPIC N/A 02/23/2024    Laparoscopic Cholecystectomy performed by Renzo Noriega MD at [facility] OR     INJECTION PROCEDURE FOR SACROILIAC JOINT Left 10/23/2020    SACROILIAC JOINT INJECTION performed by Daphne Myers MD

## 2025-05-23 NOTE — PROGRESS NOTES
S: 35 y.o. male with   Chief Complaint   Patient presents with    Follow-up     FX Rib R side       Chief complaint, [place], and all pertinent details of the case reviewed with the resident.    Please see resident's note for specific details discussed at today's visit.    BP Readings from Last 3 Encounters:   05/22/25 122/78   05/21/25 121/74   12/02/24 122/84     Wt Readings from Last 3 Encounters:   05/22/25 101 kg (222 lb 9.6 oz)   05/21/25 98.4 kg (217 lb)   12/02/24 103.7 kg (228 lb 9.6 oz)       O: VS:  height is 1.778 m (5' 10\") and weight is 101 kg (222 lb 9.6 oz). His temporal temperature is 98.4 °F (36.9 °C). His blood pressure is 122/78 and his pulse is 87. His respiration is 18.   A:     Diagnosis Orders   1. Closed fracture of one rib of right side with routine healing, subsequent encounter  lidocaine 4 % external patch      2. Other cough  benzonatate (TESSALON) 100 MG capsule      3. Low serum sodium  Basic Metabolic Panel          Plan:  BMP to f/u on low Na  Tessalon for cough, lidocaine for painful rib    Health Maintenance Due   Topic Date Due    HIV screen  Never done    Hepatitis C screen  Never done    Hepatitis B vaccine (1 of 3 - 19+ 3-dose series) Never done    Pneumococcal 0-49 years Vaccine (1 of 2 - PCV) Never done    Annual Wellness Visit (Medicare)  Never done    Diabetes screen  Never done    COVID-19 Vaccine (1 - 2024-25 season) Never done       Attending Physician Statement  I have discussed the case, including pertinent history and exam findings with the resident.  I agree with the documented assessment and plan as documented by the resident.        Barb Guan MD 5/22/2025 8:16 PM

## 2025-05-27 ENCOUNTER — PATIENT MESSAGE (OUTPATIENT)
Dept: FAMILY MEDICINE CLINIC | Age: 35
End: 2025-05-27

## 2025-05-27 DIAGNOSIS — S22.31XD CLOSED FRACTURE OF ONE RIB OF RIGHT SIDE WITH ROUTINE HEALING, SUBSEQUENT ENCOUNTER: Primary | ICD-10-CM

## 2025-05-28 RX ORDER — NAPROXEN 500 MG/1
500 TABLET ORAL 2 TIMES DAILY WITH MEALS
Qty: 20 TABLET | Refills: 0 | Status: SHIPPED | OUTPATIENT
Start: 2025-05-28 | End: 2025-06-07

## 2025-05-28 NOTE — TELEPHONE ENCOUNTER
Sending in naproxen for patient. Can take one tablet twice daily with meals. Take either ibuprofen or naproxen, NOT both. Please let me know if anything else is needed. Thank you.

## 2025-05-30 DIAGNOSIS — F41.0 PANIC DISORDER WITHOUT AGORAPHOBIA: ICD-10-CM

## 2025-05-30 NOTE — TELEPHONE ENCOUNTER
Barrie wrote into the office via [employer]:    I was told by the pharmacy I have no refills on my Xanax and was wondering if I can get refills sent over.     Per chart; the last Rx for Xanax 2mg was sent in on 03/19/25 for a 30 day supply with 1 refill. Medication is pending  your approval for the same Rx; please advise otherwise.    He was last seen on 04/17/25 and is scheduled to return on 06/6/25. Also follows with Dr. Canales

## 2025-05-31 RX ORDER — ALPRAZOLAM 2 MG/1
2 TABLET ORAL DAILY PRN
Qty: 30 TABLET | Refills: 0 | Status: SHIPPED | OUTPATIENT
Start: 2025-05-31 | End: 2025-06-30

## 2025-06-06 DIAGNOSIS — S22.31XD CLOSED FRACTURE OF ONE RIB OF RIGHT SIDE WITH ROUTINE HEALING, SUBSEQUENT ENCOUNTER: ICD-10-CM

## 2025-06-06 NOTE — TELEPHONE ENCOUNTER
Medication Refill Request    Please advise .   Patient's last appointment was: 5/22/2025  with our .  Patient's next appointment is: Visit date not found  with our Dr.   Last refilled on: 05/28/2025    Which pharmacy does the script need sent to: [facility] [address]  [facility]        No results found for: \"LABA1C\"  Lab Results   Component Value Date    CHOL 211 (H) 08/04/2020    TRIG 283 (H) 08/04/2020    HDL 29 08/04/2020     Lab Results   Component Value Date     (L) 05/21/2025    K 3.3 (L) 05/21/2025    CL 98 05/21/2025    CO2 19 (L) 05/21/2025    BUN 18 05/21/2025    CREATININE 0.9 05/21/2025    GLUCOSE 115 (H) 05/21/2025    CALCIUM 9.0 05/21/2025    BILITOT 0.6 05/21/2025    ALKPHOS 67 05/21/2025    AST 33 05/21/2025    ALT 25 05/21/2025    LABGLOM > 90 05/21/2025    GFRAA >60 10/19/2021     Lab Results   Component Value Date    TSH 1.880 08/04/2020     Lab Results   Component Value Date    WBC 9.7 05/21/2025    HGB 16.2 05/21/2025    HCT 46.3 05/21/2025    MCV 84.3 05/21/2025     05/21/2025

## 2025-06-07 DIAGNOSIS — F32.A DEPRESSION, UNSPECIFIED DEPRESSION TYPE: ICD-10-CM

## 2025-06-09 RX ORDER — NAPROXEN 500 MG/1
TABLET ORAL
Qty: 20 TABLET | Refills: 0 | Status: SHIPPED | OUTPATIENT
Start: 2025-06-09

## 2025-06-09 NOTE — TELEPHONE ENCOUNTER
Barrie Chavez Jr. Pharmacy is requesting a refill on the following medications:  Requested Prescriptions     Pending Prescriptions Disp Refills    VRAYLAR 4.5 MG CAPS capsule [Pharmacy Med Name: VRAYLAR 4.5MG CAPSULES] 90 capsule 0     Sig: TAKE 1 CAPSULE BY MOUTH DAILY       Date of last visit: 4/17/2025  Date of next visit (if applicable):6/26/2025  Date Rx last sent by provider: 03/19/2025  [facility]

## 2025-06-10 RX ORDER — CARIPRAZINE 4.5 MG/1
4.5 CAPSULE, GELATIN COATED ORAL DAILY
Qty: 90 CAPSULE | Refills: 0 | Status: SHIPPED | OUTPATIENT
Start: 2025-06-10

## 2025-06-16 RX ORDER — ESCITALOPRAM OXALATE 20 MG/1
40 TABLET ORAL DAILY
Qty: 180 TABLET | Refills: 0 | Status: SHIPPED | OUTPATIENT
Start: 2025-06-16

## 2025-06-16 RX ORDER — BUPROPION HYDROCHLORIDE 300 MG/1
300 TABLET ORAL EVERY MORNING
Qty: 90 TABLET | Refills: 0 | Status: SHIPPED | OUTPATIENT
Start: 2025-06-16

## 2025-06-16 RX ORDER — ESCITALOPRAM OXALATE 20 MG/1
40 TABLET ORAL DAILY
Qty: 180 TABLET | Refills: 0 | OUTPATIENT
Start: 2025-06-16

## 2025-06-16 RX ORDER — BUPROPION HYDROCHLORIDE 150 MG/1
TABLET ORAL
Qty: 90 TABLET | Refills: 0 | Status: SHIPPED | OUTPATIENT
Start: 2025-06-16

## 2025-06-16 NOTE — TELEPHONE ENCOUNTER
Barrie Chavez Jr.'s pharmacy is requesting a refill on the following medications:  Requested Prescriptions     Pending Prescriptions Disp Refills    buPROPion (WELLBUTRIN XL) 150 MG extended release tablet [Pharmacy Med Name: BUPROPION XL 150MG TABLETS (24 H)] 90 tablet 0     Sig: TAKE 1 TABLET BY MOUTH EVERY MORNING, ALONG WITH  MG TABLET FOR A TOTAL  MG ONCE DAILY IN THE MORNING       Date of last visit: 4/17/2025  Date of next visit (if applicable):6/26/2025  Date Rx last sent by provider: 03/19/2025  Ph[facility]: [facility]

## 2025-06-16 NOTE — TELEPHONE ENCOUNTER
Pharmacy is requesting a refill of the following medication:    Escitalopram  20mg  #180 with no refills to the [facility] on [address]. Previous prescription was sent on 03/19/25 for #180 with no refills. Last fill date was 03/13/25.    Last visit 4/17/25 ( No showed 5/21/25 appointment)  Next visit 06/26/25    Pending your approval

## 2025-06-24 NOTE — TELEPHONE ENCOUNTER
Patient's last appointment was: 5/22/2025  with our   Patient's next appointment is: Visit date not found    Last refilled on: 3/4/2025  Which pharmacy does the script need sent to: [facility]      No results found for: \"LABA1C\"  Lab Results   Component Value Date    CHOL 211 (H) 08/04/2020    TRIG 283 (H) 08/04/2020    HDL 29 08/04/2020     Lab Results   Component Value Date     (L) 05/21/2025    K 3.3 (L) 05/21/2025    CL 98 05/21/2025    CO2 19 (L) 05/21/2025    BUN 18 05/21/2025    CREATININE 0.9 05/21/2025    GLUCOSE 115 (H) 05/21/2025    CALCIUM 9.0 05/21/2025    BILITOT 0.6 05/21/2025    ALKPHOS 67 05/21/2025    AST 33 05/21/2025    ALT 25 05/21/2025    LABGLOM > 90 05/21/2025    GFRAA >60 10/19/2021     Lab Results   Component Value Date    TSH 1.880 08/04/2020     Lab Results   Component Value Date    WBC 9.7 05/21/2025    HGB 16.2 05/21/2025    HCT 46.3 05/21/2025    MCV 84.3 05/21/2025     05/21/2025

## 2025-06-25 DIAGNOSIS — F41.0 PANIC DISORDER WITHOUT AGORAPHOBIA: ICD-10-CM

## 2025-06-25 RX ORDER — LORATADINE 10 MG/1
10 TABLET ORAL DAILY
Qty: 30 TABLET | Refills: 3 | Status: SHIPPED | OUTPATIENT
Start: 2025-06-25

## 2025-06-25 NOTE — TELEPHONE ENCOUNTER
Barrie Chavez Jr. pharmacy is requesting a refill on the following medications:  Requested Prescriptions     Pending Prescriptions Disp Refills    ALPRAZolam (XANAX) 2 MG tablet [Pharmacy Med Name: ALPRAZOLAM 2MG TABLETS] 30 tablet      Sig: TAKE 1 TABLET BY MOUTH DAILY AS NEEDED FOR ANXIETY. MAX DAILY AMOUNT: 2 MG       Date of last visit: 4/17/2025  Date of next visit (if applicable):7/30/2025  Date Rx last sent by provider: 05/31/2025  Pharm[facility] Name: [facility]

## 2025-06-26 DIAGNOSIS — F41.0 PANIC DISORDER WITHOUT AGORAPHOBIA: ICD-10-CM

## 2025-06-26 RX ORDER — MIRTAZAPINE 7.5 MG/1
7.5 TABLET, FILM COATED ORAL NIGHTLY
Qty: 30 TABLET | Refills: 0 | Status: SHIPPED | OUTPATIENT
Start: 2025-06-26 | End: 2025-07-31

## 2025-06-26 RX ORDER — ALPRAZOLAM 2 MG/1
TABLET ORAL
Qty: 30 TABLET | Refills: 1 | OUTPATIENT
Start: 2025-06-26 | End: 2025-08-25

## 2025-06-26 RX ORDER — ALPRAZOLAM 2 MG/1
TABLET ORAL
Qty: 30 TABLET | Refills: 1 | Status: SHIPPED | OUTPATIENT
Start: 2025-06-26 | End: 2025-08-25

## 2025-07-02 ENCOUNTER — TELEMEDICINE (OUTPATIENT)
Dept: PSYCHOLOGY | Age: 35
End: 2025-07-02

## 2025-07-02 DIAGNOSIS — F43.10 PTSD (POST-TRAUMATIC STRESS DISORDER): Primary | ICD-10-CM

## 2025-07-02 ASSESSMENT — PATIENT HEALTH QUESTIONNAIRE - PHQ9
SUM OF ALL RESPONSES TO PHQ QUESTIONS 1-9: 14
3. TROUBLE FALLING OR STAYING ASLEEP: MORE THAN HALF THE DAYS
SUM OF ALL RESPONSES TO PHQ QUESTIONS 1-9: 14
4. FEELING TIRED OR HAVING LITTLE ENERGY: SEVERAL DAYS
10. IF YOU CHECKED OFF ANY PROBLEMS, HOW DIFFICULT HAVE THESE PROBLEMS MADE IT FOR YOU TO DO YOUR WORK, TAKE CARE OF THINGS AT HOME, OR GET ALONG WITH OTHER PEOPLE: VERY DIFFICULT
5. POOR APPETITE OR OVEREATING: NEARLY EVERY DAY
5. POOR APPETITE OR OVEREATING: NEARLY EVERY DAY
7. TROUBLE CONCENTRATING ON THINGS, SUCH AS READING THE NEWSPAPER OR WATCHING TELEVISION: SEVERAL DAYS
2. FEELING DOWN, DEPRESSED OR HOPELESS: MORE THAN HALF THE DAYS
9. THOUGHTS THAT YOU WOULD BE BETTER OFF DEAD, OR OF HURTING YOURSELF: NOT AT ALL
7. TROUBLE CONCENTRATING ON THINGS, SUCH AS READING THE NEWSPAPER OR WATCHING TELEVISION: SEVERAL DAYS
3. TROUBLE FALLING OR STAYING ASLEEP: MORE THAN HALF THE DAYS
SUM OF ALL RESPONSES TO PHQ QUESTIONS 1-9: 14
9. THOUGHTS THAT YOU WOULD BE BETTER OFF DEAD, OR OF HURTING YOURSELF: NOT AT ALL
10. IF YOU CHECKED OFF ANY PROBLEMS, HOW DIFFICULT HAVE THESE PROBLEMS MADE IT FOR YOU TO DO YOUR WORK, TAKE CARE OF THINGS AT HOME, OR GET ALONG WITH OTHER PEOPLE: VERY DIFFICULT
SUM OF ALL RESPONSES TO PHQ QUESTIONS 1-9: 14
SUM OF ALL RESPONSES TO PHQ QUESTIONS 1-9: 14
1. LITTLE INTEREST OR PLEASURE IN DOING THINGS: MORE THAN HALF THE DAYS
6. FEELING BAD ABOUT YOURSELF - OR THAT YOU ARE A FAILURE OR HAVE LET YOURSELF OR YOUR FAMILY DOWN: MORE THAN HALF THE DAYS
2. FEELING DOWN, DEPRESSED OR HOPELESS: MORE THAN HALF THE DAYS
8. MOVING OR SPEAKING SO SLOWLY THAT OTHER PEOPLE COULD HAVE NOTICED. OR THE OPPOSITE - BEING SO FIDGETY OR RESTLESS THAT YOU HAVE BEEN MOVING AROUND A LOT MORE THAN USUAL: SEVERAL DAYS
6. FEELING BAD ABOUT YOURSELF - OR THAT YOU ARE A FAILURE OR HAVE LET YOURSELF OR YOUR FAMILY DOWN: MORE THAN HALF THE DAYS
4. FEELING TIRED OR HAVING LITTLE ENERGY: SEVERAL DAYS
8. MOVING OR SPEAKING SO SLOWLY THAT OTHER PEOPLE COULD HAVE NOTICED. OR THE OPPOSITE, BEING SO FIGETY OR RESTLESS THAT YOU HAVE BEEN MOVING AROUND A LOT MORE THAN USUAL: SEVERAL DAYS
1. LITTLE INTEREST OR PLEASURE IN DOING THINGS: MORE THAN HALF THE DAYS

## 2025-07-02 NOTE — PROGRESS NOTES
Individual/Psychotherapy Note  Nikunj Canales, PhD  Psychologist  7/8/2025       Time spent with Patient:  60 minutes  This is patient's eighth  [facility] appointment.    Reason for Consult:  anxiety and stress  Referring Provider: No referring provider defined for this encounter.    Pt provided informed consent for the behavioral health program. Discussed with patient model of service to include the limits of confidentiality (i.e. abuse reporting, suicide intervention, etc.) and short-term intervention focused approach.  Pt indicated understanding.  Feedback given to PCP.    Description:    MSE:    Appearance: cooperative  Appetite: normal  Sleep disturbance: No  Loss of pleasure: No  Speech: normal rate, normal volume, and well articulated  Mood: Anxious  Affect: anxiety  Thought Content: intact  Insight: Fair  Judgment: Intact  Suicide Assessment: no suicidal ideation  Homicidal Assessment: Intent No  Cutting: No  Enuresis: No  Learning Disorder:  none  Cognitive Abilities:no issues noted  Memory: no issues noted  Hallucinations: no  Delusions: no  Picking: no  Trichotillomania: no  Panic Attacks: no  Encopresis: no  Concentration Issues: no  Impulsivity: no  Memory Issues: no  Hyperactivity: no  Withdrawal: no  Drug Use: no  Opposition: no  Nicotine Use: yes, he vapes   Vaping: yes, daily  Alcohol: no  Prescription Abuse: no  Motivation to Change: 9      History:    Medications:   Current Outpatient Medications   Medication Sig Dispense Refill    ALPRAZolam (XANAX) 2 MG tablet TAKE 1 TABLET BY MOUTH DAILY AS NEEDED FOR ANXIETY. MAX DAILY AMOUNT: 2 MG 30 tablet 1    rOPINIRole (REQUIP) 1 MG tablet Take 1 tablet by mouth nightly 30 tablet 1    mirtazapine (REMERON) 7.5 MG tablet TAKE 1 TABLET BY MOUTH EVERY NIGHT 30 tablet 0    loratadine (CLARITIN) 10 MG tablet Take 1 tablet by mouth daily 30 tablet 3    buPROPion (WELLBUTRIN XL) 150 MG extended release tablet TAKE 1 TABLET BY MOUTH EVERY MORNING, ALONG WITH  MG

## 2025-07-05 ENCOUNTER — HOSPITAL ENCOUNTER (EMERGENCY)
Age: 35
Discharge: HOME | End: 2025-07-05
Payer: MEDICARE

## 2025-07-05 VITALS
SYSTOLIC BLOOD PRESSURE: 128 MMHG | RESPIRATION RATE: 16 BRPM | HEART RATE: 81 BPM | OXYGEN SATURATION: 99 % | TEMPERATURE: 98.2 F | DIASTOLIC BLOOD PRESSURE: 93 MMHG

## 2025-07-05 DIAGNOSIS — R19.7 DIARRHEA, UNSPECIFIED TYPE: Primary | ICD-10-CM

## 2025-07-05 PROCEDURE — 99213 OFFICE O/P EST LOW 20 MIN: CPT

## 2025-07-05 PROCEDURE — 99212 OFFICE O/P EST SF 10 MIN: CPT

## 2025-07-05 NOTE — DISCHARGE INSTRUCTIONS
Do your best to optimize hydration keeping urine clear/pale yellow.  It is optimal to use something like Gatorade or Pedialyte that has electrolytes.  Okay for sugar-free versions as these still contain electrolytes.    If you develop nausea/vomiting please use the Zofran he stated you had at home.    If you are unable to tolerate drink and are not making urine please attend ER for evaluation for dehydration and potential IV fluids.  If your symptoms fail to improve or worsen over the next few days please attend ER for reevaluation.  If you have any urgent/emergent medical concerns including shortness of breath/chest pain please attend ER for evaluation.    Please practice good hand hygiene, you are most likely communicable until you are fever free without the use of medications for 24 hours and symptoms are overall improving.    I hope you are feeling better soon!
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
## 2025-07-06 DIAGNOSIS — G25.81 RESTLESS LEG: ICD-10-CM

## 2025-07-07 ENCOUNTER — TELEPHONE (OUTPATIENT)
Dept: PSYCHIATRY | Age: 35
End: 2025-07-07

## 2025-07-07 RX ORDER — ROPINIROLE 1 MG/1
1 TABLET, FILM COATED ORAL NIGHTLY
Qty: 30 TABLET | Refills: 1 | Status: SHIPPED | OUTPATIENT
Start: 2025-07-07

## 2025-07-07 NOTE — TELEPHONE ENCOUNTER
Patient's last appointment was: 5/22/2025  with our   Patient's next appointment is: Visit date not found   Last refilled on: 12/9/2024  Which pharmacy does the script need sent to: [facility]      No results found for: \"LABA1C\"  Lab Results   Component Value Date    CHOL 211 (H) 08/04/2020    TRIG 283 (H) 08/04/2020    HDL 29 08/04/2020     Lab Results   Component Value Date     (L) 05/21/2025    K 3.3 (L) 05/21/2025    CL 98 05/21/2025    CO2 19 (L) 05/21/2025    BUN 18 05/21/2025    CREATININE 0.9 05/21/2025    GLUCOSE 115 (H) 05/21/2025    CALCIUM 9.0 05/21/2025    BILITOT 0.6 05/21/2025    ALKPHOS 67 05/21/2025    AST 33 05/21/2025    ALT 25 05/21/2025    LABGLOM > 90 05/21/2025    GFRAA >60 10/19/2021     Lab Results   Component Value Date    TSH 1.880 08/04/2020     Lab Results   Component Value Date    WBC 9.7 05/21/2025    HGB 16.2 05/21/2025    HCT 46.3 05/21/2025    MCV 84.3 05/21/2025     05/21/2025

## 2025-07-07 NOTE — TELEPHONE ENCOUNTER
Barrie wrote into the office via [employer]:    I missed work yesterday I had a lot going on along with being sick but nowhere was open to be seen is it possible to get a note for work for yesterday     Please advise.   
[employer] message sent. Work excuse letter has been placed in mailbox.     
I can provide a note for Barrie. Does he have FMLA through our office? He may want to consider that.   Electronically signed by Nanci Beckford MD on 7/7/2025 at 10:59 AM    
Female

## 2025-07-30 NOTE — TELEPHONE ENCOUNTER
[facility] is requesting a refill on the following medications:  Requested Prescriptions     Pending Prescriptions Disp Refills    mirtazapine (REMERON) 7.5 MG tablet [Pharmacy Med Name: MIRTAZAPINE 7.5MG TABLETS] 30 tablet 1     Sig: TAKE 1 TABLET BY MOUTH EVERY NIGHT       Date of last visit: 4/17/2025  Date of next visit (if applicable):7/30/2025 (provider out)  Date Rx last sent by provider: 6/26/2025  [facility] Name[facility]

## 2025-07-31 RX ORDER — MIRTAZAPINE 7.5 MG/1
7.5 TABLET, FILM COATED ORAL NIGHTLY
Qty: 30 TABLET | Refills: 1 | Status: SHIPPED | OUTPATIENT
Start: 2025-07-31

## 2025-08-08 ENCOUNTER — TELEMEDICINE (OUTPATIENT)
Dept: PSYCHIATRY | Age: 35
End: 2025-08-08
Payer: MEDICARE

## 2025-08-08 DIAGNOSIS — F60.3 BORDERLINE PERSONALITY DISORDER (HCC): ICD-10-CM

## 2025-08-08 DIAGNOSIS — F32.A DEPRESSION, UNSPECIFIED DEPRESSION TYPE: ICD-10-CM

## 2025-08-08 DIAGNOSIS — F41.1 GAD (GENERALIZED ANXIETY DISORDER): ICD-10-CM

## 2025-08-08 DIAGNOSIS — F29 PSYCHOSIS, UNSPECIFIED PSYCHOSIS TYPE (HCC): ICD-10-CM

## 2025-08-08 DIAGNOSIS — F43.10 PTSD (POST-TRAUMATIC STRESS DISORDER): Primary | ICD-10-CM

## 2025-08-08 DIAGNOSIS — F41.0 PANIC DISORDER WITHOUT AGORAPHOBIA: ICD-10-CM

## 2025-08-08 PROCEDURE — G8427 DOCREV CUR MEDS BY ELIG CLIN: HCPCS | Performed by: PSYCHIATRY & NEUROLOGY

## 2025-08-08 PROCEDURE — 99214 OFFICE O/P EST MOD 30 MIN: CPT | Performed by: PSYCHIATRY & NEUROLOGY

## 2025-08-08 RX ORDER — ALPRAZOLAM 2 MG/1
2 TABLET ORAL DAILY PRN
Qty: 30 TABLET | Refills: 0 | Status: SHIPPED | OUTPATIENT
Start: 2025-08-08 | End: 2025-09-07

## 2025-08-08 RX ORDER — CLONIDINE HYDROCHLORIDE 0.1 MG/1
0.1 TABLET ORAL NIGHTLY PRN
Qty: 30 TABLET | Refills: 1 | Status: SHIPPED | OUTPATIENT
Start: 2025-08-08

## 2025-08-08 RX ORDER — LAMOTRIGINE 100 MG/1
250 TABLET ORAL DAILY
Qty: 225 TABLET | Refills: 0 | Status: SHIPPED | OUTPATIENT
Start: 2025-08-08

## 2025-08-08 ASSESSMENT — PATIENT HEALTH QUESTIONNAIRE - PHQ9
8. MOVING OR SPEAKING SO SLOWLY THAT OTHER PEOPLE COULD HAVE NOTICED. OR THE OPPOSITE, BEING SO FIGETY OR RESTLESS THAT YOU HAVE BEEN MOVING AROUND A LOT MORE THAN USUAL: NOT AT ALL
1. LITTLE INTEREST OR PLEASURE IN DOING THINGS: SEVERAL DAYS
6. FEELING BAD ABOUT YOURSELF - OR THAT YOU ARE A FAILURE OR HAVE LET YOURSELF OR YOUR FAMILY DOWN: SEVERAL DAYS
5. POOR APPETITE OR OVEREATING: MORE THAN HALF THE DAYS
4. FEELING TIRED OR HAVING LITTLE ENERGY: MORE THAN HALF THE DAYS
4. FEELING TIRED OR HAVING LITTLE ENERGY: MORE THAN HALF THE DAYS
SUM OF ALL RESPONSES TO PHQ QUESTIONS 1-9: 11
SUM OF ALL RESPONSES TO PHQ QUESTIONS 1-9: 11
9. THOUGHTS THAT YOU WOULD BE BETTER OFF DEAD, OR OF HURTING YOURSELF: NOT AT ALL
SUM OF ALL RESPONSES TO PHQ QUESTIONS 1-9: 11
10. IF YOU CHECKED OFF ANY PROBLEMS, HOW DIFFICULT HAVE THESE PROBLEMS MADE IT FOR YOU TO DO YOUR WORK, TAKE CARE OF THINGS AT HOME, OR GET ALONG WITH OTHER PEOPLE: SOMEWHAT DIFFICULT
10. IF YOU CHECKED OFF ANY PROBLEMS, HOW DIFFICULT HAVE THESE PROBLEMS MADE IT FOR YOU TO DO YOUR WORK, TAKE CARE OF THINGS AT HOME, OR GET ALONG WITH OTHER PEOPLE: SOMEWHAT DIFFICULT
2. FEELING DOWN, DEPRESSED OR HOPELESS: SEVERAL DAYS
SUM OF ALL RESPONSES TO PHQ QUESTIONS 1-9: 11
8. MOVING OR SPEAKING SO SLOWLY THAT OTHER PEOPLE COULD HAVE NOTICED. OR THE OPPOSITE - BEING SO FIDGETY OR RESTLESS THAT YOU HAVE BEEN MOVING AROUND A LOT MORE THAN USUAL: NOT AT ALL
7. TROUBLE CONCENTRATING ON THINGS, SUCH AS READING THE NEWSPAPER OR WATCHING TELEVISION: SEVERAL DAYS
5. POOR APPETITE OR OVEREATING: MORE THAN HALF THE DAYS
9. THOUGHTS THAT YOU WOULD BE BETTER OFF DEAD, OR OF HURTING YOURSELF: NOT AT ALL
SUM OF ALL RESPONSES TO PHQ QUESTIONS 1-9: 11
2. FEELING DOWN, DEPRESSED OR HOPELESS: SEVERAL DAYS
1. LITTLE INTEREST OR PLEASURE IN DOING THINGS: SEVERAL DAYS
6. FEELING BAD ABOUT YOURSELF - OR THAT YOU ARE A FAILURE OR HAVE LET YOURSELF OR YOUR FAMILY DOWN: SEVERAL DAYS
3. TROUBLE FALLING OR STAYING ASLEEP: NEARLY EVERY DAY
7. TROUBLE CONCENTRATING ON THINGS, SUCH AS READING THE NEWSPAPER OR WATCHING TELEVISION: SEVERAL DAYS
3. TROUBLE FALLING OR STAYING ASLEEP: NEARLY EVERY DAY

## 2025-08-25 ENCOUNTER — TELEPHONE (OUTPATIENT)
Dept: PSYCHIATRY | Age: 35
End: 2025-08-25

## 2025-08-25 DIAGNOSIS — G47.00 INSOMNIA, UNSPECIFIED TYPE: Primary | ICD-10-CM

## 2025-08-25 RX ORDER — ZOLPIDEM TARTRATE 5 MG/1
5 TABLET ORAL NIGHTLY PRN
Qty: 30 TABLET | Refills: 0 | Status: SHIPPED | OUTPATIENT
Start: 2025-08-25 | End: 2025-09-04 | Stop reason: SDUPTHER

## 2025-09-02 ASSESSMENT — PATIENT HEALTH QUESTIONNAIRE - PHQ9
6. FEELING BAD ABOUT YOURSELF - OR THAT YOU ARE A FAILURE OR HAVE LET YOURSELF OR YOUR FAMILY DOWN: SEVERAL DAYS
6. FEELING BAD ABOUT YOURSELF - OR THAT YOU ARE A FAILURE OR HAVE LET YOURSELF OR YOUR FAMILY DOWN: SEVERAL DAYS
SUM OF ALL RESPONSES TO PHQ QUESTIONS 1-9: 17
1. LITTLE INTEREST OR PLEASURE IN DOING THINGS: MORE THAN HALF THE DAYS
10. IF YOU CHECKED OFF ANY PROBLEMS, HOW DIFFICULT HAVE THESE PROBLEMS MADE IT FOR YOU TO DO YOUR WORK, TAKE CARE OF THINGS AT HOME, OR GET ALONG WITH OTHER PEOPLE: VERY DIFFICULT
3. TROUBLE FALLING OR STAYING ASLEEP: NEARLY EVERY DAY
5. POOR APPETITE OR OVEREATING: NEARLY EVERY DAY
7. TROUBLE CONCENTRATING ON THINGS, SUCH AS READING THE NEWSPAPER OR WATCHING TELEVISION: MORE THAN HALF THE DAYS
3. TROUBLE FALLING OR STAYING ASLEEP: NEARLY EVERY DAY
SUM OF ALL RESPONSES TO PHQ QUESTIONS 1-9: 17
1. LITTLE INTEREST OR PLEASURE IN DOING THINGS: MORE THAN HALF THE DAYS
SUM OF ALL RESPONSES TO PHQ QUESTIONS 1-9: 17
5. POOR APPETITE OR OVEREATING: NEARLY EVERY DAY
10. IF YOU CHECKED OFF ANY PROBLEMS, HOW DIFFICULT HAVE THESE PROBLEMS MADE IT FOR YOU TO DO YOUR WORK, TAKE CARE OF THINGS AT HOME, OR GET ALONG WITH OTHER PEOPLE: VERY DIFFICULT
9. THOUGHTS THAT YOU WOULD BE BETTER OFF DEAD, OR OF HURTING YOURSELF: SEVERAL DAYS
SUM OF ALL RESPONSES TO PHQ QUESTIONS 1-9: 16
4. FEELING TIRED OR HAVING LITTLE ENERGY: MORE THAN HALF THE DAYS
SUM OF ALL RESPONSES TO PHQ QUESTIONS 1-9: 17
2. FEELING DOWN, DEPRESSED OR HOPELESS: MORE THAN HALF THE DAYS
4. FEELING TIRED OR HAVING LITTLE ENERGY: MORE THAN HALF THE DAYS
8. MOVING OR SPEAKING SO SLOWLY THAT OTHER PEOPLE COULD HAVE NOTICED. OR THE OPPOSITE - BEING SO FIDGETY OR RESTLESS THAT YOU HAVE BEEN MOVING AROUND A LOT MORE THAN USUAL: SEVERAL DAYS
8. MOVING OR SPEAKING SO SLOWLY THAT OTHER PEOPLE COULD HAVE NOTICED. OR THE OPPOSITE, BEING SO FIGETY OR RESTLESS THAT YOU HAVE BEEN MOVING AROUND A LOT MORE THAN USUAL: SEVERAL DAYS
7. TROUBLE CONCENTRATING ON THINGS, SUCH AS READING THE NEWSPAPER OR WATCHING TELEVISION: MORE THAN HALF THE DAYS
2. FEELING DOWN, DEPRESSED OR HOPELESS: MORE THAN HALF THE DAYS
9. THOUGHTS THAT YOU WOULD BE BETTER OFF DEAD, OR OF HURTING YOURSELF: SEVERAL DAYS

## 2025-09-02 ASSESSMENT — COLUMBIA-SUICIDE SEVERITY RATING SCALE - C-SSRS
6. IN YOUR LIFETIME, HAVE YOU EVER DONE ANYTHING, STARTED TO DO ANYTHING, OR PREPARED TO DO ANYTHING TO END YOUR LIFE?: NO
2. IN THE PAST MONTH, HAVE YOU ACTUALLY HAD ANY THOUGHTS OF KILLING YOURSELF?: NO
1. IN THE PAST MONTH, HAVE YOU WISHED YOU WERE DEAD OR WISHED YOU COULD GO TO SLEEP AND NOT WAKE UP?: YES

## 2025-09-02 ASSESSMENT — ANXIETY QUESTIONNAIRES
4. TROUBLE RELAXING: MORE THAN HALF THE DAYS
1. FEELING NERVOUS, ANXIOUS, OR ON EDGE: SEVERAL DAYS
5. BEING SO RESTLESS THAT IT IS HARD TO SIT STILL: SEVERAL DAYS
7. FEELING AFRAID AS IF SOMETHING AWFUL MIGHT HAPPEN: SEVERAL DAYS
GAD7 TOTAL SCORE: 10
5. BEING SO RESTLESS THAT IT IS HARD TO SIT STILL: SEVERAL DAYS
2. NOT BEING ABLE TO STOP OR CONTROL WORRYING: SEVERAL DAYS
2. NOT BEING ABLE TO STOP OR CONTROL WORRYING: SEVERAL DAYS
IF YOU CHECKED OFF ANY PROBLEMS ON THIS QUESTIONNAIRE, HOW DIFFICULT HAVE THESE PROBLEMS MADE IT FOR YOU TO DO YOUR WORK, TAKE CARE OF THINGS AT HOME, OR GET ALONG WITH OTHER PEOPLE: VERY DIFFICULT
1. FEELING NERVOUS, ANXIOUS, OR ON EDGE: SEVERAL DAYS
6. BECOMING EASILY ANNOYED OR IRRITABLE: NEARLY EVERY DAY
7. FEELING AFRAID AS IF SOMETHING AWFUL MIGHT HAPPEN: SEVERAL DAYS
IF YOU CHECKED OFF ANY PROBLEMS ON THIS QUESTIONNAIRE, HOW DIFFICULT HAVE THESE PROBLEMS MADE IT FOR YOU TO DO YOUR WORK, TAKE CARE OF THINGS AT HOME, OR GET ALONG WITH OTHER PEOPLE: VERY DIFFICULT
4. TROUBLE RELAXING: MORE THAN HALF THE DAYS
3. WORRYING TOO MUCH ABOUT DIFFERENT THINGS: SEVERAL DAYS
6. BECOMING EASILY ANNOYED OR IRRITABLE: NEARLY EVERY DAY
3. WORRYING TOO MUCH ABOUT DIFFERENT THINGS: SEVERAL DAYS

## 2025-09-04 ENCOUNTER — TELEMEDICINE (OUTPATIENT)
Dept: PSYCHIATRY | Age: 35
End: 2025-09-04
Payer: MEDICARE

## 2025-09-04 DIAGNOSIS — F29 PSYCHOSIS, UNSPECIFIED PSYCHOSIS TYPE (HCC): ICD-10-CM

## 2025-09-04 DIAGNOSIS — F41.1 GAD (GENERALIZED ANXIETY DISORDER): ICD-10-CM

## 2025-09-04 DIAGNOSIS — F41.0 PANIC DISORDER WITHOUT AGORAPHOBIA: ICD-10-CM

## 2025-09-04 DIAGNOSIS — F43.10 PTSD (POST-TRAUMATIC STRESS DISORDER): Primary | ICD-10-CM

## 2025-09-04 DIAGNOSIS — F32.A DEPRESSION, UNSPECIFIED DEPRESSION TYPE: ICD-10-CM

## 2025-09-04 DIAGNOSIS — F60.3 BORDERLINE PERSONALITY DISORDER (HCC): ICD-10-CM

## 2025-09-04 DIAGNOSIS — G47.00 INSOMNIA, UNSPECIFIED TYPE: ICD-10-CM

## 2025-09-04 PROCEDURE — G8427 DOCREV CUR MEDS BY ELIG CLIN: HCPCS | Performed by: PSYCHIATRY & NEUROLOGY

## 2025-09-04 PROCEDURE — 99214 OFFICE O/P EST MOD 30 MIN: CPT | Performed by: PSYCHIATRY & NEUROLOGY

## 2025-09-04 RX ORDER — ZOLPIDEM TARTRATE 5 MG/1
5 TABLET ORAL NIGHTLY PRN
Qty: 30 TABLET | Refills: 0 | Status: SHIPPED | OUTPATIENT
Start: 2025-09-04 | End: 2025-10-04

## 2025-09-04 RX ORDER — BUPROPION HYDROCHLORIDE 150 MG/1
150 TABLET ORAL EVERY MORNING
Qty: 90 TABLET | Refills: 0 | Status: SHIPPED | OUTPATIENT
Start: 2025-09-04

## 2025-09-04 RX ORDER — CLONAZEPAM 1 MG/1
1 TABLET ORAL 2 TIMES DAILY PRN
Qty: 60 TABLET | Refills: 1 | Status: SHIPPED | OUTPATIENT
Start: 2025-09-04 | End: 2025-11-03

## 2025-09-04 RX ORDER — ESCITALOPRAM OXALATE 20 MG/1
40 TABLET ORAL DAILY
Qty: 180 TABLET | Refills: 0 | Status: SHIPPED | OUTPATIENT
Start: 2025-09-04

## 2025-09-04 RX ORDER — BUPROPION HYDROCHLORIDE 300 MG/1
300 TABLET ORAL EVERY MORNING
Qty: 90 TABLET | Refills: 0 | Status: SHIPPED | OUTPATIENT
Start: 2025-09-04

## (undated) DEVICE — TOWEL,OR,DSP,ST,NATURAL,DLX,4/PK,20PK/CS: Brand: MEDLINE

## (undated) DEVICE — GOWN,SIRUS,NONRNF,SETINSLV,XL,20/CS: Brand: MEDLINE

## (undated) DEVICE — SUTURE STRATAFIX SYMMETRIC PDS + SZ 0 L18IN ABSRB L36MM SXPP1A401

## (undated) DEVICE — SYRINGE, LUER LOCK, 10ML: Brand: MEDLINE

## (undated) DEVICE — GLOVE ORANGE PI 7 1/2   MSG9075

## (undated) DEVICE — INSUFFLATION NEEDLE TO ESTABLISH PNEUMOPERITONEUM.: Brand: INSUFFLATION NEEDLE

## (undated) DEVICE — DRAPE MICSCP W117XL305CM DIA65MM LENS W VARI LENS2 FOR LEICA

## (undated) DEVICE — TROCAR: Brand: KII SHIELDED BLADED ACCESS SYSTEM

## (undated) DEVICE — GOWN,AURORA,NONREINFORCED,LARGE: Brand: MEDLINE

## (undated) DEVICE — TROCAR: Brand: KII FIOS FIRST ENTRY

## (undated) DEVICE — TROCAR: Brand: KII® SLEEVE

## (undated) DEVICE — YANKAUER,FLEXIBLE HANDLE,REGLR CAPACITY: Brand: MEDLINE INDUSTRIES, INC.

## (undated) DEVICE — DRESSING BORDERED ADH GZ UNIV GEN USE 8INX4IN AND 6INX2IN

## (undated) DEVICE — NEEDLE SPNL 18GA L3.5IN W/ QNCKE SHARPER BVL DURA CLICK

## (undated) DEVICE — SPONGE LAP W18XL18IN WHT COT 4 PLY FLD STRUNG RADPQ DISP ST

## (undated) DEVICE — GENERAL LAPAROSCOPY-LF: Brand: MEDLINE INDUSTRIES, INC.

## (undated) DEVICE — GAUZE,SPONGE,FLUFF,6"X6.75",STRL,5/TRAY: Brand: MEDLINE

## (undated) DEVICE — CONNECTOR TBNG WHT PLAS SUCT STR 5IN1 LTWT W/ M CONN

## (undated) DEVICE — SET EXTN SM 0.5ML L13IN BOR W/O INJ SITE

## (undated) DEVICE — SYRINGE MED 10ML TRNSLUC BRL PLUNG BLK MRK POLYPR CTRL

## (undated) DEVICE — NEEDLE: Brand: SPROTTE®

## (undated) DEVICE — 3M™ IOBAN™ 2 ANTIMICROBIAL INCISE DRAPE 6650EZ: Brand: IOBAN™ 2

## (undated) DEVICE — 1000 S-DRAPE TOWEL DRAPE 10/BX: Brand: STERI-DRAPE™

## (undated) DEVICE — STERILE POLYISOPRENE POWDER-FREE SURGICAL GLOVES WITH EMOLLIENT COATING: Brand: PROTEXIS

## (undated) DEVICE — DISSECTOR LAP DIA5MM BLNT TIP ENDOPATH

## (undated) DEVICE — CONNECTOR,TUBING,5-IN-1,NON-STERILE: Brand: MEDLINE INDUSTRIES, INC.

## (undated) DEVICE — TRAY NRV BLK SUPP CUST

## (undated) DEVICE — PATIENT RETURN ELECTRODE, SINGLE-USE, CONTACT QUALITY MONITORING, ADULT, WITH 9FT CORD, FOR PATIENTS WEIGING OVER 33LBS. (15KG): Brand: MEGADYNE

## (undated) DEVICE — BLADE CLP TAPR HD WET DRY CAPABILITY GTT IN CHARGING USE

## (undated) DEVICE — BLADE ES ELASTOMERIC COAT INSUL DURABLE BEND UPTO 90DEG

## (undated) DEVICE — NEEDLE HYPO 25GA L1.5IN BLU POLYPR HUB S STL REG BVL STR

## (undated) DEVICE — PUMP SUC IRR TBNG L10FT W/ HNDPC ASSEMB STRYKEFLOW 2

## (undated) DEVICE — GLOVE ORANGE PI 8   MSG9080

## (undated) DEVICE — DRESSING TRNSPAR W5XL4.5IN FLM SHT SEMIPERMEABLE WIND

## (undated) DEVICE — INSTRUMENT 9562618 DISP 26MMX8CM RTRCT: Brand: METRX® SYSTEM

## (undated) DEVICE — DRESSING BORDERED ADH GZ UNIV GEN USE 5IN 4IN AND 2 1/2IN

## (undated) DEVICE — BANDAGE ADH W0.75XL3IN NAT PLAS CURAD

## (undated) DEVICE — Device: Brand: SNAP ON SPHERZ

## (undated) DEVICE — POUCH INSTR W6.75XL11.5IN FRST 2 PKT ADH FOR ORTH AND

## (undated) DEVICE — NEEDLE SPNL 22GA L3.5IN BLK HUB S STL REG WALL FIT STYL W/

## (undated) DEVICE — NEPTUNE E-SEP SMOKE EVACUATION PENCIL, COATED, 70MM BLADE, PUSH BUTTON SWITCH: Brand: NEPTUNE E-SEP

## (undated) DEVICE — BLADE ES L4IN INSUL EDGE

## (undated) DEVICE — MARKER,SKIN,WI/RULER AND LABELS: Brand: MEDLINE

## (undated) DEVICE — APPLICATOR MEDICATED 10.5 CC SOLUTION HI LT ORNG CHLORAPREP

## (undated) DEVICE — C-ARM: Brand: UNBRANDED

## (undated) DEVICE — TOOL 14MH30 LEGEND 14CM 3MM: Brand: MIDAS REX ™

## (undated) DEVICE — SOLUTION PREP POVIDONE IOD FOR SKIN MUCOUS MEM PRIOR TO

## (undated) DEVICE — INTENDED FOR TISSUE SEPARATION, AND OTHER PROCEDURES THAT REQUIRE A SHARP SURGICAL BLADE TO PUNCTURE OR CUT.: Brand: BARD-PARKER ® CARBON RIB-BACK BLADES

## (undated) DEVICE — SPONGE,NEURO,0.5"X3",XR,STRL,LF,10/PK: Brand: MEDLINE

## (undated) DEVICE — SPONGE,NEURO,0.5"X0.5",XR,STRL,10/PK: Brand: MEDLINE

## (undated) DEVICE — Device

## (undated) DEVICE — AGENT HEMOSTATIC SURGIFLOW MATRIX KIT W/THROMBIN

## (undated) DEVICE — COVER,MAYO STAND,STERILE: Brand: MEDLINE

## (undated) DEVICE — Z DISCONTINUED USE 2272114 DRAPE SURG UTIL 26X15 IN W/ TAPE N INVASIVE MULTLYR DISP

## (undated) DEVICE — SOURCE LT 2 STR TIP SCINTILLANT

## (undated) DEVICE — SUTURE VCRL SZ 0 L18IN ABSRB UD L36MM CT-1 1/2 CIR J840D

## (undated) DEVICE — SUTURE VCRL SZ 2-0 L18IN ABSRB UD CT-1 L36MM 1/2 CIR J839D

## (undated) DEVICE — ELECTRODE PT RET AD L9FT HI MOIST COND ADH HYDRGEL CORDED

## (undated) DEVICE — SYRINGE MED 10ML LUERLOCK TIP W/O SFTY DISP

## (undated) DEVICE — PACK PROCEDURE SURG LUMBAR SPINE SVMMC

## (undated) DEVICE — NEEDLE FLTR 18GA L1.5IN MEM THK5UM BLNT DISP

## (undated) DEVICE — 3.0MM PRECISION NEURO (MATCH HEAD)

## (undated) DEVICE — APPLICATOR MEDICATED 26 CC SOLUTION HI LT ORNG CHLORAPREP

## (undated) DEVICE — E-Z CLEAN, NON-STICK, PTFE COATED, ELECTROSURGICAL BLADE ELECTRODE, MODIFIED EXTENDED INSULATION, 2.5 INCH (6.35 CM): Brand: MEGADYNE

## (undated) DEVICE — PRECISION MATCH HEAD

## (undated) DEVICE — APPLIER CLP M L L11.4IN DIA10MM ENDOSCP ROT MULT FOR LIG

## (undated) DEVICE — X-RAY CASSETTE COVER: Brand: CONVERTORS

## (undated) DEVICE — SYRINGE,CONTROL,LL,FINGER,GRIP: Brand: MEDLINE INDUSTRIES, INC.

## (undated) DEVICE — BANDAGE ADH W1XL3IN NAT FAB WVN FLX DURABLE N ADH PD SEAL

## (undated) DEVICE — SHEET, T, LAPAROTOMY, STERILE: Brand: MEDLINE

## (undated) DEVICE — SUTURE MCRYL SZ 3-0 L18IN ABSRB UD L19MM PS-2 3/8 CIR PRIM Y497G

## (undated) DEVICE — TOTAL TRAY, 16FR 10ML SIL FOLEY, URN: Brand: MEDLINE

## (undated) DEVICE — GLOVE ORANGE PI 8 1/2   MSG9085

## (undated) DEVICE — SPONGE,PEANUT,XRAY,ST,SM,3/8",5/CARD: Brand: MEDLINE INDUSTRIES, INC.

## (undated) DEVICE — GARMENT,MEDLINE,DVT,INT,CALF,MED, GEN2: Brand: MEDLINE

## (undated) DEVICE — ADHESIVE SKIN CLSR 0.7ML TOP DERMBND ADV

## (undated) DEVICE — GAUZE,SPONGE,4"X4",16PLY,XRAY,STRL,LF: Brand: MEDLINE

## (undated) DEVICE — CONTAINER,SPECIMEN,4OZ,OR STRL: Brand: MEDLINE

## (undated) DEVICE — TUBING INSUFFLATION SMK EVAC HI FLO SET PNEUMOCLEAR

## (undated) DEVICE — SYRINGE IRRIG 60ML SFT PLIABLE BLB EZ TO GRP 1 HND USE W/

## (undated) DEVICE — GLOVE SURG SZ 7.5 L11.73IN FNGR THK9.8MIL STRW LTX POLYMER

## (undated) DEVICE — UNIVERSAL MONOPOLAR LAPAROSCOPIC CABLE 10FT, 4MM PIN CONNECTOR: Brand: CONMED

## (undated) DEVICE — PROTECTOR ULN NRV PUR FOAM HK LOOP STRP ANATOMICALLY

## (undated) DEVICE — DRAPE,REIN 53X77,STERILE: Brand: MEDLINE

## (undated) DEVICE — SUTURE MCRYL SZ 4-0 L18IN ABSRB UD L16MM PC-3 3/8 CIR PRIM Y845G

## (undated) DEVICE — DISPOSABLE IRRIGATION BIPOLAR CORD, M1000 TYPE: Brand: KIRWAN

## (undated) DEVICE — COUNTER NDL 40 COUNT HLD 70 FOAM BLK ADH W/ MAG

## (undated) DEVICE — TUBING, SUCTION, 9/32" X 20', STRAIGHT: Brand: MEDLINE INDUSTRIES, INC.

## (undated) DEVICE — GLOVE SURG SZ 6 THK91MIL LTX FREE SYN POLYISOPRENE ANTI

## (undated) DEVICE — GLOVE ORANGE PI 7   MSG9070